# Patient Record
Sex: FEMALE | Race: WHITE | Employment: UNEMPLOYED | URBAN - METROPOLITAN AREA
[De-identification: names, ages, dates, MRNs, and addresses within clinical notes are randomized per-mention and may not be internally consistent; named-entity substitution may affect disease eponyms.]

---

## 2017-01-12 ENCOUNTER — OFFICE VISIT (OUTPATIENT)
Dept: PAIN MANAGEMENT | Age: 62
End: 2017-01-12

## 2017-01-12 VITALS — DIASTOLIC BLOOD PRESSURE: 77 MMHG | RESPIRATION RATE: 14 BRPM | HEART RATE: 56 BPM | SYSTOLIC BLOOD PRESSURE: 132 MMHG

## 2017-01-12 DIAGNOSIS — M54.5 CHRONIC MIDLINE LOW BACK PAIN, WITH SCIATICA PRESENCE UNSPECIFIED: ICD-10-CM

## 2017-01-12 DIAGNOSIS — M16.12 ARTHRITIS OF LEFT HIP: Primary | ICD-10-CM

## 2017-01-12 DIAGNOSIS — M54.17 LUMBOSACRAL RADICULOPATHY: ICD-10-CM

## 2017-01-12 DIAGNOSIS — G89.29 CHRONIC MIDLINE LOW BACK PAIN, WITH SCIATICA PRESENCE UNSPECIFIED: ICD-10-CM

## 2017-01-12 DIAGNOSIS — Z94.0 H/O KIDNEY TRANSPLANT: ICD-10-CM

## 2017-01-12 DIAGNOSIS — Z79.899 ENCOUNTER FOR LONG-TERM (CURRENT) USE OF MEDICATIONS: ICD-10-CM

## 2017-01-12 DIAGNOSIS — M51.37 DEGENERATION OF LUMBAR OR LUMBOSACRAL INTERVERTEBRAL DISC: ICD-10-CM

## 2017-01-12 DIAGNOSIS — Z79.899 ENCOUNTER FOR LONG-TERM (CURRENT) DRUG USE: ICD-10-CM

## 2017-01-12 LAB
ALCOHOL UR POC: NORMAL
AMPHETAMINES UR POC: NEGATIVE
BARBITURATES UR POC: NEGATIVE
BENZODIAZEPINES UR POC: NEGATIVE
BUPRENORPHINE UR POC: NORMAL
CANNABINOIDS UR POC: NEGATIVE
CARISOPRODOL UR POC: NORMAL
COCAINE UR POC: NEGATIVE
FENTANYL UR POC: NORMAL
MDMA/ECSTASY UR POC: NEGATIVE
METHADONE UR POC: NORMAL
METHAMPHETAMINE UR POC: NEGATIVE
METHYLPHENIDATE UR POC: NEGATIVE
OPIATES UR POC: NORMAL
OXYCODONE UR POC: NORMAL
PHENCYCLIDINE UR POC: NEGATIVE
PROPOXYPHENE UR POC: NORMAL
TRAMADOL UR POC: NORMAL
TRICYCLICS UR POC: NEGATIVE

## 2017-01-12 RX ORDER — OXYCODONE AND ACETAMINOPHEN 10; 325 MG/1; MG/1
1 TABLET ORAL
Qty: 60 TAB | Refills: 0 | Status: SHIPPED | OUTPATIENT
Start: 2017-03-11 | End: 2017-04-06 | Stop reason: SDUPTHER

## 2017-01-12 RX ORDER — MORPHINE SULFATE 60 MG/1
60 TABLET, FILM COATED, EXTENDED RELEASE ORAL EVERY 6 HOURS
Qty: 120 TAB | Refills: 0 | Status: SHIPPED | OUTPATIENT
Start: 2017-03-26 | End: 2017-04-06 | Stop reason: SDUPTHER

## 2017-01-12 RX ORDER — MORPHINE SULFATE 60 MG/1
60 TABLET, FILM COATED, EXTENDED RELEASE ORAL EVERY 6 HOURS
Qty: 120 TAB | Refills: 0 | Status: SHIPPED | OUTPATIENT
Start: 2017-01-27 | End: 2017-01-12 | Stop reason: SDUPTHER

## 2017-01-12 RX ORDER — ZOLPIDEM TARTRATE 10 MG/1
TABLET ORAL
Qty: 30 TAB | Refills: 2 | Status: SHIPPED | OUTPATIENT
Start: 2017-01-12 | End: 2017-04-06 | Stop reason: SDUPTHER

## 2017-01-12 RX ORDER — OXYCODONE AND ACETAMINOPHEN 10; 325 MG/1; MG/1
1 TABLET ORAL
Qty: 60 TAB | Refills: 0 | Status: SHIPPED | OUTPATIENT
Start: 2017-02-10 | End: 2017-01-12 | Stop reason: SDUPTHER

## 2017-01-12 RX ORDER — MORPHINE SULFATE 60 MG/1
60 TABLET, FILM COATED, EXTENDED RELEASE ORAL EVERY 6 HOURS
Qty: 120 TAB | Refills: 0 | Status: SHIPPED | OUTPATIENT
Start: 2017-02-25 | End: 2017-01-12 | Stop reason: SDUPTHER

## 2017-01-12 NOTE — PROGRESS NOTES
Nursing Notes    Patient presents to the office today in follow-up. Reviewed medications with counts as follows:    Rx Date filled Qty Dispensed Pill Count Last Dose Short   Morphine er 60 mg 12/29/16 120 63 today no   Percocet 10/325 1/9/17 60 60 n/a no   Ms. Jada Dias has a reminder for a \"due or due soon\" health maintenance. I have asked that she contact her primary care provider for follow-up on this health maintenance. POC UDS was performed in office today    Any new labs or imaging since last appointment? NO    Have you been to an emergency room (ER) or urgent care clinic since your last visit? NO            Have you been hospitalized since your last visit? NO     If yes, where, when, and reason for visit? Have you seen or consulted any other health care providers outside of the [address] [place] [address]  since your last visit? NO     If yes, where, when, and reason for visit?

## 2017-01-12 NOTE — MR AVS SNAPSHOT
Visit Information Date & Time Provider Department Dept. Phone Encounter #  
 1/12/2017  4:00 PM [facility] [id number] Follow-up Instructions Return in about 3 months (around 4/12/2017). Upcoming Health Maintenance Date Due Hepatitis C Screening 1955 FOOT EXAM Q1 9/18/1964 MICROALBUMIN Q1 9/18/1964 Pneumococcal 19-64 Medium Risk (1 of 1 - PPSV23) 9/18/1973 DTaP/Tdap/Td series (1 - Tdap) 9/18/1975 PAP AKA CERVICAL CYTOLOGY 9/18/1975 EYE EXAM RETINAL OR DILATED Q1 2/3/2012 BREAST CANCER SCRN MAMMOGRAM 9/16/2012 ZOSTER VACCINE AGE 60> 9/18/2014 LIPID PANEL Q1 4/21/2015 HEMOGLOBIN A1C Q6M 5/9/2016 INFLUENZA AGE 9 TO ADULT 8/1/2016 COLONOSCOPY 6/27/2021 Allergies as of 1/12/2017  Review Complete On: 1/12/2017 By: Garland Ugalde LPN Severity Noted Reaction Type Reactions Asa-acetaminophen-caff-potass    Unknown (comments) Bystolic [Nebivolol]  81/81/0455    Other (comments) Lipitor [Atorvastatin]  01/25/2012    Unknown (comments) Nsaids (Non-steroidal Anti-inflammatory Drug)    Unknown (comments) Toprol Xl [Metoprolol Succinate]    Other (comments) Asthma Current Immunizations  Never Reviewed Name Date Influenza Vaccine 11/11/2013, 12/18/2012 Influenza Vaccine (Quad) PF 11/10/2015 Not reviewed this visit You Were Diagnosed With   
  
 Codes Comments Arthritis of left hip    -  Primary ICD-10-CM: M19.90 ICD-9-CM: 716.95 Encounter for long-term (current) use of medications     ICD-10-CM: Z79.899 ICD-9-CM: V58.69 Lumbosacral radiculopathy     ICD-10-CM: M54.17 ICD-9-CM: 724.4 Chronic midline low back pain, with sciatica presence unspecified     ICD-10-CM: M54.5, G89.29 ICD-9-CM: 724.2, 338.29  Degeneration of lumbar or lumbosacral intervertebral disc     ICD-10-CM: M51.37 
ICD-9-CM: 722.52   
 Encounter for long-term (current) drug use     ICD-10-CM: Z79.899 ICD-9-CM: V58.69   
 H/O kidney transplant     ICD-10-CM: Z94.0 ICD-9-CM: V42.0 Vitals BP Pulse Resp OB Status Smoking Status 132/77 (!) 56 14 Hysterectomy Never Smoker Vitals History Preferred Pharmacy Pharmacy Name Phone [address], [address] [facility] [phone] Your Updated Medication List  
  
   
This list is accurate as of: 1/12/17  4:56 PM.  Always use your most recent med list.  
  
  
  
  
 Mertha Becka Take  by mouth. doxycycline 50 mg tablet Commonly known as:  ADOXA  
  
 ergocalciferol 50,000 unit capsule Commonly known as:  ERGOCALCIFEROL  
TAKE 1 CAPSULE BY MOUTH EVERY WEEK  
  
 folic acid 742 mcg tablet Take 400 mcg by mouth daily. FOSAMAX 5 mg tablet Generic drug:  alendronate Take 40 mg by mouth Daily (before breakfast). linaclotide 290 mcg Cap capsule Commonly known as:  [place] Take 1 Cap by mouth Daily (before breakfast). For chronic opioid-induced constipation  
  
 lisinopril 20 mg tablet Commonly known as:  Emily November Take  by mouth daily. magic mouthwash Susp Commonly known as:  [place] Take 5 mL by mouth every four (4) hours as needed for Pain (Benadryl- 30 cc; Maalox or equivalent-60 cc; 4 grams of liquid Carafate- 5 cc po-swish, gargle, and swallow q4 hrs. prn sore throat or prn as needed. ). morphine CR 60 mg CR tablet Commonly known as:  MS CONTIN Take 1 Tab by mouth every six (6) hours for 30 days. Indications: CHRONIC PAIN, NEUROPATHIC PAIN, SEVERE PAIN Start taking on:  3/26/2017 * oxyCODONE-acetaminophen  mg per tablet Commonly known as:  PERCOCET 10 Take 1 Tab by mouth. * oxyCODONE-acetaminophen  mg per tablet Commonly known as:  PERCOCET 10 Take 1 Tab by mouth two (2) times daily as needed for Pain (breakthrough) for up to 30 days. Indications: PAIN Start taking on:  3/11/2017 PriLOSEC 40 mg capsule Generic drug:  omeprazole Take 40 mg by mouth daily. simvastatin 80 mg tablet Commonly known as:  ZOCOR Take 80 mg by mouth nightly. ZOFRAN (AS HYDROCHLORIDE) 4 mg tablet Generic drug:  ondansetron hcl Take 4 mg by mouth every eight (8) hours as needed for Nausea. zolpidem 10 mg tablet Commonly known as:  AMBIEN  
TAKE 1 TABLET BY MOUTH NIGHTLY AS NEEDED FOR SLEEP FOR UP TO 30 DAYS * Notice: This list has 2 medication(s) that are the same as other medications prescribed for you. Read the directions carefully, and ask your doctor or other care provider to review them with you. Prescriptions Printed Refills  
 zolpidem (AMBIEN) 10 mg tablet 2 Sig: TAKE 1 TABLET BY MOUTH NIGHTLY AS NEEDED FOR SLEEP FOR UP TO 30 DAYS Class: Print  
 morphine CR (MS CONTIN) 60 mg CR tablet 0 Starting on: 3/26/2017 Sig: Take 1 Tab by mouth every six (6) hours for 30 days. Indications: CHRONIC PAIN, NEUROPATHIC PAIN, SEVERE PAIN Class: Print Route: Oral  
 oxyCODONE-acetaminophen (PERCOCET 10)  mg per tablet 0 Starting on: 3/11/2017 Sig: Take 1 Tab by mouth two (2) times daily as needed for Pain (breakthrough) for up to 30 days. Indications: PAIN Class: Print Route: Oral  
  
Prescriptions Sent to Pharmacy Refills  
 linaclotide (LINZESS) 290 mcg cap capsule 5 Sig: Take 1 Cap by mouth Daily (before breakfast). For chronic opioid-induced constipation Class: Normal  
 Pharmacy: [address] [place] [address] #: [phone] Route: Oral  
  
We Performed the Following AMB POC DRUG SCREEN () [ [facility]] DRUG SCREEN [[id number] Custom] Follow-up Instructions Return in about 3 months (around 4/12/2017). Patient Instructions Plan: Continue same medications as prescribed for chronic pain Follow up in 3 months or sooner if needed Regular exercise and attention to emotional health and diet remain the most effective ways to treat chronic pain of all kinds You may contact me with questions or concerns through [address] Introducing [facility] & HEALTH SERVICES! Dorian Vazquez introduces [employer] patient portal. Now you can access parts of your medical record, email your doctor's office, and request medication refills online. 1. In your internet browser, go to [email or web address] 2. Click on the First Time User? Click Here link in the Sign In box. You will see the New Member Sign Up page. 3. Enter your [employer] Access Code exactly as it appears below. You will not need to use this code after youve completed the sign-up process. If you do not sign up before the expiration date, you must request a new code. · [employer] Access Code: [email or web address] Expires: 4/12/2017  4:52 PM 
 
4. Enter the last four digits of your Social Security Number (xxxx) and Date of Birth (mm/dd/yyyy) as indicated and click Submit. You will be taken to the next sign-up page. 5. Create a [employer] ID. This will be your [employer] login ID and cannot be changed, so think of one that is secure and easy to remember. 6. Create a [employer] password. You can change your password at any time. 7. Enter your Password Reset Question and Answer. This can be used at a later time if you forget your password. 8. Enter your e-mail address. You will receive e-mail notification when new information is available in [address] 9. Click Sign Up. You can now view and download portions of your medical record. 10. Click the Download Summary menu link to download a portable copy of your medical information. If you have questions, please visit the Frequently Asked Questions section of the [employer] website.  Remember, [employer] is NOT to be used for urgent needs. For medical emergencies, dial 911. Now available from your iPhone and Android! Please provide this summary of care documentation to your next provider. Your primary care clinician is listed as Natanael Trevizo. If you have any questions after today's visit, please call [phone].

## 2017-01-12 NOTE — PROGRESS NOTES
HISTORY OF PRESENT ILLNESS  Itz Ramirez is a 57 y.o. female. Patient presents for follow up of chronic lower back and polyarticular pain due to osteoarthritis and lumbar degenerative disc disease. She is s/p renal transplant in 1984. She reports that left hip, which is in need of replacement, has become progressively more painful over the past several months. She is not yet ready to proceed with surgery, but Dr. Arelis Rivero has informed her that \"he's ready when [she] is ready\" to proceed. She continues to complain of persistent back pain, which she describes as aching, shooting, stabbing, and tender. Symptoms worsen with prolonged weight-bearing, sitting, and bending. Pt reports average of 3-4/10 pain scale most days. She denies any new symptoms. Medications continue to work well for pain control overall. Itz Ramirez is tolerating medications well, with no untoward side effects noted. She is able to stay more active with less discomfort with these current doses. The patient reports an average of 60% relief with this regimen. Most recent UDS and  were consistent with prescribed medications. Pill counts are appropriate. She is informed of side effects, risks, and benefits of this regimen, and emphasizes that she derives a significant improvement in functionality and quality of life, and notes that non-opioid medications and therapies in the past have not offered significant benefit. She denies new or worsening insomnia or constipation issues. She denies any falls, injuries, or hospitalizations since the last visit. HPI--see above    ROS  Review of Systems   Constitutional: Negative. Negative for fever and chills. HENT: Negative for ear pain. Eyes: Negative. Negative for pain and discharge. Respiratory: Positive for cough and shortness of breath. Cardiovascular: Negative. Gastrointestinal: Negative for nausea, vomiting and constipation.    Genitourinary: Positive for dysuria (frequent UT ). Negative for urgency, frequency, hematuria and flank pain. Hx of kidney transplant    Musculoskeletal: Positive for back pain (lower back ) and joint pain (hips b/l). Negative for myalgias, falls and neck pain. Currently on Fosamax and Vit D. She reports increased pain. Physical Exam  Musculoskeletal:        Right hip: She exhibits tenderness. She exhibits normal range of motion, normal strength, no bony tenderness, no swelling and no crepitus. Legs:  Well-healed incision at right lateral hip     Constitutional: She appears well-developed and well-nourished. She is cooperative. She does not have a sickly appearance. HENT:   Head: Normocephalic and atraumatic. Right Ear: External ear normal. No drainage. Left Ear: External ear normal. No drainage. Nose: Nose normal.   Eyes: Lids are normal. Right eye exhibits no discharge. Left eye exhibits no discharge. Right conjunctiva has no hemorrhage. Left conjunctiva has no hemorrhage. Neck: Neck supple. No tracheal deviation present. No thyroid mass and no thyromegaly present. Pulmonary/Chest: Effort normal. No respiratory distress. Neurological: She is alert. No cranial nerve deficit. Skin: Skin is intact. No rash noted. Psychiatric: Her speech is normal. Her affect is not angry. She does not express inappropriate judgment. ASSESSMENT and PLAN    ICD-10-CM ICD-9-CM    1. Arthritis of left hip M19.90 716.95    2. Encounter for long-term (current) use of medications Z79.899 V58.69 DRUG SCREEN      AMB POC DRUG SCREEN ()   3. Lumbosacral radiculopathy M54.17 724.4    4. Chronic midline low back pain, with sciatica presence unspecified M54.5 724.2     G89.29 338.29    5. Degeneration of lumbar or lumbosacral intervertebral disc M51.37 722.52    6. Encounter for long-term (current) drug use Z79.899 V58.69    7.  H/O kidney transplant Z94.0 V42.0       Plan:  Continue same medications as prescribed for chronic pain  Follow up in 3 months or sooner if needed  Regular exercise and attention to emotional health and diet remain the most effective ways to treat chronic pain of all kinds  You may contact me with questions or concerns through [address]

## 2017-04-06 ENCOUNTER — OFFICE VISIT (OUTPATIENT)
Dept: PAIN MANAGEMENT | Age: 62
End: 2017-04-06

## 2017-04-06 VITALS
SYSTOLIC BLOOD PRESSURE: 165 MMHG | HEART RATE: 59 BPM | DIASTOLIC BLOOD PRESSURE: 79 MMHG | BODY MASS INDEX: 32.42 KG/M2 | WEIGHT: 183 LBS

## 2017-04-06 DIAGNOSIS — M51.37 DEGENERATION OF LUMBAR OR LUMBOSACRAL INTERVERTEBRAL DISC: ICD-10-CM

## 2017-04-06 DIAGNOSIS — Z96.641 STATUS POST TOTAL REPLACEMENT OF RIGHT HIP: ICD-10-CM

## 2017-04-06 DIAGNOSIS — M54.2 CERVICALGIA: Primary | ICD-10-CM

## 2017-04-06 DIAGNOSIS — K59.09 CHRONIC CONSTIPATION: ICD-10-CM

## 2017-04-06 DIAGNOSIS — Z96.641 HISTORY OF TOTAL HIP REPLACEMENT, RIGHT: ICD-10-CM

## 2017-04-06 DIAGNOSIS — K59.03 CONSTIPATION DUE TO PAIN MEDICATION: ICD-10-CM

## 2017-04-06 DIAGNOSIS — M16.12 ARTHRITIS OF LEFT HIP: ICD-10-CM

## 2017-04-06 DIAGNOSIS — M54.17 LUMBOSACRAL RADICULOPATHY: ICD-10-CM

## 2017-04-06 DIAGNOSIS — Z79.899 ENCOUNTER FOR LONG-TERM (CURRENT) DRUG USE: ICD-10-CM

## 2017-04-06 RX ORDER — MORPHINE SULFATE 60 MG/1
60 TABLET, FILM COATED, EXTENDED RELEASE ORAL EVERY 6 HOURS
Qty: 120 TAB | Refills: 0 | Status: SHIPPED | OUTPATIENT
Start: 2017-06-25 | End: 2017-07-25

## 2017-04-06 RX ORDER — NALOXONE HYDROCHLORIDE 4 MG/.1ML
4 SPRAY NASAL
Qty: 1 PACKAGE | Refills: 0 | Status: SHIPPED | OUTPATIENT
Start: 2017-04-06 | End: 2018-11-06

## 2017-04-06 RX ORDER — MORPHINE SULFATE 60 MG/1
60 TABLET, FILM COATED, EXTENDED RELEASE ORAL EVERY 6 HOURS
Qty: 120 TAB | Refills: 0 | Status: SHIPPED | OUTPATIENT
Start: 2017-04-28 | End: 2017-04-06 | Stop reason: SDUPTHER

## 2017-04-06 RX ORDER — ZOLPIDEM TARTRATE 10 MG/1
TABLET ORAL
Qty: 30 TAB | Refills: 2 | Status: SHIPPED | OUTPATIENT
Start: 2017-04-06 | End: 2017-04-09 | Stop reason: SDUPTHER

## 2017-04-06 RX ORDER — TIZANIDINE 4 MG/1
2-8 TABLET ORAL
Qty: 60 TAB | Refills: 2 | Status: SHIPPED | OUTPATIENT
Start: 2017-04-06 | End: 2017-05-31 | Stop reason: SDUPTHER

## 2017-04-06 RX ORDER — OXYCODONE AND ACETAMINOPHEN 10; 325 MG/1; MG/1
1 TABLET ORAL
Qty: 60 TAB | Refills: 0 | Status: SHIPPED | OUTPATIENT
Start: 2017-05-16 | End: 2017-04-06 | Stop reason: SDUPTHER

## 2017-04-06 RX ORDER — OXYCODONE AND ACETAMINOPHEN 10; 325 MG/1; MG/1
1 TABLET ORAL
Qty: 60 TAB | Refills: 0 | Status: SHIPPED | OUTPATIENT
Start: 2017-06-14 | End: 2017-07-14

## 2017-04-06 RX ORDER — OXYCODONE AND ACETAMINOPHEN 10; 325 MG/1; MG/1
1 TABLET ORAL
Qty: 60 TAB | Refills: 0 | Status: SHIPPED | OUTPATIENT
Start: 2017-04-17 | End: 2017-04-06 | Stop reason: SDUPTHER

## 2017-04-06 RX ORDER — MORPHINE SULFATE 60 MG/1
60 TABLET, FILM COATED, EXTENDED RELEASE ORAL EVERY 6 HOURS
Qty: 120 TAB | Refills: 0 | Status: SHIPPED | OUTPATIENT
Start: 2017-05-27 | End: 2017-04-06 | Stop reason: SDUPTHER

## 2017-04-06 NOTE — PATIENT INSTRUCTIONS
Plan:  Continue same medications as prescribed for chronic pain  Cervical spine x-rays soon to assess for arthritis/DDD  Follow up in 3 months or sooner if needed  Regular exercise and attention to emotional health and diet remain the most effective ways to treat chronic pain of all kinds  You may contact me with questions or concerns through [address]

## 2017-04-06 NOTE — MR AVS SNAPSHOT
Visit Information Date & Time Provider Department Dept. Phone Encounter #  
 4/6/2017  3:20 PM Carlee Schroedertrey [address] [place] [address] Pain Management [phone] [id number] Follow-up Instructions Return in about 3 months (around 7/6/2017). Follow-up and Disposition History Upcoming Health Maintenance Date Due Hepatitis C Screening 1955 FOOT EXAM Q1 9/18/1964 MICROALBUMIN Q1 9/18/1964 Pneumococcal 19-64 Medium Risk (1 of 1 - PPSV23) 9/18/1973 DTaP/Tdap/Td series (1 - Tdap) 9/18/1975 PAP AKA CERVICAL CYTOLOGY 9/18/1975 EYE EXAM RETINAL OR DILATED Q1 2/3/2012 BREAST CANCER SCRN MAMMOGRAM 9/16/2012 ZOSTER VACCINE AGE 60> 9/18/2014 LIPID PANEL Q1 4/21/2015 HEMOGLOBIN A1C Q6M 5/9/2016 INFLUENZA AGE 9 TO ADULT 8/1/2016 COLONOSCOPY 6/27/2021 Allergies as of 4/6/2017  Review Complete On: 4/6/2017 By: Janay Dong RN Severity Noted Reaction Type Reactions Asa-acetaminophen-caff-potass    Unknown (comments) Bystolic [Nebivolol]  62/70/1638    Other (comments) Lipitor [Atorvastatin]  01/25/2012    Unknown (comments) Nsaids (Non-steroidal Anti-inflammatory Drug)    Unknown (comments) Toprol Xl [Metoprolol Succinate]    Other (comments) Asthma Current Immunizations  Never Reviewed Name Date Influenza Vaccine 11/11/2013, 12/18/2012 Influenza Vaccine (Quad) PF 11/10/2015 Not reviewed this visit You Were Diagnosed With   
  
 Codes Comments Cervicalgia    -  Primary ICD-10-CM: M54.2 ICD-9-CM: 723.1 Lumbosacral radiculopathy     ICD-10-CM: M54.17 ICD-9-CM: 724.4 Arthritis of left hip     ICD-10-CM: M16.12 
ICD-9-CM: 716.95 Status post total replacement of right hip     ICD-10-CM: D79.122 ICD-9-CM: V43.64 Constipation due to pain medication     ICD-10-CM: K59.03 
ICD-9-CM: 564.09, E947.9  Chronic constipation     ICD-10-CM: K59.09 
ICD-9-CM: 564.00   
 History of total hip replacement, right     ICD-10-CM: K01.795 ICD-9-CM: V43.64 Degeneration of lumbar or lumbosacral intervertebral disc     ICD-10-CM: M51.37 
ICD-9-CM: 722.52 Encounter for long-term (current) drug use     ICD-10-CM: Z79.899 ICD-9-CM: V58.69 Vitals BP Pulse Weight(growth percentile) BMI OB Status Smoking Status 165/79 (!) 59 183 lb (83 kg) 32.42 kg/m2 Hysterectomy Never Smoker Vitals History BMI and BSA Data Body Mass Index Body Surface Area  
 32.42 kg/m 2 1.92 m 2 Preferred Pharmacy Pharmacy Name Phone [address], [address] [facility] [phone] Your Updated Medication List  
  
   
This list is accurate as of: 4/6/17  4:41 PM.  Always use your most recent med list.  
  
  
  
  
 Liz Matter Take  by mouth. doxycycline 50 mg tablet Commonly known as:  ADOXA  
  
 ergocalciferol 50,000 unit capsule Commonly known as:  ERGOCALCIFEROL  
TAKE 1 CAPSULE BY MOUTH EVERY WEEK  
  
 folic acid 389 mcg tablet Take 400 mcg by mouth daily. FOSAMAX 5 mg tablet Generic drug:  alendronate Take 40 mg by mouth Daily (before breakfast). linaclotide 290 mcg Cap capsule Commonly known as:  Worthy Bonus Take 1 Cap by mouth Daily (before breakfast). For chronic opioid-induced constipation  
  
 lisinopril 20 mg tablet Commonly known as:  Tanda [place] Take  by mouth daily. magic mouthwash Susp Commonly known as:  [place] Take 5 mL by mouth every four (4) hours as needed for Pain (Benadryl- 30 cc; Maalox or equivalent-60 cc; 4 grams of liquid Carafate- 5 cc po-swish, gargle, and swallow q4 hrs. prn sore throat or prn as needed. ). morphine CR 60 mg CR tablet Commonly known as:  MS CONTIN Take 1 Tab by mouth every six (6) hours for 30 days. Indications: Chronic Pain, NEUROPATHIC PAIN, Severe Pain Start taking on:  6/25/2017 naloxone 4 mg/actuation Spry 4 mg by Nasal route once as needed (for opioid overdose) for up to 1 dose. Indications: OPIOID TOXICITY  
  
 * oxyCODONE-acetaminophen  mg per tablet Commonly known as:  PERCOCET 10 Take 1 Tab by mouth. * oxyCODONE-acetaminophen  mg per tablet Commonly known as:  PERCOCET 10 Take 1 Tab by mouth two (2) times daily as needed for Pain (breakthrough) for up to 30 days. Indications: Pain Start taking on:  2017 PriLOSEC 40 mg capsule Generic drug:  omeprazole Take 40 mg by mouth daily. simvastatin 80 mg tablet Commonly known as:  ZOCOR Take 80 mg by mouth nightly. ZOFRAN (AS HYDROCHLORIDE) 4 mg tablet Generic drug:  ondansetron hcl Take 4 mg by mouth every eight (8) hours as needed for Nausea. zolpidem 10 mg tablet Commonly known as:  AMBIEN  
TAKE 1 TABLET BY MOUTH NIGHTLY AS NEEDED FOR SLEEP FOR UP TO 30 DAYS * Notice: This list has 2 medication(s) that are the same as other medications prescribed for you. Read the directions carefully, and ask your doctor or other care provider to review them with you. Prescriptions Printed Refills  
 morphine CR (MS CONTIN) 60 mg CR tablet 0 Starting on: 2017 Sig: Take 1 Tab by mouth every six (6) hours for 30 days. Indications: Chronic Pain, NEUROPATHIC PAIN, Severe Pain Class: Print Route: Oral  
 oxyCODONE-acetaminophen (PERCOCET 10)  mg per tablet 0 Starting on: 2017 Sig: Take 1 Tab by mouth two (2) times daily as needed for Pain (breakthrough) for up to 30 days. Indications: Pain Class: Print Route: Oral  
 zolpidem (AMBIEN) 10 mg tablet 2 Sig: TAKE 1 TABLET BY MOUTH NIGHTLY AS NEEDED FOR SLEEP FOR UP TO 30 DAYS Class: Print Prescriptions Sent to Pharmacy Refills  
 naloxone 4 mg/actuation spry 0  Si mg by Nasal route once as needed (for opioid overdose) for up to 1 dose. Indications: OPIOID TOXICITY Class: Normal  
 Pharmacy: [address] [place] [address] 22 73 Dian Perez [facility] #: [phone] Route: Nasal  
  
Follow-up Instructions Return in about 3 months (around 7/6/2017). To-Do List   
 04/06/2017 Imaging:  XR SPINE CERV 4 OR 5 V Patient Instructions Plan: 
Continue same medications as prescribed for chronic pain Cervical spine x-rays soon to assess for arthritis/DDD Follow up in 3 months or sooner if needed Regular exercise and attention to emotional health and diet remain the most effective ways to treat chronic pain of all kinds You may contact me with questions or concerns through [address] Introducing [facility] HE[facility]! Ramy Ludwig introduces [employer] patient portal. Now you can access parts of your medical record, email your doctor's office, and request medication refills online. 1. In your internet browser, go to [email or web address] 2. Click on the First Time User? Click Here link in the Sign In box. You will see the New Member Sign Up page. 3. Enter your [employer] Access Code exactly as it appears below. You will not need to use this code after youve completed the sign-up process. If you do not sign up before the expiration date, you must request a new code. · [employer] Access Code: [email or web address] Expires: 4/12/2017  5:52 PM 
 
4. Enter the last four digits of your Social Security Number (xxxx) and Date of Birth (mm/dd/yyyy) as indicated and click Submit. You will be taken to the next sign-up page. 5. Create a [employer] ID. This will be your [employer] login ID and cannot be changed, so think of one that is secure and easy to remember. 6. Create a [employer] password. You can change your password at any time. 7. Enter your Password Reset Question and Answer. This can be used at a later time if you forget your password. 8. Enter your e-mail address. You will receive e-mail notification when new information is available in [address] 9. Click Sign Up. You can now view and download portions of your medical record. 10. Click the Download Summary menu link to download a portable copy of your medical information. If you have questions, please visit the Frequently Asked Questions section of the [employer] website. Remember, [employer] is NOT to be used for urgent needs. For medical emergencies, dial 911. Now available from your iPhone and Android! Please provide this summary of care documentation to your next provider. Your primary care clinician is listed as Gia Sharp. If you have any questions after today's visit, please call [phone].

## 2017-04-06 NOTE — PROGRESS NOTES
HISTORY OF PRESENT ILLNESS  Jorgito Paz is a 57 y.o. female. Patient presents for follow up of chronic lower back and polyarticular pain due to osteoarthritis and lumbar degenerative disc disease. She is s/p renal transplant in 1984. She complains of worsening neck pain over the past two months. She describes this pain as stabbing, aching, and tender. The pain tends to worsen in the evenings and she is at rest. Pain also worsens with certain positions of her head, particularly with extension. She denies any upper extremity numbness, tingling, or  strength weakness. We will order cervical spine x-rays to assess for DDD/arthritis of the spine. She is unable to take NSAIDs due to history of renal transplant. She had a bone density in 11/2015, and this revealed increased BMD compared to 2013 (worst T-score was -1.1 at the lumbar spine). She discontinued Fosamax six months ago after five years of use. Medications continue to work well for pain control overall. Jorgito Paz is tolerating medications well, with no untoward side effects noted. She is able to stay more active with less discomfort with these current doses. The patient reports an average of 40-50% relief with this regimen. Most recent UDS and  were consistent with prescribed medications. Pill counts are appropriate. She is informed of side effects, risks, and benefits of this regimen, and emphasizes that she derives a significant improvement in functionality and quality of life, and notes that non-opioid medications and therapies in the past have not offered significant benefit. She denies new or worsening insomnia or constipation issues. She denies any falls, injuries, or hospitalizations since the last visit. HPI--see above    ROS  Review of Systems   Constitutional: Negative. Negative for fever and chills. HENT: Negative for ear pain. Eyes: Negative. Negative for pain and discharge.    Respiratory: Positive for cough and shortness of breath. Cardiovascular: Negative. Gastrointestinal: Negative for nausea, vomiting and constipation. Genitourinary: Positive for dysuria (frequent UT ). Negative for urgency, frequency, hematuria and flank pain. Hx of kidney transplant    Musculoskeletal: Positive for back pain (lower back ) and joint pain (hips b/l). Negative for myalgias, falls and neck pain. Physical Exam  Musculoskeletal:        Right hip: She exhibits tenderness. She exhibits normal range of motion, normal strength, no bony tenderness, no swelling and no crepitus. Legs:  Well-healed incision at right lateral hip  Mild spasms and tenderness of lumbar paraspinous musculature noted      Constitutional: She appears well-developed and well-nourished. She is cooperative. She does not have a sickly appearance. HENT:   Head: Normocephalic and atraumatic. Right Ear: External ear normal. No drainage. Left Ear: External ear normal. No drainage. Nose: Nose normal.   Eyes: Lids are normal. Right eye exhibits no discharge. Left eye exhibits no discharge. Right conjunctiva has no hemorrhage. Left conjunctiva has no hemorrhage. Neck: Neck supple. No tracheal deviation present. No thyroid mass and no thyromegaly present. Pulmonary/Chest: Effort normal. No respiratory distress. Neurological: She is alert. No cranial nerve deficit. Skin: Skin is intact. No rash noted. Psychiatric: Her speech is normal. Her affect is not angry. She does not express inappropriate judgment. ASSESSMENT and PLAN    ICD-10-CM ICD-9-CM    1. Cervicalgia M54.2 723.1 XR SPINE CERV 4 OR 5 V   2. Lumbosacral radiculopathy M54.17 724.4    3. Arthritis of left hip M16.12 716.95    4. 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 Constipation due to pain medication K59.03 564.09      E947.9    6. Chronic constipation K59.09 564.00    7.  History of total hip replacement, right Z96.641 V43.64       Plan:  Continue same medications as prescribed for chronic pain  Cervical spine x-rays soon to assess for arthritis/DDD  Follow up in 3 months or sooner if needed  Regular exercise and attention to emotional health and diet remain the most effective ways to treat chronic pain of all kinds  You may contact me with questions or concerns through [address]

## 2017-04-06 NOTE — PROGRESS NOTES
Nursing Notes    Patient presents to the office today in follow-up. Patient rates her pain at 6/10 on the numerical pain scale. Reviewed medications with counts as follows:    Rx Date filled Qty Dispensed Pill Count Last Dose Short   MS Contin 60 3/30/17 120 92 4/6/17 no   Percocet 10 3/19/17 60 27 4/6/17 no       Comments:     POC UDS was not performed in office today    Any new labs or imaging since last appointment? NO    Have you been to an emergency room (ER) or urgent care clinic since your last visit? NO            Have you been hospitalized since your last visit? NO     If yes, where, when, and reason for visit? Have you seen or consulted any other health care providers outside of the [address] [place] [address]  since your last visit? NO     If yes, where, when, and reason for visit? Ms. Ronna Avila has a reminder for a \"due or due soon\" health maintenance. I have asked that she contact her primary care provider for follow-up on this health maintenance.

## 2017-04-10 RX ORDER — ZOLPIDEM TARTRATE 10 MG/1
TABLET ORAL
Qty: 30 TAB | Refills: 0 | Status: SHIPPED | OUTPATIENT
Start: 2017-04-10 | End: 2017-08-24 | Stop reason: SDUPTHER

## 2017-04-14 ENCOUNTER — HOSPITAL ENCOUNTER (OUTPATIENT)
Dept: GENERAL RADIOLOGY | Age: 62
Discharge: HOME | End: 2017-04-14
Payer: COMMERCIAL

## 2017-04-14 DIAGNOSIS — M54.2 CERVICALGIA: ICD-10-CM

## 2017-04-14 PROCEDURE — 72050 X-RAY EXAM NECK SPINE 4/5VWS: CPT

## 2017-04-18 NOTE — PROGRESS NOTES
Let pt know that xrays of the cervical spine show progression of chronic arthritis, particularly at C5-C7 (lower neck area). If she wishes to schedule any injections, we will need to order an MRI or CT. Otherwise, we will follow up at her next visit.

## 2017-04-20 ENCOUNTER — TELEPHONE (OUTPATIENT)
Dept: PAIN MANAGEMENT | Age: 62
End: 2017-04-20

## 2017-04-25 NOTE — TELEPHONE ENCOUNTER
Let pt know that xrays of the cervical spine show progression of chronic arthritis, particularly at C5-C7 (lower neck area). If she wishes to schedule any injections, we will need to order an MRI or CT. Otherwise, we will follow up at her next visit.  (Routing comment) /kg

## 2017-06-05 RX ORDER — TIZANIDINE 4 MG/1
TABLET ORAL
Qty: 60 TAB | Refills: 0 | Status: SHIPPED | OUTPATIENT
Start: 2017-06-05 | End: 2017-07-02 | Stop reason: SDUPTHER

## 2017-07-05 RX ORDER — TIZANIDINE 4 MG/1
TABLET ORAL
Qty: 60 TAB | Refills: 0 | Status: SHIPPED | OUTPATIENT
Start: 2017-07-05 | End: 2017-08-10 | Stop reason: SDUPTHER

## 2017-08-09 RX ORDER — MORPHINE SULFATE 60 MG/1
30 TABLET, FILM COATED, EXTENDED RELEASE ORAL EVERY 6 HOURS
Qty: 120 TAB | Refills: 0 | Status: SHIPPED | OUTPATIENT
Start: 2017-08-09 | End: 2017-08-24 | Stop reason: SDUPTHER

## 2017-08-09 RX ORDER — OXYCODONE AND ACETAMINOPHEN 10; 325 MG/1; MG/1
1 TABLET ORAL
Qty: 60 TAB | Refills: 0 | Status: SHIPPED | OUTPATIENT
Start: 2017-08-09 | End: 2017-08-24 | Stop reason: SDUPTHER

## 2017-08-09 NOTE — TELEPHONE ENCOUNTER
Received call from patient requesting bridge prescriptions; patient last seen on 04/06/17 and was scheduled to be seen on 06/29/17 , however appointment was rescheduled.

## 2017-08-15 RX ORDER — TIZANIDINE 4 MG/1
TABLET ORAL
Qty: 60 TAB | Refills: 0 | Status: SHIPPED | OUTPATIENT
Start: 2017-08-15 | End: 2017-08-24 | Stop reason: SDUPTHER

## 2017-08-24 ENCOUNTER — OFFICE VISIT (OUTPATIENT)
Dept: PAIN MANAGEMENT | Age: 62
End: 2017-08-24

## 2017-08-24 VITALS
HEART RATE: 55 BPM | RESPIRATION RATE: 14 BRPM | OXYGEN SATURATION: 95 % | WEIGHT: 183 LBS | TEMPERATURE: 98.6 F | DIASTOLIC BLOOD PRESSURE: 77 MMHG | BODY MASS INDEX: 32.42 KG/M2 | SYSTOLIC BLOOD PRESSURE: 143 MMHG

## 2017-08-24 DIAGNOSIS — Z96.641 STATUS POST TOTAL REPLACEMENT OF RIGHT HIP: ICD-10-CM

## 2017-08-24 DIAGNOSIS — Z94.0 H/O KIDNEY TRANSPLANT: ICD-10-CM

## 2017-08-24 DIAGNOSIS — Z96.641 HISTORY OF TOTAL HIP REPLACEMENT, RIGHT: ICD-10-CM

## 2017-08-24 DIAGNOSIS — M54.5 CHRONIC MIDLINE LOW BACK PAIN, WITH SCIATICA PRESENCE UNSPECIFIED: ICD-10-CM

## 2017-08-24 DIAGNOSIS — M51.37 DEGENERATION OF LUMBAR OR LUMBOSACRAL INTERVERTEBRAL DISC: ICD-10-CM

## 2017-08-24 DIAGNOSIS — M16.12 ARTHRITIS OF LEFT HIP: Primary | ICD-10-CM

## 2017-08-24 DIAGNOSIS — K59.03 CONSTIPATION DUE TO PAIN MEDICATION: ICD-10-CM

## 2017-08-24 DIAGNOSIS — G89.29 CHRONIC MIDLINE LOW BACK PAIN, WITH SCIATICA PRESENCE UNSPECIFIED: ICD-10-CM

## 2017-08-24 DIAGNOSIS — Z79.899 ENCOUNTER FOR LONG-TERM (CURRENT) DRUG USE: ICD-10-CM

## 2017-08-24 DIAGNOSIS — M54.17 LUMBOSACRAL RADICULOPATHY: ICD-10-CM

## 2017-08-24 RX ORDER — OXYCODONE AND ACETAMINOPHEN 10; 325 MG/1; MG/1
1 TABLET ORAL
Qty: 60 TAB | Refills: 0 | Status: SHIPPED | OUTPATIENT
Start: 2017-11-05 | End: 2017-08-24 | Stop reason: SDUPTHER

## 2017-08-24 RX ORDER — OXYCODONE AND ACETAMINOPHEN 10; 325 MG/1; MG/1
1 TABLET ORAL
Qty: 60 TAB | Refills: 0 | Status: SHIPPED | OUTPATIENT
Start: 2017-12-04 | End: 2017-12-12

## 2017-08-24 RX ORDER — MORPHINE SULFATE 60 MG/1
30 TABLET, FILM COATED, EXTENDED RELEASE ORAL EVERY 6 HOURS
Qty: 120 TAB | Refills: 0 | Status: SHIPPED | OUTPATIENT
Start: 2017-09-08 | End: 2017-08-24 | Stop reason: SDUPTHER

## 2017-08-24 RX ORDER — OXYCODONE AND ACETAMINOPHEN 10; 325 MG/1; MG/1
1 TABLET ORAL
Qty: 60 TAB | Refills: 0 | Status: SHIPPED | OUTPATIENT
Start: 2017-09-08 | End: 2017-08-24 | Stop reason: SDUPTHER

## 2017-08-24 RX ORDER — ZOLPIDEM TARTRATE 10 MG/1
TABLET ORAL
Qty: 30 TAB | Refills: 3 | Status: SHIPPED | OUTPATIENT
Start: 2017-08-26 | End: 2017-12-12

## 2017-08-24 RX ORDER — TIZANIDINE 4 MG/1
TABLET ORAL
Qty: 60 TAB | Refills: 5 | Status: SHIPPED | OUTPATIENT
Start: 2017-08-24 | End: 2017-12-12

## 2017-08-24 RX ORDER — MORPHINE SULFATE 60 MG/1
30 TABLET, FILM COATED, EXTENDED RELEASE ORAL EVERY 6 HOURS
Qty: 120 TAB | Refills: 0 | Status: SHIPPED | OUTPATIENT
Start: 2017-10-07 | End: 2017-08-24 | Stop reason: SDUPTHER

## 2017-08-24 RX ORDER — MORPHINE SULFATE 60 MG/1
30 TABLET, FILM COATED, EXTENDED RELEASE ORAL EVERY 6 HOURS
Qty: 120 TAB | Refills: 0 | Status: SHIPPED | OUTPATIENT
Start: 2017-12-04 | End: 2017-12-12

## 2017-08-24 RX ORDER — OXYCODONE AND ACETAMINOPHEN 10; 325 MG/1; MG/1
1 TABLET ORAL
Qty: 60 TAB | Refills: 0 | Status: SHIPPED | OUTPATIENT
Start: 2017-10-07 | End: 2017-08-24 | Stop reason: SDUPTHER

## 2017-08-24 RX ORDER — MORPHINE SULFATE 60 MG/1
30 TABLET, FILM COATED, EXTENDED RELEASE ORAL EVERY 6 HOURS
Qty: 120 TAB | Refills: 0 | Status: SHIPPED | OUTPATIENT
Start: 2017-11-05 | End: 2017-08-24 | Stop reason: SDUPTHER

## 2017-08-24 NOTE — PROGRESS NOTES
Nursing Notes    Patient presents to the office today in follow-up. Patient rates her pain at 7/10 on the numerical pain scale. Reviewed medications with counts as follows:    Rx Date filled Qty Dispensed Pill Count Last Dose Short   ambien 10 mg 07/28/17 30 1 Last pm  Yes  1 day   Ms contin 60 mg ER 08/10/17 120 58 Today no   Percocet 10 mg 08/10/17 60 43 today no         Comments: patient is here today for  A follow up appt today she states her pain level today is a 7   She states she had a bone density test and ultrasound checking her kidneys post transplant  She states she has medications in her pill case. POC UDS was performed in office today    Any new labs or imaging since last appointment? YES Bone Density Ultrasound to check her kidney, labs done at [address] you been to an emergency room (ER) or urgent care clinic since your last visit? NO            Have you been hospitalized since your last visit? NO     If yes, where, when, and reason for visit? Have you seen or consulted any other health care providers outside of the [address] [place] [address]  since your last visit? Marshall Devine MD for transplant  If yes, where, when, and reason for visit? Ms. Renetta Johnson has a reminder for a \"due or due soon\" health maintenance. I have asked that she contact her primary care provider for follow-up on this health maintenance.

## 2017-08-24 NOTE — PATIENT INSTRUCTIONS
Plan:  Continue same medications as prescribed for chronic pain  Follow up in 4 months or sooner if needed  Regular exercise and attention to emotional health and diet remain the most effective ways to treat chronic pain of all kinds  You may contact me with questions or concerns through [address]

## 2017-08-24 NOTE — PROGRESS NOTES
HISTORY OF PRESENT ILLNESS  Sindy Linn is a 57 y.o. female. Patient presents for follow up of chronic lower back and polyarticular pain due to osteoarthritis and lumbar degenerative disc disease. She is s/p renal transplant in 1984. She reports that her pain remains constant but stable since her last visit. She is frustrated that she had to reschedule until today after she states that the staff told her to come in yesterday rather than today. But otherwise, she feels that her pain is adequately managed with her current regimen. She continues to complain of persistent back pain, which she describes as aching, shooting, stabbing, and tender. Symptoms worsen with prolonged weight-bearing, sitting, and bending. Pt reports average of 5/10 pain scale most days. She denies any new symptoms. Medications continue to work well for pain control overall. Sindy Linn is tolerating medications well, with no untoward side effects noted. She is able to stay more active with less discomfort with these current doses. The patient reports an average of 60% relief with this regimen. Most recent UDS and  were consistent with prescribed medications. Pill counts are appropriate. She is informed of side effects, risks, and benefits of this regimen, and emphasizes that she derives a significant improvement in functionality and quality of life, and notes that non-opioid medications and therapies in the past have not offered significant benefit. She denies new or worsening insomnia or constipation issues. She denies any falls, injuries, or hospitalizations since the last visit. HPI--see above    ROS  Constitutional: Negative. Negative for fever and chills. HENT: Negative for ear pain. Eyes: Negative. Negative for pain and discharge. Respiratory: Positive for cough and shortness of breath. Cardiovascular: Negative. Gastrointestinal: Negative for nausea, vomiting and constipation.    Genitourinary: Positive for dysuria (frequent UT ). Negative for urgency, frequency, hematuria and flank pain. Hx of kidney transplant    Musculoskeletal: Positive for back pain (lower back ) and joint pain (hips b/l). Negative for myalgias, falls and neck pain. Physical Exam  Musculoskeletal:        Right hip: She exhibits tenderness. She exhibits normal range of motion, normal strength, no bony tenderness, no swelling and no crepitus. Legs:  Well-healed incision at right lateral hip  Mild spasms and tenderness of lumbar paraspinous musculature noted      Constitutional: She appears well-developed and well-nourished. She is cooperative. She does not have a sickly appearance. HENT:   Head: Normocephalic and atraumatic. Right Ear: External ear normal. No drainage. Left Ear: External ear normal. No drainage. Nose: Nose normal.   Eyes: Lids are normal. Right eye exhibits no discharge. Left eye exhibits no discharge. Right conjunctiva has no hemorrhage. Left conjunctiva has no hemorrhage. Neck: Neck supple. No tracheal deviation present. No thyroid mass and no thyromegaly present. Pulmonary/Chest: Effort normal. No respiratory distress. Neurological: She is alert. No cranial nerve deficit. Skin: Skin is intact. No rash noted. Psychiatric: Her speech is normal. Her affect is not angry. She does not express inappropriate judgment. ASSESSMENT and PLAN    ICD-10-CM ICD-9-CM    1. Arthritis of left hip M16.12 716.95    2. Lumbosacral radiculopathy M54.17 724.4    3. Chronic midline low back pain, with sciatica presence unspecified M54.5 724.2     G89.29 338.29    4. Degeneration of lumbar or lumbosacral intervertebral disc M51.37 722.52    5. Encounter for long-term (current) drug use Z79.899 V58.69    6. H/O kidney transplant Z94.0 V42.0    7. History of total hip replacement, right Z96.641 V43.64    8. 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  Constipation due to pain medication K59.03 564.09 E947.9       Plan:  Continue same medications as prescribed for chronic pain  Follow up in 4 months or sooner if needed  Regular exercise and attention to emotional health and diet remain the most effective ways to treat chronic pain of all kinds  You may contact me with questions or concerns through [address]

## 2017-08-24 NOTE — MR AVS SNAPSHOT
Visit Information Date & Time Provider Department Dept. Phone Encounter #  
 8/24/2017  3:40 PM Carlee [facility] [phone] [id number] Follow-up Instructions Return in about 16 weeks (around 12/14/2017). Follow-up and Disposition History Upcoming Health Maintenance Date Due Hepatitis C Screening 1955 FOOT EXAM Q1 9/18/1964 MICROALBUMIN Q1 9/18/1964 Pneumococcal 19-64 Medium Risk (1 of 1 - PPSV23) 9/18/1973 DTaP/Tdap/Td series (1 - Tdap) 9/18/1975 PAP AKA CERVICAL CYTOLOGY 9/18/1975 EYE EXAM RETINAL OR DILATED Q1 2/3/2012 BREAST CANCER SCRN MAMMOGRAM 9/16/2012 ZOSTER VACCINE AGE 60> 7/18/2014 LIPID PANEL Q1 4/21/2015 HEMOGLOBIN A1C Q6M 5/9/2016 INFLUENZA AGE 9 TO ADULT 8/1/2017 COLONOSCOPY 6/27/2021 Allergies as of 8/24/2017  Review Complete On: 8/24/2017 By: Regla Sharp LPN Severity Noted Reaction Type Reactions Asa-acetaminophen-caff-potass    Unknown (comments) Bystolic [Nebivolol]  66/03/7904    Other (comments) Lipitor [Atorvastatin]  01/25/2012    Unknown (comments) Nsaids (Non-steroidal Anti-inflammatory Drug)    Unknown (comments) Toprol Xl [Metoprolol Succinate]    Other (comments) Asthma Current Immunizations  Never Reviewed Name Date Influenza Vaccine 11/11/2013, 12/18/2012 Influenza Vaccine (Quad) PF 11/10/2015 Not reviewed this visit You Were Diagnosed With   
  
 Codes Comments Arthritis of left hip    -  Primary ICD-10-CM: M16.12 
ICD-9-CM: 716.95 Lumbosacral radiculopathy     ICD-10-CM: M54.17 ICD-9-CM: 724.4 Chronic midline low back pain, with sciatica presence unspecified     ICD-10-CM: M54.5, G89.29 ICD-9-CM: 724.2, 338.29 Degeneration of lumbar or lumbosacral intervertebral disc     ICD-10-CM: M51.37 
ICD-9-CM: 722.52 Encounter for long-term (current) drug use     ICD-10-CM: Z79.899 ICD-9-CM: V58.69   
 H/O kidney transplant     ICD-10-CM: Z94.0 ICD-9-CM: V42.0 History of total hip replacement, right     ICD-10-CM: G18.733 ICD-9-CM: V43.64 Status post total replacement of right hip     ICD-10-CM: Z03.897 ICD-9-CM: V43.64 Constipation due to pain medication     ICD-10-CM: K59.03 
ICD-9-CM: 564.09, E947.9 Vitals BP Pulse Temp Resp Weight(growth percentile) SpO2  
 143/77 (BP 1 Location: Right arm, BP Patient Position: Sitting) (!) 55 98.6 °F (37 °C) 14 183 lb (83 kg) 95% BMI OB Status Smoking Status 32.42 kg/m2 Hysterectomy Never Smoker BMI and BSA Data Body Mass Index Body Surface Area  
 32.42 kg/m 2 1.92 m 2 Preferred Pharmacy Pharmacy Name Phone [address] [facility] 17 [facility] 22 [address] [phone] Your Updated Medication List  
  
   
This list is accurate as of: 8/24/17  5:03 PM.  Always use your most recent med list.  
  
  
  
  
 Jud Peña Take  by mouth. doxycycline 50 mg tablet Commonly known as:  ADOXA  
  
 ergocalciferol 50,000 unit capsule Commonly known as:  ERGOCALCIFEROL  
TAKE 1 CAPSULE BY MOUTH EVERY WEEK  
  
 folic acid 258 mcg tablet Take 400 mcg by mouth daily. FOSAMAX 5 mg tablet Generic drug:  alendronate Take 40 mg by mouth Daily (before breakfast). linaclotide 290 mcg Cap capsule Commonly known as:  Mack Stagger Take 1 Cap by mouth Daily (before breakfast). For chronic opioid-induced constipation  
  
 lisinopril 20 mg tablet Commonly known as:  Pecola Cypher Take  by mouth daily. magic mouthwash Susp Commonly known as:  [place] Take 5 mL by mouth every four (4) hours as needed for Pain (Benadryl- 30 cc; Maalox or equivalent-60 cc; 4 grams of liquid Carafate- 5 cc po-swish, gargle, and swallow q4 hrs. prn sore throat or prn as needed. ). morphine CR 60 mg CR tablet Commonly known as:  MS CONTIN  
 Take 1 Tab by mouth every six (6) hours. Max Daily Amount: 240 mg. Start taking on:  12/4/2017  
  
 naloxone 4 mg/actuation Spry 4 mg by Nasal route once as needed (for opioid overdose) for up to 1 dose. Indications: OPIOID TOXICITY  
  
 * oxyCODONE-acetaminophen  mg per tablet Commonly known as:  PERCOCET 10 Take 1 Tab by mouth. * oxyCODONE-acetaminophen  mg per tablet Commonly known as:  PERCOCET 10 Take 1 Tab by mouth two (2) times daily as needed for Pain. Max Daily Amount: 2 Tabs. Start taking on:  12/4/2017 PriLOSEC 40 mg capsule Generic drug:  omeprazole Take 40 mg by mouth daily. simvastatin 80 mg tablet Commonly known as:  ZOCOR Take 80 mg by mouth nightly. tiZANidine 4 mg tablet Commonly known as:  McFarlan Bouche TAKE 1 TO 2 TABLETS BY MOUTH EVERY 6 HOURS AS NEEDED FOR MUSCLE SPASMS ZOFRAN (AS HYDROCHLORIDE) 4 mg tablet Generic drug:  ondansetron hcl Take 4 mg by mouth every eight (8) hours as needed for Nausea. zolpidem 10 mg tablet Commonly known as:  AMBIEN  
TAKE 1 TABLET BY MOUTH NIGHTLY AS NEEDED FOR SLEEP Start taking on:  8/26/2017 * Notice: This list has 2 medication(s) that are the same as other medications prescribed for you. Read the directions carefully, and ask your doctor or other care provider to review them with you. Prescriptions Printed Refills  
 zolpidem (AMBIEN) 10 mg tablet 3 Starting on: 8/26/2017 Sig: TAKE 1 TABLET BY MOUTH NIGHTLY AS NEEDED FOR SLEEP Class: Print  
 morphine CR (MS CONTIN) 60 mg CR tablet 0 Starting on: 12/4/2017 Sig: Take 1 Tab by mouth every six (6) hours. Max Daily Amount: 240 mg.  
 Class: Print Route: Oral  
 oxyCODONE-acetaminophen (PERCOCET 10)  mg per tablet 0 Starting on: 12/4/2017 Sig: Take 1 Tab by mouth two (2) times daily as needed for Pain. Max Daily Amount: 2 Tabs. Class: Print  Route: Oral  
  
 Prescriptions Sent to Pharmacy Refills  
 linaclotide (LINZESS) 290 mcg cap capsule 5 Sig: Take 1 Cap by mouth Daily (before breakfast). For chronic opioid-induced constipation Class: Normal  
 Pharmacy: [address] [place] [address] #: [phone] Route: Oral  
 tiZANidine (ZANAFLEX) 4 mg tablet 5 Sig: TAKE 1 TO 2 TABLETS BY MOUTH EVERY 6 HOURS AS NEEDED FOR MUSCLE SPASMS Class: Normal  
 Pharmacy: [address] [place] [address] Ph #: [phone] Follow-up Instructions Return in about 16 weeks (around 12/14/2017). Patient Instructions Plan: 
Continue same medications as prescribed for chronic pain Follow up in 4 months or sooner if needed Regular exercise and attention to emotional health and diet remain the most effective ways to treat chronic pain of all kinds You may contact me with questions or concerns through [address] Introducing [facility]! Edita Verdugo introduces [employer] patient portal. Now you can access parts of your medical record, email your doctor's office, and request medication refills online. 1. In your internet browser, go to [email or web address] 2. Click on the First Time User? Click Here link in the Sign In box. You will see the New Member Sign Up page. 3. Enter your [employer] Access Code exactly as it appears below. You will not need to use this code after youve completed the sign-up process. If you do not sign up before the expiration date, you must request a new code. · [employer] Access Code: [email or web address] Expires: 11/22/2017  5:03 PM 
 
4. Enter the last four digits of your Social Security Number (xxxx) and Date of Birth (mm/dd/yyyy) as indicated and click Submit. You will be taken to the next sign-up page. 5. Create a [employer] ID.  This will be your [employer] login ID and cannot be changed, so think of one that is secure and easy to remember. 6. Create a [facility] password. You can change your password at any time. 7. Enter your Password Reset Question and Answer. This can be used at a later time if you forget your password. 8. Enter your e-mail address. You will receive e-mail notification when new information is available in [address] 9. Click Sign Up. You can now view and download portions of your medical record. 10. Click the Download Summary menu link to download a portable copy of your medical information. If you have questions, please visit the Frequently Asked Questions section of the [facility] website. Remember, [facility] is NOT to be used for urgent needs. For medical emergencies, dial 911. Now available from your iPhone and Android! Please provide this summary of care documentation to your next provider. Your primary care clinician is listed as Shivani Levy. If you have any questions after today's visit, please call [phone].

## 2017-12-12 ENCOUNTER — OFFICE VISIT (OUTPATIENT)
Dept: PAIN MANAGEMENT | Age: 62
End: 2017-12-12

## 2017-12-12 VITALS
WEIGHT: 189 LBS | DIASTOLIC BLOOD PRESSURE: 79 MMHG | TEMPERATURE: 98.3 F | RESPIRATION RATE: 20 BRPM | BODY MASS INDEX: 33.48 KG/M2 | SYSTOLIC BLOOD PRESSURE: 134 MMHG | HEART RATE: 75 BPM

## 2017-12-12 DIAGNOSIS — M54.17 LUMBOSACRAL RADICULOPATHY: ICD-10-CM

## 2017-12-12 DIAGNOSIS — M54.5 CHRONIC MIDLINE LOW BACK PAIN, WITH SCIATICA PRESENCE UNSPECIFIED: Primary | ICD-10-CM

## 2017-12-12 DIAGNOSIS — G89.4 CHRONIC PAIN SYNDROME: ICD-10-CM

## 2017-12-12 DIAGNOSIS — Z86.59 HISTORY OF DEPRESSION: ICD-10-CM

## 2017-12-12 DIAGNOSIS — M51.37 DEGENERATION OF LUMBAR OR LUMBOSACRAL INTERVERTEBRAL DISC: ICD-10-CM

## 2017-12-12 DIAGNOSIS — G89.29 CHRONIC MIDLINE LOW BACK PAIN, WITH SCIATICA PRESENCE UNSPECIFIED: Primary | ICD-10-CM

## 2017-12-12 DIAGNOSIS — Z96.641 STATUS POST TOTAL REPLACEMENT OF RIGHT HIP: ICD-10-CM

## 2017-12-12 DIAGNOSIS — M16.12 ARTHRITIS OF LEFT HIP: ICD-10-CM

## 2017-12-12 DIAGNOSIS — Z94.0 H/O KIDNEY TRANSPLANT: ICD-10-CM

## 2017-12-12 DIAGNOSIS — F41.1 GAD (GENERALIZED ANXIETY DISORDER): ICD-10-CM

## 2017-12-12 RX ORDER — MORPHINE SULFATE 60 MG/1
30 TABLET, FILM COATED, EXTENDED RELEASE ORAL EVERY 6 HOURS
Qty: 120 TAB | Refills: 0 | Status: SHIPPED | OUTPATIENT
Start: 2017-12-12 | End: 2018-05-14 | Stop reason: SDUPTHER

## 2017-12-12 RX ORDER — MORPHINE SULFATE 60 MG/1
30 TABLET, FILM COATED, EXTENDED RELEASE ORAL EVERY 6 HOURS
Qty: 120 TAB | Refills: 0 | Status: SHIPPED | OUTPATIENT
Start: 2018-02-10 | End: 2018-03-14 | Stop reason: SDUPTHER

## 2017-12-12 RX ORDER — ZOLPIDEM TARTRATE 10 MG/1
TABLET ORAL
Qty: 30 TAB | Refills: 3 | Status: SHIPPED | OUTPATIENT
Start: 2017-12-12 | End: 2018-08-09

## 2017-12-12 RX ORDER — OXYCODONE AND ACETAMINOPHEN 10; 325 MG/1; MG/1
1 TABLET ORAL
Qty: 60 TAB | Refills: 0 | Status: SHIPPED | OUTPATIENT
Start: 2018-02-10 | End: 2018-03-14 | Stop reason: SDUPTHER

## 2017-12-12 RX ORDER — OXYCODONE AND ACETAMINOPHEN 10; 325 MG/1; MG/1
1 TABLET ORAL
Qty: 60 TAB | Refills: 0 | Status: SHIPPED | OUTPATIENT
Start: 2017-12-12 | End: 2018-05-14 | Stop reason: SDUPTHER

## 2017-12-12 RX ORDER — OXYCODONE AND ACETAMINOPHEN 10; 325 MG/1; MG/1
1 TABLET ORAL
Qty: 60 TAB | Refills: 0 | Status: SHIPPED | OUTPATIENT
Start: 2018-01-11 | End: 2018-03-14 | Stop reason: SDUPTHER

## 2017-12-12 RX ORDER — MORPHINE SULFATE 60 MG/1
30 TABLET, FILM COATED, EXTENDED RELEASE ORAL EVERY 6 HOURS
Qty: 120 TAB | Refills: 0 | Status: SHIPPED | OUTPATIENT
Start: 2018-01-11 | End: 2018-03-14 | Stop reason: SDUPTHER

## 2017-12-12 NOTE — MR AVS SNAPSHOT
Visit Information Date & Time Provider Department Dept. Phone Encounter #  
 12/12/2017  1:40 PM Thamas Dakin, MD [address] [place] [address] Pain Management [id number] Follow-up Instructions Return in about 3 months (around 3/12/2018). Follow-up and Disposition History Upcoming Health Maintenance Date Due Hepatitis C Screening 1955 FOOT EXAM Q1 9/18/1964 MICROALBUMIN Q1 9/18/1964 Pneumococcal 19-64 Medium Risk (1 of 1 - PPSV23) 9/18/1973 DTaP/Tdap/Td series (1 - Tdap) 9/18/1975 PAP AKA CERVICAL CYTOLOGY 9/18/1975 EYE EXAM RETINAL OR DILATED Q1 2/3/2012 ZOSTER VACCINE AGE 60> 7/18/2014 LIPID PANEL Q1 4/21/2015 HEMOGLOBIN A1C Q6M 5/9/2016 COLONOSCOPY 6/27/2021 Allergies as of 12/12/2017  Review Complete On: 12/12/2017 By: Thamas Dakin, MD  
  
 Severity Noted Reaction Type Reactions Asa-acetaminophen-caff-potass    Unknown (comments) Bystolic [Nebivolol]  97/32/5985    Other (comments) Lipitor [Atorvastatin]  01/25/2012    Unknown (comments) Nsaids (Non-steroidal Anti-inflammatory Drug)    Unknown (comments) Toprol Xl [Metoprolol Succinate]    Other (comments) Asthma Current Immunizations  Never Reviewed Name Date Influenza Vaccine 11/11/2013, 12/18/2012 Influenza Vaccine (Quad) PF 11/10/2015 Not reviewed this visit You Were Diagnosed With   
  
 Codes Comments Chronic midline low back pain, with sciatica presence unspecified    -  Primary ICD-10-CM: M54.5, G89.29 ICD-9-CM: 724.2, 338.29 Arthritis of left hip     ICD-10-CM: M16.12 
ICD-9-CM: 716.95 Status post total replacement of right hip     ICD-10-CM: E70.697 ICD-9-CM: V43.64 KIM (generalized anxiety disorder)     ICD-10-CM: F41.1 ICD-9-CM: 300.02 Chronic pain syndrome     ICD-10-CM: G89.4 ICD-9-CM: 338.4 H/O kidney transplant     ICD-10-CM: Z94.0 ICD-9-CM: V42.0 Lumbosacral radiculopathy     ICD-10-CM: M54.17 ICD-9-CM: 724.4 Degeneration of lumbar or lumbosacral intervertebral disc     ICD-10-CM: M51.37 
ICD-9-CM: 722.52 History of depression     ICD-10-CM: Z86.59 
ICD-9-CM: V11.8 Vitals BP Pulse Temp Resp Weight(growth percentile) BMI  
 134/79 75 98.3 °F (36.8 °C) 20 189 lb (85.7 kg) 33.48 kg/m2 OB Status Smoking Status Hysterectomy Never Smoker Vitals History BMI and BSA Data Body Mass Index Body Surface Area  
 33.48 kg/m 2 1.95 m 2 Preferred Pharmacy Pharmacy Name Phone [address], Dipak Navarrete 17 [facility] 22 1700 [facility] [phone] Your Updated Medication List  
  
   
This list is accurate as of: 12/12/17  2:05 PM.  Always use your most recent med list.  
  
  
  
  
 Pamela Ashraf Take  by mouth. doxycycline 50 mg tablet Commonly known as:  ADOXA  
  
 ergocalciferol 50,000 unit capsule Commonly known as:  ERGOCALCIFEROL  
TAKE 1 CAPSULE BY MOUTH EVERY WEEK  
  
 folic acid 723 mcg tablet Take 400 mcg by mouth daily. FOSAMAX 5 mg tablet Generic drug:  alendronate Take 40 mg by mouth Daily (before breakfast). linaclotide 290 mcg Cap capsule Commonly known as:  [place] Dikes Take 1 Cap by mouth Daily (before breakfast). For chronic opioid-induced constipation  
  
 lisinopril 20 mg tablet Commonly known as:  Aundra Rian Take  by mouth daily. magic mouthwash Susp Commonly known as:  [place] Take 5 mL by mouth every four (4) hours as needed for Pain (Benadryl- 30 cc; Maalox or equivalent-60 cc; 4 grams of liquid Carafate- 5 cc po-swish, gargle, and swallow q4 hrs. prn sore throat or prn as needed. ). * morphine CR 60 mg CR tablet Commonly known as:  MS CONTIN Take 1 Tab by mouth every six (6) hours. Max Daily Amount: 240 mg.  
  
 * morphine CR 60 mg CR tablet Commonly known as:  MS CONTIN  
 Take 1 Tab by mouth every six (6) hours. Max Daily Amount: 240 mg. Start taking on:  1/11/2018 * morphine CR 60 mg CR tablet Commonly known as:  MS CONTIN Take 1 Tab by mouth every six (6) hours. Max Daily Amount: 240 mg. Start taking on:  2/10/2018  
  
 naloxone 4 mg/actuation nasal spray Commonly known as:  NARCAN  
4 mg by Nasal route once as needed (for opioid overdose) for up to 1 dose. Indications: OPIOID TOXICITY  
  
 * oxyCODONE-acetaminophen  mg per tablet Commonly known as:  PERCOCET 10 Take 1 Tab by mouth. * oxyCODONE-acetaminophen  mg per tablet Commonly known as:  PERCOCET 10 Take 1 Tab by mouth two (2) times daily as needed for Pain. Max Daily Amount: 2 Tabs. * oxyCODONE-acetaminophen  mg per tablet Commonly known as:  PERCOCET 10 Take 1 Tab by mouth two (2) times daily as needed for Pain. Max Daily Amount: 2 Tabs. Start taking on:  1/11/2018 * oxyCODONE-acetaminophen  mg per tablet Commonly known as:  PERCOCET 10 Take 1 Tab by mouth two (2) times daily as needed for Pain. Max Daily Amount: 2 Tabs. Start taking on:  2/10/2018 PriLOSEC 40 mg capsule Generic drug:  omeprazole Take 40 mg by mouth daily. simvastatin 80 mg tablet Commonly known as:  ZOCOR Take 80 mg by mouth nightly. ZOFRAN (AS HYDROCHLORIDE) 4 mg tablet Generic drug:  ondansetron hcl Take 4 mg by mouth every eight (8) hours as needed for Nausea. zolpidem 10 mg tablet Commonly known as:  AMBIEN  
TAKE 1 TABLET BY MOUTH NIGHTLY AS NEEDED FOR SLEEP * Notice: This list has 7 medication(s) that are the same as other medications prescribed for you. Read the directions carefully, and ask your doctor or other care provider to review them with you. Prescriptions Printed Refills  
 zolpidem (AMBIEN) 10 mg tablet 3 Sig: TAKE 1 TABLET BY MOUTH NIGHTLY AS NEEDED FOR SLEEP Class: Print morphine CR (MS CONTIN) 60 mg CR tablet 0 Sig: Take 1 Tab by mouth every six (6) hours. Max Daily Amount: 240 mg.  
 Class: Print Route: Oral  
 morphine CR (MS CONTIN) 60 mg CR tablet 0 Starting on: 2/10/2018 Sig: Take 1 Tab by mouth every six (6) hours. Max Daily Amount: 240 mg.  
 Class: Print Route: Oral  
 morphine CR (MS CONTIN) 60 mg CR tablet 0 Starting on: 1/11/2018 Sig: Take 1 Tab by mouth every six (6) hours. Max Daily Amount: 240 mg.  
 Class: Print Route: Oral  
 oxyCODONE-acetaminophen (PERCOCET 10)  mg per tablet 0 Sig: Take 1 Tab by mouth two (2) times daily as needed for Pain. Max Daily Amount: 2 Tabs. Class: Print Route: Oral  
 oxyCODONE-acetaminophen (PERCOCET 10)  mg per tablet 0 Starting on: 2/10/2018 Sig: Take 1 Tab by mouth two (2) times daily as needed for Pain. Max Daily Amount: 2 Tabs. Class: Print Route: Oral  
 oxyCODONE-acetaminophen (PERCOCET 10)  mg per tablet 0 Starting on: 1/11/2018 Sig: Take 1 Tab by mouth two (2) times daily as needed for Pain. Max Daily Amount: 2 Tabs. Class: Print Route: Oral  
  
Prescriptions Sent to Pharmacy Refills  
 linaclotide (LINZESS) 290 mcg cap capsule 2 Sig: Take 1 Cap by mouth Daily (before breakfast). For chronic opioid-induced constipation Class: Normal  
 Pharmacy: [address] [place] [address] #: [phone] Route: Oral  
  
Follow-up Instructions Return in about 3 months (around 3/12/2018). Introducing [facility]! [employer] introduces [employer] patient portal. Now you can access parts of your medical record, email your doctor's office, and request medication refills online. 1. In your internet browser, go to [email or web address] 2. Click on the First Time User? Click Here link in the Sign In box. You will see the New Member Sign Up page. 3. Enter your [employer] Access Code exactly as it appears below. You will not need to use this code after youve completed the sign-up process. If you do not sign up before the expiration date, you must request a new code. · [employer] Access Code: [email or web address] Expires: 3/12/2018  2:05 PM 
 
4. Enter the last four digits of your Social Security Number (xxxx) and Date of Birth (mm/dd/yyyy) as indicated and click Submit. You will be taken to the next sign-up page. 5. Create a [employer] ID. This will be your [employer] login ID and cannot be changed, so think of one that is secure and easy to remember. 6. Create a [employer] password. You can change your password at any time. 7. Enter your Password Reset Question and Answer. This can be used at a later time if you forget your password. 8. Enter your e-mail address. You will receive e-mail notification when new information is available in [address]. 9. Click Sign Up. You can now view and download portions of your medical record. 10. Click the Download Summary menu link to download a portable copy of your medical information. If you have questions, please visit the Frequently Asked Questions section of the [employer] website. Remember, [employer] is NOT to be used for urgent needs. For medical emergencies, dial 911. Now available from your iPhone and Android! Please provide this summary of care documentation to your next provider. If you have any questions after today's visit, please call [phone].

## 2017-12-12 NOTE — PROGRESS NOTES
HISTORY OF PRESENT ILLNESS  Janna Romero is a 60 y.o. female. HPI Comments: The patient's care with this clinic has been under the direction of Dr. Leopold Brooke. I have reviewed medical information today including progress notes. Review of past treatments, past medications, current medications. Review of diagnoses. I have obtained history from the patient today as well. There have been changes concerning pain management across our country and in the state of [place]. Also, changes within our own clinic. Nursing discussed these changes with the patient. I also spoke with the patient on these matters today. The patient's questions were answered. The patient is aware that Dr. Leopold Brooke is no longer with this clinic. The patient is also aware that they are free to choose a different pain clinic if they wish and our clinic will help transition them if necessary. Chronic pain and opioids. Also, when appropriate, the patient will receive a copy of a research study, titled- Benzodiazepines and risk of all cause mortality in adults. Visit survey reviewed  Chief complaint- chronic pain syndrome- low back pain, hip pain  Medication helps general activity, mood, walking, sleep  She will continue with Percocet 10 mg twice a day as needed  Ambien 10 mg as needed to help with sleep  Extended release morphine 60 mg 4 times a day  She is on prednisone, on a daily basis for her history of kidney transplant. This is prescribed by a different clinic. No new significant side effects reported  Medication continues to help improve quality of life. Medications reviewed including risk and benefits. Recent average level of pain-6        Review of Systems   Constitutional: Negative for chills and fever. HENT: Negative for ear discharge and ear pain. Eyes: Negative for discharge and redness. Respiratory: Negative for hemoptysis and wheezing. Gastrointestinal: Negative for blood in stool and melena. Musculoskeletal: Positive for back pain and myalgias. Skin: Negative for itching and rash. Neurological: Negative for seizures and loss of consciousness. Psychiatric/Behavioral: Negative for hallucinations and suicidal ideas. Physical Exam   Constitutional: She appears well-developed and well-nourished. She is cooperative. She does not have a sickly appearance. HENT:   Head: Normocephalic and atraumatic. Right Ear: External ear normal. No drainage. Left Ear: External ear normal. No drainage. Nose: Nose normal.   Eyes: Lids are normal. Right eye exhibits no discharge. Left eye exhibits no discharge. Right conjunctiva has no hemorrhage. Left conjunctiva has no hemorrhage. Neck: Neck supple. No tracheal deviation present. No thyroid mass present. Pulmonary/Chest: Effort normal. No respiratory distress. Neurological: She is alert. No cranial nerve deficit. Skin: Skin is intact. No rash noted. Psychiatric: Her speech is normal. Her affect is not angry. She does not express inappropriate judgment. Nursing note and vitals reviewed. ASSESSMENT and PLAN  Encounter Diagnoses   Name Primary?  Arthritis of left hip     Status post total replacement of right hip     KIM (generalized anxiety disorder)     Chronic pain syndrome     H/O kidney transplant     Chronic midline low back pain, with sciatica presence unspecified     Lumbosacral radiculopathy     Degeneration of lumbar or lumbosacral intervertebral disc    No indicators for recent medication misuse.  reviewed. Pain Meds and Quality Of Life have been reviewed. Nonpharmacologic therapy and non-opioid pharmacologic therapy were considered. If opioid therapy is prescribed, this is only if the expected benefits are anticipated to outweigh risks. Possible changes to treatment plan considered. Support/education given as needed. Today-medications are as listed. Follow up -- 3 months.     The patient reports that her most recent prescriptions for Prozac, she states for depression, or from this pain clinic. I explained that I am not comfortable with that medicine coming from the pain clinic. She still has plenty remaining including refills. I have asked her to taper down and discuss this medicine with her primary care physician or a mental health clinic. Pain is a complex sensory and emotional experience intimately related to the concept of suffering and the life experiences and psychological makeup of the individual. There is a host of psychosocial issues,including depression,anxiety,fear,altered social roles,and loss of activities which have a strong scientific basis for contributing to the chronic pain state. The effective treatment of chronic pain involves understanding the individual with pain. Issues of fear avoidance,catastrophizing,belief systems about exercise and injury,sleep disturbance,family dynamics,and other factors may play a role in the patient's experience of pain.

## 2017-12-12 NOTE — PROGRESS NOTES
Nursing Notes    Patient presents to the office today in follow-up. Patient rates her pain at 6/10 on the numerical pain scale. Reviewed medications with counts as follows:    Rx Date filled Qty Dispensed Pill Count Last Dose Short     Morphine sulfate 60mg ER 11/14/17 120 2 This am  No ( with one blaine day )    Percocet 10/325mg 11/19/17 60 12 This am No                                     Comments:     POC UDS was not performed in office today    Any new labs or imaging since last appointment? YES; labwork ; cxr, CT of heart     Have you been to an emergency room (ER) or urgent care clinic since your last visit? YES ; [facility] emergency dept            Have you been hospitalized since your last visit? YES     If yes, where, when, and reason for visit? [facility] in 11/2017 due to CHF    Have you seen or consulted any other health care providers outside of the [address] [place] [address]  since your last visit? YES     If yes, where, when, and reason for visit? emergency dept physicians, hospitalists, cardiology, pcp     Patient did not bring in medications , ambien 10mg (#30) filled on 11/19/17 to this office; advised to bring in all medications to all office visit. HM deferred to pcp.

## 2018-02-22 ENCOUNTER — TELEPHONE (OUTPATIENT)
Dept: PAIN MANAGEMENT | Age: 63
End: 2018-02-22

## 2018-02-22 NOTE — TELEPHONE ENCOUNTER
FEP [employer] approved mser, but denied the quantity of #120/30. They run on a 3 month basis and will only approve #270/30=90/30. Called pt to let her know FEP will only cover 3/day vs 4/day. Pt asked what to do. Explained the insurance isn't saying she can't have 4/day, but they will only cover 3/day. Told her she can pay out of pocket for the extra 30 pills if she can afford it. Pt said she would see how much it costs or will take 3 per day and use her breakthrough meds which she rarely uses.

## 2018-03-14 ENCOUNTER — OFFICE VISIT (OUTPATIENT)
Dept: PAIN MANAGEMENT | Age: 63
End: 2018-03-14

## 2018-03-14 VITALS
RESPIRATION RATE: 18 BRPM | SYSTOLIC BLOOD PRESSURE: 137 MMHG | HEART RATE: 76 BPM | DIASTOLIC BLOOD PRESSURE: 77 MMHG | HEIGHT: 63 IN | BODY MASS INDEX: 33.49 KG/M2 | TEMPERATURE: 98.2 F | WEIGHT: 189 LBS

## 2018-03-14 DIAGNOSIS — M16.12 ARTHRITIS OF LEFT HIP: ICD-10-CM

## 2018-03-14 DIAGNOSIS — Z79.899 ENCOUNTER FOR LONG-TERM (CURRENT) USE OF HIGH-RISK MEDICATION: ICD-10-CM

## 2018-03-14 DIAGNOSIS — M51.37 DEGENERATION OF LUMBAR OR LUMBOSACRAL INTERVERTEBRAL DISC: Primary | ICD-10-CM

## 2018-03-14 DIAGNOSIS — Z96.641 STATUS POST TOTAL REPLACEMENT OF RIGHT HIP: ICD-10-CM

## 2018-03-14 DIAGNOSIS — G89.4 CHRONIC PAIN SYNDROME: ICD-10-CM

## 2018-03-14 DIAGNOSIS — M25.551 PAIN IN JOINT INVOLVING RIGHT PELVIC REGION AND THIGH: ICD-10-CM

## 2018-03-14 LAB
ALCOHOL UR POC: NORMAL
AMPHETAMINES UR POC: NEGATIVE
BARBITURATES UR POC: NORMAL
BENZODIAZEPINES UR POC: NEGATIVE
BUPRENORPHINE UR POC: NEGATIVE
CANNABINOIDS UR POC: NEGATIVE
CARISOPRODOL UR POC: NORMAL
COCAINE UR POC: NEGATIVE
FENTANYL UR POC: NORMAL
MDMA/ECSTASY UR POC: NORMAL
METHADONE UR POC: NEGATIVE
METHAMPHETAMINE UR POC: NORMAL
METHYLPHENIDATE UR POC: NORMAL
OPIATES UR POC: NORMAL
OXYCODONE UR POC: NORMAL
PHENCYCLIDINE UR POC: NORMAL
PROPOXYPHENE UR POC: NORMAL
TRAMADOL UR POC: NORMAL
TRICYCLICS UR POC: NORMAL

## 2018-03-14 RX ORDER — OXYCODONE AND ACETAMINOPHEN 10; 325 MG/1; MG/1
1 TABLET ORAL
Qty: 60 TAB | Refills: 0 | Status: SHIPPED | OUTPATIENT
Start: 2018-05-17 | End: 2018-05-14 | Stop reason: SDUPTHER

## 2018-03-14 RX ORDER — MORPHINE SULFATE 60 MG/1
30 TABLET, FILM COATED, EXTENDED RELEASE ORAL EVERY 8 HOURS
Qty: 90 TAB | Refills: 0 | Status: SHIPPED | OUTPATIENT
Start: 2018-03-21 | End: 2018-08-09 | Stop reason: SDUPTHER

## 2018-03-14 RX ORDER — MORPHINE SULFATE 60 MG/1
30 TABLET, FILM COATED, EXTENDED RELEASE ORAL EVERY 8 HOURS
Qty: 90 TAB | Refills: 0 | Status: SHIPPED | OUTPATIENT
Start: 2018-04-20 | End: 2018-05-14 | Stop reason: SDUPTHER

## 2018-03-14 RX ORDER — OXYCODONE AND ACETAMINOPHEN 10; 325 MG/1; MG/1
1 TABLET ORAL
Qty: 60 TAB | Refills: 0 | Status: SHIPPED | OUTPATIENT
Start: 2018-04-18 | End: 2018-05-14 | Stop reason: SDUPTHER

## 2018-03-14 RX ORDER — OXYCODONE AND ACETAMINOPHEN 10; 325 MG/1; MG/1
1 TABLET ORAL
Qty: 60 TAB | Refills: 0 | Status: SHIPPED | OUTPATIENT
Start: 2018-03-19 | End: 2018-08-09 | Stop reason: SDUPTHER

## 2018-03-14 RX ORDER — MORPHINE SULFATE 60 MG/1
30 TABLET, FILM COATED, EXTENDED RELEASE ORAL EVERY 8 HOURS
Qty: 90 TAB | Refills: 0 | Status: SHIPPED | OUTPATIENT
Start: 2018-05-17 | End: 2018-05-14 | Stop reason: SDUPTHER

## 2018-03-14 NOTE — MR AVS SNAPSHOT
[address] [facility] [address] [phone] Patient: Lynnette Gan MRN: [id number] Visit Information Date & Time Provider Department Dept. Phone Encounter #  
 3/14/2018  2:20 PM Yousif Patel [facility] [phone] [id number] Your Appointments 4/3/2018  2:45 PM  
CONSULT with Kelsey Jorge MD  
[facility] Appt Note: [address] [facility] [address]  
[phone] [id number] 5/31/2018 11:00 AM  
Follow Up with Mervat Gregorio PA-C [facility] ([facility] Appt Note: Thursday, May 31st 2018 @ 11:00AM  
 [address] [facility] [address]  
[phone] [id number] Upcoming Health Maintenance Date Due Hepatitis C Screening 1955 FOOT EXAM Q1 9/18/1964 MICROALBUMIN Q1 9/18/1964 Pneumococcal 19-64 Medium Risk (1 of 1 - PPSV23) 9/18/1973 DTaP/Tdap/Td series (1 - Tdap) 9/18/1975 PAP AKA CERVICAL CYTOLOGY 9/18/1975 EYE EXAM RETINAL OR DILATED Q1 2/3/2012 BREAST CANCER SCRN MAMMOGRAM 9/16/2012 ZOSTER VACCINE AGE 60> 7/18/2014 LIPID PANEL Q1 4/21/2015 HEMOGLOBIN A1C Q6M 5/9/2016 COLONOSCOPY 6/27/2021 Allergies as of 3/14/2018  Review Complete On: 3/14/2018 By: Mervat Gregorio PA-C Severity Noted Reaction Type Reactions Asa-acetaminophen-caff-potass    Unknown (comments) Bystolic [Nebivolol]  61/17/3458    Other (comments) Lipitor [Atorvastatin]  01/25/2012    Unknown (comments) Nsaids (Non-steroidal Anti-inflammatory Drug)    Unknown (comments) Toprol Xl [Metoprolol Succinate]    Other (comments) Asthma Current Immunizations  Never Reviewed Name Date Influenza Vaccine 11/11/2013, 12/18/2012 Influenza Vaccine (Quad) PF 11/10/2015 Not reviewed this visit You Were Diagnosed With   
  
 Codes Comments Degeneration of lumbar or lumbosacral intervertebral disc    -  Primary ICD-10-CM: M51.37 
ICD-9-CM: 722.52 Chronic pain syndrome     ICD-10-CM: G89.4 ICD-9-CM: 338.4 Arthritis of left hip     ICD-10-CM: M16.12 
ICD-9-CM: 716.95 Status post total replacement of right hip     ICD-10-CM: Q68.369 ICD-9-CM: V43.64 Pain in joint involving right pelvic region and thigh     ICD-10-CM: M25.551 ICD-9-CM: 719.45 Encounter for long-term (current) use of high-risk medication     ICD-10-CM: Z79.899 ICD-9-CM: V58.69 Vitals BP Pulse Temp Resp Height(growth percentile) Weight(growth percentile)  
 137/77 (BP 1 Location: Right arm, BP Patient Position: Sitting) 76 98.2 °F (36.8 °C) (Oral) 18 5' 3\" (1.6 m) 189 lb (85.7 kg) BMI OB Status Smoking Status 33.48 kg/m2 Hysterectomy Never Smoker BMI and BSA Data Body Mass Index Body Surface Area  
 33.48 kg/m 2 1.95 m 2 Preferred Pharmacy Pharmacy Name Phone [address] [place] [address] [address] [facility] [phone] Your Updated Medication List  
  
   
This list is accurate as of 3/14/18  3:12 PM.  Always use your most recent med list.  
  
  
  
  
 Abel Cummins Take  by mouth. doxycycline 50 mg tablet Commonly known as:  ADOXA  
  
 ergocalciferol 50,000 unit capsule Commonly known as:  ERGOCALCIFEROL  
TAKE 1 CAPSULE BY MOUTH EVERY WEEK  
  
 folic acid 252 mcg tablet Take 400 mcg by mouth daily. FOSAMAX 5 mg tablet Generic drug:  alendronate Take 40 mg by mouth Daily (before breakfast). linaclotide 290 mcg Cap capsule Commonly known as:  Jaquita Coaster Take 1 Cap by mouth Daily (before breakfast). For chronic opioid-induced constipation  
  
 lisinopril 20 mg tablet Commonly known as:  Lupe Shell Take  by mouth daily. magic mouthwash Susp Commonly known as:  Billy Martinez Take 5 mL by mouth every four (4) hours as needed for Pain (Benadryl- 30 cc; Maalox or equivalent-60 cc; 4 grams of liquid Carafate- 5 cc po-swish, gargle, and swallow q4 hrs. prn sore throat or prn as needed. ). * morphine CR 60 mg CR tablet Commonly known as:  MS CONTIN Take 1 Tab by mouth every six (6) hours. Max Daily Amount: 240 mg.  
  
 * morphine CR 60 mg CR tablet Commonly known as:  MS CONTIN Take 1 Tab by mouth every eight (8) hours. Max Daily Amount: 180 mg. Start taking on:  3/21/2018 * morphine CR 60 mg CR tablet Commonly known as:  MS CONTIN Take 1 Tab by mouth every eight (8) hours. Max Daily Amount: 180 mg. Start taking on:  4/20/2018 * morphine CR 60 mg CR tablet Commonly known as:  MS CONTIN Take 1 Tab by mouth every eight (8) hours. Max Daily Amount: 180 mg. Start taking on:  5/17/2018  
  
 naloxone 4 mg/actuation nasal spray Commonly known as:  NARCAN  
4 mg by Nasal route once as needed (for opioid overdose) for up to 1 dose. Indications: OPIOID TOXICITY  
  
 * oxyCODONE-acetaminophen  mg per tablet Commonly known as:  PERCOCET 10 Take 1 Tab by mouth. * oxyCODONE-acetaminophen  mg per tablet Commonly known as:  PERCOCET 10 Take 1 Tab by mouth two (2) times daily as needed for Pain. Max Daily Amount: 2 Tabs. * oxyCODONE-acetaminophen  mg per tablet Commonly known as:  PERCOCET 10 Take 1 Tab by mouth two (2) times daily as needed for Pain. Max Daily Amount: 2 Tabs. Start taking on:  3/19/2018 * oxyCODONE-acetaminophen  mg per tablet Commonly known as:  PERCOCET 10 Take 1 Tab by mouth two (2) times daily as needed for Pain. Max Daily Amount: 2 Tabs. Start taking on:  4/18/2018 * oxyCODONE-acetaminophen  mg per tablet Commonly known as:  PERCOCET 10  
 Take 1 Tab by mouth two (2) times daily as needed for Pain. Max Daily Amount: 2 Tabs. Start taking on:  5/17/2018 PriLOSEC 40 mg capsule Generic drug:  omeprazole Take 40 mg by mouth daily. simvastatin 80 mg tablet Commonly known as:  ZOCOR Take 80 mg by mouth nightly. ZOFRAN (AS HYDROCHLORIDE) 4 mg tablet Generic drug:  ondansetron hcl Take 4 mg by mouth every eight (8) hours as needed for Nausea. zolpidem 10 mg tablet Commonly known as:  AMBIEN  
TAKE 1 TABLET BY MOUTH NIGHTLY AS NEEDED FOR SLEEP * Notice: This list has 9 medication(s) that are the same as other medications prescribed for you. Read the directions carefully, and ask your doctor or other care provider to review them with you. Prescriptions Printed Refills  
 morphine CR (MS CONTIN) 60 mg CR tablet 0 Starting on: 5/17/2018 Sig: Take 1 Tab by mouth every eight (8) hours. Max Daily Amount: 180 mg.  
 Class: Print Route: Oral  
 morphine CR (MS CONTIN) 60 mg CR tablet 0 Starting on: 4/20/2018 Sig: Take 1 Tab by mouth every eight (8) hours. Max Daily Amount: 180 mg.  
 Class: Print Route: Oral  
 morphine CR (MS CONTIN) 60 mg CR tablet 0 Starting on: 3/21/2018 Sig: Take 1 Tab by mouth every eight (8) hours. Max Daily Amount: 180 mg.  
 Class: Print Route: Oral  
 oxyCODONE-acetaminophen (PERCOCET 10)  mg per tablet 0 Starting on: 5/17/2018 Sig: Take 1 Tab by mouth two (2) times daily as needed for Pain. Max Daily Amount: 2 Tabs. Class: Print Route: Oral  
 oxyCODONE-acetaminophen (PERCOCET 10)  mg per tablet 0 Starting on: 4/18/2018 Sig: Take 1 Tab by mouth two (2) times daily as needed for Pain. Max Daily Amount: 2 Tabs. Class: Print Route: Oral  
 oxyCODONE-acetaminophen (PERCOCET 10)  mg per tablet 0 Starting on: 3/19/2018 Sig: Take 1 Tab by mouth two (2) times daily as needed for Pain. Max Daily Amount: 2 Tabs. Class: Print Route: Oral  
  
We Performed the Following AMB POC DRUG SCREEN () [ \[facility]\""] DRUG SCREEN [[id number] Custom] Introducing [facility]! Anselmo Bui introduces Invarium patient portal. Now you can access parts of your medical record, email your doctor's office, and request medication refills online. 1. In your internet browser, go to [email or web address] 2. Click on the First Time User? Click Here link in the Sign In box. You will see the New Member Sign Up page. 3. Enter your Invarium Access Code exactly as it appears below. You will not need to use this code after youve completed the sign-up process. If you do not sign up before the expiration date, you must request a new code. · Invarium Access Code: [email or web address] Expires: 6/12/2018  3:12 PM 
 
4. Enter the last four digits of your Social Security Number (xxxx) and Date of Birth (mm/dd/yyyy) as indicated and click Submit. You will be taken to the next sign-up page. 5. Create a Invarium ID. This will be your Invarium login ID and cannot be changed, so think of one that is secure and easy to remember. 6. Create a Invarium password. You can change your password at any time. 7. Enter your Password Reset Question and Answer. This can be used at a later time if you forget your password. 8. Enter your e-mail address. You will receive e-mail notification when new information is available in [address] 9. Click Sign Up. You can now view and download portions of your medical record. 10. Click the Download Summary menu link to download a portable copy of your medical information. If you have questions, please visit the Frequently Asked Questions section of the Invarium website. Remember, In[employer]ium is NOT to be used for urgent needs. For medical emergencies, dial 911. Now available from your iPhone and Android! Please provide this summary of care documentation to your next provider. If you have any questions after today's visit, please call [phone].

## 2018-03-14 NOTE — PROGRESS NOTES
HISTORY OF PRESENT ILLNESS  Tim Johnson is a 60 y.o. female    Ms. Esme Jett presents for follow up of chronic lower back and polyarticular pain due to osteoarthritis and lumbar degenerative disc disease. She is s/p renal transplant in 1984. This is my first visit with this patient today. The patient denies any significant changes since last f/u. We discussed her current condition and medications in detail today. She tolerates medications without side effects. Tim Johnson reports no change in sleep or constipation. She uses Colace and over-the-counter laxative for occasional constipation. .      Current medication management consists of:MS Contin 60 mg tablet, take 1 tablet every 8 hours, oxycodone/acetaminophen 10/325 mg tablet, take 1 tablet 2 times daily as needed  Medications are helping with pain control and quality of life. Her pain is 2-3/10 with medication and 7 8/10 without. Pt describes pain as aching, stabbing, and burning. Aggravating factors include standing, sitting, and walking. Relieved with rest, medication, and avoiding painful activities. The patient reports 70% relief with current medications. Current treatment is helping to improve general activity, mood, walking, sleep, enjoyment of life    Measuring clinical outcomes of chronic pain patients: score 12/28; the lower the number the better the outcome. Pain Meds and Quality Of Life have been reviewed. Nonpharmacologic therapy and non-opioid pharmacologic therapy were considered. If opioid therapy is prescribed, this is only if the expected benefits are anticipated to outweigh risks. She  is otherwise doing well with no other complaints today. She denies any adverse events including nausea, vomiting, dizziness, increased constipation, hallucinations, or seizures. The patient reports functional improvement and QOL with pain medication.        Vitals:    03/14/18 1437   BP: 137/77   Pulse: 76   Resp: 18   Temp: 98.2 °F (36.8 °C)   TempSrc: Oral   Weight: 85.7 kg (189 lb)   Height: 5' 3\" (1.6 m)   PainSc:   7   PainLoc: Back         Allergies   Allergen Reactions    Asa-Acetaminophen-Caff-Potass Unknown (comments)    Bystolic [Nebivolol] Other (comments)    Lipitor [Atorvastatin] Unknown (comments)    Nsaids (Non-Steroidal Anti-Inflammatory Drug) Unknown (comments)    Toprol Xl [Metoprolol Succinate] Other (comments)     Asthma       Past Surgical History:   Procedure Laterality Date    ABDOMEN SURGERY PROC UNLISTED      HX GYN      Ovary    HX HEENT      nose surgery    HX HYSTERECTOMY      HX ORTHOPAEDIC  [id number]    hip replacement    HX UROLOGICAL      Kidney Transplant    NEPHRECTOMY      bilateral       ROS   Constitutional: Negative. Negative for fever and chills. HENT: Negative for ear pain. Eyes: Negative. Negative for pain and discharge. Respiratory: Positive for cough and shortness of breath. Cardiovascular: Negative. Gastrointestinal: Negative for nausea, vomiting and constipation. Genitourinary: Positive for dysuria (frequent UT ). Negative for urgency, frequency, hematuria and flank pain. Hx of kidney transplant    Musculoskeletal: Positive for back pain (lower back ) and joint pain (hips b/l). Negative for myalgias, falls and neck pain. Physical Exam   Musculoskeletal:        Right hip: She exhibits tenderness. She exhibits normal range of motion, normal strength, no bony tenderness, no swelling and no crepitus. Legs:  Well-healed incision at right lateral hip  Mild spasms and tenderness of lumbar paraspinous musculature noted       Constitutional: She appears well-developed and well-nourished. She is cooperative. She does not have a sickly appearance. HENT:   Head: Normocephalic and atraumatic. Right Ear: External ear normal. No drainage. Left Ear: External ear normal. No drainage. Nose: Nose normal.   Eyes: Lids are normal. Right eye exhibits no discharge.  Left eye exhibits no discharge. Right conjunctiva has no hemorrhage. Left conjunctiva has no hemorrhage. Neck: Neck supple. No tracheal deviation present. No thyroid mass and no thyromegaly present. Pulmonary/Chest: Effort normal. No respiratory distress. Neurological: She is alert. No cranial nerve deficit. Skin: Skin is intact. No rash noted. Psychiatric: Her speech is normal. Her affect is not angry. She does not express inappropriate judgment. ASSESSMENT:    1. Degeneration of lumbar or lumbosacral intervertebral disc    2. Chronic pain syndrome    3. Arthritis of left hip    4. Status post total replacement of right hip    5. Pain in joint involving right pelvic region and thigh    6. Encounter for long-term (current) use of high-risk medication          [place] Prescription Monitoring Program was reviewed which does not demonstrate aberrancies and/or inconsistencies with regard to the historical prescribing of controlled medications to this patient by other providers. Medications were brought to visit today. Pill count was appropriate. When possible, non-drug therapy for chronic pain should be used as a first-line treatment. Physical therapy exercise regimens, chiropractic manipulation, meditation relaxation techniques, cognitive behavior therapy, acupuncture, yoga, Kelvin Chi,  transcutaneous electrical nerve stimulation (TENS), and application of moist heat can help alleviate pain . Explained that realistic expectations and goals with chronic pain management are to maximize function and minimize pain with the understanding that limitations will exist both in the extent of relief that she may achieve, as well as thresholds of mg strengths that we will not exceed. Our role is to help the patient better cope with chronic pain utilizng a multimodal approach. The patients condition and plan were discussed. All questions were answered. The patient agrees with the plan.      PLAN / Pt Instructions:  1. Continue current plan with no evidence of addiction or diversion. 2. Stable on current medication without adverse events. 3. Refill MS Contin 60 mg tablet, take 1 tablet every 8 hours  4. Refill oxycodone/acetaminophen 10/325 mg tablet, take 1 tablet 2 times daily as needed  5. Discussed risks of addiction, dependency, and opioid induced hyperalgesia. 6. Reviewed with patient benefits of home exercise, and lifestyle changes to assist the patient in self-management of symptoms. 7. Return to clinic in 3 months         Medications Ordered Today   Medications    morphine CR (MS CONTIN) 60 mg CR tablet     Sig: Take 1 Tab by mouth every eight (8) hours. Max Daily Amount: 180 mg. Dispense:  90 Tab     Refill:  0    morphine CR (MS CONTIN) 60 mg CR tablet     Sig: Take 1 Tab by mouth every eight (8) hours. Max Daily Amount: 180 mg. Dispense:  90 Tab     Refill:  0    morphine CR (MS CONTIN) 60 mg CR tablet     Sig: Take 1 Tab by mouth every eight (8) hours. Max Daily Amount: 180 mg. Dispense:  90 Tab     Refill:  0    oxyCODONE-acetaminophen (PERCOCET 10)  mg per tablet     Sig: Take 1 Tab by mouth two (2) times daily as needed for Pain. Max Daily Amount: 2 Tabs. Dispense:  60 Tab     Refill:  0    oxyCODONE-acetaminophen (PERCOCET 10)  mg per tablet     Sig: Take 1 Tab by mouth two (2) times daily as needed for Pain. Max Daily Amount: 2 Tabs. Dispense:  60 Tab     Refill:  0    oxyCODONE-acetaminophen (PERCOCET 10)  mg per tablet     Sig: Take 1 Tab by mouth two (2) times daily as needed for Pain. Max Daily Amount: 2 Tabs. Dispense:  60 Tab     Refill:  0         Prescription monitoring program reviewed. The patient  should keep track of total Tylenol intake and make sure liver function tests have been checked with primary care physician. POC UDS today.      Pain medications prescribed with the objective of pain relief and improved physical and psychosocial function in this patient. DISPOSITION  · Counseled patient on proper use of prescribed medications. · Counseled patient about chronic medical conditions and their relationship to anxiety and depression and recommended mental health support as needed. · Reviewed with patient self-help tools, home exercise, and lifestyle changes to assist the patient in self-management of symptoms. · Reviewed with patient the treatment plan, goals of treatment plan, and limitations of treatment plan, to include the potential for side effects from medications and procedures. If side effects occur, it is the responsibility of the patient to inform the clinic so that a change in the treatment plan can be made in a safe manner. The patient is advised that stopping prescribed medication may cause an increase in symptoms and possible medication withdrawal symptoms. The patient is informed an emergency room evaluation may be necessary if this occurs. Spent 25 minutes with patient today reviewing the treatment plan, goals of treatment plan, and limitations of the treatment plan, to include the potential for side effects from medications and procedures. More than 50% of the visit time was spent counseling the patient. Wilian Gu PA-C 3/14/2018        Note: Please excuse any typographical errors. Voice recognition software was used for this note and may cause mistakes.

## 2018-05-31 ENCOUNTER — OFFICE VISIT (OUTPATIENT)
Dept: PAIN MANAGEMENT | Age: 63
End: 2018-05-31

## 2018-05-31 VITALS
HEART RATE: 57 BPM | SYSTOLIC BLOOD PRESSURE: 138 MMHG | RESPIRATION RATE: 18 BRPM | BODY MASS INDEX: 31.01 KG/M2 | DIASTOLIC BLOOD PRESSURE: 65 MMHG | TEMPERATURE: 98.6 F | WEIGHT: 175 LBS | HEIGHT: 63 IN

## 2018-05-31 DIAGNOSIS — M16.12 ARTHRITIS OF LEFT HIP: ICD-10-CM

## 2018-05-31 DIAGNOSIS — M54.5 CHRONIC MIDLINE LOW BACK PAIN, WITH SCIATICA PRESENCE UNSPECIFIED: ICD-10-CM

## 2018-05-31 DIAGNOSIS — G89.4 CHRONIC PAIN SYNDROME: ICD-10-CM

## 2018-05-31 DIAGNOSIS — M25.551 PAIN IN JOINT INVOLVING RIGHT PELVIC REGION AND THIGH: ICD-10-CM

## 2018-05-31 DIAGNOSIS — M51.37 DEGENERATION OF LUMBAR OR LUMBOSACRAL INTERVERTEBRAL DISC: ICD-10-CM

## 2018-05-31 DIAGNOSIS — M54.17 LUMBOSACRAL RADICULOPATHY: Primary | ICD-10-CM

## 2018-05-31 DIAGNOSIS — Z96.641 HISTORY OF TOTAL RIGHT HIP REPLACEMENT: ICD-10-CM

## 2018-05-31 DIAGNOSIS — G89.29 CHRONIC MIDLINE LOW BACK PAIN, WITH SCIATICA PRESENCE UNSPECIFIED: ICD-10-CM

## 2018-05-31 RX ORDER — OXYCODONE AND ACETAMINOPHEN 10; 325 MG/1; MG/1
1 TABLET ORAL
Qty: 60 TAB | Refills: 0 | Status: SHIPPED | OUTPATIENT
Start: 2018-06-26 | End: 2018-09-09 | Stop reason: ALTCHOICE

## 2018-05-31 RX ORDER — MORPHINE SULFATE 60 MG/1
30 TABLET, FILM COATED, EXTENDED RELEASE ORAL EVERY 8 HOURS
Qty: 90 TAB | Refills: 0 | Status: SHIPPED | OUTPATIENT
Start: 2018-06-26 | End: 2018-11-06 | Stop reason: ALTCHOICE

## 2018-05-31 NOTE — PROGRESS NOTES
HISTORY OF PRESENT ILLNESS  Sabas Gould is a 60 y.o. female    Ms. Melissa Moe presents for follow up of chronic lower back and polyarticular pain due to osteoarthritis and lumbar degenerative disc disease. She is s/p renal transplant in 1984.      The patient denies any significant changes since last f/u. We discussed her current condition and medications in detail today. She tolerates medications without side effects. Lillian Moe reports no change in sleep or constipation. She uses Colace and over-the-counter laxative for occasional constipation. Patient understands current practice transition and taper plan in future. Patient is planning on transferring her continued pain management care to another practice. She will sign a medical release form today. I will provide 1 month transition prescription. We will assist patient with any medical records transfer as needed. Current MME dose as of today:210    Current medication management consists of:MS Contin 60 mg tablet, take 1 tablet every 8 hours, oxycodone/acetaminophen 10/325 mg tablet, take 1 tablet 2 times daily as needed  Medications are helping with pain control and quality of life. Her pain is 2-3/10 with medication and 7 8/10 without.  Pt describes pain as aching, stabbing, and burning. Aggravating factors include standing, sitting, and walking. Relieved with rest, medication, and avoiding painful activities. The patient reports 70% relief with current medications.   Current treatment is helping to improve general activity, mood, walking, sleep, enjoyment of life     Pain Meds and Quality Of Life have been reviewed. Nonpharmacologic therapy and non-opioid pharmacologic therapy were considered. If opioid therapy is prescribed, this is only if the expected benefits are anticipated to outweigh risks. She  is otherwise doing well with no other complaints today.  She denies any adverse events including nausea, vomiting, dizziness, increased constipation, hallucinations, or seizures. The patient reports functional improvement and QOL with pain medication. Vitals:    05/31/18 1125   BP: 138/65   Pulse: (!) 57   Resp: 18   Temp: 98.6 °F (37 °C)   TempSrc: Oral   Weight: 79.4 kg (175 lb)   Height: 5' 3\" (1.6 m)   PainSc:   7   PainLoc: Back       Allergies   Allergen Reactions    Asa-Acetaminophen-Caff-Potass Unknown (comments)    Aspirin Other (comments)    Beta-Blockers (Beta-Adrenergic Blocking Agts) Other (comments)     [facility] into [facility].  Bystolic [Nebivolol] Other (comments)    Ibuprofen Other (comments)     Renal transplant patient    Lipitor [Atorvastatin] Unknown (comments)    Nsaids (Non-Steroidal Anti-Inflammatory Drug) Unknown (comments)    Sirolimus Other (comments)     pneumonitis    Toprol Xl [Metoprolol Succinate] Other (comments)     Asthma    Venlafaxine Other (comments)     Patient does not recall taking this medication       Past Surgical History:   Procedure Laterality Date    ABDOMEN SURGERY PROC UNLISTED      HX GYN      Ovary    HX HEENT      nose surgery    HX HYSTERECTOMY      HX ORTHOPAEDIC  [id number]    hip replacement    HX UROLOGICAL      Kidney Transplant    NEPHRECTOMY      bilateral       ROS   Constitutional: Negative.  Negative for fever and chills. HENT: Negative for ear pain.    Eyes: Negative.  Negative for pain and discharge. Respiratory: Positive for cough and shortness of breath.    Cardiovascular: Negative.    Gastrointestinal: Negative for nausea, vomiting and constipation. Genitourinary: Positive for dysuria (frequent UT ). Negative for urgency, frequency, hematuria and flank pain.        Hx of kidney transplant    Musculoskeletal: Positive for back pain (lower back ) and joint pain (hips b/l). Negative for myalgias, falls and neck pain.        Physical Exam   Musculoskeletal:        Right hip: She exhibits tenderness.  She exhibits normal range of motion, normal strength, no bony tenderness, no swelling and no crepitus.      Legs:  Well-healed incision at right lateral hip  Mild spasms and tenderness of lumbar paraspinous musculature noted        Constitutional: She appears well-developed and well-nourished. She is cooperative. She does not have a sickly appearance. HENT:   Head: Normocephalic and atraumatic. Right Ear: External ear normal. No drainage. Left Ear: External ear normal. No drainage. Nose: Nose normal.   Eyes: Lids are normal. Right eye exhibits no discharge. Left eye exhibits no discharge. Right conjunctiva has no hemorrhage. Left conjunctiva has no hemorrhage. Neck: Neck supple. No tracheal deviation present. No thyroid mass and no thyromegaly present. Pulmonary/Chest: Effort normal. No respiratory distress. Neurological: She is alert. No cranial nerve deficit. Skin: Skin is intact. No rash noted. Psychiatric: Her speech is normal. Her affect is not angry. She does not express inappropriate judgment.        ASSESSMENT:    1. Lumbosacral radiculopathy    2. Degeneration of lumbar or lumbosacral intervertebral disc    3. Chronic midline low back pain, with sciatica presence unspecified    4. History of total right hip replacement    5. Chronic pain syndrome    6. Arthritis of left hip    7. Pain in joint involving right pelvic region and thigh          COMM:   [address] [place] [address] Program was reviewed which does not demonstrate aberrancies and/or inconsistencies with regard to the historical prescribing of controlled medications to this patient by other providers. Medications were brought to visit today. Pill count was appropriate. When possible, non-drug therapy for chronic pain should be used as a first-line treatment.  Physical therapy exercise regimens, chiropractic manipulation, meditation relaxation techniques, cognitive behavior therapy, acupuncture, yoga, Kelvin Chi,  transcutaneous electrical nerve stimulation (TENS), and application of moist heat can help alleviate pain . Explained that realistic expectations and goals with chronic pain management are to maximize function and minimize pain with the understanding that limitations will exist both in the extent of relief that she may achieve, as well as thresholds of mg strengths that we will not exceed. Our role is to help the patient better cope with chronic pain utilizng a multimodal approach. The patients condition and plan were discussed. All questions were answered. The patient agrees with the plan. PLAN / Pt Instructions:  1. Continue current plan with no evidence of addiction or diversion. 2. Stable on current medication without adverse events. 3. Refill MS Contin 60 mg tablet, take 1 tablet every 8 hours  4. Refill oxycodone/acetaminophen 10/325 mg tablet, take 1 tablet 2 times daily as needed  5. Discussed risks of addiction, dependency, and opioid induced hyperalgesia. 6. Reviewed with patient benefits of home exercise, and lifestyle changes to assist the patient in self-management of symptoms  7. Patient plans on moving her pain management care to another provider. 8. She will tentatively schedule for 3 month follow-up. We will assist with medical records transfer as needed. Prescription monitoring program reviewed. The patient  should keep track of total Tylenol intake and make sure liver function tests have been checked with primary care physician. Pain medications prescribed with the objective of pain relief and improved physical and psychosocial function in this patient. DISPOSITION  · Counseled patient on proper use of prescribed medications. · Counseled patient about chronic medical conditions and their relationship to anxiety and depression and recommended mental health support as needed.   · Reviewed with patient self-help tools, home exercise, and lifestyle changes to assist the patient in self-management of symptoms. · Reviewed with patient the treatment plan, goals of treatment plan, and limitations of treatment plan, to include the potential for side effects from medications and procedures. If side effects occur, it is the responsibility of the patient to inform the clinic so that a change in the treatment plan can be made in a safe manner. The patient is advised that stopping prescribed medication may cause an increase in symptoms and possible medication withdrawal symptoms. The patient is informed an emergency room evaluation may be necessary if this occurs. Spent 25 minutes with patient today reviewing the treatment plan, goals of treatment plan, and limitations of the treatment plan, to include the potential for side effects from medications and procedures. More than 50% of the visit time was spent counseling the patient. Magali Tavares PA-C 5/31/2018        Note: Please excuse any typographical errors. Voice recognition software was used for this note and may cause mistakes.

## 2018-05-31 NOTE — PROGRESS NOTES
Nursing Notes    Patient presents to the office today in follow-up. Patient rates her pain at 7/10 on the numerical pain scale. Reviewed medications with counts as follows:    Rx Date filled Qty Dispensed Pill Count Last Dose Short   Percocet  mg 05/27/18 60 53 This a.m no   Morphine 60 mg 05/27/18 90 80 This a.m no                                POC UDS was not performed in office today    Any new labs or imaging since last appointment? NO    Have you been to an emergency room (ER) or urgent care clinic since your last visit? NO            Have you been hospitalized since your last visit? NO     If yes, where, when, and reason for visit? Have you seen or consulted any other health care providers outside of the [address] [place] [address]  since your last visit? NO     If yes, where, when, and reason for visit? Ms. Neetu Cantu has a reminder for a \"due or due soon\" health maintenance. I have asked that she contact her primary care provider for follow-up on this health maintenance.

## 2018-08-02 ENCOUNTER — LAB ONLY (OUTPATIENT)
Dept: INTERNAL MEDICINE CLINIC | Age: 63
End: 2018-08-02

## 2018-08-02 ENCOUNTER — HOSPITAL ENCOUNTER (OUTPATIENT)
Dept: LAB | Age: 63
Discharge: HOME | End: 2018-08-02
Payer: COMMERCIAL

## 2018-08-02 DIAGNOSIS — E11.9 TYPE 2 DIABETES MELLITUS WITHOUT COMPLICATION, UNSPECIFIED WHETHER LONG TERM INSULIN USE (HCC): ICD-10-CM

## 2018-08-02 DIAGNOSIS — Z94.0 H/O KIDNEY TRANSPLANT: ICD-10-CM

## 2018-08-02 DIAGNOSIS — Z00.00 ROUTINE GENERAL MEDICAL EXAMINATION AT A HEALTH CARE FACILITY: Primary | ICD-10-CM

## 2018-08-02 DIAGNOSIS — Z00.00 ROUTINE GENERAL MEDICAL EXAMINATION AT A HEALTH CARE FACILITY: ICD-10-CM

## 2018-08-02 LAB
ANION GAP SERPL CALC-SCNC: 8 MMOL/L (ref 3–18)
APPEARANCE UR: CLEAR
BACTERIA URNS QL MICRO: ABNORMAL /HPF
BASOPHILS # BLD: 0 K/UL (ref 0–0.1)
BASOPHILS NFR BLD: 0 % (ref 0–2)
BILIRUB UR QL: NEGATIVE
BUN SERPL-MCNC: 14 MG/DL (ref 7–18)
BUN/CREAT SERPL: 14 (ref 12–20)
CALCIUM SERPL-MCNC: 9.5 MG/DL (ref 8.5–10.1)
CHLORIDE SERPL-SCNC: 105 MMOL/L (ref 100–108)
CHOLEST SERPL-MCNC: 265 MG/DL
CO2 SERPL-SCNC: 28 MMOL/L (ref 21–32)
COLOR UR: YELLOW
CREAT SERPL-MCNC: 0.97 MG/DL (ref 0.6–1.3)
DIFFERENTIAL METHOD BLD: ABNORMAL
EOSINOPHIL # BLD: 0.2 K/UL (ref 0–0.4)
EOSINOPHIL NFR BLD: 3 % (ref 0–5)
EPITH CASTS URNS QL MICRO: ABNORMAL /LPF (ref 0–5)
ERYTHROCYTE [DISTWIDTH] IN BLOOD BY AUTOMATED COUNT: 14.1 % (ref 11.6–14.5)
EST. AVERAGE GLUCOSE BLD GHB EST-MCNC: 169 MG/DL
GLUCOSE SERPL-MCNC: 127 MG/DL (ref 74–99)
GLUCOSE UR STRIP.AUTO-MCNC: NEGATIVE MG/DL
HBA1C MFR BLD: 7.5 % (ref 4.2–5.6)
HCT VFR BLD AUTO: 39.2 % (ref 35–45)
HDLC SERPL-MCNC: 44 MG/DL (ref 40–60)
HDLC SERPL: 6 {RATIO} (ref 0–5)
HGB BLD-MCNC: 12.4 G/DL (ref 12–16)
HGB UR QL STRIP: NEGATIVE
KETONES UR QL STRIP.AUTO: NEGATIVE MG/DL
LDLC SERPL CALC-MCNC: ABNORMAL MG/DL (ref 0–100)
LEUKOCYTE ESTERASE UR QL STRIP.AUTO: ABNORMAL
LIPID PROFILE,FLP: ABNORMAL
LYMPHOCYTES # BLD: 2.5 K/UL (ref 0.9–3.6)
LYMPHOCYTES NFR BLD: 33 % (ref 21–52)
MCH RBC QN AUTO: 30.3 PG (ref 24–34)
MCHC RBC AUTO-ENTMCNC: 31.6 G/DL (ref 31–37)
MCV RBC AUTO: 95.8 FL (ref 74–97)
MONOCYTES # BLD: 0.8 K/UL (ref 0.05–1.2)
MONOCYTES NFR BLD: 10 % (ref 3–10)
NEUTS SEG # BLD: 4.2 K/UL (ref 1.8–8)
NEUTS SEG NFR BLD: 54 % (ref 40–73)
NITRITE UR QL STRIP.AUTO: NEGATIVE
PH UR STRIP: 6 [PH] (ref 5–8)
PLATELET # BLD AUTO: 318 K/UL (ref 135–420)
PMV BLD AUTO: 9.5 FL (ref 9.2–11.8)
POTASSIUM SERPL-SCNC: 4.6 MMOL/L (ref 3.5–5.5)
PROT UR STRIP-MCNC: ABNORMAL MG/DL
RBC # BLD AUTO: 4.09 M/UL (ref 4.2–5.3)
RBC #/AREA URNS HPF: ABNORMAL /HPF (ref 0–5)
SODIUM SERPL-SCNC: 141 MMOL/L (ref 136–145)
SP GR UR REFRACTOMETRY: 1.01 (ref 1–1.03)
TRIGL SERPL-MCNC: 479 MG/DL (ref ?–150)
TSH SERPL DL<=0.05 MIU/L-ACNC: 1.1 UIU/ML (ref 0.36–3.74)
UROBILINOGEN UR QL STRIP.AUTO: 0.2 EU/DL (ref 0.2–1)
VLDLC SERPL CALC-MCNC: ABNORMAL MG/DL
WBC # BLD AUTO: 7.8 K/UL (ref 4.6–13.2)
WBC URNS QL MICRO: ABNORMAL /HPF (ref 0–4)

## 2018-08-02 PROCEDURE — 84443 ASSAY THYROID STIM HORMONE: CPT | Performed by: INTERNAL MEDICINE

## 2018-08-02 PROCEDURE — 85025 COMPLETE CBC W/AUTO DIFF WBC: CPT | Performed by: INTERNAL MEDICINE

## 2018-08-02 PROCEDURE — 81001 URINALYSIS AUTO W/SCOPE: CPT | Performed by: INTERNAL MEDICINE

## 2018-08-02 PROCEDURE — 82043 UR ALBUMIN QUANTITATIVE: CPT | Performed by: INTERNAL MEDICINE

## 2018-08-02 PROCEDURE — 83036 HEMOGLOBIN GLYCOSYLATED A1C: CPT | Performed by: INTERNAL MEDICINE

## 2018-08-02 PROCEDURE — 86803 HEPATITIS C AB TEST: CPT | Performed by: INTERNAL MEDICINE

## 2018-08-02 PROCEDURE — 80061 LIPID PANEL: CPT | Performed by: INTERNAL MEDICINE

## 2018-08-02 PROCEDURE — 80048 BASIC METABOLIC PNL TOTAL CA: CPT | Performed by: INTERNAL MEDICINE

## 2018-08-03 LAB
CREAT UR-MCNC: 81.76 MG/DL (ref 30–125)
HCV AB SER IA-ACNC: 0.1 INDEX
HCV AB SERPL QL IA: NEGATIVE
HCV COMMENT,HCGAC: NORMAL
MICROALBUMIN UR-MCNC: 6.8 MG/DL (ref 0–3)
MICROALBUMIN/CREAT UR-RTO: 83 MG/G (ref 0–30)

## 2018-08-09 ENCOUNTER — OFFICE VISIT (OUTPATIENT)
Dept: INTERNAL MEDICINE CLINIC | Age: 63
End: 2018-08-09

## 2018-08-09 VITALS
RESPIRATION RATE: 20 BRPM | SYSTOLIC BLOOD PRESSURE: 148 MMHG | BODY MASS INDEX: 32.27 KG/M2 | DIASTOLIC BLOOD PRESSURE: 82 MMHG | TEMPERATURE: 99.2 F | OXYGEN SATURATION: 96 % | HEART RATE: 80 BPM | WEIGHT: 189 LBS | HEIGHT: 64 IN

## 2018-08-09 DIAGNOSIS — E11.29 TYPE 2 DIABETES MELLITUS WITH MICROALBUMINURIA, WITHOUT LONG-TERM CURRENT USE OF INSULIN (HCC): ICD-10-CM

## 2018-08-09 DIAGNOSIS — G89.4 CHRONIC PAIN SYNDROME: ICD-10-CM

## 2018-08-09 DIAGNOSIS — I10 ESSENTIAL HYPERTENSION: ICD-10-CM

## 2018-08-09 DIAGNOSIS — Z12.31 SCREENING MAMMOGRAM, ENCOUNTER FOR: ICD-10-CM

## 2018-08-09 DIAGNOSIS — K59.03 CONSTIPATION DUE TO PAIN MEDICATION: ICD-10-CM

## 2018-08-09 DIAGNOSIS — E78.5 HYPERLIPIDEMIA, UNSPECIFIED HYPERLIPIDEMIA TYPE: ICD-10-CM

## 2018-08-09 DIAGNOSIS — K21.9 GASTROESOPHAGEAL REFLUX DISEASE WITHOUT ESOPHAGITIS: ICD-10-CM

## 2018-08-09 DIAGNOSIS — Z94.0 KIDNEY TRANSPLANT STATUS, LIVING RELATED DONOR: ICD-10-CM

## 2018-08-09 DIAGNOSIS — I50.32 CHRONIC DIASTOLIC HEART FAILURE (HCC): ICD-10-CM

## 2018-08-09 DIAGNOSIS — M85.89 OSTEOPENIA OF MULTIPLE SITES: ICD-10-CM

## 2018-08-09 DIAGNOSIS — M15.9 PRIMARY OSTEOARTHRITIS INVOLVING MULTIPLE JOINTS: ICD-10-CM

## 2018-08-09 DIAGNOSIS — Z23 ENCOUNTER FOR IMMUNIZATION: ICD-10-CM

## 2018-08-09 DIAGNOSIS — Z87.448 H/O RENAL FAILURE: ICD-10-CM

## 2018-08-09 DIAGNOSIS — R80.9 TYPE 2 DIABETES MELLITUS WITH MICROALBUMINURIA, WITHOUT LONG-TERM CURRENT USE OF INSULIN (HCC): ICD-10-CM

## 2018-08-09 DIAGNOSIS — Z86.59 HISTORY OF DEPRESSION: ICD-10-CM

## 2018-08-09 DIAGNOSIS — Z00.01 ENCOUNTER FOR ROUTINE ADULT PHYSICAL EXAM WITH ABNORMAL FINDINGS: Primary | ICD-10-CM

## 2018-08-09 RX ORDER — LISINOPRIL 40 MG/1
40 TABLET ORAL DAILY
Qty: 90 TAB | Refills: 2 | Status: SHIPPED | OUTPATIENT
Start: 2018-08-09 | End: 2018-11-06

## 2018-08-09 RX ORDER — SIMVASTATIN 40 MG/1
TABLET, FILM COATED ORAL
Refills: 2 | COMMUNITY
Start: 2018-06-29 | End: 2018-08-13 | Stop reason: ALTCHOICE

## 2018-08-09 RX ORDER — METFORMIN HYDROCHLORIDE 500 MG/1
500 TABLET, EXTENDED RELEASE ORAL
Qty: 90 TAB | Refills: 2 | Status: SHIPPED | OUTPATIENT
Start: 2018-08-09 | End: 2018-09-09 | Stop reason: SDDI

## 2018-08-09 NOTE — PROGRESS NOTES
Chief Complaint   Patient presents with   [facility] patient present to establish care with Dr. Nika Salcedo. Dorenetent states that she was treated for MRSA 4 months ago. Patient also states that she has had a kidney transplant 40 years ao. Health Maintenance Due   Topic Date Due    FOOT EXAM Q1  09/18/1964    Pneumococcal 19-64 Medium Risk (1 of 1 - PPSV23) 09/18/1973    DTaP/Tdap/Td series (1 - Tdap) 09/18/1975    PAP AKA CERVICAL CYTOLOGY  09/18/1975    EYE EXAM RETINAL OR DILATED Q1  02/03/2012    BREAST CANCER SCRN MAMMOGRAM  09/16/2012    ZOSTER VACCINE AGE 60>  07/18/2014    Influenza Age 9 to Adult  08/01/2018     Patient given Tetanus vaccine, TDAP, in left deltoid, per verbal order from Dr. Deloris Najjar. Instructed patient to sit and wait 10-20 minutes before leaving the premises so that we can watch for any complications or adverse reactions. Patient given vaccine information statement handout before vaccine was given. Patient tolerated well without adverse reactions or complications. 1. Have you been to the ER, urgent care clinic or hospitalized within the last 6 months? YES.     2. Have you seen or consulted any other health care providers outside of the [address] [place] [address] since your last visit (Include any pap smears or colon screening)?  YES

## 2018-08-09 NOTE — MR AVS SNAPSHOT
[address] [facility] 
 
 
 [address], [address] [place] [address] [facility] 
[phone] Patient: Giovanna Terra MRN: [id number] HG Visit Information Date & Time Provider Department Dept. Phone Encounter #  
 2018 11:30 AM Claude Sark, MD [facility] [phone] Follow-up Instructions Return in about 4 weeks (around 2018), or if symptoms worsen or fail to improve. Your Appointments 2018  9:25 AM  
LAB with [facility] NURSE VISIT [facility] ([address]) Appt Note: lab  
 [address], [address] [place] [address] [place]  
  
   
 [address], [address] [facility] [place] [address]  
  
    
 2018  1:30 PM  
Office Visit with Claude Sark, MD  
[facility] [address]) Appt Note: 4 week follow up  
 [address], [address] [place] [address] [place]  
  
   
 [address], [address] Upcoming Health Maintenance Date Due  
 FOOT EXAM Q1 1964 Pneumococcal 19-64 Medium Risk (1 of 1 - PPSV23) 1973 DTaP/Tdap/Td series (1 - Tdap) 1975 PAP AKA CERVICAL CYTOLOGY 1975 EYE EXAM RETINAL OR DILATED Q1 2/3/2012 BREAST CANCER SCRN MAMMOGRAM 2012 ZOSTER VACCINE AGE 60> 2014 Influenza Age 5 to Adult 2018 HEMOGLOBIN A1C Q6M 2019 MICROALBUMIN Q1 2019 LIPID PANEL Q1 2019 COLONOSCOPY 2021 Allergies as of 2018  Review Complete On: 2018 By: Saige Espino Severity Noted Reaction Type Reactions Asa-acetaminophen-caff-potass    Unknown (comments) Aspirin  2018    Other (comments) Beta-blockers (Beta-adrenergic Blocking Agts)    Other (comments) Sent into CHF. Bystolic [Nebivolol]      Other (comments) Ibuprofen    Other (comments) Renal transplant patient Lipitor [Atorvastatin]  01/25/2012    Unknown (comments) Nsaids (Non-steroidal Anti-inflammatory Drug)    Unknown (comments) Sirolimus  02/05/2015    Other (comments)  
 pneumonitis Toprol Xl [Metoprolol Succinate]    Other (comments) Asthma Venlafaxine    Other (comments) Patient does not recall taking this medication Current Immunizations  Never Reviewed Name Date Influenza Vaccine 11/11/2013, 12/18/2012 Influenza Vaccine (Quad) PF 11/10/2015 Tdap  Incomplete Not reviewed this visit You Were Diagnosed With   
  
 Codes Comments Encounter for immunization    -  Primary ICD-10-CM: K99 ICD-9-CM: V03.89 Vitals BP Pulse Temp Resp Height(growth percentile) Weight(growth percentile) 148/82 (BP 1 Location: Left arm, BP Patient Position: Sitting) 80 99.2 °F (37.3 °C) (Oral) 20 5' 3.5\" (1.613 m) 189 lb (85.7 kg) SpO2 BMI OB Status Smoking Status 96% 32.95 kg/m2 Hysterectomy Former Smoker Vitals History BMI and BSA Data Body Mass Index Body Surface Area 32.95 kg/m 2 1.96 m 2 Preferred Pharmacy Pharmacy Name Phone [address], Dipak Navarrete [facility] 22 1700 [facility] [phone] Your Updated Medication List  
  
   
This list is accurate as of 8/9/18 12:26 PM.  Always use your most recent med list.  
  
  
  
  
 Coralyn Emms Take  by mouth.  
  
 ergocalciferol 50,000 unit capsule Commonly known as:  ERGOCALCIFEROL  
TAKE 1 CAPSULE BY MOUTH EVERY WEEK FLUoxetine 40 mg capsule Commonly known as:  PROzac TK 1 C PO D  
  
 folic acid 282 mcg tablet Take 400 mcg by mouth daily. FOSAMAX 5 mg tablet Generic drug:  alendronate Take 40 mg by mouth Daily (before breakfast). furosemide 20 mg tablet Commonly known as:  LASIX TK 1 T PO BID  
  
 isosorbide mononitrate ER 30 mg tablet Commonly known as:  IMDUR TK 1 T PO  QD  
  
 linaclotide 290 mcg Cap capsule Commonly known as:  Armando Desha Take 1 Cap by mouth Daily (before breakfast). For chronic opioid-induced constipation  
  
 lisinopril 40 mg tablet Commonly known as:  Merilynn Glendale Take 1 Tab by mouth daily. magic mouthwash Susp Commonly known as:  SuperGen [address] Take 5 mL by mouth every four (4) hours as needed for Pain (Benadryl- 30 cc; Maalox or equivalent-60 cc; 4 grams of liquid Carafate- 5 cc po-swish, gargle, and swallow q4 hrs. prn sore throat or prn as needed. ). metFORMIN  mg tablet Commonly known as:  GLUCOPHAGE XR Take 1 Tab by mouth daily (with dinner). morphine CR 60 mg CR tablet Commonly known as:  MS CONTIN Take 1 Tab by mouth every eight (8) hours. Max Daily Amount: 180 mg.  
  
 naloxone 4 mg/actuation nasal spray Commonly known as:  NARCAN  
4 mg by Nasal route once as needed (for opioid overdose) for up to 1 dose. Indications: OPIOID TOXICITY  
  
 oxyCODONE-acetaminophen  mg per tablet Commonly known as:  PERCOCET 10 Take 1 Tab by mouth two (2) times daily as needed for Pain. Max Daily Amount: 2 Tabs. predniSONE 5 mg tablet Commonly known as:  DELTASONE  
TK 1 T PO  EVERY OTHER DAY PriLOSEC 40 mg capsule Generic drug:  omeprazole Take 40 mg by mouth daily. simvastatin 40 mg tablet Commonly known as:  ZOCOR TK 1 T PO HS  
  
 tacrolimus 1 mg capsule Commonly known as:  PROGRAF  
TAKE 2 CAPSULES BY MOUTH TWICE DAILY ZOFRAN 4 mg tablet Generic drug:  ondansetron hcl Take 4 mg by mouth every eight (8) hours as needed for Nausea. Prescriptions Sent to Pharmacy Refills  
 lisinopril (PRINIVIL, ZESTRIL) 40 mg tablet 2 Sig: Take 1 Tab by mouth daily. Class: Normal  
 Pharmacy: [address] [place] [address] #: [phone]  Route: Oral  
 metFORMIN ER (GLUCOPHAGE XR) 500 mg tablet 2  
 Sig: Take 1 Tab by mouth daily (with dinner). Class: Normal  
 Pharmacy: [address] [place] [address] #: [phone] Route: Oral  
  
We Performed the Following TETANUS, DIPHTHERIA TOXOIDS AND ACELLULAR PERTUSSIS VACCINE (TDAP), IN INDIVIDS. >=7, IM [id number] CPT(R)] Follow-up Instructions Return in about 4 weeks (around 9/6/2018), or if symptoms worsen or fail to improve. Patient Instructions Begin metformin  mg with dinner. Increase lisinopril to 40 mg daily. Please monitor and record your blood pressure and blood sugar every morning and bring  record of readings to our office for review. Call office if blood pressure greater than 150/90. Call office if blood sugar greater than 140 or less than 90. Learning About Diabetes Food Guidelines Your Care Instructions Meal planning is important to manage diabetes. It helps keep your blood sugar at a target level (which you set with your doctor). You don't have to eat special foods. You can eat what your family eats, including sweets once in a while. But you do have to pay attention to how often you eat and how much you eat of certain foods. You may want to work with a dietitian or a certified diabetes educator (CDE) to help you plan meals and snacks. A dietitian or CDE can also help you lose weight if that is one of your goals. What should you know about eating carbs? Managing the amount of carbohydrate (carbs) you eat is an important part of healthy meals when you have diabetes. Carbohydrate is found in many foods. · Learn which foods have carbs. And learn the amounts of carbs in different foods. ¨ Bread, cereal, pasta, and rice have about 15 grams of carbs in a serving. A serving is 1 slice of bread (1 ounce), ½ cup of cooked cereal, or 1/3 cup of cooked pasta or rice. ¨ Fruits have 15 grams of carbs in a serving.  A serving is 1 small fresh fruit, such as an apple or orange; ½ of a banana; ½ cup of cooked or canned fruit; ½ cup of fruit juice; 1 cup of melon or raspberries; or 2 tablespoons of dried fruit. ¨ Milk and no-sugar-added yogurt have 15 grams of carbs in a serving. A serving is 1 cup of milk or 2/3 cup of no-sugar-added yogurt. ¨ Starchy vegetables have 15 grams of carbs in a serving. A serving is ½ cup of mashed potatoes or sweet potato; 1 cup winter squash; ½ of a small baked potato; ½ cup of cooked beans; or ½ cup cooked corn or green peas. · Learn how much carbs to eat each day and at each meal. A dietitian or CDE can teach you how to keep track of the amount of carbs you eat. This is called carbohydrate counting. · If you are not sure how to count carbohydrate grams, use the Plate Method to plan meals. It is a good, quick way to make sure that you have a balanced meal. It also helps you spread carbs throughout the day. ¨ Divide your plate by types of foods. Put non-starchy vegetables on half the plate, meat or other protein food on one-quarter of the plate, and a grain or starchy vegetable in the final quarter of the plate. To this you can add a small piece of fruit and 1 cup of milk or yogurt, depending on how many carbs you are supposed to eat at a meal. 
· Try to eat about the same amount of carbs at each meal. Do not \"save up\" your daily allowance of carbs to eat at one meal. 
· Proteins have very little or no carbs per serving. Examples of proteins are beef, chicken, turkey, fish, eggs, tofu, cheese, cottage cheese, and peanut butter. A serving size of meat is 3 ounces, which is about the size of a deck of cards. Examples of meat substitute serving sizes (equal to 1 ounce of meat) are 1/4 cup of cottage cheese, 1 egg, 1 tablespoon of peanut butter, and ½ cup of tofu. How can you eat out and still eat healthy? · Learn to estimate the serving sizes of foods that have carbohydrate.  If you measure food at home, it will be easier to estimate the amount in a serving of restaurant food. · If the meal you order has too much carbohydrate (such as potatoes, corn, or baked beans), ask to have a low-carbohydrate food instead. Ask for a salad or green vegetables. · If you use insulin, check your blood sugar before and after eating out to help you plan how much to eat in the future. · If you eat more carbohydrate at a meal than you had planned, take a walk or do other exercise. This will help lower your blood sugar. What else should you know? · Limit saturated fat, such as the fat from meat and dairy products. This is a healthy choice because people who have diabetes are at higher risk of heart disease. So choose lean cuts of meat and nonfat or low-fat dairy products. Use olive or canola oil instead of butter or shortening when cooking. · Don't skip meals. Your blood sugar may drop too low if you skip meals and take insulin or certain medicines for diabetes. · Check with your doctor before you drink alcohol. Alcohol can cause your blood sugar to drop too low. Alcohol can also cause a bad reaction if you take certain diabetes medicines. Follow-up care is a key part of your treatment and safety. Be sure to make and go to all appointments, and call your doctor if you are having problems. It's also a good idea to know your test results and keep a list of the medicines you take. Where can you learn more? Go to http://kolby-yuli.info/. Enter K261 in the search box to learn more about \"Learning About Diabetes Food Guidelines. \" Current as of: December 7, 2017 Content Version: 11.7 © 1729-2972 Healthwise, Incorporated. Care instructions adapted under license by [employer] (which disclaims liability or warranty for this information).  If you have questions about a medical condition or this instruction, always ask your healthcare professional. [employer] disclaims any warranty or liability for your use of this information. Learning About Meal Planning for Diabetes Why plan your meals? Meal planning can be a key part of managing diabetes. Planning meals and snacks with the right balance of carbohydrate, protein, and fat can help you keep your blood sugar at the target level you set with your doctor. You don't have to eat special foods. You can eat what your family eats, including sweets once in a while. But you do have to pay attention to how often you eat and how much you eat of certain foods. You may want to work with a dietitian or a certified diabetes educator. He or she can give you tips and meal ideas and can answer your questions about meal planning. This health professional can also help you reach a healthy weight if that is one of your goals. What plan is right for you? Your dietitian or diabetes educator may suggest that you start with the plate format or carbohydrate counting. The plate format The plate format is a simple way to help you manage how you eat. You plan meals by learning how much space each food should take on a plate. Using the plate format helps you spread carbohydrate throughout the day. It can make it easier to keep your blood sugar level within your target range. It also helps you see if you're eating healthy portion sizes. To use the plate format, you put non-starchy vegetables on half your plate. Add meat or meat substitutes on one-quarter of the plate. Put a grain or starchy vegetable (such as brown rice or a potato) on the final quarter of the plate. You can add a small piece of fruit and some low-fat or fat-free milk or yogurt, depending on your carbohydrate goal for each meal. 
Here are some tips for using the plate format: · Make sure that you are not using an oversized plate. A 9-inch plate is best. Many restaurants use larger plates. · Get used to using the plate format at home. Then you can use it when you eat out. · Write down your questions about using the plate format. Talk to your doctor, a dietitian, or a diabetes educator about your concerns. Carbohydrate counting With carbohydrate counting, you plan meals based on the amount of carbohydrate in each food. Carbohydrate raises blood sugar higher and more quickly than any other nutrient. It is found in desserts, breads and cereals, and fruit. It's also found in starchy vegetables such as potatoes and corn, grains such as rice and pasta, and milk and yogurt. Spreading carbohydrate throughout the day helps keep your blood sugar levels within your target range. Your daily amount depends on several things, including your weight, how active you are, which diabetes medicines you take, and what your goals are for your blood sugar levels. A registered dietitian or diabetes educator can help you plan how much carbohydrate to include in each meal and snack. A guideline for your daily amount of carbohydrate is: · 45 to 60 grams at each meal. That's about the same as 3 to 4 carbohydrate servings. · 15 to 20 grams at each snack. That's about the same as 1 carbohydrate serving. The Nutrition Facts label on packaged foods tells you how much carbohydrate is in a serving of the food. First, look at the serving size on the food label. Is that the amount you eat in a serving? All of the nutrition information on a food label is based on that serving size. So if you eat more or less than that, you'll need to adjust the other numbers. Total carbohydrate is the next thing you need to look for on the label. If you count carbohydrate servings, one serving of carbohydrate is 15 grams. For foods that don't come with labels, such as fresh fruits and vegetables, you'll need a guide that lists carbohydrate in these foods.  Ask your doctor, dietitian, or diabetes educator about books or other nutrition guides you can use. If you take insulin, you need to know how many grams of carbohydrate are in a meal. This lets you know how much rapid-acting insulin to take before you eat. If you use an insulin pump, you get a constant rate of insulin during the day. So the pump must be programmed at meals to give you extra insulin to cover the rise in blood sugar after meals. When you know how much carbohydrate you will eat, you can take the right amount of insulin. Or, if you always use the same amount of insulin, you need to make sure that you eat the same amount of carbohydrate at meals. If you need more help to understand carbohydrate counting and food labels, ask your doctor, dietitian, or diabetes educator. How do you get started with meal planning? Here are some tips to get started: 
· Plan your meals a week at a time. Don't forget to include snacks too. · Use cookbooks or online recipes to plan several main meals. Plan some quick meals for busy nights. You also can double some recipes that freeze well. Then you can save half for other busy nights when you don't have time to cook. · Make sure you have the ingredients you need for your recipes. If you're running low on basic items, put these items on your shopping list too. · List foods that you use to make breakfasts, lunches, and snacks. List plenty of fruits and vegetables. · Post this list on the refrigerator. Add to it as you think of more things you need. · Take the list to the store to do your weekly shopping. Follow-up care is a key part of your treatment and safety. Be sure to make and go to all appointments, and call your doctor if you are having problems. It's also a good idea to know your test results and keep a list of the medicines you take. Where can you learn more? Go to http://kolby-yuli.info/. Estela Foley in the search box to learn more about \"Learning About Meal Planning for Diabetes. \" 
 Current as of: December 7, 2017 Content Version: 11.7 © 5346-6998 [employer]. Care instructions adapted under license by [employer] (which disclaims liability or warranty for this information). If you have questions about a medical condition or this instruction, always ask your healthcare professional. [address] any warranty or liability for your use of this information. DASH Diet: Care Instructions Your Care Instructions The DASH diet is an eating plan that can help lower your blood pressure. DASH stands for Dietary Approaches to Stop Hypertension. Hypertension is high blood pressure. The DASH diet focuses on eating foods that are high in calcium, potassium, and magnesium. These nutrients can lower blood pressure. The foods that are highest in these nutrients are fruits, vegetables, low-fat dairy products, nuts, seeds, and legumes. But taking calcium, potassium, and magnesium supplements instead of eating foods that are high in those nutrients does not have the same effect. The DASH diet also includes whole grains, fish, and poultry. The DASH diet is one of several lifestyle changes your doctor may recommend to lower your high blood pressure. Your doctor may also want you to decrease the amount of sodium in your diet. Lowering sodium while following the DASH diet can lower blood pressure even further than just the DASH diet alone. Follow-up care is a key part of your treatment and safety. Be sure to make and go to all appointments, and call your doctor if you are having problems. It's also a good idea to know your test results and keep a list of the medicines you take. How can you care for yourself at home? Following the DASH diet · Eat 4 to 5 servings of fruit each day. A serving is 1 medium-sized piece of fruit, ½ cup chopped or canned fruit, 1/4 cup dried fruit, or 4 ounces (½ cup) of fruit juice. Choose fruit more often than fruit juice. · Eat 4 to 5 servings of vegetables each day. A serving is 1 cup of lettuce or raw leafy vegetables, ½ cup of chopped or cooked vegetables, or 4 ounces (½ cup) of vegetable juice. Choose vegetables more often than vegetable juice. · Get 2 to 3 servings of low-fat and fat-free dairy each day. A serving is 8 ounces of milk, 1 cup of yogurt, or 1 ½ ounces of cheese. · Eat 6 to 8 servings of grains each day. A serving is 1 slice of bread, 1 ounce of dry cereal, or ½ cup of cooked rice, pasta, or cooked cereal. Try to choose whole-grain products as much as possible. · Limit lean meat, poultry, and fish to 2 servings each day. A serving is 3 ounces, about the size of a deck of cards. · Eat 4 to 5 servings of nuts, seeds, and legumes (cooked dried beans, lentils, and split peas) each week. A serving is 1/3 cup of nuts, 2 tablespoons of seeds, or ½ cup of cooked beans or peas. · Limit fats and oils to 2 to 3 servings each day. A serving is 1 teaspoon of vegetable oil or 2 tablespoons of salad dressing. · Limit sweets and added sugars to 5 servings or less a week. A serving is 1 tablespoon jelly or jam, ½ cup sorbet, or 1 cup of lemonade. · Eat less than 2,300 milligrams (mg) of sodium a day. If you limit your sodium to 1,500 mg a day, you can lower your blood pressure even more. Tips for success · Start small. Do not try to make dramatic changes to your diet all at once. You might feel that you are missing out on your favorite foods and then be more likely to not follow the plan. Make small changes, and stick with them. Once those changes become habit, add a few more changes. · Try some of the following: ¨ Make it a goal to eat a fruit or vegetable at every meal and at snacks. This will make it easy to get the recommended amount of fruits and vegetables each day. ¨ Try yogurt topped with fruit and nuts for a snack or healthy dessert. ¨ Add lettuce, tomato, cucumber, and onion to sandwiches. ¨ Combine a ready-made pizza crust with low-fat mozzarella cheese and lots of vegetable toppings. Try using tomatoes, squash, spinach, broccoli, carrots, cauliflower, and onions. ¨ Have a variety of cut-up vegetables with a low-fat dip as an appetizer instead of chips and dip. ¨ Sprinkle sunflower seeds or chopped almonds over salads. Or try adding chopped walnuts or almonds to cooked vegetables. ¨ Try some vegetarian meals using beans and peas. Add garbanzo or kidney beans to salads. Make burritos and tacos with mashed xie beans or black beans. Where can you learn more? Go to http://kolby-yuli.info/. Enter E718 in the search box to learn more about \"DASH Diet: Care Instructions. \" Current as of: December 6, 2017 Content Version: 11.7 © 8417-8278 [employer]. Care instructions adapted under license by [employer] (which disclaims liability or warranty for this information). If you have questions about a medical condition or this instruction, always ask your healthcare professional. [place] [address] any warranty or liability for your use of this information. Introducing [facility]! [facility] introduces SmartOn Learning patient portal. Now you can access parts of your medical record, email your doctor's office, and request medication refills online. 1. In your internet browser, go to [email or web address] 2. Click on the First Time User? Click Here link in the Sign In box. You will see the New Member Sign Up page. 3. Enter your SmartOn Learning Access Code exactly as it appears below. You will not need to use this code after youve completed the sign-up process. If you do not sign up before the expiration date, you must request a new code. · SmartOn Learning Access Code: [email or web address] Expires: 10/31/2018 10:56 AM 
 
4.  Enter the last four digits of your Social Security Number (xxxx) and Date of Birth (mm/dd/yyyy) as indicated and click Submit. You will be taken to the next sign-up page. 5. Create a ICAgen ID. This will be your ICAgen login ID and cannot be changed, so think of one that is secure and easy to remember. 6. Create a ICAgen password. You can change your password at any time. 7. Enter your Password Reset Question and Answer. This can be used at a later time if you forget your password. 8. Enter your e-mail address. You will receive e-mail notification when new information is available in [address] 9. Click Sign Up. You can now view and download portions of your medical record. 10. Click the Download Summary menu link to download a portable copy of your medical information. If you have questions, please visit the Frequently Asked Questions section of the ICAgen website. Remember, ICAgen is NOT to be used for urgent needs. For medical emergencies, dial 911. Now available from your iPhone and Android! Please provide this summary of care documentation to your next provider. Your primary care clinician is listed as Jacy Ta. If you have any questions after today's visit, please call [phone].

## 2018-08-09 NOTE — PATIENT INSTRUCTIONS
Begin metformin  mg with dinner. Increase lisinopril to 40 mg daily. Please monitor and record your blood pressure and blood sugar every morning and bring  record of readings to our office for review. Call office if blood pressure greater than 150/90. Call office if blood sugar greater than 140 or less than 90. Learning About Diabetes Food Guidelines  Your Care Instructions    Meal planning is important to manage diabetes. It helps keep your blood sugar at a target level (which you set with your doctor). You don't have to eat special foods. You can eat what your family eats, including sweets once in a while. But you do have to pay attention to how often you eat and how much you eat of certain foods. You may want to work with a dietitian or a certified diabetes educator (CDE) to help you plan meals and snacks. A dietitian or CDE can also help you lose weight if that is one of your goals. What should you know about eating carbs? Managing the amount of carbohydrate (carbs) you eat is an important part of healthy meals when you have diabetes. Carbohydrate is found in many foods. · Learn which foods have carbs. And learn the amounts of carbs in different foods. ¨ Bread, cereal, pasta, and rice have about 15 grams of carbs in a serving. A serving is 1 slice of bread (1 ounce), ½ cup of cooked cereal, or 1/3 cup of cooked pasta or rice. ¨ Fruits have 15 grams of carbs in a serving. A serving is 1 small fresh fruit, such as an apple or orange; ½ of a banana; ½ cup of cooked or canned fruit; ½ cup of fruit juice; 1 cup of melon or raspberries; or 2 tablespoons of dried fruit. ¨ Milk and no-sugar-added yogurt have 15 grams of carbs in a serving. A serving is 1 cup of milk or 2/3 cup of no-sugar-added yogurt. ¨ Starchy vegetables have 15 grams of carbs in a serving.  A serving is ½ cup of mashed potatoes or sweet potato; 1 cup winter squash; ½ of a small baked potato; ½ cup of cooked beans; or ½ cup cooked corn or green peas. · Learn how much carbs to eat each day and at each meal. A dietitian or CDE can teach you how to keep track of the amount of carbs you eat. This is called carbohydrate counting. · If you are not sure how to count carbohydrate grams, use the Plate Method to plan meals. It is a good, quick way to make sure that you have a balanced meal. It also helps you spread carbs throughout the day. ¨ Divide your plate by types of foods. Put non-starchy vegetables on half the plate, meat or other protein food on one-quarter of the plate, and a grain or starchy vegetable in the final quarter of the plate. To this you can add a small piece of fruit and 1 cup of milk or yogurt, depending on how many carbs you are supposed to eat at a meal.  · Try to eat about the same amount of carbs at each meal. Do not \"save up\" your daily allowance of carbs to eat at one meal.  · Proteins have very little or no carbs per serving. Examples of proteins are beef, chicken, turkey, fish, eggs, tofu, cheese, cottage cheese, and peanut butter. A serving size of meat is 3 ounces, which is about the size of a deck of cards. Examples of meat substitute serving sizes (equal to 1 ounce of meat) are 1/4 cup of cottage cheese, 1 egg, 1 tablespoon of peanut butter, and ½ cup of tofu. How can you eat out and still eat healthy? · Learn to estimate the serving sizes of foods that have carbohydrate. If you measure food at home, it will be easier to estimate the amount in a serving of restaurant food. · If the meal you order has too much carbohydrate (such as potatoes, corn, or baked beans), ask to have a low-carbohydrate food instead. Ask for a salad or green vegetables. · If you use insulin, check your blood sugar before and after eating out to help you plan how much to eat in the future. · If you eat more carbohydrate at a meal than you had planned, take a walk or do other exercise.  This will help lower your blood sugar.  What else should you know? · Limit saturated fat, such as the fat from meat and dairy products. This is a healthy choice because people who have diabetes are at higher risk of heart disease. So choose lean cuts of meat and nonfat or low-fat dairy products. Use olive or canola oil instead of butter or shortening when cooking. · Don't skip meals. Your blood sugar may drop too low if you skip meals and take insulin or certain medicines for diabetes. · Check with your doctor before you drink alcohol. Alcohol can cause your blood sugar to drop too low. Alcohol can also cause a bad reaction if you take certain diabetes medicines. Follow-up care is a key part of your treatment and safety. Be sure to make and go to all appointments, and call your doctor if you are having problems. It's also a good idea to know your test results and keep a list of the medicines you take. Where can you learn more? Go to http://[employer] Allianceyuli.info/. Enter A060 in the search box to learn more about \"Learning About Diabetes Food Guidelines. \"  Current as of: December 7, 2017  Content Version: 11.7  © 5910-6144 [employer]. Care instructions adapted under license by [employer] (which disclaims liability or warranty for this information). If you have questions about a medical condition or this instruction, always ask your healthcare professional. [address] any warranty or liability for your use of this information. Learning About Meal Planning for Diabetes  Why plan your meals? Meal planning can be a key part of managing diabetes. Planning meals and snacks with the right balance of carbohydrate, protein, and fat can help you keep your blood sugar at the target level you set with your doctor. You don't have to eat special foods. You can eat what your family eats, including sweets once in a while.  But you do have to pay attention to how often you eat and how much you eat of certain foods. You may want to work with a dietitian or a certified diabetes educator. He or she can give you tips and meal ideas and can answer your questions about meal planning. This health professional can also help you reach a healthy weight if that is one of your goals. What plan is right for you? Your dietitian or diabetes educator may suggest that you start with the plate format or carbohydrate counting. The plate format  The plate format is a simple way to help you manage how you eat. You plan meals by learning how much space each food should take on a plate. Using the plate format helps you spread carbohydrate throughout the day. It can make it easier to keep your blood sugar level within your target range. It also helps you see if you're eating healthy portion sizes. To use the plate format, you put non-starchy vegetables on half your plate. Add meat or meat substitutes on one-quarter of the plate. Put a grain or starchy vegetable (such as brown rice or a potato) on the final quarter of the plate. You can add a small piece of fruit and some low-fat or fat-free milk or yogurt, depending on your carbohydrate goal for each meal.  Here are some tips for using the plate format:  · Make sure that you are not using an oversized plate. A 9-inch plate is best. Many restaurants use larger plates. · Get used to using the plate format at home. Then you can use it when you eat out. · Write down your questions about using the plate format. Talk to your doctor, a dietitian, or a diabetes educator about your concerns. Carbohydrate counting  With carbohydrate counting, you plan meals based on the amount of carbohydrate in each food. Carbohydrate raises blood sugar higher and more quickly than any other nutrient. It is found in desserts, breads and cereals, and fruit. It's also found in starchy vegetables such as potatoes and corn, grains such as rice and pasta, and milk and yogurt.  Spreading carbohydrate throughout the day helps keep your blood sugar levels within your target range. Your daily amount depends on several things, including your weight, how active you are, which diabetes medicines you take, and what your goals are for your blood sugar levels. A registered dietitian or diabetes educator can help you plan how much carbohydrate to include in each meal and snack. A guideline for your daily amount of carbohydrate is:  · 45 to 60 grams at each meal. That's about the same as 3 to 4 carbohydrate servings. · 15 to 20 grams at each snack. That's about the same as 1 carbohydrate serving. The Nutrition Facts label on packaged foods tells you how much carbohydrate is in a serving of the food. First, look at the serving size on the food label. Is that the amount you eat in a serving? All of the nutrition information on a food label is based on that serving size. So if you eat more or less than that, you'll need to adjust the other numbers. Total carbohydrate is the next thing you need to look for on the label. If you count carbohydrate servings, one serving of carbohydrate is 15 grams. For foods that don't come with labels, such as fresh fruits and vegetables, you'll need a guide that lists carbohydrate in these foods. Ask your doctor, dietitian, or diabetes educator about books or other nutrition guides you can use. If you take insulin, you need to know how many grams of carbohydrate are in a meal. This lets you know how much rapid-acting insulin to take before you eat. If you use an insulin pump, you get a constant rate of insulin during the day. So the pump must be programmed at meals to give you extra insulin to cover the rise in blood sugar after meals. When you know how much carbohydrate you will eat, you can take the right amount of insulin. Or, if you always use the same amount of insulin, you need to make sure that you eat the same amount of carbohydrate at meals.   If you need more help to understand carbohydrate counting and food labels, ask your doctor, dietitian, or diabetes educator. How do you get started with meal planning? Here are some tips to get started:  · Plan your meals a week at a time. Don't forget to include snacks too. · Use cookbooks or online recipes to plan several main meals. Plan some quick meals for busy nights. You also can double some recipes that freeze well. Then you can save half for other busy nights when you don't have time to cook. · Make sure you have the ingredients you need for your recipes. If you're running low on basic items, put these items on your shopping list too. · List foods that you use to make breakfasts, lunches, and snacks. List plenty of fruits and vegetables. · Post this list on the refrigerator. Add to it as you think of more things you need. · Take the list to the store to do your weekly shopping. Follow-up care is a key part of your treatment and safety. Be sure to make and go to all appointments, and call your doctor if you are having problems. It's also a good idea to know your test results and keep a list of the medicines you take. Where can you learn more? Go to http://kolby-yuli.info/. Jennifer Aguirre in the search box to learn more about \"Learning About Meal Planning for Diabetes. \"  Current as of: December 7, 2017  Content Version: 11.7  © 3940-2001 [employer]. Care instructions adapted under license by [employer] (which disclaims liability or warranty for this information). If you have questions about a medical condition or this instruction, always ask your healthcare professional. [place] [address] any warranty or liability for your use of this information. DASH Diet: Care Instructions  Your Care Instructions    The DASH diet is an eating plan that can help lower your blood pressure. DASH stands for Dietary Approaches to Stop Hypertension.  Hypertension is high blood pressure. The DASH diet focuses on eating foods that are high in calcium, potassium, and magnesium. These nutrients can lower blood pressure. The foods that are highest in these nutrients are fruits, vegetables, low-fat dairy products, nuts, seeds, and legumes. But taking calcium, potassium, and magnesium supplements instead of eating foods that are high in those nutrients does not have the same effect. The DASH diet also includes whole grains, fish, and poultry. The DASH diet is one of several lifestyle changes your doctor may recommend to lower your high blood pressure. Your doctor may also want you to decrease the amount of sodium in your diet. Lowering sodium while following the DASH diet can lower blood pressure even further than just the DASH diet alone. Follow-up care is a key part of your treatment and safety. Be sure to make and go to all appointments, and call your doctor if you are having problems. It's also a good idea to know your test results and keep a list of the medicines you take. How can you care for yourself at home? Following the DASH diet  · Eat 4 to 5 servings of fruit each day. A serving is 1 medium-sized piece of fruit, ½ cup chopped or canned fruit, 1/4 cup dried fruit, or 4 ounces (½ cup) of fruit juice. Choose fruit more often than fruit juice. · Eat 4 to 5 servings of vegetables each day. A serving is 1 cup of lettuce or raw leafy vegetables, ½ cup of chopped or cooked vegetables, or 4 ounces (½ cup) of vegetable juice. Choose vegetables more often than vegetable juice. · Get 2 to 3 servings of low-fat and fat-free dairy each day. A serving is 8 ounces of milk, 1 cup of yogurt, or 1 ½ ounces of cheese. · Eat 6 to 8 servings of grains each day. A serving is 1 slice of bread, 1 ounce of dry cereal, or ½ cup of cooked rice, pasta, or cooked cereal. Try to choose whole-grain products as much as possible. · Limit lean meat, poultry, and fish to 2 servings each day.  A serving is 3 ounces, about the size of a deck of cards. · Eat 4 to 5 servings of nuts, seeds, and legumes (cooked dried beans, lentils, and split peas) each week. A serving is 1/3 cup of nuts, 2 tablespoons of seeds, or ½ cup of cooked beans or peas. · Limit fats and oils to 2 to 3 servings each day. A serving is 1 teaspoon of vegetable oil or 2 tablespoons of salad dressing. · Limit sweets and added sugars to 5 servings or less a week. A serving is 1 tablespoon jelly or jam, ½ cup sorbet, or 1 cup of lemonade. · Eat less than 2,300 milligrams (mg) of sodium a day. If you limit your sodium to 1,500 mg a day, you can lower your blood pressure even more. Tips for success  · Start small. Do not try to make dramatic changes to your diet all at once. You might feel that you are missing out on your favorite foods and then be more likely to not follow the plan. Make small changes, and stick with them. Once those changes become habit, add a few more changes. · Try some of the following:  ¨ Make it a goal to eat a fruit or vegetable at every meal and at snacks. This will make it easy to get the recommended amount of fruits and vegetables each day. ¨ Try yogurt topped with fruit and nuts for a snack or healthy dessert. ¨ Add lettuce, tomato, cucumber, and onion to sandwiches. ¨ Combine a ready-made pizza crust with low-fat mozzarella cheese and lots of vegetable toppings. Try using tomatoes, squash, spinach, broccoli, carrots, cauliflower, and onions. ¨ Have a variety of cut-up vegetables with a low-fat dip as an appetizer instead of chips and dip. ¨ Sprinkle sunflower seeds or chopped almonds over salads. Or try adding chopped walnuts or almonds to cooked vegetables. ¨ Try some vegetarian meals using beans and peas. Add garbanzo or kidney beans to salads. Make burritos and tacos with mashed ixe beans or black beans. Where can you learn more? Go to http://jarrett-yuli.info/.   Enter P294 in the search box to learn more about \"DASH Diet: Care Instructions. \"  Current as of: December 6, 2017  Content Version: 11.7  © 4119-7891 [employer]. Care instructions adapted under license by [employer] (which disclaims liability or warranty for this information). If you have questions about a medical condition or this instruction, always ask your healthcare professional. [address] any warranty or liability for your use of this information.

## 2018-08-13 ENCOUNTER — TELEPHONE (OUTPATIENT)
Dept: INTERNAL MEDICINE CLINIC | Age: 63
End: 2018-08-13

## 2018-08-13 PROBLEM — Z87.448 H/O RENAL FAILURE: Status: ACTIVE | Noted: 2018-08-13

## 2018-08-13 PROBLEM — M15.9 PRIMARY OSTEOARTHRITIS INVOLVING MULTIPLE JOINTS: Status: ACTIVE | Noted: 2018-08-13

## 2018-08-13 PROBLEM — I50.32 CHRONIC DIASTOLIC HEART FAILURE (HCC): Status: ACTIVE | Noted: 2018-08-13

## 2018-08-13 PROBLEM — E78.5 HYPERLIPIDEMIA: Status: ACTIVE | Noted: 2018-08-13

## 2018-08-13 PROBLEM — I10 ESSENTIAL HYPERTENSION: Status: ACTIVE | Noted: 2018-08-13

## 2018-08-13 PROBLEM — K21.9 GERD (GASTROESOPHAGEAL REFLUX DISEASE): Status: ACTIVE | Noted: 2018-08-13

## 2018-08-13 RX ORDER — ROSUVASTATIN CALCIUM 20 MG/1
20 TABLET, COATED ORAL
Qty: 30 TAB | Refills: 5 | Status: SHIPPED | OUTPATIENT
Start: 2018-08-13 | End: 2018-09-05 | Stop reason: ALTCHOICE

## 2018-08-13 RX ORDER — AMLODIPINE BESYLATE 5 MG/1
5 TABLET ORAL DAILY
Qty: 30 TAB | Refills: 5 | Status: SHIPPED | OUTPATIENT
Start: 2018-08-13 | End: 2019-01-03 | Stop reason: ALTCHOICE

## 2018-08-13 NOTE — PROGRESS NOTES
HPI:   Guillaume Gifford is a 60y.o. year old female who presents today to \[facility]\"" care. She is the daughter of Saritha Welch. She has a history of hypertension, hyperlipidemia, chronic diastolic heart failure, diabetes mellitus, ESRD s/p living donor cadaveric renal transplant (4/1980), osteopenia, osteoarthritis, lumbar degenerative disease, chronic pain syndrome, squamous cell skin cancers, depression, anxiety, GERD, and chronic constipation. She reports that she is doing relatively well. She states that she has been followed in the [facility] pain management center, but states that six months ago, she decided to wean herself off of narcotic pain medications. She states that she has decreased her MS Contin to 30 mg daily and is rarely requiring a Percocet. She states that she has not noticed a significant increase in her pain. She is otherwise without complaints and feeling generally well. She has a history of hypertension, treated with lisinopril, Imdur, and lasix. She also has a history of hyperlipidemia, treated with high intensity dose simvastatin. In 10/2017, she was admitted to [facility] with dyspnea on exertion, and chest xray revealed mild cardiac enlargement with pulmonary vascular congestion and diffusely increased interstitial markings. Echocardiogram showed normal LV size and function (EF 65%), mild concentric LVH, grade 2 diastolic dysfunction, and pharmacologic nuclear stress test was a normal low risk study without evidence of ischemia or prior infarction, and calculated EF 70%. She was diagnosed with acute on chronic diastolic heart failure, and treated with lasix and diuresed approximately five liters with improvement. She was intolerant to beta blockers. She has been monitoring her blood pressure at home and reports elevated readings recently, ranging 150-160/ 80-90.  She currently denies any chest pain, shortness of breath at rest or with exertion, palpitations, lightheadedness, PND, orthopnea, or edema. She is now being followed by Dr. Oemr Carcamo, but did not keep a follow-up appointment. She has a history of diabetes mellitus, not currently being treated with medication. She denies any polyuria, polydipsia, nocturia, or blurry vision, and has no history of retinopathy, neuropathy, or nephropathy. She has not been having regular eye exams. She has a history of renal failure and is s/p a living donor cadaveric renal transplant from her brother in 4/1980. She is maintained on tacrolimus and prednisone, and is followed in the [facility] by Dr. Shar Vázquez and Dr. Aundrea Acevedo. She has secondary osteopenia due to chronic steroid use, and is being treated with alendronate. Her last bone density study was in 5/2017 showing T-scores:  femoral neck left -0.1, lumbar -0.4, and distal radius -1.2. She continues to take calcium and Vitamin D supplements. She has no history of pathologic fractures. She has a history of osteoarthritis and is s/p right hip replacement by Dr. Eveline Santos in 2015. She also has chronic neck and low back pain secondary to degenerative disease. She has been followed in the [facility] pain management center, but as discussed above, she is attempting to wean from taking all narcotics. She denies any worsening neck or low back pain today. She denies any fevers, chills, weight change, saddle paresthesia, neurogenic bowel or bladder symptoms, or recent trauma. She has a history of GERD, treated with omeprazole. She had an upper endoscopy in 5/2014 by Dr. Malka Finney which was normal. She also has difficulty with chronic constipation, secondary to narcotic use. She is being treated with Linzess. She had a screening colonoscopy in 6/2011 which showed evidence of diverticulosis. Follow-up recommended for 10 years. She denies any abdominal pain, nausea, vomiting, melena, hematochezia, or change in bowel movements.     She has a history of multiple squamous cell carcinomas of the skin. She is being followed closely by Dr. Angie Brown. She reports that she was treated with Efudex last week and her skin is beginning to exfoliate. She does report that she was treated with doxycycline in 6/2018 for a possible MRSA infection on her wrist. She states that her grandson had a confirmed infection and she was taking care of him. She states that it completely resolved. She has a history of depression and anxiety, treated currently with Prozac. She states that she is no longer using Ambien as a sleep aid. She states that she feels that her symptoms are currently well controlled. Past Medical History:   Diagnosis Date    Calculus of kidney     Chronic diastolic heart failure ([facility] [address] 8/13/2018    Chronic pain syndrome 7/16/2013    Constipation due to pain medication 4/8/2016    Degeneration of lumbar or lumbosacral intervertebral disc     Depression     Diabetes mellitus     Essential hypertension 8/13/2018    KIM (generalized anxiety disorder) 4/29/2015    H/O renal failure 8/13/2018    Secondary to glomerulonephritis. 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    Type 2 diabetes mellitus with microalbuminuria ([facility] [address] 11/10/2015     Past Surgical History:   Procedure Laterality Date    ABDOMEN SURGERY PROC UNLISTED      HX GYN      Ovary    HX HEENT      nose surgery    HX HYSTERECTOMY      HX ORTHOPAEDIC  [id number]    hip replacement    HX UROLOGICAL      Kidney Transplant    NEPHRECTOMY      bilateral     Current Outpatient Prescriptions   Medication Sig    lisinopril (PRINIVIL, ZESTRIL) 40 mg tablet Take 1 Tab by mouth daily.     metFORMIN ER (GLUCOPHAGE XR) 500 mg tablet Take 1 Tab by mouth daily (with dinner).  predniSONE (DELTASONE) 5 mg tablet TK 1 T PO  EVERY OTHER DAY    furosemide (LASIX) 20 mg tablet TK 1 T PO BID    isosorbide mononitrate ER (IMDUR) 30 mg tablet TK 1 T PO  QD    tacrolimus (PROGRAF) 1 mg capsule TAKE 2 CAPSULES BY MOUTH TWICE DAILY    FLUoxetine (PROZAC) 40 mg capsule TK 1 C PO D    linaclotide (LINZESS) 290 mcg cap capsule Take 1 Cap by mouth Daily (before breakfast). For chronic opioid-induced constipation    DOCUSATE SODIUM (COLACE PO) Take  by mouth.  omeprazole (PRILOSEC) 40 mg capsule Take 40 mg by mouth daily.  alendronate (FOSAMAX) 5 mg tablet Take 40 mg by mouth Daily (before breakfast).  ergocalciferol (ERGOCALCIFEROL) 50,000 unit capsule TAKE 1 CAPSULE BY MOUTH EVERY WEEK    folic acid 475 mcg tablet Take 400 mcg by mouth daily.  simvastatin (ZOCOR) 40 mg tablet TK 1 T PO HS    morphine CR (MS CONTIN) 60 mg CR tablet Take 1 Tab by mouth every eight (8) hours. Max Daily Amount: 180 mg.    oxyCODONE-acetaminophen (PERCOCET 10)  mg per tablet Take 1 Tab by mouth two (2) times daily as needed for Pain. Max Daily Amount: 2 Tabs.  naloxone 4 mg/actuation spry 4 mg by Nasal route once as needed (for opioid overdose) for up to 1 dose. Indications: OPIOID TOXICITY    magic mouthwash (TABITHA) susp Take 5 mL by mouth every four (4) hours as needed for Pain (Benadryl- 30 cc; Maalox or equivalent-60 cc; 4 grams of liquid Carafate- 5 cc po-swish, gargle, and swallow q4 hrs. prn sore throat or prn as needed. ).  ondansetron hcl (ZOFRAN) 4 mg tablet Take 4 mg by mouth every eight (8) hours as needed for Nausea. No current facility-administered medications for this visit. Allergies and Intolerances: Allergies   Allergen Reactions    Asa-Acetaminophen-Caff-Potass Unknown (comments)    Aspirin Other (comments)    Beta-Blockers (Beta-Adrenergic Blocking Agts) Other (comments)     Sent into [facility].     Bystolic [Nebivolol] Other (comments)    Ibuprofen Other (comments)     Renal transplant patient    Lipitor [Atorvastatin] Unknown (comments)    Nsaids (Non-Steroidal Anti-Inflammatory Drug) Unknown (comments)    Sirolimus Other (comments)     pneumonitis    Toprol Xl [Metoprolol Succinate] Other (comments)     Asthma    Venlafaxine Other (comments)     Patient does not recall taking this medication     Family History: Her sister was diagnosed with breast cancer at age 61; her father had CAD and  during CABG  Family History   Problem Relation Age of Onset    Hypertension Father     Heart Disease Father     Alcohol abuse Father     Arthritis-osteo Father     Arthritis-osteo Brother     Cancer Maternal Grandmother      breast    Lung Disease Sister     High Cholesterol Mother     Arthritis-osteo Mother     Arthritis-osteo Sister     Liver Disease Sister      Social History:   She  reports that she quit smoking about 16 years ago. She has never used smokeless tobacco. Smoked  2-3 ppd for 10 years, stopping 20 years ago. She is  with one adopted son. She is a retired . History   Alcohol Use No     Immunization History:  Immunization History   Administered Date(s) Administered    Influenza Vaccine 2012, 2013    Influenza Vaccine (Quad) PF 11/10/2015    Pneumococcal Polysaccharide (PPSV-23) 2014    Tdap 2018       Review of Systems:   As above included in HPI. Otherwise 11 point review of systems negative including constitutional, skin, HENT, eyes, respiratory, cardiovascular, gastrointestinal, genitourinary, musculoskeletal, endocrine, hematologic, allergy, and neurologic. Physical:   Vitals:   BP: 148/82  HR: 80  WT: 189 lb (85.7 kg)  BMI:  32.95 kg/m2    Exam:   Patient appears in no apparent distress. Affect is appropriate.   HEENT --Anicteric sclerae, tympanic membranes normal,  ear canals normal.  PERRL, EOMI, conjunctiva and lids normal. Sinuses were nontender, turbinates normal, hearing normal.  Oropharynx without  erythema, normal tongue, oral mucosa and tonsils. No cervical lymphadenopathy. No thyromegaly, JVD, or bruits. Carotid pulses 2+ with normal upstroke. Lungs --Clear to auscultation. No wheezing or rales. Heart --Regular rate and rhythm, no murmurs, rubs, gallops, or clicks. Breasts -- no masses, skin dimpling, asymmetry, nipple discharge, nodules, axillary lymphadenopathy. Chest wall --Nontender to palpation. PMI normal.  Abdomen -- Soft and nontender, no hepatosplenomegaly or masses. Extremities -- Without cyanosis, clubbing, edema. 2+ pulses equally and bilaterally.   Neuro -- CN 2-12 intact, strength 5/5 with intact soft touch in all extremities  Derm - multiple raised lesions with rough appearing skin secondary to Efudex treatment    Diabetic foot exam:     Left Foot:   Visual Exam: normal    Pulse DP: 2+ (normal)   Filament test: normal sensation    Vibratory sensation: normal      Right Foot:   Visual Exam: normal    Pulse DP: 2+ (normal)   Filament test: normal sensation    Vibratory sensation: normal          Review of Data:  Labs:  Hospital Outpatient Visit on 08/02/2018   Component Date Value Ref Range Status    Hemoglobin A1c 08/02/2018 7.5* 4.2 - 5.6 % Final    Est. average glucose 08/02/2018 169  mg/dL Final    WBC 08/02/2018 7.8  4.6 - 13.2 K/uL Final    RBC 08/02/2018 4.09* 4.20 - 5.30 M/uL Final    HGB 08/02/2018 12.4  12.0 - 16.0 g/dL Final    HCT 08/02/2018 39.2  35.0 - 45.0 % Final    MCV 08/02/2018 95.8  74.0 - 97.0 FL Final    MCH 08/02/2018 30.3  24.0 - 34.0 PG Final    MCHC 08/02/2018 31.6  31.0 - 37.0 g/dL Final    RDW 08/02/2018 14.1  11.6 - 14.5 % Final    PLATELET 55/70/9776 203  135 - 420 K/uL Final    MPV 08/02/2018 9.5  9.2 - 11.8 FL Final    NEUTROPHILS 08/02/2018 54  40 - 73 % Final    LYMPHOCYTES 08/02/2018 33  21 - 52 % Final    MONOCYTES 08/02/2018 10  3 - 10 % Final    EOSINOPHILS 08/02/2018 3  0 - 5 % Final    BASOPHILS 08/02/2018 0  0 - 2 % Final    ABS. NEUTROPHILS 08/02/2018 4.2  1.8 - 8.0 K/UL Final    ABS. LYMPHOCYTES 08/02/2018 2.5  0.9 - 3.6 K/UL Final    ABS. MONOCYTES 08/02/2018 0.8  0.05 - 1.2 K/UL Final    ABS. EOSINOPHILS 08/02/2018 0.2  0.0 - 0.4 K/UL Final    ABS. BASOPHILS 08/02/2018 0.0  0.0 - 0.1 K/UL Final    DF 08/02/2018 AUTOMATED    Final    LIPID PROFILE 08/02/2018        Final    Cholesterol, total 08/02/2018 265* <200 MG/DL Final    Triglyceride 08/02/2018 479* <150 MG/DL Final    HDL Cholesterol 08/02/2018 44  40 - 60 MG/DL Final    LDL, calculated 08/02/2018 LDL AND VLDL CHOLESTEROL NOT CALCULATED WHEN TRIGLYCERIDES >400 MG/DL OR HDL CHOLESTEROL <20 MG/DL  0 - 100 MG/DL Final    VLDL, calculated 08/02/2018 Calculation not valid with this patient's other Lipid values. MG/DL Final    CHOL/HDL Ratio 08/02/2018 6.0* 0 - 5.0   Final    Sodium 08/02/2018 141  136 - 145 mmol/L Final    Potassium 08/02/2018 4.6  3.5 - 5.5 mmol/L Final    Chloride 08/02/2018 105  100 - 108 mmol/L Final    CO2 08/02/2018 28  21 - 32 mmol/L Final    Anion gap 08/02/2018 8  3.0 - 18 mmol/L Final    Glucose 08/02/2018 127* 74 - 99 mg/dL Final    BUN 08/02/2018 14  7.0 - 18 MG/DL Final    Creatinine 08/02/2018 0.97  0.6 - 1.3 MG/DL Final    BUN/Creatinine ratio 08/02/2018 14  12 - 20   Final    GFR est AA 08/02/2018 >60  >60 ml/min/1.73m2 Final    GFR est non-AA 08/02/2018 58* >60 ml/min/1.73m2 Final    Calcium 08/02/2018 9.5  8.5 - 10.1 MG/DL Final    TSH 08/02/2018 1.10  0.36 - 3.74 uIU/mL Final    Hepatitis C virus Ab 08/02/2018 0.1  <0.80 Index Final    Hep C  virus Ab Interp.  08/02/2018 NEGATIVE   NEG   Final    Hep C  virus Ab comment 08/02/2018        Final    Microalbumin,urine random 08/02/2018 6.80* 0 - 3.0 MG/DL Final    Creatinine, urine 08/02/2018 81.76  30 - 125 mg/dL Final    Microalbumin/Creat ratio (mg/g cre* 08/02/2018 83* 0 - 30 mg/g Final    Color 2018 YELLOW    Final    Appearance 2018 CLEAR    Final    Specific gravity 2018 1.014  1.005 - 1.030   Final    pH (UA) 2018 6.0  5.0 - 8.0   Final    Protein 2018 TRACE* NEG mg/dL Final    Glucose 2018 NEGATIVE   NEG mg/dL Final    Ketone 2018 NEGATIVE   NEG mg/dL Final    Bilirubin 2018 NEGATIVE   NEG   Final    Blood 2018 NEGATIVE   NEG   Final    Urobilinogen 2018 0.2  0.2 - 1.0 EU/dL Final    Nitrites 2018 NEGATIVE   NEG   Final    Leukocyte Esterase 2018 SMALL* NEG   Final    WBC 2018 4 to 10  0 - 4 /hpf Final    RBC 2018 NONE  0 - 5 /hpf Final    Epithelial cells 2018 1+  0 - 5 /lpf Final    Bacteria 2018 1+* NEG /hpf Final     Health Maintenance:  Screening:    Mammogram: negative (2016) [facility]   PAP smear: s/p CATY/BSO for endometriosis   Colorectal: colonoscopy (2011) divertculosis. Due .    Depression: on Prozac   DM (HbA1c/FPG): HbA1c 7.5 (2018)   Hepatitis C: negative (2018)   Falls: none   DEXA: osteopenia (2017)   Glaucoma: unknown   Smokin-30 pack years, stopped 20 years ago   Vitamin D: 18.3 (2015)   Medicare Wellness: N/A    Impression:  Patient Active Problem List   Diagnosis Code    Chronic low back pain M54.5, G89.29    Lumbosacral radiculopathy M54.17    Degeneration of lumbar or lumbosacral intervertebral disc M51.37    Primary iridocyclitis H20.019    Encounter for long-term (current) drug use Z79.899    Chronic insomnia F51.04    Kidney transplant status, living related donor Z80.0    Chronic pain syndrome G89.4    KIM (generalized anxiety disorder) F41.1    Type 2 diabetes mellitus with microalbuminuria (HCC) E11.29, R80.9    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    Constipation due to pain medication K59.03    Arthritis of left hip M16.12    History of depression Z86.59    Osteopenia of multiple sites M85.89    Essential hypertension I10    Chronic diastolic heart failure (HCC) I50.32    Hyperlipidemia E78.5    H/O renal failure Z87.448       Plan:  1. Hypertension. Blood pressure elevated today and at home on current regimen of lisinopril 20 mg daily, Imdur 30 mg daily and lasix 20 mg bid. Patient reports dose of lisinopril decreased when lasix was added, but blood pressure has not been controlled since doing so. Will increase dose to 40 mg daily, and instructed to monitor and record blood pressure and bring to next visit. Renal function has been stable with creatinine 0.97/ eGFR 58. Will continue to follow and recheck next visit. 2. Chronic diastolic heart failure. Grade 2 on echocardiogram in 10/2017. Hemodynamically stable currently current regimen as above. Does not tolerate beta blockers. Instructed to restrict sodium and follow weights. 3. Hyperlipidemia. On moderate intensity dose simvastatin with LDL not calculated given elevated triglycerides of 479. Discussed importance of weight loss and improved control of blood sugar. Will recheck next visit and consider changing to more potent statin. Patient states she experienced muscle aches with atorvastatin. Will consider rosuvastatin. Continue to follow. 4. S/p renal transplant from living related donor. Continuing to do well. On tacrolimus and prednisone and being followed in the Transplant Clinic at [facility]. Evidence of trace protein on urinalysis and microalbuminuria on recent check. Need to achieve better blood pressure and blood sugar control. Follow closely. 5. Diabetes mellitus. HbA1c 7.5 on no medications. Will begin metformin  mg daily with dinner. No known evidence of retinopathy, but patient overdue for eye exam. Will refer to Dr. Shaye Bowman. Foot exam normal today. On Ace-I and statin. Urine microalbumin/ creatinine ratio with evidence of mild-mod microalbuminuria. Will follow as achieve better blood sugar control. 6. Osteopenia.  Last bone density scan 5/2017 . Using femoral neck T-scores, calculated FRAX score estimates her 10 year risk of a major osteoporetic fracture at 10 % and hip fracture at 0.3 % in context of chronic steroid use and secondary osteoporosis. Currently being treated with alendronate as per Transplant team. Continue calcium and Vitamin D. On 50,000 U dose weekly of ergocalciferol. Will check level at next visit. Encouraged exercise, particularly weight bearing activities. 7. Osteoarthritis. Patient with multiple areas of arthritis contributing to her chronic pain. However, attempting to wean from narcotics, and rarely using Percocet and has decreased MS Contin to 30 mg daily. Will attempt to wean further. Not experiencing increased pain currently, but will follow. Will use non-narcotic based regimen if needed. 8. GERD. Appears well controlled on omeprazole. Upper endoscopy in 5/2014 negative. 9. Chronic constipation. Most likely related to chronic narcotics. Will hopefully improve with weaning. Continue Linzess. 10. Squamous cell carcinoma of skin. Under care of Dr. Bernard Goodman. Being treated with Efudex. Related to chronic immunosuppression. 11. Depression. On Prozac and appears well controlled. 12. Obesity. Emphasized importance of lifestyle modifications, including diet, exercise, and weight loss. Will address at next visit. 13. Health maintenance. Received Pneumovax. Will give Tdap today. Not candidate for Shingrix vaccine until further studied in immunosuppressed. Will order mammogram as overdue. Colonoscopy due 2021. Referred to Dr. Nidhi Flood for eye exams. Will check Vitamin D level with next lab draw. Total time: 40 minutes spent with the patient in face-to-face consultation of which greater than 50% was spent on counseling, answering questions and/or coordination of care. Complex medical review and management performed. Patient understands recommendations and agrees with plan. Follow-up in 4 weeks.

## 2018-08-13 NOTE — TELEPHONE ENCOUNTER
This patient was new to me at her visit last week. Review of record showed that she had a bone density study at [facility] in 5/2017, so insurance will not pay for a repeat until 5/2019. Please cancel her bone density study which is scheduled for 8/17/2018 and inform patient of reason. Thank you.

## 2018-08-13 NOTE — TELEPHONE ENCOUNTER
Called and spoke with patient. She states that her dose of lisinopril was already 40 mg daily when she checked her bottle at home. Told her that 40 mg was the maximum dose and she should not increase to 80 mg. Will prescribe a different anti-hypertensive. Will begin amlodipine 5 mg daily. Script sent to [facility]. Discussed monitoring blood pressure and blood sugar and bringing record to next visit. Patient states that she has already seen an improvement of her blood sugar since beginning metformin. Discussed changing statin to Crestor. Given that will be starting calcium channel blocker, will go ahead and change to rosuvastatin given known interaction with Zocor. Patient agreeable.

## 2018-08-13 NOTE — TELEPHONE ENCOUNTER
called- wife has questions about  Lisinopril- thought she was to take 80 mg a day- she was taking 40mg and she was told to double. She also thought Crestor was to be called in.  Please advise

## 2018-08-16 ENCOUNTER — TELEPHONE (OUTPATIENT)
Dept: INTERNAL MEDICINE CLINIC | Age: 63
End: 2018-08-16

## 2018-08-16 NOTE — TELEPHONE ENCOUNTER
Called and spoke with patient. Complaining of diffuse muscle aching and cramping since starting rosuvastatin. Reports that blood pressure and blood sugar significantly improved since starting amlodipine and metformin. Instructed to discontinue rosuvastatin and see if muscle complaints improve. Will address statin medication at next visit on 9/5/2018.

## 2018-08-16 NOTE — TELEPHONE ENCOUNTER
Patient's  is calling stating since starting the new meds she has been having extreme muscle pain that is only getting worse. Please advise.

## 2018-08-29 ENCOUNTER — HOSPITAL ENCOUNTER (OUTPATIENT)
Dept: LAB | Age: 63
Discharge: HOME | End: 2018-08-29
Payer: COMMERCIAL

## 2018-08-29 DIAGNOSIS — I10 ESSENTIAL HYPERTENSION: ICD-10-CM

## 2018-08-29 DIAGNOSIS — R80.9 TYPE 2 DIABETES MELLITUS WITH MICROALBUMINURIA, WITHOUT LONG-TERM CURRENT USE OF INSULIN (HCC): ICD-10-CM

## 2018-08-29 DIAGNOSIS — Z87.448 H/O RENAL FAILURE: ICD-10-CM

## 2018-08-29 DIAGNOSIS — E11.29 TYPE 2 DIABETES MELLITUS WITH MICROALBUMINURIA, WITHOUT LONG-TERM CURRENT USE OF INSULIN (HCC): ICD-10-CM

## 2018-08-29 DIAGNOSIS — M85.89 OSTEOPENIA OF MULTIPLE SITES: ICD-10-CM

## 2018-08-29 DIAGNOSIS — Z94.0 KIDNEY TRANSPLANT STATUS, LIVING RELATED DONOR: ICD-10-CM

## 2018-08-29 LAB
25(OH)D3 SERPL-MCNC: 30.4 NG/ML (ref 30–100)
ALBUMIN SERPL-MCNC: 3.9 G/DL (ref 3.4–5)
ANION GAP SERPL CALC-SCNC: 7 MMOL/L (ref 3–18)
APPEARANCE UR: CLEAR
BILIRUB UR QL: NEGATIVE
BUN SERPL-MCNC: 15 MG/DL (ref 7–18)
BUN/CREAT SERPL: 15 (ref 12–20)
CALCIUM SERPL-MCNC: 8.3 MG/DL (ref 8.5–10.1)
CHLORIDE SERPL-SCNC: 107 MMOL/L (ref 100–108)
CHOLEST SERPL-MCNC: 266 MG/DL
CO2 SERPL-SCNC: 27 MMOL/L (ref 21–32)
COLOR UR: YELLOW
CREAT SERPL-MCNC: 0.97 MG/DL (ref 0.6–1.3)
CREAT UR-MCNC: 94.3 MG/DL (ref 30–125)
EPITH CASTS URNS QL MICRO: ABNORMAL /LPF (ref 0–5)
GLUCOSE SERPL-MCNC: 113 MG/DL (ref 74–99)
GLUCOSE UR STRIP.AUTO-MCNC: NEGATIVE MG/DL
HDLC SERPL-MCNC: 38 MG/DL (ref 40–60)
HDLC SERPL: 7 {RATIO} (ref 0–5)
HGB UR QL STRIP: NEGATIVE
KETONES UR QL STRIP.AUTO: NEGATIVE MG/DL
LDLC SERPL CALC-MCNC: 172 MG/DL (ref 0–100)
LEUKOCYTE ESTERASE UR QL STRIP.AUTO: ABNORMAL
LIPID PROFILE,FLP: ABNORMAL
MICROALBUMIN UR-MCNC: 9.16 MG/DL (ref 0–3)
MICROALBUMIN/CREAT UR-RTO: 97 MG/G (ref 0–30)
NITRITE UR QL STRIP.AUTO: NEGATIVE
PH UR STRIP: 5.5 [PH] (ref 5–8)
PHOSPHATE SERPL-MCNC: 3.3 MG/DL (ref 2.5–4.9)
POTASSIUM SERPL-SCNC: 4.5 MMOL/L (ref 3.5–5.5)
PROT UR STRIP-MCNC: ABNORMAL MG/DL
RBC #/AREA URNS HPF: ABNORMAL /HPF (ref 0–5)
SODIUM SERPL-SCNC: 141 MMOL/L (ref 136–145)
SP GR UR REFRACTOMETRY: 1.02 (ref 1–1.03)
TRIGL SERPL-MCNC: 280 MG/DL (ref ?–150)
UROBILINOGEN UR QL STRIP.AUTO: 0.2 EU/DL (ref 0.2–1)
VLDLC SERPL CALC-MCNC: 56 MG/DL
WBC URNS QL MICRO: ABNORMAL /HPF (ref 0–5)

## 2018-08-29 PROCEDURE — 82043 UR ALBUMIN QUANTITATIVE: CPT | Performed by: INTERNAL MEDICINE

## 2018-08-29 PROCEDURE — 80069 RENAL FUNCTION PANEL: CPT | Performed by: INTERNAL MEDICINE

## 2018-08-29 PROCEDURE — 82306 VITAMIN D 25 HYDROXY: CPT | Performed by: INTERNAL MEDICINE

## 2018-08-29 PROCEDURE — 36415 COLL VENOUS BLD VENIPUNCTURE: CPT | Performed by: INTERNAL MEDICINE

## 2018-08-29 PROCEDURE — 80061 LIPID PANEL: CPT | Performed by: INTERNAL MEDICINE

## 2018-08-29 PROCEDURE — 81001 URINALYSIS AUTO W/SCOPE: CPT | Performed by: INTERNAL MEDICINE

## 2018-09-05 ENCOUNTER — OFFICE VISIT (OUTPATIENT)
Dept: INTERNAL MEDICINE CLINIC | Age: 63
End: 2018-09-05

## 2018-09-05 VITALS
WEIGHT: 187.8 LBS | BODY MASS INDEX: 32.06 KG/M2 | SYSTOLIC BLOOD PRESSURE: 162 MMHG | RESPIRATION RATE: 16 BRPM | DIASTOLIC BLOOD PRESSURE: 80 MMHG | HEIGHT: 64 IN | TEMPERATURE: 98.8 F | HEART RATE: 60 BPM | OXYGEN SATURATION: 96 %

## 2018-09-05 DIAGNOSIS — Z94.0 KIDNEY TRANSPLANT STATUS, LIVING RELATED DONOR: ICD-10-CM

## 2018-09-05 DIAGNOSIS — K59.03 CONSTIPATION DUE TO PAIN MEDICATION: ICD-10-CM

## 2018-09-05 DIAGNOSIS — E78.5 HYPERLIPIDEMIA, UNSPECIFIED HYPERLIPIDEMIA TYPE: ICD-10-CM

## 2018-09-05 DIAGNOSIS — D84.9 IMMUNOSUPPRESSED STATUS (HCC): ICD-10-CM

## 2018-09-05 DIAGNOSIS — I50.32 CHRONIC DIASTOLIC HEART FAILURE (HCC): ICD-10-CM

## 2018-09-05 DIAGNOSIS — K21.9 GASTROESOPHAGEAL REFLUX DISEASE WITHOUT ESOPHAGITIS: ICD-10-CM

## 2018-09-05 DIAGNOSIS — M85.89 OSTEOPENIA OF MULTIPLE SITES: ICD-10-CM

## 2018-09-05 DIAGNOSIS — R80.9 TYPE 2 DIABETES MELLITUS WITH MICROALBUMINURIA, WITHOUT LONG-TERM CURRENT USE OF INSULIN (HCC): ICD-10-CM

## 2018-09-05 DIAGNOSIS — Z86.59 HISTORY OF DEPRESSION: ICD-10-CM

## 2018-09-05 DIAGNOSIS — G89.4 CHRONIC PAIN SYNDROME: ICD-10-CM

## 2018-09-05 DIAGNOSIS — E11.29 TYPE 2 DIABETES MELLITUS WITH MICROALBUMINURIA, WITHOUT LONG-TERM CURRENT USE OF INSULIN (HCC): ICD-10-CM

## 2018-09-05 DIAGNOSIS — I10 ESSENTIAL HYPERTENSION: Primary | ICD-10-CM

## 2018-09-05 RX ORDER — LANOLIN ALCOHOL/MO/W.PET/CERES
400 CREAM (GRAM) TOPICAL DAILY
COMMUNITY
End: 2019-12-19

## 2018-09-05 RX ORDER — ISOSORBIDE MONONITRATE 60 MG/1
60 TABLET, EXTENDED RELEASE ORAL
Qty: 90 TAB | Refills: 2 | Status: SHIPPED | OUTPATIENT
Start: 2018-09-05 | End: 2019-01-03 | Stop reason: ALTCHOICE

## 2018-09-05 RX ORDER — SIMVASTATIN 40 MG/1
40 TABLET, FILM COATED ORAL
Qty: 90 TAB | Refills: 2 | Status: SHIPPED | OUTPATIENT
Start: 2018-09-05 | End: 2019-10-09 | Stop reason: SDUPTHER

## 2018-09-05 NOTE — PROGRESS NOTES
Chief Complaint   Patient presents with    Hypertension     4 week follow up with lab review. Health Maintenance Due   Topic Date Due    EYE EXAM RETINAL OR DILATED Q1  02/03/2012    BREAST CANCER SCRN MAMMOGRAM  09/16/2012    ZOSTER VACCINE AGE 60>  07/18/2014    Pneumococcal 19-64 Highest Risk (2 of 3 - PCV13) 11/28/2015    Influenza Age 9 to Adult  08/01/2018     1. Have you been to the ER, urgent care clinic or hospitalized since your last visit? NO.     2. Have you seen or consulted any other health care providers outside of the [address] [place] [address] since your last visit (Include any pap smears or colon screening)?  NO

## 2018-09-05 NOTE — MR AVS SNAPSHOT
Samantha  
 
 
 [address], [address] [facility] 200 [facility] 
[phone] Patient: Toñito Milner MRN: [id number] PKU:[id number] Visit Information Date & Time Provider Department Dept. Phone Encounter #  
 9/5/2018  1:30 PM Rajendra Morelos MD [facility] [phone] Follow-up Instructions Return in about 2 weeks (around 9/19/2018), or if symptoms worsen or fail to improve. Your Appointments 9/20/2018  2:00 PM  
Office Visit with Rajendra Morelos MD  
[facility]) Appt Note: ov 2wks per bs  
 [address] [facility], [address] [id number] [address] 455 [facility] [place]  
  
   
 [address] [facility], [address] Upcoming Health Maintenance Date Due  
 EYE EXAM RETINAL OR DILATED Q1 2/3/2012 BREAST CANCER SCRN MAMMOGRAM 9/16/2012 ZOSTER VACCINE AGE 60> 7/18/2014 Pneumococcal 19-64 Highest Risk (2 of 3 - PCV13) 11/28/2015 Influenza Age 5 to Adult 8/1/2018 HEMOGLOBIN A1C Q6M 2/2/2019 FOOT EXAM Q1 8/13/2019 MICROALBUMIN Q1 8/29/2019 LIPID PANEL Q1 8/29/2019 COLONOSCOPY 6/27/2021 DTaP/Tdap/Td series (2 - Td) 8/9/2028 Allergies as of 9/5/2018  Review Complete On: 9/5/2018 By: Dulce Maria Jain Severity Noted Reaction Type Reactions Asa-acetaminophen-caff-potass    Unknown (comments) Aspirin  05/14/2018    Other (comments) Beta-blockers (Beta-adrenergic Blocking Agts)    Other (comments) Sent into CHF. Bystolic [Nebivolol]  44/68/9951    Other (comments) Ibuprofen    Other (comments) Renal transplant patient Lipitor [Atorvastatin]  01/25/2012    Unknown (comments) Nsaids (Non-steroidal Anti-inflammatory Drug)    Unknown (comments) Sirolimus  02/05/2015    Other (comments)  
 pneumonitis Toprol Xl [Metoprolol Succinate]    Other (comments) Asthma Venlafaxine    Other (comments) Patient does not recall taking this medication Current Immunizations  Reviewed on 8/13/2018 Name Date Influenza Vaccine 11/11/2013, 12/18/2012 Influenza Vaccine (Quad) PF 11/10/2015 Pneumococcal Polysaccharide (PPSV-23) 11/28/2014 12:00 AM  
 Tdap 8/9/2018 12:20 PM  
  
 Not reviewed this visit Vitals BP Pulse Temp Resp Height(growth percentile) Weight(growth percentile) 162/80 (BP 1 Location: Right arm, BP Patient Position: Sitting) 60 98.8 °F (37.1 °C) (Oral) 16 5' 3.5\" (1.613 m) 187 lb 12.8 oz (85.2 kg) SpO2 BMI OB Status Smoking Status 96% 32.75 kg/m2 Hysterectomy Former Smoker Vitals History BMI and BSA Data Body Mass Index Body Surface Area 32.75 kg/m 2 1.95 m 2 Preferred Pharmacy Pharmacy Name Phone [address], Dipak Navarrete 17 [facility] 22 7310 [facility] [phone] Your Updated Medication List  
  
   
This list is accurate as of 9/5/18  2:42 PM.  Always use your most recent med list. amLODIPine 5 mg tablet Commonly known as:  Karen Gables Take 1 Tab by mouth daily. COLACE PO Take  by mouth.  
  
 ergocalciferol 50,000 unit capsule Commonly known as:  ERGOCALCIFEROL  
TAKE 1 CAPSULE BY MOUTH EVERY WEEK Flaxseed Oil Oil  
by Does Not Apply route. FLUoxetine 40 mg capsule Commonly known as:  PROzac TK 1 C PO D  
  
 folic acid 833 mcg tablet Take 400 mcg by mouth daily. FOSAMAX 5 mg tablet Generic drug:  alendronate Take 40 mg by mouth Daily (before breakfast). furosemide 20 mg tablet Commonly known as:  LASIX TK 1 T PO BID  
  
 isosorbide mononitrate ER 30 mg tablet Commonly known as:  IMDUR TK 1 T PO  QD  
  
 linaclotide 290 mcg Cap capsule Commonly known as:  Alto Longs Take 1 Cap by mouth Daily (before breakfast). For chronic opioid-induced constipation  
  
 lisinopril 40 mg tablet Commonly known as:  Angel Cervantes  
 Take 1 Tab by mouth daily. MAGOX 400 mg tablet Generic drug:  magnesium oxide Take 400 mg by mouth daily. metFORMIN  mg tablet Commonly known as:  GLUCOPHAGE XR Take 1 Tab by mouth daily (with dinner). morphine CR 60 mg CR tablet Commonly known as:  MS CONTIN Take 1 Tab by mouth every eight (8) hours. Max Daily Amount: 180 mg.  
  
 naloxone 4 mg/actuation nasal spray Commonly known as:  NARCAN  
4 mg by Nasal route once as needed (for opioid overdose) for up to 1 dose. Indications: OPIOID TOXICITY  
  
 oxyCODONE-acetaminophen  mg per tablet Commonly known as:  PERCOCET 10 Take 1 Tab by mouth two (2) times daily as needed for Pain. Max Daily Amount: 2 Tabs. PriLOSEC 40 mg capsule Generic drug:  omeprazole Take 40 mg by mouth daily. tacrolimus 1 mg capsule Commonly known as:  PROGRAF  
TAKE 2 CAPSULES BY MOUTH TWICE DAILY ZOFRAN 4 mg tablet Generic drug:  ondansetron hcl Take 4 mg by mouth every eight (8) hours as needed for Nausea. Follow-up Instructions Return in about 2 weeks (around 9/19/2018), or if symptoms worsen or fail to improve. Patient Instructions Increase Imdur to 60 mg daily. Restart simvastatin at 40 mg daily. Please monitor and record your blood pressure every morning for the next 2 weeks two hours after taking your medications. Please deliver record of readings to our office for review. Discontinue Fosamax. Learning About Diabetes Food Guidelines Your Care Instructions Meal planning is important to manage diabetes. It helps keep your blood sugar at a target level (which you set with your doctor). You don't have to eat special foods. You can eat what your family eats, including sweets once in a while. But you do have to pay attention to how often you eat and how much you eat of certain foods.  
You may want to work with a dietitian or a certified diabetes educator (CDE) to help you plan meals and snacks. A dietitian or CDE can also help you lose weight if that is one of your goals. What should you know about eating carbs? Managing the amount of carbohydrate (carbs) you eat is an important part of healthy meals when you have diabetes. Carbohydrate is found in many foods. · Learn which foods have carbs. And learn the amounts of carbs in different foods. ¨ Bread, cereal, pasta, and rice have about 15 grams of carbs in a serving. A serving is 1 slice of bread (1 ounce), ½ cup of cooked cereal, or 1/3 cup of cooked pasta or rice. ¨ Fruits have 15 grams of carbs in a serving. A serving is 1 small fresh fruit, such as an apple or orange; ½ of a banana; ½ cup of cooked or canned fruit; ½ cup of fruit juice; 1 cup of melon or raspberries; or 2 tablespoons of dried fruit. ¨ Milk and no-sugar-added yogurt have 15 grams of carbs in a serving. A serving is 1 cup of milk or 2/3 cup of no-sugar-added yogurt. ¨ Starchy vegetables have 15 grams of carbs in a serving. A serving is ½ cup of mashed potatoes or sweet potato; 1 cup winter squash; ½ of a small baked potato; ½ cup of cooked beans; or ½ cup cooked corn or green peas. · Learn how much carbs to eat each day and at each meal. A dietitian or CDE can teach you how to keep track of the amount of carbs you eat. This is called carbohydrate counting. · If you are not sure how to count carbohydrate grams, use the Plate Method to plan meals. It is a good, quick way to make sure that you have a balanced meal. It also helps you spread carbs throughout the day. ¨ Divide your plate by types of foods. Put non-starchy vegetables on half the plate, meat or other protein food on one-quarter of the plate, and a grain or starchy vegetable in the final quarter of the plate.  To this you can add a small piece of fruit and 1 cup of milk or yogurt, depending on how many carbs you are supposed to eat at a meal. 
 · Try to eat about the same amount of carbs at each meal. Do not \"save up\" your daily allowance of carbs to eat at one meal. 
· Proteins have very little or no carbs per serving. Examples of proteins are beef, chicken, turkey, fish, eggs, tofu, cheese, cottage cheese, and peanut butter. A serving size of meat is 3 ounces, which is about the size of a deck of cards. Examples of meat substitute serving sizes (equal to 1 ounce of meat) are 1/4 cup of cottage cheese, 1 egg, 1 tablespoon of peanut butter, and ½ cup of tofu. How can you eat out and still eat healthy? · Learn to estimate the serving sizes of foods that have carbohydrate. If you measure food at home, it will be easier to estimate the amount in a serving of restaurant food. · If the meal you order has too much carbohydrate (such as potatoes, corn, or baked beans), ask to have a low-carbohydrate food instead. Ask for a salad or green vegetables. · If you use insulin, check your blood sugar before and after eating out to help you plan how much to eat in the future. · If you eat more carbohydrate at a meal than you had planned, take a walk or do other exercise. This will help lower your blood sugar. What else should you know? · Limit saturated fat, such as the fat from meat and dairy products. This is a healthy choice because people who have diabetes are at higher risk of heart disease. So choose lean cuts of meat and nonfat or low-fat dairy products. Use olive or canola oil instead of butter or shortening when cooking. · Don't skip meals. Your blood sugar may drop too low if you skip meals and take insulin or certain medicines for diabetes. · Check with your doctor before you drink alcohol. Alcohol can cause your blood sugar to drop too low. Alcohol can also cause a bad reaction if you take certain diabetes medicines. Follow-up care is a key part of your treatment and safety.  Be sure to make and go to all appointments, and call your doctor if you are having problems. It's also a good idea to know your test results and keep a list of the medicines you take. Where can you learn more? Go to http://kolby-yuli.info/. Enter P317 in the search box to learn more about \"Learning About Diabetes Food Guidelines. \" Current as of: December 7, 2017 Content Version: 11.7 © 5753-6380 [employer] Care instructions adapted under license by [employer] (which disclaims liability or warranty for this information). If you have questions about a medical condition or this instruction, always ask your healthcare professional. [address] any warranty or liability for your use of this information. Learning About Meal Planning for Diabetes Why plan your meals? Meal planning can be a key part of managing diabetes. Planning meals and snacks with the right balance of carbohydrate, protein, and fat can help you keep your blood sugar at the target level you set with your doctor. You don't have to eat special foods. You can eat what your family eats, including sweets once in a while. But you do have to pay attention to how often you eat and how much you eat of certain foods. You may want to work with a dietitian or a certified diabetes educator. He or she can give you tips and meal ideas and can answer your questions about meal planning. This health professional can also help you reach a healthy weight if that is one of your goals. What plan is right for you? Your dietitian or diabetes educator may suggest that you start with the plate format or carbohydrate counting. The plate format The plate format is a simple way to help you manage how you eat. You plan meals by learning how much space each food should take on a plate. Using the plate format helps you spread carbohydrate throughout the day.  It can make it easier to keep your blood sugar level within your target range. It also helps you see if you're eating healthy portion sizes. To use the plate format, you put non-starchy vegetables on half your plate. Add meat or meat substitutes on one-quarter of the plate. Put a grain or starchy vegetable (such as brown rice or a potato) on the final quarter of the plate. You can add a small piece of fruit and some low-fat or fat-free milk or yogurt, depending on your carbohydrate goal for each meal. 
Here are some tips for using the plate format: · Make sure that you are not using an oversized plate. A 9-inch plate is best. Many restaurants use larger plates. · Get used to using the plate format at home. Then you can use it when you eat out. · Write down your questions about using the plate format. Talk to your doctor, a dietitian, or a diabetes educator about your concerns. Carbohydrate counting With carbohydrate counting, you plan meals based on the amount of carbohydrate in each food. Carbohydrate raises blood sugar higher and more quickly than any other nutrient. It is found in desserts, breads and cereals, and fruit. It's also found in starchy vegetables such as potatoes and corn, grains such as rice and pasta, and milk and yogurt. Spreading carbohydrate throughout the day helps keep your blood sugar levels within your target range. Your daily amount depends on several things, including your weight, how active you are, which diabetes medicines you take, and what your goals are for your blood sugar levels. A registered dietitian or diabetes educator can help you plan how much carbohydrate to include in each meal and snack. A guideline for your daily amount of carbohydrate is: · 45 to 60 grams at each meal. That's about the same as 3 to 4 carbohydrate servings. · 15 to 20 grams at each snack. That's about the same as 1 carbohydrate serving.  
The Nutrition Facts label on packaged foods tells you how much carbohydrate is in a serving of the food. First, look at the serving size on the food label. Is that the amount you eat in a serving? All of the nutrition information on a food label is based on that serving size. So if you eat more or less than that, you'll need to adjust the other numbers. Total carbohydrate is the next thing you need to look for on the label. If you count carbohydrate servings, one serving of carbohydrate is 15 grams. For foods that don't come with labels, such as fresh fruits and vegetables, you'll need a guide that lists carbohydrate in these foods. Ask your doctor, dietitian, or diabetes educator about books or other nutrition guides you can use. If you take insulin, you need to know how many grams of carbohydrate are in a meal. This lets you know how much rapid-acting insulin to take before you eat. If you use an insulin pump, you get a constant rate of insulin during the day. So the pump must be programmed at meals to give you extra insulin to cover the rise in blood sugar after meals. When you know how much carbohydrate you will eat, you can take the right amount of insulin. Or, if you always use the same amount of insulin, you need to make sure that you eat the same amount of carbohydrate at meals. If you need more help to understand carbohydrate counting and food labels, ask your doctor, dietitian, or diabetes educator. How do you get started with meal planning? Here are some tips to get started: 
· Plan your meals a week at a time. Don't forget to include snacks too. · Use cookbooks or online recipes to plan several main meals. Plan some quick meals for busy nights. You also can double some recipes that freeze well. Then you can save half for other busy nights when you don't have time to cook. · Make sure you have the ingredients you need for your recipes. If you're running low on basic items, put these items on your shopping list too. · List foods that you use to make breakfasts, lunches, and snacks. List plenty of fruits and vegetables. · Post this list on the refrigerator. Add to it as you think of more things you need. · Take the list to the store to do your weekly shopping. Follow-up care is a key part of your treatment and safety. Be sure to make and go to all appointments, and call your doctor if you are having problems. It's also a good idea to know your test results and keep a list of the medicines you take. Where can you learn more? Go to http://kolbyTursiop Technologiesyuli.info/. Cesia Perez in the search box to learn more about \"Learning About Meal Planning for Diabetes. \" Current as of: December 7, 2017 Content Version: 11.7 © 0659-3391 [employer]. Care instructions adapted under license by [employer] (which disclaims liability or warranty for this information). If you have questions about a medical condition or this instruction, always ask your healthcare professional. [place] [address] any warranty or liability for your use of this information. Introducing [facility]! [employer] introduces [employer] patient portal. Now you can access parts of your medical record, email your doctor's office, and request medication refills online. 1. In your internet browser, go to [email or web address] 2. Click on the First Time User? Click Here link in the Sign In box. You will see the New Member Sign Up page. 3. Enter your [employer] Access Code exactly as it appears below. You will not need to use this code after youve completed the sign-up process. If you do not sign up before the expiration date, you must request a new code. · [employer] Access Code: [email or web address] Expires: 10/31/2018 10:56 AM 
 
4. Enter the last four digits of your Social Security Number (xxxx) and Date of Birth (mm/dd/yyyy) as indicated and click Submit.  You will be taken to the next sign-up page. 5. Create a Re[employer] ID. This will be your [employer] login ID and cannot be changed, so think of one that is secure and easy to remember. 6. Create a [employer] password. You can change your password at any time. 7. Enter your Password Reset Question and Answer. This can be used at a later time if you forget your password. 8. Enter your e-mail address. You will receive e-mail notification when new information is available in [address] 9. Click Sign Up. You can now view and download portions of your medical record. 10. Click the Download Summary menu link to download a portable copy of your medical information. If you have questions, please visit the Frequently Asked Questions section of the [employer] website. Remember, [employer] is NOT to be used for urgent needs. For medical emergencies, dial 911. Now available from your iPhone and Android! Please provide this summary of care documentation to your next provider. Your primary care clinician is listed as Jacy Ta. If you have any questions after today's visit, please call [phone].

## 2018-09-05 NOTE — PATIENT INSTRUCTIONS
Increase Imdur to 60 mg daily. Restart simvastatin at 40 mg daily. Please monitor and record your blood pressure every morning for the next 2 weeks two hours after taking your medications. Please deliver record of readings to our office for review. Discontinue Fosamax. Learning About Diabetes Food Guidelines  Your Care Instructions    Meal planning is important to manage diabetes. It helps keep your blood sugar at a target level (which you set with your doctor). You don't have to eat special foods. You can eat what your family eats, including sweets once in a while. But you do have to pay attention to how often you eat and how much you eat of certain foods. You may want to work with a dietitian or a certified diabetes educator (CDE) to help you plan meals and snacks. A dietitian or CDE can also help you lose weight if that is one of your goals. What should you know about eating carbs? Managing the amount of carbohydrate (carbs) you eat is an important part of healthy meals when you have diabetes. Carbohydrate is found in many foods. · Learn which foods have carbs. And learn the amounts of carbs in different foods. ¨ Bread, cereal, pasta, and rice have about 15 grams of carbs in a serving. A serving is 1 slice of bread (1 ounce), ½ cup of cooked cereal, or 1/3 cup of cooked pasta or rice. ¨ Fruits have 15 grams of carbs in a serving. A serving is 1 small fresh fruit, such as an apple or orange; ½ of a banana; ½ cup of cooked or canned fruit; ½ cup of fruit juice; 1 cup of melon or raspberries; or 2 tablespoons of dried fruit. ¨ Milk and no-sugar-added yogurt have 15 grams of carbs in a serving. A serving is 1 cup of milk or 2/3 cup of no-sugar-added yogurt. ¨ Starchy vegetables have 15 grams of carbs in a serving. A serving is ½ cup of mashed potatoes or sweet potato; 1 cup winter squash; ½ of a small baked potato; ½ cup of cooked beans; or ½ cup cooked corn or green peas.   · Learn how much carbs to eat each day and at each meal. A dietitian or CDE can teach you how to keep track of the amount of carbs you eat. This is called carbohydrate counting. · If you are not sure how to count carbohydrate grams, use the Plate Method to plan meals. It is a good, quick way to make sure that you have a balanced meal. It also helps you spread carbs throughout the day. ¨ Divide your plate by types of foods. Put non-starchy vegetables on half the plate, meat or other protein food on one-quarter of the plate, and a grain or starchy vegetable in the final quarter of the plate. To this you can add a small piece of fruit and 1 cup of milk or yogurt, depending on how many carbs you are supposed to eat at a meal.  · Try to eat about the same amount of carbs at each meal. Do not \"save up\" your daily allowance of carbs to eat at one meal.  · Proteins have very little or no carbs per serving. Examples of proteins are beef, chicken, turkey, fish, eggs, tofu, cheese, cottage cheese, and peanut butter. A serving size of meat is 3 ounces, which is about the size of a deck of cards. Examples of meat substitute serving sizes (equal to 1 ounce of meat) are 1/4 cup of cottage cheese, 1 egg, 1 tablespoon of peanut butter, and ½ cup of tofu. How can you eat out and still eat healthy? · Learn to estimate the serving sizes of foods that have carbohydrate. If you measure food at home, it will be easier to estimate the amount in a serving of restaurant food. · If the meal you order has too much carbohydrate (such as potatoes, corn, or baked beans), ask to have a low-carbohydrate food instead. Ask for a salad or green vegetables. · If you use insulin, check your blood sugar before and after eating out to help you plan how much to eat in the future. · If you eat more carbohydrate at a meal than you had planned, take a walk or do other exercise. This will help lower your blood sugar. What else should you know?   · Limit saturated fat, such as the fat from meat and dairy products. This is a healthy choice because people who have diabetes are at higher risk of heart disease. So choose lean cuts of meat and nonfat or low-fat dairy products. Use olive or canola oil instead of butter or shortening when cooking. · Don't skip meals. Your blood sugar may drop too low if you skip meals and take insulin or certain medicines for diabetes. · Check with your doctor before you drink alcohol. Alcohol can cause your blood sugar to drop too low. Alcohol can also cause a bad reaction if you take certain diabetes medicines. Follow-up care is a key part of your treatment and safety. Be sure to make and go to all appointments, and call your doctor if you are having problems. It's also a good idea to know your test results and keep a list of the medicines you take. Where can you learn more? Go to http://[employer] CBOTyuli.info/. Enter H906 in the search box to learn more about \"Learning About Diabetes Food Guidelines. \"  Current as of: December 7, 2017  Content Version: 11.7  © 7877-1105 [employer]. Care instructions adapted under license by [employer] (which disclaims liability or warranty for this information). If you have questions about a medical condition or this instruction, always ask your healthcare professional. [address] any warranty or liability for your use of this information. Learning About Meal Planning for Diabetes  Why plan your meals? Meal planning can be a key part of managing diabetes. Planning meals and snacks with the right balance of carbohydrate, protein, and fat can help you keep your blood sugar at the target level you set with your doctor. You don't have to eat special foods. You can eat what your family eats, including sweets once in a while. But you do have to pay attention to how often you eat and how much you eat of certain foods.   You may want to work with a dietitian or a certified diabetes educator. He or she can give you tips and meal ideas and can answer your questions about meal planning. This health professional can also help you reach a healthy weight if that is one of your goals. What plan is right for you? Your dietitian or diabetes educator may suggest that you start with the plate format or carbohydrate counting. The plate format  The plate format is a simple way to help you manage how you eat. You plan meals by learning how much space each food should take on a plate. Using the plate format helps you spread carbohydrate throughout the day. It can make it easier to keep your blood sugar level within your target range. It also helps you see if you're eating healthy portion sizes. To use the plate format, you put non-starchy vegetables on half your plate. Add meat or meat substitutes on one-quarter of the plate. Put a grain or starchy vegetable (such as brown rice or a potato) on the final quarter of the plate. You can add a small piece of fruit and some low-fat or fat-free milk or yogurt, depending on your carbohydrate goal for each meal.  Here are some tips for using the plate format:  · Make sure that you are not using an oversized plate. A 9-inch plate is best. Many restaurants use larger plates. · Get used to using the plate format at home. Then you can use it when you eat out. · Write down your questions about using the plate format. Talk to your doctor, a dietitian, or a diabetes educator about your concerns. Carbohydrate counting  With carbohydrate counting, you plan meals based on the amount of carbohydrate in each food. Carbohydrate raises blood sugar higher and more quickly than any other nutrient. It is found in desserts, breads and cereals, and fruit. It's also found in starchy vegetables such as potatoes and corn, grains such as rice and pasta, and milk and yogurt.  Spreading carbohydrate throughout the day helps keep your blood sugar levels within your target range. Your daily amount depends on several things, including your weight, how active you are, which diabetes medicines you take, and what your goals are for your blood sugar levels. A registered dietitian or diabetes educator can help you plan how much carbohydrate to include in each meal and snack. A guideline for your daily amount of carbohydrate is:  · 45 to 60 grams at each meal. That's about the same as 3 to 4 carbohydrate servings. · 15 to 20 grams at each snack. That's about the same as 1 carbohydrate serving. The Nutrition Facts label on packaged foods tells you how much carbohydrate is in a serving of the food. First, look at the serving size on the food label. Is that the amount you eat in a serving? All of the nutrition information on a food label is based on that serving size. So if you eat more or less than that, you'll need to adjust the other numbers. Total carbohydrate is the next thing you need to look for on the label. If you count carbohydrate servings, one serving of carbohydrate is 15 grams. For foods that don't come with labels, such as fresh fruits and vegetables, you'll need a guide that lists carbohydrate in these foods. Ask your doctor, dietitian, or diabetes educator about books or other nutrition guides you can use. If you take insulin, you need to know how many grams of carbohydrate are in a meal. This lets you know how much rapid-acting insulin to take before you eat. If you use an insulin pump, you get a constant rate of insulin during the day. So the pump must be programmed at meals to give you extra insulin to cover the rise in blood sugar after meals. When you know how much carbohydrate you will eat, you can take the right amount of insulin. Or, if you always use the same amount of insulin, you need to make sure that you eat the same amount of carbohydrate at meals.   If you need more help to understand carbohydrate counting and food labels, ask your doctor, dietitian, or diabetes educator. How do you get started with meal planning? Here are some tips to get started:  · Plan your meals a week at a time. Don't forget to include snacks too. · Use cookbooks or online recipes to plan several main meals. Plan some quick meals for busy nights. You also can double some recipes that freeze well. Then you can save half for other busy nights when you don't have time to cook. · Make sure you have the ingredients you need for your recipes. If you're running low on basic items, put these items on your shopping list too. · List foods that you use to make breakfasts, lunches, and snacks. List plenty of fruits and vegetables. · Post this list on the refrigerator. Add to it as you think of more things you need. · Take the list to the store to do your weekly shopping. Follow-up care is a key part of your treatment and safety. Be sure to make and go to all appointments, and call your doctor if you are having problems. It's also a good idea to know your test results and keep a list of the medicines you take. Where can you learn more? Go to http://kolby-yuli.info/. [place] in the search box to learn more about \"Learning About Meal Planning for Diabetes. \"  Current as of: December 7, 2017  Content Version: 11.7  © 8742-6117 [employer]. Care instructions adapted under license by [employer] (which disclaims liability or warranty for this information). If you have questions about a medical condition or this instruction, always ask your healthcare professional. [address] any warranty or liability for your use of this information.

## 2018-09-09 PROBLEM — D84.9 IMMUNOSUPPRESSED STATUS (HCC): Status: ACTIVE | Noted: 2018-09-09

## 2018-09-09 RX ORDER — ALENDRONATE SODIUM 10 MG/1
40 TABLET ORAL
Qty: 4 TAB | Refills: 0
Start: 2018-09-09 | End: 2018-11-06

## 2018-09-09 NOTE — PROGRESS NOTES
HPI:   Jerad Talamatnes is a 60y.o. year old female who presents today for evaluation of hypertension, hyperlipidemia, chronic diastolic heart failure, diabetes mellitus, ESRD s/p living donor cadaveric renal transplant (4/1980), osteopenia, osteoarthritis, lumbar degenerative disease, chronic pain syndrome, squamous cell skin cancers, depression, anxiety, GERD, and chronic constipation. She reports that she is doing relatively well. She had been followed previously in the [facility] pain management center, but reported last visit that she has been weaning herself off of pain medications. She reports today that she has been successful discontinuing Percocet, and is only using MS Contin 30 mg as needed. She states that she has not noticed a significant increase in her neck or low back pain. She does report that she stopped taking metformin since she changed her diet and noticed that this was effective in controlling her blood sugars. She states that she started rosuvastatin, but noticed severe muscle aches and stopped taking it with improvement. She has been monitoring her blood pressure since starting amlodipine, and reports that it has been fluctuating, appearing controlled at times and elevated at others. She states that she notices that when she takes a dose of MS Contin, her blood pressure seems to rise. She is otherwise without complaints. She has a history of hypertension, currently treated with lisinopril, Imdur, amlodipine, and lasix. She also has a history of hyperlipidemia, treated previously with high intensity dose simvastatin. In 10/2017, she was admitted to [facility] with dyspnea on exertion, and chest xray revealed mild cardiac enlargement with pulmonary vascular congestion and diffusely increased interstitial markings.  Echocardiogram showed normal LV size and function (EF 65%), mild concentric LVH, grade 2 diastolic dysfunction, and pharmacologic nuclear stress test was a normal low risk study without evidence of ischemia or prior infarction, and calculated EF 70%. She was diagnosed with acute on chronic diastolic heart failure, and treated with lasix and diuresed approximately five liters with improvement. She was intolerant to beta blockers. She has been monitoring her blood pressure at home and reports elevated readings recently, ranging 150-160/ 80-90. She currently denies any chest pain, shortness of breath at rest or with exertion, palpitations, lightheadedness, PND, orthopnea, or edema. She is now being followed by Dr. Justa Begum, but did not keep a follow-up appointment. She has a history of diabetes mellitus, not currently being treated with medication. She denies any polyuria, polydipsia, nocturia, or blurry vision, and has no history of retinopathy, neuropathy, or nephropathy. She has not been having regular eye exams. She has a history of renal failure and is s/p a living donor cadaveric renal transplant from her brother in 4/1980. She is maintained on tacrolimus alone, stating that she discontinued prednisone approximately one year ago. She is followed in the [facility] by Dr. Wesley Torres and Dr. Fany Jett. She has secondary osteopenia due to chronic steroid use, and is being treated with alendronate. Her last bone density study was in 5/2017 showing T-scores:  femoral neck left -0.1, lumbar -0.4, and distal radius -1.2. She continues to take calcium and Vitamin D supplements. She has no history of pathologic fractures. She has a history of osteoarthritis and is s/p right hip replacement by Dr. Laya Winn in 2015. She also has chronic neck and low back pain secondary to degenerative disease. She has been followed in the [facility] pain management center, but as discussed above, she is attempting to wean from taking all narcotics. She denies any worsening neck or low back pain today.  She denies any fevers, chills, weight change, saddle paresthesia, neurogenic bowel or bladder symptoms, or recent trauma. She has a history of GERD, treated with omeprazole. She had an upper endoscopy in 5/2014 by Dr. Guy Marquis which was normal. She also has difficulty with chronic constipation, secondary to narcotic use. She is being treated with Linzess. She had a screening colonoscopy in 6/2011 which showed evidence of diverticulosis. Follow-up recommended for 10 years. She denies any abdominal pain, nausea, vomiting, melena, hematochezia, or change in bowel movements. She has a history of multiple squamous cell carcinomas of the skin. [facility] She reports that she was treated with Efudex in 8/2018 and her skin is beginning to exfoliate. She does report that she was treated with doxycycline in 6/2018 for a possible MRSA infection on her wrist. She states that her grandson had a confirmed infection and she was taking care of him. She states that it completely resolved. She has a history of depression and anxiety, treated currently with Prozac. She states that she is no longer using Ambien as a sleep aid. She states that she feels that her symptoms are currently well controlled. Past Medical History:   Diagnosis Date    Calculus of kidney     Chronic diastolic heart failure ([facility] [address] 8/13/2018    Chronic pain syndrome 7/16/2013    Constipation due to pain medication 4/8/2016    Degeneration of lumbar or lumbosacral intervertebral disc     Depression     Diabetes mellitus     Essential hypertension 8/13/2018    KIM (generalized anxiety disorder) 4/29/2015    H/O renal failure 8/13/2018    Secondary to glomerulonephritis.   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    Type 2 diabetes mellitus with microalbuminuria ([facility] [address] 11/10/2015     Past Surgical History:   Procedure Laterality Date    ABDOMEN SURGERY PROC UNLISTED      HX GYN      Ovary    HX HEENT      nose surgery    HX HYSTERECTOMY      HX ORTHOPAEDIC  [id number]    hip replacement    HX UROLOGICAL      Kidney Transplant    NEPHRECTOMY      bilateral     Current Outpatient Prescriptions   Medication Sig    alendronate (FOSAMAX) 10 mg tablet Take 4 Tabs by mouth every seven (7) days.  magnesium oxide (MAGOX) 400 mg tablet Take 400 mg by mouth daily.  Flaxseed Oil oil by Does Not Apply route.  simvastatin (ZOCOR) 40 mg tablet Take 1 Tab by mouth nightly.  isosorbide mononitrate ER (IMDUR) 60 mg CR tablet Take 1 Tab by mouth every morning.  amLODIPine (NORVASC) 5 mg tablet Take 1 Tab by mouth daily.  lisinopril (PRINIVIL, ZESTRIL) 40 mg tablet Take 1 Tab by mouth daily.  morphine CR (MS CONTIN) 60 mg CR tablet Take 1 Tab by mouth every eight (8) hours. Max Daily Amount: 180 mg.    furosemide (LASIX) 20 mg tablet TK 1 T PO BID    tacrolimus (PROGRAF) 1 mg capsule TAKE 2 CAPSULES BY MOUTH TWICE DAILY    FLUoxetine (PROZAC) 40 mg capsule TK 1 C PO D    DOCUSATE SODIUM (COLACE PO) Take  by mouth.  omeprazole (PRILOSEC) 40 mg capsule Take 40 mg by mouth daily.  ergocalciferol (ERGOCALCIFEROL) 50,000 unit capsule TAKE 1 CAPSULE BY MOUTH EVERY WEEK    linaclotide (LINZESS) 290 mcg cap capsule Take 1 Cap by mouth Daily (before breakfast). For chronic opioid-induced constipation    naloxone 4 mg/actuation spry 4 mg by Nasal route once as needed (for opioid overdose) for up to 1 dose. Indications: OPIOID TOXICITY    ondansetron hcl (ZOFRAN) 4 mg tablet Take 4 mg by mouth every eight (8) hours as needed for Nausea.  folic acid 434 mcg tablet Take 400 mcg by mouth daily. No current facility-administered medications for this visit.       Allergies and Intolerances: Allergies   Allergen Reactions    Asa-Acetaminophen-Caff-Potass Unknown (comments)    Aspirin Other (comments)    Beta-Blockers (Beta-Adrenergic Blocking Agts) Other (comments)     Sent into [facility].  Bystolic [Nebivolol] Other (comments)    Ibuprofen Other (comments)     Renal transplant patient    Lipitor [Atorvastatin] Unknown (comments)    Nsaids (Non-Steroidal Anti-Inflammatory Drug) Unknown (comments)    Sirolimus Other (comments)     pneumonitis    Toprol Xl [Metoprolol Succinate] Other (comments)     Asthma    Venlafaxine Other (comments)     Patient does not recall taking this medication     Family History: Her sister was diagnosed with breast cancer at age 61; her father had CAD and  during CABG  Family History   Problem Relation Age of Onset    Hypertension Father     Heart Disease Father     Alcohol abuse Father     Arthritis-osteo Father     Arthritis-osteo Brother     Cancer Maternal Grandmother      breast    Lung Disease Sister     High Cholesterol Mother     Arthritis-osteo Mother     Arthritis-osteo Sister     Liver Disease Sister      Social History:   She  reports that she quit smoking about 16 years ago. She has never used smokeless tobacco. Smoked  2-3 ppd for 10 years, stopping 20 years ago. She is  with one adopted son. She is a retired . History   Alcohol Use No     Immunization History:  Immunization History   Administered Date(s) Administered    Influenza Vaccine 2012, 2013    Influenza Vaccine (Quad) PF 11/10/2015    Pneumococcal Polysaccharide (PPSV-23) 2014    Tdap 2018       Review of Systems:   As above included in HPI. Otherwise 11 point review of systems negative including constitutional, skin, HENT, eyes, respiratory, cardiovascular, gastrointestinal, genitourinary, musculoskeletal, endocrine, hematologic, allergy, and neurologic.     Physical:   Vitals:   BP: 162/80  HR: 60  WT: 187 lb 12.8 oz (85.2 kg)  BMI:  32.75 kg/m2    Exam:   Patient appears in no apparent distress. Affect is appropriate. HEENT: PERRLA, anicteric, oropharynx clear, no JVD, adenopathy or thyromegaly. No carotid bruits or radiated murmur. Lungs: clear to auscultation, no wheezes, rhonchi, or rales. Heart: regular rate and rhythm. No murmur, rubs, gallops  Abdomen: soft, nontender, nondistended, normal bowel sounds, no hepatosplenomegaly or masses. Extremities: without edema. Pulses 1-2+ bilaterally.   Derm - multiple raised lesions with rough appearing skin secondary to Efudex treatment    Review of Data:  Labs:  Hospital Outpatient Visit on 08/29/2018   Component Date Value Ref Range Status    Sodium 08/29/2018 141  136 - 145 mmol/L Final    Potassium 08/29/2018 4.5  3.5 - 5.5 mmol/L Final    Chloride 08/29/2018 107  100 - 108 mmol/L Final    CO2 08/29/2018 27  21 - 32 mmol/L Final    Anion gap 08/29/2018 7  3.0 - 18 mmol/L Final    Glucose 08/29/2018 113* 74 - 99 mg/dL Final    BUN 08/29/2018 15  7.0 - 18 MG/DL Final    Creatinine 08/29/2018 0.97  0.6 - 1.3 MG/DL Final    BUN/Creatinine ratio 08/29/2018 15  12 - 20   Final    GFR est AA 08/29/2018 >60  >60 ml/min/1.73m2 Final    GFR est non-AA 08/29/2018 58* >60 ml/min/1.73m2 Final    Calcium 08/29/2018 8.3* 8.5 - 10.1 MG/DL Final    Phosphorus 08/29/2018 3.3  2.5 - 4.9 MG/DL Final    Albumin 08/29/2018 3.9  3.4 - 5.0 g/dL Final    Microalbumin,urine random 08/29/2018 9.16* 0 - 3.0 MG/DL Final    Creatinine, urine 08/29/2018 94.30  30 - 125 mg/dL Final    Microalbumin/Creat ratio (mg/g cre* 08/29/2018 97* 0 - 30 mg/g Final    Color 08/29/2018 YELLOW    Final    Appearance 08/29/2018 CLEAR    Final    Specific gravity 08/29/2018 1.017  1.005 - 1.030   Final    pH (UA) 08/29/2018 5.5  5.0 - 8.0   Final    Protein 08/29/2018 TRACE* NEG mg/dL Final    Glucose 08/29/2018 NEGATIVE   NEG mg/dL Final    Ketone 08/29/2018 NEGATIVE   NEG mg/dL Final    Bilirubin 2018 NEGATIVE   NEG   Final    Blood 2018 NEGATIVE   NEG   Final    Urobilinogen 2018 0.2  0.2 - 1.0 EU/dL Final    Nitrites 2018 NEGATIVE   NEG   Final    Leukocyte Esterase 2018 MODERATE* NEG   Final    WBC 2018 5 to 10  0 - 5 /hpf Final    RBC 2018 0 to 1  0 - 5 /hpf Final    Epithelial cells 2018 FEW  0 - 5 /lpf Final    LIPID PROFILE 2018        Final    Cholesterol, total 2018 266* <200 MG/DL Final    Triglyceride 2018 280* <150 MG/DL Final    HDL Cholesterol 2018 38* 40 - 60 MG/DL Final    LDL, calculated 2018 172* 0 - 100 MG/DL Final    VLDL, calculated 2018 56  MG/DL Final    CHOL/HDL Ratio 2018 7.0* 0 - 5.0   Final    Vitamin D 25-Hydroxy 2018 30.4  30 - 100 ng/mL Final     Health Maintenance:  Screening:    Mammogram: negative (2016) [facility]   PAP smear: s/p CATY/BSO for endometriosis   Colorectal: colonoscopy (2011) divertculosis. Due .    Depression: on Prozac   DM (HbA1c/FPG): HbA1c 7.5 (2018)   Hepatitis C: negative (2018)   Falls: none   DEXA: osteopenia (2017)   Glaucoma: regular eye exams with Dr. Nico Saravia (overdue)   Smokin-30 pack years, stopped 20 years ago   Vitamin D: 30.4 (2018)   Medicare Wellness: N/A    Impression:  Patient Active Problem List   Diagnosis Code    Chronic low back pain M54.5, G89.29    Lumbosacral radiculopathy M54.17    Degeneration of lumbar or lumbosacral intervertebral disc M51.37    Primary iridocyclitis H20.019    Encounter for long-term (current) drug use Z79.899    Chronic insomnia F51.04    Kidney transplant status, living related donor Z80.0    Chronic pain syndrome G89.4    KIM (generalized anxiety disorder) F41.1    Type 2 diabetes mellitus with microalbuminuria (HCC) E11.29, R80.9    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    Constipation due to pain medication K59.03    Arthritis of left hip M16.12  History of depression Z86.59    Osteopenia of multiple sites M85.89    Essential hypertension I10    Chronic diastolic heart failure (HCC) I50.32    Hyperlipidemia E78.5    H/O renal failure Z87.448    GERD (gastroesophageal reflux disease) K21.9    Primary osteoarthritis involving multiple joints M15.0       Plan:  1. Hypertension. Blood pressure elevated today and at home on current regimen of lisinopril 40 mg daily, Imdur 30 mg daily, amlodipine 5 mg daily, and lasix 20 mg as needed. Will increase Imdur to 60 mg daily. Instructed to monitor and record her blood pressure daily and bring record of readings to our office for review. Renal function has been stable with creatinine 0.97/ eGFR 58. Will continue to follow. 2. Chronic diastolic heart failure. Grade 2 on echocardiogram in 10/2017. Hemodynamically stable on current regimen as above. Does not tolerate beta blockers. Instructed to restrict sodium and follow weights. 3. Hyperlipidemia. Attempted to change to rosuvastatin, but patient developed myalgias. Lipid panel with elevated TG at 280, as well as elevated LDL at 172 and low HDL at 38. Will restart simvastatin at 40 mg daily and recheck at next visit. Discussed importance of weight loss and improved control of blood sugar. Continue to follow. 4. S/p renal transplant from living related donor. Continuing to do well. On tacrolimus and being followed in the Transplant [facility] at [facility]. States that stopped taking prednisone approximately one year ago on her own. Evidence of trace protein on urinalysis and microalbuminuria on recent check. Need to achieve better blood pressure and blood sugar control. Follow closely. 5. Diabetes mellitus. HbA1c 7.5 without medications. Started metformin  mg daily with dinner at last visit, but patient discontinued stating that sugars are now normal with dietary changes.  No known evidence of retinopathy, but patient overdue for eye exam. Referred to  [facility], but not yet scheduled. Foot exam normal (8/2018). On Ace-I and will restart statin. Urine microalbumin/ creatinine ratio with evidence of mild-mod microalbuminuria. Will follow as achieve better blood sugar control. 6. Osteopenia. Last bone density scan 5/2017 . Using femoral neck T-scores, calculated FRAX score estimates her 10 year risk of a major osteoporetic fracture at 10 % and hip fracture at 0.3 % in context of chronic steroid use and secondary osteoporosis. However, patient states that she is no longer taking prednisone, so unclear indication to continue. Currently being treated with alendronate as per Transplant team, and advised to address with them. Continue calcium and Vitamin D. On 50,000 U dose weekly of ergocalciferol. Encouraged exercise, particularly weight bearing activities. 7. Osteoarthritis. Patient with multiple areas of arthritis contributing to her chronic pain. However, attempting to wean from narcotics, and discontinued Percocet and using minimal MS Contin 30 mg as needed. Not experiencing increased pain currently, but will follow. Will use non-narcotic based regimen if needed. 8. GERD. Appears well controlled on omeprazole. Upper endoscopy in 5/2014 negative. 9. Chronic constipation. Most likely related to chronic narcotics. Will hopefully improve with weaning. Continue Linzess. 10. Squamous cell carcinoma of skin. Under care of Dr. Reg Wallace. Being treated with Efudex. Related to chronic immunosuppression. 11. Depression. On Prozac and appears well controlled. 12. Obesity. Emphasized importance of lifestyle modifications, including diet, exercise, and weight loss. Will address at next visit. 13. Health maintenance. Received Pneumovax. Given Tdap. Not candidate for Shingrix vaccine until further studied in immunosuppressed. Ordered mammogram as overdue, but not yet scheduled. Colonoscopy due 2021. Referred to Dr. Lora Milan for eye exams.  Vitamin D level normal on current dose of ergocalciferol. Patient understands recommendations and agrees with plan. Follow-up in 2 weeks to reevaluate blood pressure and lipid therapy.

## 2018-09-20 ENCOUNTER — TELEPHONE (OUTPATIENT)
Dept: INTERNAL MEDICINE CLINIC | Age: 63
End: 2018-09-20

## 2018-09-20 ENCOUNTER — OFFICE VISIT (OUTPATIENT)
Dept: INTERNAL MEDICINE CLINIC | Age: 63
End: 2018-09-20

## 2018-09-20 VITALS
DIASTOLIC BLOOD PRESSURE: 80 MMHG | HEART RATE: 80 BPM | WEIGHT: 183.8 LBS | OXYGEN SATURATION: 95 % | BODY MASS INDEX: 32.57 KG/M2 | SYSTOLIC BLOOD PRESSURE: 120 MMHG | RESPIRATION RATE: 12 BRPM | TEMPERATURE: 99.2 F | HEIGHT: 63 IN

## 2018-09-20 DIAGNOSIS — M54.17 LUMBOSACRAL RADICULOPATHY: ICD-10-CM

## 2018-09-20 DIAGNOSIS — Z86.59 HISTORY OF DEPRESSION: ICD-10-CM

## 2018-09-20 DIAGNOSIS — K21.9 GASTROESOPHAGEAL REFLUX DISEASE WITHOUT ESOPHAGITIS: ICD-10-CM

## 2018-09-20 DIAGNOSIS — I51.9 LEFT VENTRICULAR DIASTOLIC DYSFUNCTION: ICD-10-CM

## 2018-09-20 DIAGNOSIS — E78.5 HYPERLIPIDEMIA, UNSPECIFIED HYPERLIPIDEMIA TYPE: ICD-10-CM

## 2018-09-20 DIAGNOSIS — D84.9 IMMUNOSUPPRESSED STATUS (HCC): ICD-10-CM

## 2018-09-20 DIAGNOSIS — K59.03 CONSTIPATION DUE TO PAIN MEDICATION: ICD-10-CM

## 2018-09-20 DIAGNOSIS — M15.9 PRIMARY OSTEOARTHRITIS INVOLVING MULTIPLE JOINTS: ICD-10-CM

## 2018-09-20 DIAGNOSIS — Z94.0 KIDNEY TRANSPLANT STATUS, LIVING RELATED DONOR: ICD-10-CM

## 2018-09-20 DIAGNOSIS — I10 ESSENTIAL HYPERTENSION: Primary | ICD-10-CM

## 2018-09-20 DIAGNOSIS — I50.32 CHRONIC DIASTOLIC HEART FAILURE (HCC): ICD-10-CM

## 2018-09-20 DIAGNOSIS — F41.1 GAD (GENERALIZED ANXIETY DISORDER): ICD-10-CM

## 2018-09-20 DIAGNOSIS — R80.9 TYPE 2 DIABETES MELLITUS WITH MICROALBUMINURIA, WITHOUT LONG-TERM CURRENT USE OF INSULIN (HCC): ICD-10-CM

## 2018-09-20 DIAGNOSIS — E11.29 TYPE 2 DIABETES MELLITUS WITH MICROALBUMINURIA, WITHOUT LONG-TERM CURRENT USE OF INSULIN (HCC): ICD-10-CM

## 2018-09-20 NOTE — PATIENT INSTRUCTIONS
DASH Diet: Care Instructions  Your Care Instructions    The DASH diet is an eating plan that can help lower your blood pressure. DASH stands for Dietary Approaches to Stop Hypertension. Hypertension is high blood pressure. The DASH diet focuses on eating foods that are high in calcium, potassium, and magnesium. These nutrients can lower blood pressure. The foods that are highest in these nutrients are fruits, vegetables, low-fat dairy products, nuts, seeds, and legumes. But taking calcium, potassium, and magnesium supplements instead of eating foods that are high in those nutrients does not have the same effect. The DASH diet also includes whole grains, fish, and poultry. The DASH diet is one of several lifestyle changes your doctor may recommend to lower your high blood pressure. Your doctor may also want you to decrease the amount of sodium in your diet. Lowering sodium while following the DASH diet can lower blood pressure even further than just the DASH diet alone. Follow-up care is a key part of your treatment and safety. Be sure to make and go to all appointments, and call your doctor if you are having problems. It's also a good idea to know your test results and keep a list of the medicines you take. How can you care for yourself at home? Following the DASH diet  · Eat 4 to 5 servings of fruit each day. A serving is 1 medium-sized piece of fruit, ½ cup chopped or canned fruit, 1/4 cup dried fruit, or 4 ounces (½ cup) of fruit juice. Choose fruit more often than fruit juice. · Eat 4 to 5 servings of vegetables each day. A serving is 1 cup of lettuce or raw leafy vegetables, ½ cup of chopped or cooked vegetables, or 4 ounces (½ cup) of vegetable juice. Choose vegetables more often than vegetable juice. · Get 2 to 3 servings of low-fat and fat-free dairy each day. A serving is 8 ounces of milk, 1 cup of yogurt, or 1 ½ ounces of cheese. · Eat 6 to 8 servings of grains each day.  A serving is 1 slice of bread, 1 ounce of dry cereal, or ½ cup of cooked rice, pasta, or cooked cereal. Try to choose whole-grain products as much as possible. · Limit lean meat, poultry, and fish to 2 servings each day. A serving is 3 ounces, about the size of a deck of cards. · Eat 4 to 5 servings of nuts, seeds, and legumes (cooked dried beans, lentils, and split peas) each week. A serving is 1/3 cup of nuts, 2 tablespoons of seeds, or ½ cup of cooked beans or peas. · Limit fats and oils to 2 to 3 servings each day. A serving is 1 teaspoon of vegetable oil or 2 tablespoons of salad dressing. · Limit sweets and added sugars to 5 servings or less a week. A serving is 1 tablespoon jelly or jam, ½ cup sorbet, or 1 cup of lemonade. · Eat less than 2,300 milligrams (mg) of sodium a day. If you limit your sodium to 1,500 mg a day, you can lower your blood pressure even more. Tips for success  · Start small. Do not try to make dramatic changes to your diet all at once. You might feel that you are missing out on your favorite foods and then be more likely to not follow the plan. Make small changes, and stick with them. Once those changes become habit, add a few more changes. · Try some of the following:  ¨ Make it a goal to eat a fruit or vegetable at every meal and at snacks. This will make it easy to get the recommended amount of fruits and vegetables each day. ¨ Try yogurt topped with fruit and nuts for a snack or healthy dessert. ¨ Add lettuce, tomato, cucumber, and onion to sandwiches. ¨ Combine a ready-made pizza crust with low-fat mozzarella cheese and lots of vegetable toppings. Try using tomatoes, squash, spinach, broccoli, carrots, cauliflower, and onions. ¨ Have a variety of cut-up vegetables with a low-fat dip as an appetizer instead of chips and dip. ¨ Sprinkle sunflower seeds or chopped almonds over salads. Or try adding chopped walnuts or almonds to cooked vegetables.   ¨ Try some vegetarian meals using beans and peas. Add garbanzo or kidney beans to salads. Make burritos and tacos with mashed xie beans or black beans. Where can you learn more? Go to http://kolby-yuli.info/. Enter D287 in the search box to learn more about \"DASH Diet: Care Instructions. \"  Current as of: December 6, 2017  Content Version: 11.7  © 2767-6096 [employer]. Care instructions adapted under license by [employer] (which disclaims liability or warranty for this information). If you have questions about a medical condition or this instruction, always ask your healthcare professional. [address] any warranty or liability for your use of this information. Learning About Diabetes Food Guidelines  Your Care Instructions    Meal planning is important to manage diabetes. It helps keep your blood sugar at a target level (which you set with your doctor). You don't have to eat special foods. You can eat what your family eats, including sweets once in a while. But you do have to pay attention to how often you eat and how much you eat of certain foods. You may want to work with a dietitian or a certified diabetes educator (CDE) to help you plan meals and snacks. A dietitian or CDE can also help you lose weight if that is one of your goals. What should you know about eating carbs? Managing the amount of carbohydrate (carbs) you eat is an important part of healthy meals when you have diabetes. Carbohydrate is found in many foods. · Learn which foods have carbs. And learn the amounts of carbs in different foods. ¨ Bread, cereal, pasta, and rice have about 15 grams of carbs in a serving. A serving is 1 slice of bread (1 ounce), ½ cup of cooked cereal, or 1/3 cup of cooked pasta or rice. ¨ Fruits have 15 grams of carbs in a serving.  A serving is 1 small fresh fruit, such as an apple or orange; ½ of a banana; ½ cup of cooked or canned fruit; ½ cup of fruit juice; 1 cup of melon or raspberries; or 2 tablespoons of dried fruit. ¨ Milk and no-sugar-added yogurt have 15 grams of carbs in a serving. A serving is 1 cup of milk or 2/3 cup of no-sugar-added yogurt. ¨ Starchy vegetables have 15 grams of carbs in a serving. A serving is ½ cup of mashed potatoes or sweet potato; 1 cup winter squash; ½ of a small baked potato; ½ cup of cooked beans; or ½ cup cooked corn or green peas. · Learn how much carbs to eat each day and at each meal. A dietitian or CDE can teach you how to keep track of the amount of carbs you eat. This is called carbohydrate counting. · If you are not sure how to count carbohydrate grams, use the Plate Method to plan meals. It is a good, quick way to make sure that you have a balanced meal. It also helps you spread carbs throughout the day. ¨ Divide your plate by types of foods. Put non-starchy vegetables on half the plate, meat or other protein food on one-quarter of the plate, and a grain or starchy vegetable in the final quarter of the plate. To this you can add a small piece of fruit and 1 cup of milk or yogurt, depending on how many carbs you are supposed to eat at a meal.  · Try to eat about the same amount of carbs at each meal. Do not \"save up\" your daily allowance of carbs to eat at one meal.  · Proteins have very little or no carbs per serving. Examples of proteins are beef, chicken, turkey, fish, eggs, tofu, cheese, cottage cheese, and peanut butter. A serving size of meat is 3 ounces, which is about the size of a deck of cards. Examples of meat substitute serving sizes (equal to 1 ounce of meat) are 1/4 cup of cottage cheese, 1 egg, 1 tablespoon of peanut butter, and ½ cup of tofu. How can you eat out and still eat healthy? · Learn to estimate the serving sizes of foods that have carbohydrate. If you measure food at home, it will be easier to estimate the amount in a serving of restaurant food.   · If the meal you order has too much carbohydrate (such as potatoes, corn, or baked beans), ask to have a low-carbohydrate food instead. Ask for a salad or green vegetables. · If you use insulin, check your blood sugar before and after eating out to help you plan how much to eat in the future. · If you eat more carbohydrate at a meal than you had planned, take a walk or do other exercise. This will help lower your blood sugar. What else should you know? · Limit saturated fat, such as the fat from meat and dairy products. This is a healthy choice because people who have diabetes are at higher risk of heart disease. So choose lean cuts of meat and nonfat or low-fat dairy products. Use olive or canola oil instead of butter or shortening when cooking. · Don't skip meals. Your blood sugar may drop too low if you skip meals and take insulin or certain medicines for diabetes. · Check with your doctor before you drink alcohol. Alcohol can cause your blood sugar to drop too low. Alcohol can also cause a bad reaction if you take certain diabetes medicines. Follow-up care is a key part of your treatment and safety. Be sure to make and go to all appointments, and call your doctor if you are having problems. It's also a good idea to know your test results and keep a list of the medicines you take. Where can you learn more? Go to http://kolby-yuli.info/. Enter X599 in the search box to learn more about \"Learning About Diabetes Food Guidelines. \"  Current as of: December 7, 2017  Content Version: 11.7  © 8559-5280 [employer]. Care instructions adapted under license by [employer] (which disclaims liability or warranty for this information). If you have questions about a medical condition or this instruction, always ask your healthcare professional. [place] [address] any warranty or liability for your use of this information.     Hyperlipidemia: After Your Visit  Your Care Instructions  Hyperlipidemia is too much fat in your blood. The body has several kinds of fat, including cholesterol and triglycerides. Your body needs fat for many things, such as making new cells. But too much fat in your blood increases your chances of having a heart attack or stroke. You may be able to lower your cholesterol and triglycerides with a heart-healthy diet, exercise, and if needed, medicine. Your doctor may want you to try lifestyle changes first to see whether they lower the fat in your blood. You may need to take medicine if lifestyle changes do not lower the fat in your blood enough. Follow-up care is a key part of your treatment and safety. Be sure to make and go to all appointments, and call your doctor if you are having problems. Its also a good idea to know your test results and keep a list of the medicines you take. How can you care for yourself at home? Take your medicines  · Take your medicines exactly as prescribed. Call your doctor if you think you are having a problem with your medicine. · If you take medicine to lower your cholesterol, go to follow-up visits. You will need to have blood tests. · Do not take large doses of niacin, which is a B vitamin, while taking medicine called statins. It may increase the chance of muscle pain and liver problems. · Talk to your doctor about avoiding grapefruit juice if you are taking statins. Grapefruit juice can raise the level of this medicine in your blood. This could increase side effects. Eat more fruits, vegetables, and fiber  · Fruits and vegetables have lots of nutrients that help protect against heart disease, and they have little--if any--fat. Try to eat at least five servings a day. Dark green, deep orange, or yellow fruits and vegetables are healthy choices. · Keep carrots, celery, and other veggies handy for snacks. Buy fruit that is in season and store it where you can see it so that you will be tempted to eat it.  Cook dishes that have a lot of veggies in them, such as stir-fries and soups. · Foods high in fiber may reduce your cholesterol and provide important vitamins and minerals. High-fiber foods include whole-grain cereals and breads, oatmeal, beans, brown rice, citrus fruits, and apples. · Buy whole-grain breads and cereals instead of white bread and pastries. Limit saturated fat  · Read food labels and try to avoid saturated fat and trans fat. They increase your risk of heart disease. · Use olive or canola oil when you cook. Try cholesterol-lowering spreads, such as Benecol or Take Control. · Bake, broil, grill, or steam foods instead of frying them. · Limit the amount of high-fat meats you eat, including hot dogs and sausages. Cut out all visible fat when you prepare meat. · Eat fish, skinless poultry, and soy products such as tofu instead of high-fat meats. Soybeans may be especially good for your heart. Eat at least two servings of fish a week. Certain fish, such as salmon, contain omega-3 fatty acids, which may help reduce your risk of heart attack. · Choose low-fat or fat-free milk and dairy products. Get exercise, limit alcohol, and quit smoking  · Get more exercise. Work with your doctor to set up an exercise program. Even if you can do only a small amount, exercise will help you get stronger, have more energy, and manage your weight and your stress. Walking is an easy way to get exercise. Gradually increase the amount you walk every day. Aim for at least 30 minutes on most days of the week. You also may want to swim, bike, or do other activities. · Limit alcohol to no more than 2 drinks a day for men and 1 drink a day for women. · Do not smoke. If you need help quitting, talk to your doctor about stop-smoking programs and medicines. These can increase your chances of quitting for good. When should you call for help? Call 911 anytime you think you may need emergency care. For example, call if:  · You have symptoms of a heart attack.  These may include:  ¨ Chest pain or pressure, or a strange feeling in the chest.  ¨ Sweating. ¨ Shortness of breath. ¨ Nausea or vomiting. ¨ Pain, pressure, or a strange feeling in the back, neck, jaw, or upper belly or in one or both shoulders or arms. ¨ Lightheadedness or sudden weakness. ¨ A fast or irregular heartbeat. After you call 911, the  may tell you to chew 1 adult-strength or 2 to 4 low-dose aspirin. Wait for an ambulance. Do not try to drive yourself. · You have signs of a stroke. These may include:  ¨ Sudden numbness, paralysis, or weakness in your face, arm, or leg, especially on only one side of your body. ¨ New problems with walking or balance. ¨ Sudden vision changes. ¨ Drooling or slurred speech. ¨ New problems speaking or understanding simple statements, or feeling confused. ¨ A sudden, severe headache that is different from past headaches. · You passed out (lost consciousness). Call your doctor now or seek immediate medical care if:  · You have muscle pain or weakness. Watch closely for changes in your health, and be sure to contact your doctor if:  · You are very tired. · You have an upset stomach, gas, constipation, or belly pain or cramps. Where can you learn more? Go to HITbills.be  Enter C406 in the search box to learn more about \"Hyperlipidemia: After Your Visit. \"   © 9873-4091 [employer]. Care instructions adapted under license by [employer] (which disclaims liability or warranty for this information). This care instruction is for use with your licensed healthcare professional. If you have questions about a medical condition or this instruction, always ask your healthcare professional. [place] [address] any warranty or liability for your use of this information.   Content Version: 2.4.652291; Last Revised: October 13, 2011

## 2018-09-20 NOTE — MR AVS SNAPSHOT
[address] [facility] 
 
 
 [address], [address] [place] [address] [facility] 
[phone] Patient: Felisha Sanchez MRN: [id number] [id number] Visit Information Date & Time Provider Department Dept. Phone Encounter #  
 9/20/2018  2:00 PM Lázaro Whitten MD [facility] [phone] [id number] Follow-up Instructions Return if symptoms worsen or fail to improve. Your Appointments 11/6/2018 12:00 PM  
Office Visit with Lázaro Whitten MD  
[facility] [address] [facility]) Appt Note: allie campuzano per [facility] labs at [facility] Cap [address], [address] [place] [address] [place]  
  
   
 [address], [address] Upcoming Health Maintenance Date Due  
 EYE EXAM RETINAL OR DILATED Q1 2/3/2012 BREAST CANCER SCRN MAMMOGRAM 9/16/2012 ZOSTER VACCINE AGE 60> 7/18/2014 Pneumococcal 19-64 Highest Risk (2 of 3 - PCV13) 11/28/2015 Influenza Age 5 to Adult 8/1/2018 HEMOGLOBIN A1C Q6M 2/2/2019 FOOT EXAM Q1 8/13/2019 MICROALBUMIN Q1 8/29/2019 LIPID PANEL Q1 8/29/2019 COLONOSCOPY 6/27/2021 DTaP/Tdap/Td series (2 - Td) 8/9/2028 Allergies as of 9/20/2018  Review Complete On: 9/20/2018 By: [place] Severity Noted Reaction Type Reactions Asa-acetaminophen-caff-potass    Unknown (comments) Aspirin  05/14/2018    Other (comments) Beta-blockers (Beta-adrenergic Blocking Agts)    Other (comments) Sent into CHF. Bystolic [Nebivolol]  67/73/0412    Other (comments) Ibuprofen    Other (comments) Renal transplant patient Lipitor [Atorvastatin]  01/25/2012    Unknown (comments) Nsaids (Non-steroidal Anti-inflammatory Drug)    Unknown (comments) Sirolimus  02/05/2015    Other (comments)  
 pneumonitis Toprol Xl [Metoprolol Succinate]    Other (comments) Asthma Venlafaxine    Other (comments) Patient does not recall taking this medication Current Immunizations  Reviewed on 8/13/2018 Name Date Influenza Vaccine 11/11/2013, 12/18/2012 Influenza Vaccine (Quad) PF 11/10/2015 Pneumococcal Polysaccharide (PPSV-23) 11/28/2014 12:00 AM  
 Tdap 8/9/2018 12:20 PM  
  
 Not reviewed this visit Vitals BP Pulse Temp Resp Height(growth percentile) Weight(growth percentile) 120/80 (BP 1 Location: Left arm, BP Patient Position: Sitting) 80 99.2 °F (37.3 °C) (Oral) 12 5' 3\" (1.6 m) 183 lb 12.8 oz (83.4 kg) SpO2 BMI OB Status Smoking Status 95% 32.56 kg/m2 Hysterectomy Former Smoker BMI and BSA Data Body Mass Index Body Surface Area 32.56 kg/m 2 1.93 m 2 Preferred Pharmacy Pharmacy Name Phone [address], [address] [facility] [address] [facility] [phone] Your Updated Medication List  
  
   
This list is accurate as of 9/20/18  2:47 PM.  Always use your most recent med list.  
  
  
  
  
 alendronate 10 mg tablet Commonly known as:  FOSAMAX Take 4 Tabs by mouth every seven (7) days. amLODIPine 5 mg tablet Commonly known as:  Thaddeus Evelynhs Take 1 Tab by mouth daily. COLACE PO Take  by mouth.  
  
 ergocalciferol 50,000 unit capsule Commonly known as:  ERGOCALCIFEROL  
TAKE 1 CAPSULE BY MOUTH EVERY WEEK Flaxseed Oil Oil  
by Does Not Apply route. FLUoxetine 40 mg capsule Commonly known as:  PROzac TK 1 C PO D  
  
 folic acid 812 mcg tablet Take 400 mcg by mouth daily. furosemide 20 mg tablet Commonly known as:  LASIX TK 1 T PO BID  
  
 isosorbide mononitrate ER 60 mg CR tablet Commonly known as:  IMDUR Take 1 Tab by mouth every morning. linaclotide 290 mcg Cap capsule Commonly known as:  [place] Take 1 Cap by mouth Daily (before breakfast). For chronic opioid-induced constipation  
  
 lisinopril 40 mg tablet Commonly known as:  [place] Take 1 Tab by mouth daily. MAGOX 400 mg tablet Generic drug:  magnesium oxide Take 400 mg by mouth daily. morphine CR 60 mg CR tablet Commonly known as:  MS CONTIN Take 1 Tab by mouth every eight (8) hours. Max Daily Amount: 180 mg.  
  
 naloxone 4 mg/actuation nasal spray Commonly known as:  NARCAN  
4 mg by Nasal route once as needed (for opioid overdose) for up to 1 dose. Indications: OPIOID TOXICITY PriLOSEC 40 mg capsule Generic drug:  omeprazole Take 40 mg by mouth daily. simvastatin 40 mg tablet Commonly known as:  ZOCOR Take 1 Tab by mouth nightly. tacrolimus 1 mg capsule Commonly known as:  PROGRAF  
TAKE 2 CAPSULES BY MOUTH TWICE DAILY ZOFRAN 4 mg tablet Generic drug:  ondansetron hcl Take 4 mg by mouth every eight (8) hours as needed for Nausea. Follow-up Instructions Return if symptoms worsen or fail to improve. Patient Instructions DASH Diet: Care Instructions Your Care Instructions The DASH diet is an eating plan that can help lower your blood pressure. DASH stands for Dietary Approaches to Stop Hypertension. Hypertension is high blood pressure. The DASH diet focuses on eating foods that are high in calcium, potassium, and magnesium. These nutrients can lower blood pressure. The foods that are highest in these nutrients are fruits, vegetables, low-fat dairy products, nuts, seeds, and legumes. But taking calcium, potassium, and magnesium supplements instead of eating foods that are high in those nutrients does not have the same effect. The DASH diet also includes whole grains, fish, and poultry. The DASH diet is one of several lifestyle changes your doctor may recommend to lower your high blood pressure. Your doctor may also want you to decrease the amount of sodium in your diet.  Lowering sodium while following the DASH diet can lower blood pressure even further than just the DASH diet alone. Follow-up care is a key part of your treatment and safety. Be sure to make and go to all appointments, and call your doctor if you are having problems. It's also a good idea to know your test results and keep a list of the medicines you take. How can you care for yourself at home? Following the DASH diet · Eat 4 to 5 servings of fruit each day. A serving is 1 medium-sized piece of fruit, ½ cup chopped or canned fruit, 1/4 cup dried fruit, or 4 ounces (½ cup) of fruit juice. Choose fruit more often than fruit juice. · Eat 4 to 5 servings of vegetables each day. A serving is 1 cup of lettuce or raw leafy vegetables, ½ cup of chopped or cooked vegetables, or 4 ounces (½ cup) of vegetable juice. Choose vegetables more often than vegetable juice. · Get 2 to 3 servings of low-fat and fat-free dairy each day. A serving is 8 ounces of milk, 1 cup of yogurt, or 1 ½ ounces of cheese. · Eat 6 to 8 servings of grains each day. A serving is 1 slice of bread, 1 ounce of dry cereal, or ½ cup of cooked rice, pasta, or cooked cereal. Try to choose whole-grain products as much as possible. · Limit lean meat, poultry, and fish to 2 servings each day. A serving is 3 ounces, about the size of a deck of cards. · Eat 4 to 5 servings of nuts, seeds, and legumes (cooked dried beans, lentils, and split peas) each week. A serving is 1/3 cup of nuts, 2 tablespoons of seeds, or ½ cup of cooked beans or peas. · Limit fats and oils to 2 to 3 servings each day. A serving is 1 teaspoon of vegetable oil or 2 tablespoons of salad dressing. · Limit sweets and added sugars to 5 servings or less a week. A serving is 1 tablespoon jelly or jam, ½ cup sorbet, or 1 cup of lemonade. · Eat less than 2,300 milligrams (mg) of sodium a day. If you limit your sodium to 1,500 mg a day, you can lower your blood pressure even more. Tips for success · Start small. Do not try to make dramatic changes to your diet all at once. You might feel that you are missing out on your favorite foods and then be more likely to not follow the plan. Make small changes, and stick with them. Once those changes become habit, add a few more changes. · Try some of the following: ¨ Make it a goal to eat a fruit or vegetable at every meal and at snacks. This will make it easy to get the recommended amount of fruits and vegetables each day. ¨ Try yogurt topped with fruit and nuts for a snack or healthy dessert. ¨ Add lettuce, tomato, cucumber, and onion to sandwiches. ¨ Combine a ready-made pizza crust with low-fat mozzarella cheese and lots of vegetable toppings. Try using tomatoes, squash, spinach, broccoli, carrots, cauliflower, and onions. ¨ Have a variety of cut-up vegetables with a low-fat dip as an appetizer instead of chips and dip. ¨ Sprinkle sunflower seeds or chopped almonds over salads. Or try adding chopped walnuts or almonds to cooked vegetables. ¨ Try some vegetarian meals using beans and peas. Add garbanzo or kidney beans to salads. Make burritos and tacos with mashed xie beans or black beans. Where can you learn more? Go to http://kolby-yuli.info/. Enter P647 in the search box to learn more about \"DASH Diet: Care Instructions. \" Current as of: December 6, 2017 Content Version: 11.7 © 9548-7870 [employer]. Care instructions adapted under license by [employer] (which disclaims liability or warranty for this information). If you have questions about a medical condition or this instruction, always ask your healthcare professional. [place] [address] any warranty or liability for your use of this information. Learning About Diabetes Food Guidelines Your Care Instructions Meal planning is important to manage diabetes.  It helps keep your blood sugar at a target level (which you set with your doctor). You don't have to eat special foods. You can eat what your family eats, including sweets once in a while. But you do have to pay attention to how often you eat and how much you eat of certain foods. You may want to work with a dietitian or a certified diabetes educator (CDE) to help you plan meals and snacks. A dietitian or CDE can also help you lose weight if that is one of your goals. What should you know about eating carbs? Managing the amount of carbohydrate (carbs) you eat is an important part of healthy meals when you have diabetes. Carbohydrate is found in many foods. · Learn which foods have carbs. And learn the amounts of carbs in different foods. ¨ Bread, cereal, pasta, and rice have about 15 grams of carbs in a serving. A serving is 1 slice of bread (1 ounce), ½ cup of cooked cereal, or 1/3 cup of cooked pasta or rice. ¨ Fruits have 15 grams of carbs in a serving. A serving is 1 small fresh fruit, such as an apple or orange; ½ of a banana; ½ cup of cooked or canned fruit; ½ cup of fruit juice; 1 cup of melon or raspberries; or 2 tablespoons of dried fruit. ¨ Milk and no-sugar-added yogurt have 15 grams of carbs in a serving. A serving is 1 cup of milk or 2/3 cup of no-sugar-added yogurt. ¨ Starchy vegetables have 15 grams of carbs in a serving. A serving is ½ cup of mashed potatoes or sweet potato; 1 cup winter squash; ½ of a small baked potato; ½ cup of cooked beans; or ½ cup cooked corn or green peas. · Learn how much carbs to eat each day and at each meal. A dietitian or CDE can teach you how to keep track of the amount of carbs you eat. This is called carbohydrate counting. · If you are not sure how to count carbohydrate grams, use the Plate Method to plan meals. It is a good, quick way to make sure that you have a balanced meal. It also helps you spread carbs throughout the day. ¨ Divide your plate by types of foods. Put non-starchy vegetables on half the plate, meat or other protein food on one-quarter of the plate, and a grain or starchy vegetable in the final quarter of the plate. To this you can add a small piece of fruit and 1 cup of milk or yogurt, depending on how many carbs you are supposed to eat at a meal. 
· Try to eat about the same amount of carbs at each meal. Do not \"save up\" your daily allowance of carbs to eat at one meal. 
· Proteins have very little or no carbs per serving. Examples of proteins are beef, chicken, turkey, fish, eggs, tofu, cheese, cottage cheese, and peanut butter. A serving size of meat is 3 ounces, which is about the size of a deck of cards. Examples of meat substitute serving sizes (equal to 1 ounce of meat) are 1/4 cup of cottage cheese, 1 egg, 1 tablespoon of peanut butter, and ½ cup of tofu. How can you eat out and still eat healthy? · Learn to estimate the serving sizes of foods that have carbohydrate. If you measure food at home, it will be easier to estimate the amount in a serving of restaurant food. · If the meal you order has too much carbohydrate (such as potatoes, corn, or baked beans), ask to have a low-carbohydrate food instead. Ask for a salad or green vegetables. · If you use insulin, check your blood sugar before and after eating out to help you plan how much to eat in the future. · If you eat more carbohydrate at a meal than you had planned, take a walk or do other exercise. This will help lower your blood sugar. What else should you know? · Limit saturated fat, such as the fat from meat and dairy products. This is a healthy choice because people who have diabetes are at higher risk of heart disease. So choose lean cuts of meat and nonfat or low-fat dairy products. Use olive or canola oil instead of butter or shortening when cooking. · Don't skip meals.  Your blood sugar may drop too low if you skip meals and take insulin or certain medicines for diabetes. · Check with your doctor before you drink alcohol. Alcohol can cause your blood sugar to drop too low. Alcohol can also cause a bad reaction if you take certain diabetes medicines. Follow-up care is a key part of your treatment and safety. Be sure to make and go to all appointments, and call your doctor if you are having problems. It's also a good idea to know your test results and keep a list of the medicines you take. Where can you learn more? Go to http://kolby-yuli.info/. Enter Z235 in the search box to learn more about \"Learning About Diabetes Food Guidelines. \" Current as of: December 7, 2017 Content Version: 11.7 © 9892-9375 [employer]. Care instructions adapted under license by [employer] (which disclaims liability or warranty for this information). If you have questions about a medical condition or this instruction, always ask your healthcare professional. [address] any warranty or liability for your use of this information. Hyperlipidemia: After Your Visit Your Care Instructions Hyperlipidemia is too much fat in your blood. The body has several kinds of fat, including cholesterol and triglycerides. Your body needs fat for many things, such as making new cells. But too much fat in your blood increases your chances of having a heart attack or stroke. You may be able to lower your cholesterol and triglycerides with a heart-healthy diet, exercise, and if needed, medicine. Your doctor may want you to try lifestyle changes first to see whether they lower the fat in your blood. You may need to take medicine if lifestyle changes do not lower the fat in your blood enough. Follow-up care is a key part of your treatment and safety.  Be sure to make and go to all appointments, and call your doctor if you are having problems. Its also a good idea to know your test results and keep a list of the medicines you take. How can you care for yourself at home? Take your medicines · Take your medicines exactly as prescribed. Call your doctor if you think you are having a problem with your medicine. · If you take medicine to lower your cholesterol, go to follow-up visits. You will need to have blood tests. · Do not take large doses of niacin, which is a B vitamin, while taking medicine called statins. It may increase the chance of muscle pain and liver problems. · Talk to your doctor about avoiding grapefruit juice if you are taking statins. Grapefruit juice can raise the level of this medicine in your blood. This could increase side effects. Eat more fruits, vegetables, and fiber · Fruits and vegetables have lots of nutrients that help protect against heart disease, and they have littleif anyfat. Try to eat at least five servings a day. Dark green, deep orange, or yellow fruits and vegetables are healthy choices. · Keep carrots, celery, and other veggies handy for snacks. Buy fruit that is in season and store it where you can see it so that you will be tempted to eat it. Cook dishes that have a lot of veggies in them, such as stir-fries and soups. · Foods high in fiber may reduce your cholesterol and provide important vitamins and minerals. High-fiber foods include whole-grain cereals and breads, oatmeal, beans, brown rice, citrus fruits, and apples. · Buy whole-grain breads and cereals instead of white bread and pastries. Limit saturated fat · Read food labels and try to avoid saturated fat and trans fat. They increase your risk of heart disease. · Use olive or canola oil when you cook. Try cholesterol-lowering spreads, such as Benecol or Take Control. · Bake, broil, grill, or steam foods instead of frying them.  
· Limit the amount of high-fat meats you eat, including hot dogs and sausages. Cut out all visible fat when you prepare meat. · Eat fish, skinless poultry, and soy products such as tofu instead of high-fat meats. Soybeans may be especially good for your heart. Eat at least two servings of fish a week. Certain fish, such as salmon, contain omega-3 fatty acids, which may help reduce your risk of heart attack. · Choose low-fat or fat-free milk and dairy products. Get exercise, limit alcohol, and quit smoking · Get more exercise. Work with your doctor to set up an exercise program. Even if you can do only a small amount, exercise will help you get stronger, have more energy, and manage your weight and your stress. Walking is an easy way to get exercise. Gradually increase the amount you walk every day. Aim for at least 30 minutes on most days of the week. You also may want to swim, bike, or do other activities. · Limit alcohol to no more than 2 drinks a day for men and 1 drink a day for women. · Do not smoke. If you need help quitting, talk to your doctor about stop-smoking programs and medicines. These can increase your chances of quitting for good. When should you call for help? Call 911 anytime you think you may need emergency care. For example, call if: 
· You have symptoms of a heart attack. These may include: ¨ Chest pain or pressure, or a strange feeling in the chest. 
¨ Sweating. ¨ Shortness of breath. ¨ Nausea or vomiting. ¨ Pain, pressure, or a strange feeling in the back, neck, jaw, or upper belly or in one or both shoulders or arms. ¨ Lightheadedness or sudden weakness. ¨ A fast or irregular heartbeat. After you call 911, the  may tell you to chew 1 adult-strength or 2 to 4 low-dose aspirin. Wait for an ambulance. Do not try to drive yourself. · You have signs of a stroke. These may include: 
¨ Sudden numbness, paralysis, or weakness in your face, arm, or leg, especially on only one side of your body. ¨ New problems with walking or balance. ¨ Sudden vision changes. ¨ Drooling or slurred speech. ¨ New problems speaking or understanding simple statements, or feeling confused. ¨ A sudden, severe headache that is different from past headaches. · You passed out (lost consciousness). Call your doctor now or seek immediate medical care if: 
· You have muscle pain or weakness. Watch closely for changes in your health, and be sure to contact your doctor if: 
· You are very tired. · You have an upset stomach, gas, constipation, or belly pain or cramps. Where can you learn more? Go to [employer].be Enter C406 in the search box to learn more about \"Hyperlipidemia: After Your Visit. \"  
© 0010-0596 Healthwise, Incorporated. Care instructions adapted under license by Vaishali Brown (which disclaims liability or warranty for this information). This care instruction is for use with your licensed healthcare professional. If you have questions about a medical condition or this instruction, always ask your healthcare professional. [place] [address] any warranty or liability for your use of this information. Content Version: 5.8.079998; Last Revised: October 13, 2011 Introducing [facility] [place] [facility] SERVICES! Vaishali Brown introduces Shweeb patient portal. Now you can access parts of your medical record, email your doctor's office, and request medication refills online. 1. In your internet browser, go to [email or web address]. [place]/[employer] 2. Click on the First Time User? Click Here link in the Sign In box. You will see the New Member Sign Up page. 3. Enter your Shweeb Access Code exactly as it appears below. You will not need to use this code after youve completed the sign-up process. If you do not sign up before the expiration date, you must request a new code. · Shweeb Access Code: [email or web address] Expires: 10/31/2018 10:56 AM 
 
 4. Enter the last four digits of your Social Security Number (xxxx) and Date of Birth (mm/dd/yyyy) as indicated and click Submit. You will be taken to the next sign-up page. 5. Create a HashParade ID. This will be your HashParade login ID and cannot be changed, so think of one that is secure and easy to remember. 6. Create a HashParade password. You can change your password at any time. 7. Enter your Password Reset Question and Answer. This can be used at a later time if you forget your password. 8. Enter your e-mail address. You will receive e-mail notification when new information is available in [address] 9. Click Sign Up. You can now view and download portions of your medical record. 10. Click the Download Summary menu link to download a portable copy of your medical information. If you have questions, please visit the Frequently Asked Questions section of the HashParade website. Remember, HashParade is NOT to be used for urgent needs. For medical emergencies, dial 911. Now available from your iPhone and Android! Please provide this summary of care documentation to your next provider. Your primary care clinician is listed as Aristides Mckee. If you have any questions after today's visit, please call [phone].

## 2018-09-20 NOTE — PROGRESS NOTES
1. Have you been to the ER, urgent care clinic or hospitalized since your last visit? NO           2. Have you seen or consulted any other health care providers outside of the [address] [place] [address] since your last visit (Include any pap smears or colon screening)? YES    Do you have an Advanced Directive? NO    Would you like information on Advanced Directives?  NO

## 2018-09-23 PROBLEM — I51.9 LEFT VENTRICULAR DIASTOLIC DYSFUNCTION: Status: ACTIVE | Noted: 2018-09-23

## 2018-09-23 NOTE — PROGRESS NOTES
HPI:   Charan Mccauley is a 58y.o. year old female who presents today for reevaluation of blood pressure. She has a history of hypertension, hyperlipidemia, chronic diastolic heart failure, diabetes mellitus, ESRD s/p living donor cadaveric renal transplant (4/1980), osteopenia, osteoarthritis, lumbar degenerative disease, chronic pain syndrome, squamous cell skin cancers, depression, anxiety, GERD, and chronic constipation. She reports that she is doing relatively well. She had been followed previously in the [facility] pain management center, but is continuing to wean herself off of pain medications. She reports today that she is now only using MS Contin 30 mg as needed. She states that she has not noticed a significant increase in her neck or low back pain. She was noted to have elevated blood pressure at her last visit on amlodipine 5 mg daily and lisinopril 40 mg daily, and her dose of Imdur was increased to 60 mg daily. She states that she has been monitoring her blood pressure at home and reports that it is generally improved, ranging 120-130/ 80-85. She states that she has been eating better and has decreased her intake of carbohydrates significantly. She reports that she has noted some nasal congestion and mild sore throat over the last few days and believes she is getting a URI. She denies any fever or chills. She is otherwise without complaints. She has a history of hypertension, currently treated with lisinopril, Imdur, amlodipine, and lasix. She also has a history of hyperlipidemia, treated previously with high intensity dose simvastatin at 80 mg daily. She was changed to rosuvastatin in 8/2018, but noticed severe muscle aches and stopped taking it with improvement. She was subsequently restarted on simvastatin at 40 mg dose.  In 10/2017, she was admitted to [facility] with dyspnea on exertion, and chest xray revealed mild cardiac enlargement with pulmonary vascular congestion and diffusely increased interstitial markings. Echocardiogram showed normal LV size and function (EF 65%), mild concentric LVH, grade 2 diastolic dysfunction, and a pharmacologic nuclear stress test was a normal low risk study without evidence of ischemia or prior infarction, and calculated EF 70%. She was diagnosed with acute on chronic diastolic heart failure, and treated with lasix and diuresed approximately five liters with improvement. She was intolerant to beta blockers. She currently denies any chest pain, shortness of breath at rest or with exertion, palpitations, lightheadedness, PND, orthopnea, or edema. She is now being followed by Dr. Maria Ines Patricia. She has a history of diabetes mellitus, not currently being treated with medication. She denies any polyuria, polydipsia, nocturia, or blurry vision, and has no history of retinopathy, neuropathy, or nephropathy. She has not been having regular eye exams. She has a history of renal failure and is s/p a living donor cadaveric renal transplant from her brother in 4/1980. She is maintained on tacrolimus alone, stating that she discontinued prednisone approximately one year ago. She is followed in the [facility] by Dr. Priscila Black and Dr. Gerson Greene. She has secondary osteopenia due to chronic steroid use, and is being treated with alendronate. Her last bone density study was in 5/2017 showing T-scores:  femoral neck left -0.1, lumbar -0.4, and distal radius -1.2. She continues to take calcium and Vitamin D supplements. She has no history of pathologic fractures. She has a history of osteoarthritis and is s/p right hip replacement by Dr. Kassy Ambriz in 2015. She also has chronic neck and low back pain secondary to degenerative disease. She has been followed in the [facility] pain management center, but as discussed above, she is attempting to wean from taking all narcotics. She denies any worsening neck or low back pain today.  She denies any fevers, chills, weight change, saddle paresthesia, neurogenic bowel or bladder symptoms, or recent trauma. She has a history of GERD, treated with omeprazole. She had an upper endoscopy in 5/2014 by Dr. Ming Saldivar which was normal. She also has difficulty with chronic constipation, secondary to narcotic use. She is being treated with Linzess. She had a screening colonoscopy in 6/2011 which showed evidence of diverticulosis. Follow-up recommended for 10 years. She denies any abdominal pain, nausea, vomiting, melena, hematochezia, or change in bowel movements. She has a history of multiple squamous cell carcinomas of the skin. She is being followed closely by Dr. Noel Garcia. She reports that she was treated with Efudex in 8/2018 with subsequent exfoliation. She does report that she was treated with doxycycline in 6/2018 for a possible MRSA infection on her wrist. She states that her grandson had a confirmed infection and she was taking care of him. She states that it completely resolved. She has a history of depression and anxiety, treated currently with Prozac. She states that she is no longer using Ambien as a sleep aid. She states that she feels that her symptoms are currently well controlled. Past Medical History:   Diagnosis Date    Calculus of kidney     Chronic diastolic heart failure ([facility] [place] [address] 8/13/2018    Chronic pain syndrome 7/16/2013    Constipation due to pain medication 4/8/2016    Degeneration of lumbar or lumbosacral intervertebral disc     Depression     Diabetes mellitus     Essential hypertension 8/13/2018    KIM (generalized anxiety disorder) 4/29/2015    H/O renal failure 8/13/2018    Secondary to glomerulonephritis.   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    Type 2 diabetes mellitus with microalbuminuria ([facility] [address] 11/10/2015     Past Surgical History:   Procedure Laterality Date    ABDOMEN SURGERY PROC UNLISTED      HX GYN      Ovary    HX HEENT      nose surgery    HX HYSTERECTOMY      HX ORTHOPAEDIC  [id number]    hip replacement    HX UROLOGICAL      Kidney Transplant    NEPHRECTOMY      bilateral     Current Outpatient Prescriptions   Medication Sig    alendronate (FOSAMAX) 10 mg tablet Take 4 Tabs by mouth every seven (7) days.  magnesium oxide (MAGOX) 400 mg tablet Take 400 mg by mouth daily.  Flaxseed Oil oil by Does Not Apply route.  simvastatin (ZOCOR) 40 mg tablet Take 1 Tab by mouth nightly.  isosorbide mononitrate ER (IMDUR) 60 mg CR tablet Take 1 Tab by mouth every morning.  amLODIPine (NORVASC) 5 mg tablet Take 1 Tab by mouth daily.  lisinopril (PRINIVIL, ZESTRIL) 40 mg tablet Take 1 Tab by mouth daily.  morphine CR (MS CONTIN) 60 mg CR tablet Take 1 Tab by mouth every eight (8) hours. Max Daily Amount: 180 mg.    furosemide (LASIX) 20 mg tablet TK 1 T PO BID    tacrolimus (PROGRAF) 1 mg capsule TAKE 2 CAPSULES BY MOUTH TWICE DAILY    FLUoxetine (PROZAC) 40 mg capsule TK 1 C PO D    linaclotide (LINZESS) 290 mcg cap capsule Take 1 Cap by mouth Daily (before breakfast). For chronic opioid-induced constipation    naloxone 4 mg/actuation spry 4 mg by Nasal route once as needed (for opioid overdose) for up to 1 dose. Indications: OPIOID TOXICITY    DOCUSATE SODIUM (COLACE PO) Take  by mouth.  omeprazole (PRILOSEC) 40 mg capsule Take 40 mg by mouth daily.  ondansetron hcl (ZOFRAN) 4 mg tablet Take 4 mg by mouth every eight (8) hours as needed for Nausea.  ergocalciferol (ERGOCALCIFEROL) 50,000 unit capsule TAKE 1 CAPSULE BY MOUTH EVERY WEEK    folic acid 352 mcg tablet Take 400 mcg by mouth daily.      No current facility-administered medications for this visit. Allergies and Intolerances: Allergies   Allergen Reactions    Asa-Acetaminophen-Caff-Potass Unknown (comments)    Aspirin Other (comments)    Beta-Blockers (Beta-Adrenergic Blocking Agts) Other (comments)     Sent into [facility].  Bystolic [Nebivolol] Other (comments)    Ibuprofen Other (comments)     Renal transplant patient    Lipitor [Atorvastatin] Unknown (comments)    Nsaids (Non-Steroidal Anti-Inflammatory Drug) Unknown (comments)    Sirolimus Other (comments)     pneumonitis    Toprol Xl [Metoprolol Succinate] Other (comments)     Asthma    Venlafaxine Other (comments)     Patient does not recall taking this medication     Family History: Her sister was diagnosed with breast cancer at age 61; her father had CAD and  during CABG  Family History   Problem Relation Age of Onset    Hypertension Father     Heart Disease Father     Alcohol abuse Father     Arthritis-osteo Father     Arthritis-osteo Brother     Cancer Maternal Grandmother      breast    Lung Disease Sister     High Cholesterol Mother     Arthritis-osteo Mother     Arthritis-osteo Sister     Liver Disease Sister      Social History:   She  reports that she quit smoking about 16 years ago. She has never used smokeless tobacco. Smoked  2-3 ppd for 10 years, stopping 20 years ago. She is  with one adopted son. She is a retired . History   Alcohol Use No     Immunization History:  Immunization History   Administered Date(s) Administered    Influenza Vaccine 2012, 2013    Influenza Vaccine (Quad) PF 11/10/2015    Pneumococcal Polysaccharide (PPSV-23) 2014    Tdap 2018       Review of Systems:   As above included in HPI. Otherwise 11 point review of systems negative including constitutional, skin, HENT, eyes, respiratory, cardiovascular, gastrointestinal, genitourinary, musculoskeletal, endocrine, hematologic, allergy, and neurologic.     Physical: Vitals:   BP: 120/80  HR: 80  WT: 183 lb 12.8 oz (83.4 kg)  BMI:  32.56 kg/m2    Exam:   Patient appears in no apparent distress. Affect is appropriate. HEENT: PERRLA, anicteric, oropharynx clear, no JVD, adenopathy or thyromegaly. No carotid bruits or radiated murmur. Lungs: clear to auscultation, no wheezes, rhonchi, or rales. Heart: regular rate and rhythm. No murmur, rubs, gallops  Abdomen: soft, nontender, nondistended, normal bowel sounds, no hepatosplenomegaly or masses. Extremities: without edema. Pulses 1-2+ bilaterally.   Derm - multiple raised lesions with rough appearing skin secondary to Efudex treatment    Review of Data:  Labs:  Hospital Outpatient Visit on 08/29/2018   Component Date Value Ref Range Status    Sodium 08/29/2018 141  136 - 145 mmol/L Final    Potassium 08/29/2018 4.5  3.5 - 5.5 mmol/L Final    Chloride 08/29/2018 107  100 - 108 mmol/L Final    CO2 08/29/2018 27  21 - 32 mmol/L Final    Anion gap 08/29/2018 7  3.0 - 18 mmol/L Final    Glucose 08/29/2018 113* 74 - 99 mg/dL Final    BUN 08/29/2018 15  7.0 - 18 MG/DL Final    Creatinine 08/29/2018 0.97  0.6 - 1.3 MG/DL Final    BUN/Creatinine ratio 08/29/2018 15  12 - 20   Final    GFR est AA 08/29/2018 >60  >60 ml/min/1.73m2 Final    GFR est non-AA 08/29/2018 58* >60 ml/min/1.73m2 Final    Calcium 08/29/2018 8.3* 8.5 - 10.1 MG/DL Final    Phosphorus 08/29/2018 3.3  2.5 - 4.9 MG/DL Final    Albumin 08/29/2018 3.9  3.4 - 5.0 g/dL Final    Microalbumin,urine random 08/29/2018 9.16* 0 - 3.0 MG/DL Final    Creatinine, urine 08/29/2018 94.30  30 - 125 mg/dL Final    Microalbumin/Creat ratio (mg/g cre* 08/29/2018 97* 0 - 30 mg/g Final    Color 08/29/2018 YELLOW    Final    Appearance 08/29/2018 CLEAR    Final    Specific gravity 08/29/2018 1.017  1.005 - 1.030   Final    pH (UA) 08/29/2018 5.5  5.0 - 8.0   Final    Protein 08/29/2018 TRACE* NEG mg/dL Final    Glucose 08/29/2018 NEGATIVE   NEG mg/dL Final    Ketone 2018 NEGATIVE   NEG mg/dL Final    Bilirubin 2018 NEGATIVE   NEG   Final    Blood 2018 NEGATIVE   NEG   Final    Urobilinogen 2018 0.2  0.2 - 1.0 EU/dL Final    Nitrites 2018 NEGATIVE   NEG   Final    Leukocyte Esterase 2018 MODERATE* NEG   Final    WBC 2018 5 to 10  0 - 5 /hpf Final    RBC 2018 0 to 1  0 - 5 /hpf Final    Epithelial cells 2018 FEW  0 - 5 /lpf Final    LIPID PROFILE 2018        Final    Cholesterol, total 2018 266* <200 MG/DL Final    Triglyceride 2018 280* <150 MG/DL Final    HDL Cholesterol 2018 38* 40 - 60 MG/DL Final    LDL, calculated 2018 172* 0 - 100 MG/DL Final    VLDL, calculated 2018 56  MG/DL Final    CHOL/HDL Ratio 2018 7.0* 0 - 5.0   Final    Vitamin D 25-Hydroxy 2018 30.4  30 - 100 ng/mL Final     Health Maintenance:  Screening:    Mammogram: negative (2016) [facility]   PAP smear: s/p CATY/BSO for endometriosis   Colorectal: colonoscopy (2011) divertculosis. Due .    Depression: on Prozac   DM (HbA1c/FPG): HbA1c 7.5 (2018)   Hepatitis C: negative (2018)   Falls: none   DEXA: osteopenia (2017)   Glaucoma: regular eye exams with Dr. Claudette Promise (overdue)   Smokin-30 pack years, stopped 20 years ago   Vitamin D: 30.4 (2018)   Medicare Wellness: N/A    Impression:  Patient Active Problem List   Diagnosis Code    Chronic low back pain M54.5, G89.29    Lumbosacral radiculopathy M54.17    Degeneration of lumbar or lumbosacral intervertebral disc M51.37    Primary iridocyclitis H20.019    Chronic insomnia F51.04    Kidney transplant status, living related donor Z94.0    Chronic pain syndrome G89.4    KIM (generalized anxiety disorder) F41.1    Type 2 diabetes mellitus with microalbuminuria (HCC) E11.29, R80.9    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    Constipation due to pain medication K59.03    Arthritis of left hip M16.12    History of depression Z86.59    Osteopenia of multiple sites M85.89    Essential hypertension I10    Chronic diastolic heart failure (HCC) I50.32    Hyperlipidemia E78.5    H/O renal failure Z87.448    GERD (gastroesophageal reflux disease) K21.9    Primary osteoarthritis involving multiple joints M15.0    Immunosuppressed status (HCC) D89.9    Left ventricular diastolic dysfunction H83.8       Plan:  1. Hypertension. Increased dose of Imdur at last visit to 60 mg. Blood pressure significantly improved today and at home. Current regimen includes lisinopril 40 mg daily, Imdur 60 mg daily, amlodipine 5 mg daily, and lasix 20 mg as needed. Instructed to continue to monitor and record her blood pressure daily and call if elevated. Renal function has been stable with creatinine 0.97/ eGFR 58. Will continue to follow. 2. Chronic diastolic heart failure. Grade 2 with normal systolic function on echocardiogram in 10/2017. Hemodynamically stable on current regimen as above. Does not tolerate beta blockers. Instructed to restrict sodium and follow weights. 3. Hyperlipidemia. Attempted to change to rosuvastatin, but patient developed myalgias. Lipid panel with elevated TG at 280, as well as elevated LDL at 172 and low HDL at 38. Will restart simvastatin at 40 mg daily and recheck at next visit. Discussed importance of weight loss and improved control of blood sugar. Continue to follow. 4. S/p renal transplant from living related donor. Continuing to do well. On tacrolimus and being followed in the Transplant Clinic at [facility]. States that stopped taking prednisone approximately one year ago on her own. Evidence of trace protein on urinalysis and microalbuminuria on recent check. Need to achieve better blood pressure and blood sugar control. Will recheck next visit. Follow closely. 5. Diabetes mellitus. HbA1c 7.5 without medications.  Started metformin  mg daily with dinner in 8/2018, but patient discontinued stating that sugars are now normal with dietary changes. No known evidence of retinopathy. Patient referred to Dr. James Gill. Foot exam normal (8/2018). On Ace-I and will restart statin. Urine microalbumin/ creatinine ratio with evidence of mild-mod microalbuminuria. Will follow as achieve better blood sugar and blood pressure control. 6. Osteopenia. Last bone density scan 5/2017 . Using femoral neck T-scores, calculated FRAX score estimates her 10 year risk of a major osteoporetic fracture at 10 % and hip fracture at 0.3 % in context of chronic steroid use and secondary osteoporosis. However, patient states that she is no longer taking prednisone, so unclear indication to continue. Currently being treated with alendronate as per Transplant team, and advised to address with them. Continue calcium and Vitamin D. On 50,000 U dose weekly of ergocalciferol. Encouraged exercise, particularly weight bearing activities. 7. Osteoarthritis. Patient with multiple areas of arthritis contributing to her chronic pain. However, attempting to wean from narcotics, and discontinued Percocet and using minimal MS Contin 30 mg as needed. Not experiencing increased pain currently, but will follow. Will use non-narcotic based regimen if needed. 8. GERD. Appears well controlled on omeprazole. Upper endoscopy in 5/2014 negative. 9. Chronic constipation. Most likely related to chronic narcotics. Will hopefully improve with weaning. Continue Linzess. 10. Squamous cell carcinoma of skin. Under care of Dr. Ruth Reynolds. Being treated with Efudex. Related to chronic immunosuppression. 11. Depression. On Prozac and appears well controlled. 12. Obesity. Emphasized importance of lifestyle modifications, including diet, exercise, and weight loss. Decreased six pounds over last month. Will continue to address. 13. Health maintenance. Received Pneumovax. Given Tdap. Not candidate for Shingrix vaccine until further studied in immunosuppressed. Wishing to hold off on influenza vaccine until next month. Ordered mammogram as overdue, but not yet scheduled. Encouraged to do so. Colonoscopy due 2021. Referred to Dr. Jody Knapp for eye exams. Vitamin D level normal on current dose of ergocalciferol. Patient understands recommendations and agrees with plan. Follow-up in 8 weeks.

## 2018-10-04 ENCOUNTER — TELEPHONE (OUTPATIENT)
Dept: INTERNAL MEDICINE CLINIC | Age: 63
End: 2018-10-04

## 2018-10-04 RX ORDER — AMOXICILLIN AND CLAVULANATE POTASSIUM 875; 125 MG/1; MG/1
1 TABLET, FILM COATED ORAL 2 TIMES DAILY
Qty: 20 TAB | Refills: 0 | Status: SHIPPED | OUTPATIENT
Start: 2018-10-04 | End: 2018-10-14

## 2018-10-04 NOTE — TELEPHONE ENCOUNTER
Patient's  is calling stating patient saw you 2 weeks ago for a cold. Stating she still has nasal congestion and discolored mucous. Wants to know if something can be called in to [place].

## 2018-11-04 ENCOUNTER — TELEPHONE (OUTPATIENT)
Dept: INTERNAL MEDICINE CLINIC | Age: 63
End: 2018-11-04

## 2018-11-04 NOTE — TELEPHONE ENCOUNTER
Received call from . Reports that wife had a bad sore throat on 10/30-10/31/2018, which has since resolved, but subsequently developed nasal congestion, post nasal drainage, and cough productive of yellowish mucus. No significant headache or facial pain, and denies any fever, chills, or shortness of breath. Patient is s/p renal transplant. Discussed that symptoms most likely viral in etiology. Has previously scheduled appointment this week on 11/6. Will not treat with antibiotics currently, but assess at visit if needed. Instructed to call back or go to ED for any worsening.

## 2018-11-05 ENCOUNTER — HOSPITAL ENCOUNTER (OUTPATIENT)
Dept: LAB | Age: 63
Discharge: HOME | End: 2018-11-05
Payer: COMMERCIAL

## 2018-11-05 DIAGNOSIS — Z86.59 HISTORY OF DEPRESSION: ICD-10-CM

## 2018-11-05 DIAGNOSIS — I50.32 CHRONIC DIASTOLIC HEART FAILURE (HCC): ICD-10-CM

## 2018-11-05 DIAGNOSIS — E11.29 TYPE 2 DIABETES MELLITUS WITH MICROALBUMINURIA, WITHOUT LONG-TERM CURRENT USE OF INSULIN (HCC): ICD-10-CM

## 2018-11-05 DIAGNOSIS — R80.9 TYPE 2 DIABETES MELLITUS WITH MICROALBUMINURIA, WITHOUT LONG-TERM CURRENT USE OF INSULIN (HCC): ICD-10-CM

## 2018-11-05 DIAGNOSIS — I10 ESSENTIAL HYPERTENSION: ICD-10-CM

## 2018-11-05 DIAGNOSIS — K59.03 CONSTIPATION DUE TO PAIN MEDICATION: ICD-10-CM

## 2018-11-05 DIAGNOSIS — M54.17 LUMBOSACRAL RADICULOPATHY: ICD-10-CM

## 2018-11-05 DIAGNOSIS — M15.9 PRIMARY OSTEOARTHRITIS INVOLVING MULTIPLE JOINTS: ICD-10-CM

## 2018-11-05 DIAGNOSIS — D84.9 IMMUNOSUPPRESSED STATUS (HCC): ICD-10-CM

## 2018-11-05 DIAGNOSIS — E78.5 HYPERLIPIDEMIA, UNSPECIFIED HYPERLIPIDEMIA TYPE: ICD-10-CM

## 2018-11-05 DIAGNOSIS — K21.9 GASTROESOPHAGEAL REFLUX DISEASE WITHOUT ESOPHAGITIS: ICD-10-CM

## 2018-11-05 DIAGNOSIS — Z94.0 KIDNEY TRANSPLANT STATUS, LIVING RELATED DONOR: ICD-10-CM

## 2018-11-05 DIAGNOSIS — I51.9 LEFT VENTRICULAR DIASTOLIC DYSFUNCTION: ICD-10-CM

## 2018-11-05 DIAGNOSIS — F41.1 GAD (GENERALIZED ANXIETY DISORDER): ICD-10-CM

## 2018-11-05 LAB
25(OH)D3 SERPL-MCNC: 22.1 NG/ML (ref 30–100)
ALBUMIN SERPL-MCNC: 3.7 G/DL (ref 3.4–5)
ALBUMIN/GLOB SERPL: 0.9 {RATIO} (ref 0.8–1.7)
ALP SERPL-CCNC: 84 U/L (ref 45–117)
ALT SERPL-CCNC: 35 U/L (ref 13–56)
ANION GAP SERPL CALC-SCNC: 5 MMOL/L (ref 3–18)
AST SERPL-CCNC: 17 U/L (ref 15–37)
BASOPHILS # BLD: 0 K/UL (ref 0–0.1)
BASOPHILS NFR BLD: 0 % (ref 0–2)
BILIRUB SERPL-MCNC: 0.3 MG/DL (ref 0.2–1)
BUN SERPL-MCNC: 8 MG/DL (ref 7–18)
BUN/CREAT SERPL: 9 (ref 12–20)
CALCIUM SERPL-MCNC: 8.6 MG/DL (ref 8.5–10.1)
CHLORIDE SERPL-SCNC: 109 MMOL/L (ref 100–108)
CHOLEST SERPL-MCNC: 243 MG/DL
CO2 SERPL-SCNC: 27 MMOL/L (ref 21–32)
CREAT SERPL-MCNC: 0.91 MG/DL (ref 0.6–1.3)
CREAT UR-MCNC: 67 MG/DL (ref 30–125)
DIFFERENTIAL METHOD BLD: ABNORMAL
EOSINOPHIL # BLD: 0.3 K/UL (ref 0–0.4)
EOSINOPHIL NFR BLD: 3 % (ref 0–5)
ERYTHROCYTE [DISTWIDTH] IN BLOOD BY AUTOMATED COUNT: 12.8 % (ref 11.6–14.5)
EST. AVERAGE GLUCOSE BLD GHB EST-MCNC: 143 MG/DL
GLOBULIN SER CALC-MCNC: 3.9 G/DL (ref 2–4)
GLUCOSE SERPL-MCNC: 127 MG/DL (ref 74–99)
HBA1C MFR BLD: 6.6 % (ref 4.2–5.6)
HCT VFR BLD AUTO: 39.7 % (ref 35–45)
HDLC SERPL-MCNC: 38 MG/DL (ref 40–60)
HDLC SERPL: 6.4 {RATIO} (ref 0–5)
HGB BLD-MCNC: 13 G/DL (ref 12–16)
LDLC SERPL CALC-MCNC: ABNORMAL MG/DL (ref 0–100)
LIPID PROFILE,FLP: ABNORMAL
LYMPHOCYTES # BLD: 2.9 K/UL (ref 0.9–3.6)
LYMPHOCYTES NFR BLD: 32 % (ref 21–52)
MCH RBC QN AUTO: 30 PG (ref 24–34)
MCHC RBC AUTO-ENTMCNC: 32.7 G/DL (ref 31–37)
MCV RBC AUTO: 91.7 FL (ref 74–97)
MICROALBUMIN UR-MCNC: 30.9 MG/DL (ref 0–3)
MICROALBUMIN/CREAT UR-RTO: 461 MG/G (ref 0–30)
MONOCYTES # BLD: 0.6 K/UL (ref 0.05–1.2)
MONOCYTES NFR BLD: 7 % (ref 3–10)
NEUTS SEG # BLD: 5.3 K/UL (ref 1.8–8)
NEUTS SEG NFR BLD: 58 % (ref 40–73)
PLATELET # BLD AUTO: 266 K/UL (ref 135–420)
PMV BLD AUTO: 9.1 FL (ref 9.2–11.8)
POTASSIUM SERPL-SCNC: 4.4 MMOL/L (ref 3.5–5.5)
PROT SERPL-MCNC: 7.6 G/DL (ref 6.4–8.2)
RBC # BLD AUTO: 4.33 M/UL (ref 4.2–5.3)
SODIUM SERPL-SCNC: 141 MMOL/L (ref 136–145)
TRIGL SERPL-MCNC: 464 MG/DL (ref ?–150)
VLDLC SERPL CALC-MCNC: ABNORMAL MG/DL
WBC # BLD AUTO: 9.2 K/UL (ref 4.6–13.2)

## 2018-11-05 PROCEDURE — 82306 VITAMIN D 25 HYDROXY: CPT | Performed by: INTERNAL MEDICINE

## 2018-11-05 PROCEDURE — 83036 HEMOGLOBIN GLYCOSYLATED A1C: CPT | Performed by: INTERNAL MEDICINE

## 2018-11-05 PROCEDURE — 85025 COMPLETE CBC W/AUTO DIFF WBC: CPT | Performed by: INTERNAL MEDICINE

## 2018-11-05 PROCEDURE — 80061 LIPID PANEL: CPT | Performed by: INTERNAL MEDICINE

## 2018-11-05 PROCEDURE — 80053 COMPREHEN METABOLIC PANEL: CPT | Performed by: INTERNAL MEDICINE

## 2018-11-05 PROCEDURE — 82043 UR ALBUMIN QUANTITATIVE: CPT | Performed by: INTERNAL MEDICINE

## 2018-11-05 PROCEDURE — 36415 COLL VENOUS BLD VENIPUNCTURE: CPT | Performed by: INTERNAL MEDICINE

## 2018-11-06 ENCOUNTER — OFFICE VISIT (OUTPATIENT)
Dept: INTERNAL MEDICINE CLINIC | Age: 63
End: 2018-11-06

## 2018-11-06 ENCOUNTER — HOSPITAL ENCOUNTER (OUTPATIENT)
Dept: GENERAL RADIOLOGY | Age: 63
Discharge: HOME | End: 2018-11-06
Payer: COMMERCIAL

## 2018-11-06 VITALS
HEIGHT: 63 IN | BODY MASS INDEX: 32.43 KG/M2 | OXYGEN SATURATION: 95 % | RESPIRATION RATE: 14 BRPM | TEMPERATURE: 98.4 F | HEART RATE: 75 BPM | DIASTOLIC BLOOD PRESSURE: 80 MMHG | WEIGHT: 183 LBS | SYSTOLIC BLOOD PRESSURE: 144 MMHG

## 2018-11-06 DIAGNOSIS — Z91.14 NONCOMPLIANCE WITH MEDICATIONS: ICD-10-CM

## 2018-11-06 DIAGNOSIS — I10 ESSENTIAL HYPERTENSION: ICD-10-CM

## 2018-11-06 DIAGNOSIS — Z87.448 H/O RENAL FAILURE: ICD-10-CM

## 2018-11-06 DIAGNOSIS — J20.9 BRONCHITIS, ACUTE, WITH BRONCHOSPASM: Primary | ICD-10-CM

## 2018-11-06 DIAGNOSIS — J06.9 BACTERIAL UPPER RESPIRATORY INFECTION: ICD-10-CM

## 2018-11-06 DIAGNOSIS — M15.9 PRIMARY OSTEOARTHRITIS INVOLVING MULTIPLE JOINTS: ICD-10-CM

## 2018-11-06 DIAGNOSIS — E11.29 TYPE 2 DIABETES MELLITUS WITH MICROALBUMINURIA, WITHOUT LONG-TERM CURRENT USE OF INSULIN (HCC): ICD-10-CM

## 2018-11-06 DIAGNOSIS — D84.9 IMMUNOSUPPRESSED STATUS (HCC): ICD-10-CM

## 2018-11-06 DIAGNOSIS — R80.9 TYPE 2 DIABETES MELLITUS WITH MICROALBUMINURIA, WITHOUT LONG-TERM CURRENT USE OF INSULIN (HCC): ICD-10-CM

## 2018-11-06 DIAGNOSIS — F41.1 GAD (GENERALIZED ANXIETY DISORDER): ICD-10-CM

## 2018-11-06 DIAGNOSIS — G89.29 CHRONIC MIDLINE LOW BACK PAIN, WITH SCIATICA PRESENCE UNSPECIFIED: ICD-10-CM

## 2018-11-06 DIAGNOSIS — E78.5 HYPERLIPIDEMIA, UNSPECIFIED HYPERLIPIDEMIA TYPE: ICD-10-CM

## 2018-11-06 DIAGNOSIS — B96.89 BACTERIAL UPPER RESPIRATORY INFECTION: ICD-10-CM

## 2018-11-06 DIAGNOSIS — J44.1 COPD WITH ACUTE EXACERBATION (HCC): ICD-10-CM

## 2018-11-06 DIAGNOSIS — M85.89 OSTEOPENIA OF MULTIPLE SITES: ICD-10-CM

## 2018-11-06 DIAGNOSIS — R05.9 COUGH: ICD-10-CM

## 2018-11-06 DIAGNOSIS — I50.32 CHRONIC DIASTOLIC HEART FAILURE (HCC): ICD-10-CM

## 2018-11-06 DIAGNOSIS — Z94.0 KIDNEY TRANSPLANT STATUS, LIVING RELATED DONOR: ICD-10-CM

## 2018-11-06 DIAGNOSIS — Z86.59 HISTORY OF DEPRESSION: ICD-10-CM

## 2018-11-06 DIAGNOSIS — J20.9 BRONCHITIS, ACUTE, WITH BRONCHOSPASM: ICD-10-CM

## 2018-11-06 DIAGNOSIS — M54.17 LUMBOSACRAL RADICULOPATHY: ICD-10-CM

## 2018-11-06 DIAGNOSIS — M54.5 CHRONIC MIDLINE LOW BACK PAIN, WITH SCIATICA PRESENCE UNSPECIFIED: ICD-10-CM

## 2018-11-06 PROCEDURE — 71046 X-RAY EXAM CHEST 2 VIEWS: CPT

## 2018-11-06 RX ORDER — PREDNISONE 20 MG/1
40 TABLET ORAL
Qty: 10 TAB | Refills: 0 | Status: SHIPPED | OUTPATIENT
Start: 2018-11-06 | End: 2018-11-11

## 2018-11-06 RX ORDER — LISINOPRIL 20 MG/1
20 TABLET ORAL DAILY
Qty: 90 TAB | Refills: 2 | Status: SHIPPED | OUTPATIENT
Start: 2018-11-06 | End: 2019-01-03

## 2018-11-06 RX ORDER — ALBUTEROL SULFATE 90 UG/1
1 AEROSOL, METERED RESPIRATORY (INHALATION)
Qty: 1 INHALER | Refills: 1 | Status: SHIPPED | OUTPATIENT
Start: 2018-11-06 | End: 2019-05-10

## 2018-11-06 RX ORDER — ERGOCALCIFEROL 1.25 MG/1
50000 CAPSULE ORAL
Qty: 12 CAP | Refills: 3 | Status: SHIPPED | OUTPATIENT
Start: 2018-11-06 | End: 2019-05-10

## 2018-11-06 RX ORDER — BENZONATATE 100 MG/1
100 CAPSULE ORAL
Qty: 30 CAP | Refills: 0 | Status: SHIPPED | OUTPATIENT
Start: 2018-11-06 | End: 2019-01-25 | Stop reason: SDUPTHER

## 2018-11-06 RX ORDER — DOXYCYCLINE 100 MG/1
100 CAPSULE ORAL 2 TIMES DAILY
Qty: 20 CAP | Refills: 0 | Status: SHIPPED | OUTPATIENT
Start: 2018-11-06 | End: 2018-11-16

## 2018-11-06 NOTE — PATIENT INSTRUCTIONS
Chronic Obstructive Pulmonary Disease (COPD) Flare-Ups: Care Instructions  Your Care Instructions    Chronic obstructive pulmonary disease (COPD) is a lung disease that makes it hard to breathe. It is caused by damage to the lungs over many years, usually from smoking. COPD is often a mix of two diseases:  · Chronic bronchitis: The airways that carry air to the lungs (bronchial tubes) get inflamed and make a lot of mucus. This can narrow or block the airways. · Emphysema: In a healthy person, the tiny air sacs in the lungs are like balloons. As you breathe in and out, they get bigger and smaller to move air through your lungs. But with emphysema, these air sacs are damaged and lose their stretch. Less air gets in and out of the lungs. Many people with COPD have attacks called flare-ups or exacerbations. This is when your usual symptoms quickly get worse and stay worse. The doctor has checked you carefully. But problems can develop later. If you notice any problems or new symptoms, get medical treatment right away. Follow-up care is a key part of your treatment and safety. Be sure to make and go to all appointments, and call your doctor if you are having problems. It's also a good idea to know your test results and keep a list of the medicines you take. How can you care for yourself at home? · Be safe with medicines. Take your medicines exactly as prescribed. Call your doctor if you think you are having a problem with your medicine. You may be taking medicines such as:  ? Bronchodilators. These help open your airways and make breathing easier. ? Corticosteroids. These reduce airway inflammation. They may be given as pills, in a vein, or in an inhaled form. You may go home with pills in addition to an inhaler that you already use. · A spacer may help you get more inhaled medicine to your lungs. Ask your doctor or pharmacist if a spacer is right for you. If it is, ask how to use it properly.   · If your doctor prescribed antibiotics, take them as directed. Do not stop taking them just because you feel better. You need to take the full course of antibiotics. · If your doctor prescribed oxygen, use the flow rate your doctor has recommended. Do not change it without talking to your doctor first.  · Do not smoke. Smoking makes COPD worse. If you need help quitting, talk to your doctor about stop-smoking programs and medicines. These can increase your chances of quitting for good. When should you call for help? Call 911 anytime you think you may need emergency care. For example, call if:    · You have severe trouble breathing.    Call your doctor now or seek immediate medical care if:    · You have new or worse trouble breathing.     · Your coughing or wheezing gets worse.     · You cough up dark brown or bloody mucus (sputum).     · You have a new or higher fever.    Watch closely for changes in your health, and be sure to contact your doctor if:    · You notice more mucus or a change in the color of your mucus.     · You need to use your antibiotic or steroid pills.     · You do not get better as expected. Where can you learn more? Go to http://kolby-yuli.info/. Enter F189 in the search box to learn more about \"Chronic Obstructive Pulmonary Disease (COPD) Flare-Ups: Care Instructions. \"  Current as of: December 6, 2017  Content Version: 11.8  © 7414-4344 [employer]. Care instructions adapted under license by [employer] (which disclaims liability or warranty for this information). If you have questions about a medical condition or this instruction, always ask your healthcare professional. [place] [address] any warranty or liability for your use of this information. Bronchitis: Care Instructions  Your Care Instructions    Bronchitis is inflammation of the bronchial tubes, which carry air to the lungs.  The tubes swell and produce mucus, or phlegm. The mucus and inflamed bronchial tubes make you cough. You may have trouble breathing. Most cases of bronchitis are caused by viruses like those that cause colds. Antibiotics usually do not help and they may be harmful. Bronchitis usually develops rapidly and lasts about 2 to 3 weeks in otherwise healthy people. Follow-up care is a key part of your treatment and safety. Be sure to make and go to all appointments, and call your doctor if you are having problems. It's also a good idea to know your test results and keep a list of the medicines you take. How can you care for yourself at home? · Take all medicines exactly as prescribed. Call your doctor if you think you are having a problem with your medicine. · Get some extra rest.  · Take an over-the-counter pain medicine, such as acetaminophen (Tylenol), ibuprofen (Advil, Motrin), or naproxen (Aleve) to reduce fever and relieve body aches. Read and follow all instructions on the label. · Do not take two or more pain medicines at the same time unless the doctor told you to. Many pain medicines have acetaminophen, which is Tylenol. Too much acetaminophen (Tylenol) can be harmful. · Take an over-the-counter cough medicine that contains dextromethorphan to help quiet a dry, hacking cough so that you can sleep. Avoid cough medicines that have more than one active ingredient. Read and follow all instructions on the label. · Breathe moist air from a humidifier, hot shower, or sink filled with hot water. The heat and moisture will thin mucus so you can cough it out. · Do not smoke. Smoking can make bronchitis worse. If you need help quitting, talk to your doctor about stop-smoking programs and medicines. These can increase your chances of quitting for good. When should you call for help? Call 911 anytime you think you may need emergency care.  For example, call if:    · You have severe trouble breathing.    Call your doctor now or seek immediate medical care if:    · You have new or worse trouble breathing.     · You cough up dark brown or bloody mucus (sputum).     · You have a new or higher fever.     · You have a new rash.    Watch closely for changes in your health, and be sure to contact your doctor if:    · You cough more deeply or more often, especially if you notice more mucus or a change in the color of your mucus.     · You are not getting better as expected. Where can you learn more? Go to http://kolby-yuli.info/. Enter H333 in the search box to learn more about \"Bronchitis: Care Instructions. \"  Current as of: December 6, 2017  Content Version: 11.8  © 6504-1327 [employer]. Care instructions adapted under license by [employer] (which disclaims liability or warranty for this information). If you have questions about a medical condition or this instruction, always ask your healthcare professional. [address] any warranty or liability for your use of this information.

## 2018-11-10 PROBLEM — Z91.14 NONCOMPLIANCE WITH MEDICATIONS: Status: ACTIVE | Noted: 2018-11-10

## 2018-11-10 NOTE — PROGRESS NOTES
HPI:   Rico Kearney is a 58y.o. year old female who presents today for evaluation of hypertension, hyperlipidemia, chronic diastolic heart failure, diabetes mellitus, ESRD s/p living donor cadaveric renal transplant (4/1980), osteopenia, osteoarthritis, lumbar degenerative disease, chronic pain syndrome, squamous cell skin cancers, depression, anxiety, GERD, and chronic constipation. She reports that she has been having difficulty with cough productive of yellow-brown sputum for four days. She states that she does have nasal congestion and drainage, but reports that her most bothersome problem is the cough. She denies any fever, chills, shortness of breath, sore throat, or ear pain. She also admits today that she is no longer taking her immunosuppressant medication for her kidney transplant. She states that she stopped taking prednisone about a year ago, and then stopped tacrolimus several months ago although is not specific as to when. However, review of record shows that her tacrolimus level was therapeutic at 5.2 on 4/27/2018 at her annual follow-up visit in the renal transplant clinic. She states that she was told by one of her kidney doctors that \"her brother must have been a twin for her transplanted kidney to have lasted this long\". She states that she believes this even though he is 11years older, and she decided on her own that she no longer needed to take her immunosuppressants. She states that she does not like the fact that she \"gets so many colds\" while taking the medications. She is otherwise without complaints. She has a history of hypertension, currently treated with lisinopril, Imdur, amlodipine, and lasix as needed. She has been monitoring her blood pressure at home intermittently, and reports that it is generally controlled, ranging 120-130/ 80-85. She also has a history of hyperlipidemia, treated previously with high intensity dose simvastatin at 80 mg daily.  She was changed to rosuvastatin in 8/2018, but noticed severe muscle aches and stopped taking it with improvement. She was subsequently restarted on simvastatin at 40 mg dose. In 10/2017, she was admitted to [facility] with dyspnea on exertion, and chest xray revealed mild cardiac enlargement with pulmonary vascular congestion and diffusely increased interstitial markings. Echocardiogram showed normal LV size and function (EF 65%), mild concentric LVH, grade 2 diastolic dysfunction, and a pharmacologic nuclear stress test was a normal low risk study without evidence of ischemia or prior infarction, and calculated EF 70%. She was diagnosed with acute on chronic diastolic heart failure, and treated with lasix and diuresed approximately five liters with improvement. She was intolerant to beta blockers. She currently denies any chest pain, shortness of breath at rest or with exertion, palpitations, lightheadedness, PND, orthopnea, or edema. She is now being followed by Dr. Philip Ulloa. She has a history of diabetes mellitus, not currently being treated with medication. She denies any polyuria, polydipsia, nocturia, or blurry vision, and has no history of retinopathy, neuropathy, or nephropathy. She has not been having regular eye exams. She has a history of renal failure and is s/p a living donor cadaveric renal transplant from her brother in 4/1980. She is maintained on tacrolimus alone, stating that she discontinued prednisone approximately one year ago. She is followed in the [facility] by Dr. Olive Escalante and Dr. Mathieu Mercer. She has secondary osteopenia due to chronic steroid use, and was being treated with alendronate. However, she states that she discontinued taking it. Her last bone density study was in 5/2017 showing T-scores:  femoral neck left -0.1, lumbar -0.4, and distal radius -1.2. She continues to take calcium and Vitamin D supplements. She has no history of pathologic fractures.      She has a history of osteoarthritis and is s/p right hip replacement by Dr. Flor Morgan in 2015. She also has chronic neck and low back pain secondary to degenerative disease. She had been followed previously in the [facility] pain management center, but is continuing to wean herself off of pain medications. She has stopped taking Percocet and is now only using MS Contin 30 mg as needed. She states that she has not noticed a significant increase in her neck or low back pain. She denies any fevers, chills, weight change, saddle paresthesia, neurogenic bowel or bladder symptoms, or recent trauma. She has a history of GERD, treated with omeprazole. She had an upper endoscopy in 5/2014 by Dr. Gigi Zavala which was normal. She also has difficulty with chronic constipation, secondary to narcotic use. She is being treated with Linzess. She had a screening colonoscopy in 6/2011 which showed evidence of diverticulosis. Follow-up recommended for 10 years. She denies any abdominal pain, nausea, vomiting, melena, hematochezia, or change in bowel movements. She has a history of multiple squamous cell carcinomas of the skin. She is being followed closely by Dr. Jorge Dotson. She reports that she was treated with Efudex in 8/2018 with subsequent exfoliation. She does report that she was treated with doxycycline in 6/2018 for a possible MRSA infection on her wrist. She states that her grandson had a confirmed infection and she was taking care of him. She states that it completely resolved. She has a history of depression and anxiety, treated with Prozac. However, she states that she is no longer taking it. She states that she is no longer using Ambien as a sleep aid. She states that she feels that her symptoms are currently well controlled.       Past Medical History:   Diagnosis Date    Calculus of kidney     Chronic diastolic heart failure ([facility] [address] 8/13/2018    Chronic pain syndrome 7/16/2013    Constipation due to pain medication 4/8/2016    Degeneration of lumbar or lumbosacral intervertebral disc     Depression     Diabetes mellitus     Essential hypertension 8/13/2018    KIM (generalized anxiety disorder) 4/29/2015    H/O renal failure 8/13/2018    Secondary to glomerulonephritis. 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    Type 2 diabetes mellitus with microalbuminuria ([facility] [address] 11/10/2015     Past Surgical History:   Procedure Laterality Date    ABDOMEN SURGERY PROC UNLISTED      HX GYN      Ovary    HX HEENT      nose surgery    HX HYSTERECTOMY      HX ORTHOPAEDIC  [id number]    hip replacement    HX UROLOGICAL      Kidney Transplant    NEPHRECTOMY      bilateral     Current Outpatient Medications   Medication Sig    doxycycline (VIBRAMYCIN) 100 mg capsule Take 1 Cap by mouth two (2) times a day for 10 days.  predniSONE (DELTASONE) 20 mg tablet Take 2 Tabs by mouth daily (with breakfast) for 5 days.  albuterol (PROVENTIL HFA, VENTOLIN HFA, PROAIR HFA) 90 mcg/actuation inhaler Take 1 Puff by inhalation every four (4) hours as needed for Wheezing.  benzonatate (TESSALON) 100 mg capsule Take 1 Cap by mouth three (3) times daily as needed for Cough for up to 7 days.  ergocalciferol (ERGOCALCIFEROL) 50,000 unit capsule Take 1 Cap by mouth every seven (7) days.  lisinopril (PRINIVIL, ZESTRIL) 20 mg tablet Take 1 Tab by mouth daily.  magnesium oxide (MAGOX) 400 mg tablet Take 400 mg by mouth daily.  simvastatin (ZOCOR) 40 mg tablet Take 1 Tab by mouth nightly.  isosorbide mononitrate ER (IMDUR) 60 mg CR tablet Take 1 Tab by mouth every morning.  amLODIPine (NORVASC) 5 mg tablet Take 1 Tab by mouth daily.     furosemide (LASIX) 20 mg tablet TK 1 T PO BID    tacrolimus (PROGRAF) 1 mg capsule TAKE 2 CAPSULES BY MOUTH TWICE DAILY    linaclotide (LINZESS) 290 mcg cap capsule Take 1 Cap by mouth Daily (before breakfast). For chronic opioid-induced constipation    DOCUSATE SODIUM (COLACE PO) Take  by mouth.  omeprazole (PRILOSEC) 40 mg capsule Take 40 mg by mouth daily.  ondansetron hcl (ZOFRAN) 4 mg tablet Take 4 mg by mouth every eight (8) hours as needed for Nausea.  folic acid 959 mcg tablet Take 400 mcg by mouth daily. No current facility-administered medications for this visit. Allergies and Intolerances: Allergies   Allergen Reactions    Asa-Acetaminophen-Caff-Potass Unknown (comments)    Aspirin Other (comments)    Beta-Blockers (Beta-Adrenergic Blocking Agts) Other (comments)     Sent into [employer].  Bystolic [Nebivolol] Other (comments)    Ibuprofen Other (comments)     Renal transplant patient    Lipitor [Atorvastatin] Unknown (comments)    Nsaids (Non-Steroidal Anti-Inflammatory Drug) Unknown (comments)    Sirolimus Other (comments)     pneumonitis    Toprol Xl [Metoprolol Succinate] Other (comments)     Asthma    Venlafaxine Other (comments)     Patient does not recall taking this medication     Family History: Her sister was diagnosed with breast cancer at age 61; her father had CAD and  during CABG  Family History   Problem Relation Age of Onset    Hypertension Father     Heart Disease Father     Alcohol abuse Father     Arthritis-osteo Father     Arthritis-osteo Brother     Cancer Maternal Grandmother         breast    Lung Disease Sister     High Cholesterol Mother     Arthritis-osteo Mother     Arthritis-osteo Sister     Liver Disease Sister      Social History:   She  reports that she quit smoking about 16 years ago. she has never used smokeless tobacco. Smoked  2-3 ppd for 10 years, stopping 20 years ago. She is  with one adopted son.  She is a retired . Social History     Substance and Sexual Activity   Alcohol Use No     Immunization History:  Immunization History   Administered Date(s) Administered    Influenza Vaccine 12/18/2012, 11/11/2013    Influenza Vaccine (Quad) PF 11/10/2015    Pneumococcal Polysaccharide (PPSV-23) 11/28/2014    Tdap 08/09/2018       Review of Systems:   As above included in HPI. Otherwise 11 point review of systems negative including constitutional, skin, HENT, eyes, respiratory, cardiovascular, gastrointestinal, genitourinary, musculoskeletal, endocrine, hematologic, allergy, and neurologic. Physical:   Vitals:   BP: 144/80  HR: 75  WT: 183 lb (83 kg)  BMI:  32.42 kg/m2    Exam:   Patient appears in no apparent distress. Affect is appropriate. HEENT: PERRLA, anicteric, oropharynx clear, no JVD, adenopathy or thyromegaly. No carotid bruits or radiated murmur. Lungs: coarse breath sounds with diffuse expiratory wheezing; no rhonchi or rales. Heart: regular rate and rhythm. No murmur, rubs, gallops  Abdomen: soft, nontender, nondistended, normal bowel sounds, no hepatosplenomegaly or masses. Extremities: without edema. Pulses 1-2+ bilaterally.     Review of Data:  Labs:  Hospital Outpatient Visit on 11/05/2018   Component Date Value Ref Range Status    WBC 11/05/2018 9.2  4.6 - 13.2 K/uL Final    RBC 11/05/2018 4.33  4.20 - 5.30 M/uL Final    HGB 11/05/2018 13.0  12.0 - 16.0 g/dL Final    HCT 11/05/2018 39.7  35.0 - 45.0 % Final    MCV 11/05/2018 91.7  74.0 - 97.0 FL Final    MCH 11/05/2018 30.0  24.0 - 34.0 PG Final    MCHC 11/05/2018 32.7  31.0 - 37.0 g/dL Final    RDW 11/05/2018 12.8  11.6 - 14.5 % Final    PLATELET 03/03/7504 566  135 - 420 K/uL Final    MPV 11/05/2018 9.1* 9.2 - 11.8 FL Final    NEUTROPHILS 11/05/2018 58  40 - 73 % Final    LYMPHOCYTES 11/05/2018 32  21 - 52 % Final    MONOCYTES 11/05/2018 7  3 - 10 % Final    EOSINOPHILS 11/05/2018 3  0 - 5 % Final    BASOPHILS 11/05/2018 0  0 - 2 % Final    ABS. NEUTROPHILS 11/05/2018 5.3  1.8 - 8.0 K/UL Final    ABS. LYMPHOCYTES 11/05/2018 2.9  0.9 - 3.6 K/UL Final    ABS. MONOCYTES 11/05/2018 0.6  0.05 - 1.2 K/UL Final    ABS. EOSINOPHILS 11/05/2018 0.3  0.0 - 0.4 K/UL Final    ABS. BASOPHILS 11/05/2018 0.0  0.0 - 0.1 K/UL Final    DF 11/05/2018 AUTOMATED    Final    Hemoglobin A1c 11/05/2018 6.6* 4.2 - 5.6 % Final    Est. average glucose 11/05/2018 143  mg/dL Final    LIPID PROFILE 11/05/2018        Final    Cholesterol, total 11/05/2018 243* <200 MG/DL Final    Triglyceride 11/05/2018 464* <150 MG/DL Final    HDL Cholesterol 11/05/2018 38* 40 - 60 MG/DL Final    LDL, calculated 11/05/2018 LDL AND VLDL CHOLESTEROL NOT CALCULATED WHEN TRIGLYCERIDES >400 MG/DL OR HDL CHOLESTEROL <20 MG/DL  0 - 100 MG/DL Final    VLDL, calculated 11/05/2018 Calculation not valid with this patient's other Lipid values. MG/DL Final    CHOL/HDL Ratio 11/05/2018 6.4* 0 - 5.0   Final    Sodium 11/05/2018 141  136 - 145 mmol/L Final    Potassium 11/05/2018 4.4  3.5 - 5.5 mmol/L Final    Chloride 11/05/2018 109* 100 - 108 mmol/L Final    CO2 11/05/2018 27  21 - 32 mmol/L Final    Anion gap 11/05/2018 5  3.0 - 18 mmol/L Final    Glucose 11/05/2018 127* 74 - 99 mg/dL Final    BUN 11/05/2018 8  7.0 - 18 MG/DL Final    Creatinine 11/05/2018 0.91  0.6 - 1.3 MG/DL Final    BUN/Creatinine ratio 11/05/2018 9* 12 - 20   Final    GFR est AA 11/05/2018 >60  >60 ml/min/1.73m2 Final    GFR est non-AA 11/05/2018 >60  >60 ml/min/1.73m2 Final    Calcium 11/05/2018 8.6  8.5 - 10.1 MG/DL Final    Bilirubin, total 11/05/2018 0.3  0.2 - 1.0 MG/DL Final    ALT (SGPT) 11/05/2018 35  13 - 56 U/L Final    AST (SGOT) 11/05/2018 17  15 - 37 U/L Final    Alk.  phosphatase 11/05/2018 84  45 - 117 U/L Final    Protein, total 11/05/2018 7.6  6.4 - 8.2 g/dL Final    Albumin 11/05/2018 3.7  3.4 - 5.0 g/dL Final    Globulin 11/05/2018 3.9  2.0 - 4.0 g/dL Final  A-G Ratio 2018 0.9  0.8 - 1.7   Final    Microalbumin,urine random 2018 30.90* 0 - 3.0 MG/DL Final    Creatinine, urine 2018 67.00  30 - 125 mg/dL Final    Microalbumin/Creat ratio (mg/g cre* 2018 461* 0 - 30 mg/g Final    Vitamin D 25-Hydroxy 2018 22.1* 30 - 100 ng/mL Final     Health Maintenance:  Screening:    Mammogram: negative (2016) [facility]. Overdue. PAP smear: s/p CATY/BSO for endometriosis   Colorectal: colonoscopy (2011) divertculosis. Due . Depression: no longer taking Prozac   DM (HbA1c/FPG): HbA1c 6.6 (2018)   Hepatitis C: negative (2018)   Falls: none   DEXA: osteopenia (2017) [facility]. Glaucoma: regular eye exams with Dr. Franki Lanza. Overdue. Smokin-30 pack years, stopped 20 years ago   Vitamin D: 22.1 (2018)   Medicare Wellness: N/A    Impression:  Patient Active Problem List   Diagnosis Code    Chronic low back pain M54.5, G89.29    Lumbosacral radiculopathy M54.17    Degeneration of lumbar or lumbosacral intervertebral disc M51.37    Primary iridocyclitis H20.019    Chronic insomnia F51.04    Kidney transplant status, living related donor Z80.0    Chronic pain syndrome G89.4    KIM (generalized anxiety disorder) F41.1    Type 2 diabetes mellitus with microalbuminuria (HCC) E11.29, R80.9    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    Constipation due to pain medication K59.03    Arthritis of left hip M16.12    History of depression Z86.59    Osteopenia of multiple sites M85.89    Essential hypertension I10    Chronic diastolic heart failure (HCC) I50.32    Hyperlipidemia E78.5    H/O renal failure Z87.448    GERD (gastroesophageal reflux disease) K21.9    Primary osteoarthritis involving multiple joints M15.0    Immunosuppressed status ([facility]) D89.9    Left ventricular diastolic dysfunction C57.6       Plan:  1. Upper respiratory infection with bronchospasm.  Patient with cough productive of purulent sputum and coarse breath sounds with diffuse expiratory wheezing on exam. Will obtain chest x-ray to evaluate for pneumonia. Will begin doxycycline 100 mg bid for 10 days, and will also give albuterol inhaler and prednisone 40 mg daily for 5 days given diffuse wheezing. Tessalon for cough. Follow-up in 2 weeks to reassess. 2. S/p renal transplant from living related donor. Patient admitted today that she is not taking her immunosuppressant drugs, stopping prednisone about a year ago and stopping tacrolimus several months ago on her own. Noted evidence of trace protein on urinalysis and microalbuminuria at last two checks in 8/2018 (83-97 mg/g). However, significant worsening of proteinuria today with urine microalbumin/creatinine ratio 461 mg/g. Creatinine has remained stable. She admitted to noncompliance when this was brought to her attention and became very concerned. Stated that she will restart tacrolimus and prednisone today. Discussed that she should contact the renal transplant clinic, particularly as they would be able to advise her on the best way to resume, but she stated that she \"will never be able to get another transplant if they find out she stopped the medications\". Will reassess patient in 2 weeks and discuss further. Follow closely. 3. Hypertension. Blood pressure elevated today, but she reports that she did not take her medications this morning. Current regimen includes lisinopril 40 mg daily, Imdur 60 mg daily, amlodipine 5 mg daily, and lasix 20 mg as needed. Stressed importance of compliance with medications as important to protect kidney. Instructed to continue to monitor her blood pressure daily. Renal function has been stable with creatinine 0.91/ eGFR >60. Will continue to follow. 4. Chronic diastolic heart failure. Grade 2 with normal systolic function on echocardiogram in 10/2017. Hemodynamically stable on current regimen as above. Does not tolerate beta blockers.  Instructed to restrict sodium and follow weights. 5. Hyperlipidemia. Attempted to change to rosuvastatin, but patient developed myalgias. Lipid panel with elevated TG at 464 low HDL at 38 with LDL not calculated. Unclear if she is taking simvastatin, but she states that she is taking 40 mg daily. Will recheck at next visit before adjusting dose. Discussed importance of weight loss and improved control of blood sugar. Continue to follow. 6. Diabetes mellitus. HbA1c improved to 6.6 today without medications. Started metformin  mg daily with dinner in 8/2018, but patient discontinued stating that sugars are now normal with dietary changes. No known evidence of retinopathy. Patient referred to Dr. Kendal Lozada but has not yet scheduled appointment. Foot exam normal (8/2018). On Ace-I and will restart statin. Urine microalbumin/ creatinine ratio with evidence of macroalbuminuria as discussed. Will follow as achieve better blood sugar and blood pressure control. 7. Osteopenia. Last bone density scan 5/2017 . Using femoral neck T-scores, calculated FRAX score estimates her 10 year risk of a major osteoporetic fracture at 10 % and hip fracture at 0.3 % in context of chronic steroid use and secondary osteoporosis. Also, noted report of thoracic compression deformities on chest CT scan in 11/2017 at [facility]. She admitted to no longer taking prednisone, will now resume therapy with 5 mg qod. She stopped taking alendronate although unclear when she discontinued. Continue calcium and Vitamin D. On 50,000 U dose weekly of ergocalciferol, although doubt compliance given low level. She states that she will resume today. Encouraged exercise, particularly weight bearing activities. 8. Osteoarthritis. Patient with multiple areas of arthritis contributing to her chronic pain. However, attempting to wean from narcotics, and discontinued Percocet and using minimal MS Contin 30 mg as needed.  Not experiencing increased pain currently, but will follow. Will use non-narcotic based regimen if needed. 9. GERD. Appears well controlled on omeprazole. Upper endoscopy in 5/2014 negative. 10. Chronic constipation. Most likely related to chronic narcotics. Improved with weaning. Continue Linzess. 11. Squamous cell carcinoma of skin. Under care of Dr. Kamlesh Owen. Being treated with Efudex. Related to chronic immunosuppression. 12. Depression. No longer taking Prozac and appears well controlled. 13. Obesity. Emphasized importance of lifestyle modifications, including diet, exercise, and weight loss. Maintained six pound weight loss. Will continue to address. 13. Health maintenance. Received Pneumovax. Given Tdap. Not candidate for Shingrix vaccine until further studied in immunosuppressed. Will hold off on influenza vaccine given acute illness today. Ordered mammogram as overdue, but not yet scheduled. Encouraged to do so. Colonoscopy due 2021. Referred to Dr. Lisa Toribio for eye exams. Encouraged to schedule. Vitamin D level low, and states will resume ergocalciferol. Total time: 40 minutes spent with the patient in face-to-face consultation of which greater than 50% was spent on counseling, answering questions and/or coordination of care. Complex medical review and management performed. Patient understands recommendations and agrees with plan. Follow-up in 2 weeks. Addendum    XR Results (most recent):  Results from Hospital Encounter encounter on 11/06/18   XR CHEST PA LAT    Narrative EXAM: Chest Radiographs    INDICATION: Shortness of breath    TECHNIQUE: PA and lateral views of the chest    COMPARISON: 11/9/2015, 12/18/2013, 8/28/2012 and 12/15/2011    FINDINGS: No pneumothorax identified. The lungs are clear. No infiltrates  identified. No effusions appreciated. The cardiomediastinal silhouette is  unremarkable. The pulmonary vascularity is unremarkable. The osseous structures  are unremarkable. Impression IMPRESSION:  1.   No acute cardiopulmonary process. Reviewed chest x-ray results. No evidence of pneumonia. Continue with current plan as above.

## 2018-11-15 ENCOUNTER — TELEPHONE (OUTPATIENT)
Dept: INTERNAL MEDICINE CLINIC | Age: 63
End: 2018-11-15

## 2018-11-15 NOTE — TELEPHONE ENCOUNTER
Called and spoke with patient. Having insomnia, anxiety and restlessness. Discussed that may be side effects from restarting tacrolimus and prednisone. Told to continue and will follow-up when seen at visit next week. Patient agreeable.

## 2018-11-15 NOTE — TELEPHONE ENCOUNTER
Pt's spouse, Edward Amaral, calling. Said Dr Hilary Pereira changed dosing of Lisinopril & Simvastatin and pt is now having nightmares, is restless, can't sleep. Edwardashely Amaral would like to speak with Dr Hilary Pereira, his # is [phone] (said his wife has lost her phone).

## 2018-11-21 ENCOUNTER — OFFICE VISIT (OUTPATIENT)
Dept: INTERNAL MEDICINE CLINIC | Age: 63
End: 2018-11-21

## 2018-11-21 ENCOUNTER — HOSPITAL ENCOUNTER (OUTPATIENT)
Dept: LAB | Age: 63
Discharge: HOME | End: 2018-11-21
Payer: COMMERCIAL

## 2018-11-21 VITALS
HEART RATE: 84 BPM | SYSTOLIC BLOOD PRESSURE: 150 MMHG | OXYGEN SATURATION: 94 % | BODY MASS INDEX: 32.43 KG/M2 | TEMPERATURE: 99.2 F | WEIGHT: 183 LBS | DIASTOLIC BLOOD PRESSURE: 78 MMHG | HEIGHT: 63 IN | RESPIRATION RATE: 16 BRPM

## 2018-11-21 DIAGNOSIS — M15.9 PRIMARY OSTEOARTHRITIS INVOLVING MULTIPLE JOINTS: ICD-10-CM

## 2018-11-21 DIAGNOSIS — Z23 ENCOUNTER FOR IMMUNIZATION: ICD-10-CM

## 2018-11-21 DIAGNOSIS — E78.5 HYPERLIPIDEMIA, UNSPECIFIED HYPERLIPIDEMIA TYPE: ICD-10-CM

## 2018-11-21 DIAGNOSIS — Z86.59 HISTORY OF DEPRESSION: ICD-10-CM

## 2018-11-21 DIAGNOSIS — I51.9 LEFT VENTRICULAR DIASTOLIC DYSFUNCTION: ICD-10-CM

## 2018-11-21 DIAGNOSIS — R80.1 PERSISTENT PROTEINURIA: ICD-10-CM

## 2018-11-21 DIAGNOSIS — Z94.0 KIDNEY TRANSPLANT STATUS, LIVING RELATED DONOR: Primary | ICD-10-CM

## 2018-11-21 DIAGNOSIS — M85.89 OSTEOPENIA OF MULTIPLE SITES: ICD-10-CM

## 2018-11-21 DIAGNOSIS — I10 ESSENTIAL HYPERTENSION: ICD-10-CM

## 2018-11-21 DIAGNOSIS — D84.9 IMMUNOSUPPRESSED STATUS (HCC): ICD-10-CM

## 2018-11-21 DIAGNOSIS — R80.9 TYPE 2 DIABETES MELLITUS WITH MICROALBUMINURIA, WITHOUT LONG-TERM CURRENT USE OF INSULIN (HCC): ICD-10-CM

## 2018-11-21 DIAGNOSIS — K21.9 GASTROESOPHAGEAL REFLUX DISEASE WITHOUT ESOPHAGITIS: ICD-10-CM

## 2018-11-21 DIAGNOSIS — I50.32 CHRONIC DIASTOLIC HEART FAILURE (HCC): ICD-10-CM

## 2018-11-21 DIAGNOSIS — Z91.14 NONCOMPLIANCE WITH MEDICATIONS: ICD-10-CM

## 2018-11-21 DIAGNOSIS — E11.29 TYPE 2 DIABETES MELLITUS WITH MICROALBUMINURIA, WITHOUT LONG-TERM CURRENT USE OF INSULIN (HCC): ICD-10-CM

## 2018-11-21 DIAGNOSIS — F51.04 CHRONIC INSOMNIA: ICD-10-CM

## 2018-11-21 DIAGNOSIS — G47.00 INSOMNIA, UNSPECIFIED TYPE: ICD-10-CM

## 2018-11-21 DIAGNOSIS — Z94.0 KIDNEY TRANSPLANT STATUS, LIVING RELATED DONOR: ICD-10-CM

## 2018-11-21 LAB
ALBUMIN SERPL-MCNC: 4.5 G/DL (ref 3.4–5)
ALBUMIN/GLOB SERPL: 1.4 {RATIO} (ref 0.8–1.7)
ALP SERPL-CCNC: 67 U/L (ref 45–117)
ALT SERPL-CCNC: 30 U/L (ref 13–56)
ANION GAP SERPL CALC-SCNC: 7 MMOL/L (ref 3–18)
APPEARANCE UR: CLEAR
AST SERPL-CCNC: 16 U/L (ref 15–37)
BACTERIA URNS QL MICRO: ABNORMAL /HPF
BASOPHILS # BLD: 0 K/UL (ref 0–0.1)
BASOPHILS NFR BLD: 0 % (ref 0–2)
BILIRUB SERPL-MCNC: 0.3 MG/DL (ref 0.2–1)
BILIRUB UR QL: NEGATIVE
BUN SERPL-MCNC: 15 MG/DL (ref 7–18)
BUN/CREAT SERPL: 16 (ref 12–20)
CALCIUM SERPL-MCNC: 9.6 MG/DL (ref 8.5–10.1)
CHLORIDE SERPL-SCNC: 105 MMOL/L (ref 100–108)
CHOLEST SERPL-MCNC: 255 MG/DL
CO2 SERPL-SCNC: 26 MMOL/L (ref 21–32)
COLOR UR: YELLOW
CREAT SERPL-MCNC: 0.95 MG/DL (ref 0.6–1.3)
DIFFERENTIAL METHOD BLD: ABNORMAL
EOSINOPHIL # BLD: 0.1 K/UL (ref 0–0.4)
EOSINOPHIL NFR BLD: 1 % (ref 0–5)
EPITH CASTS URNS QL MICRO: ABNORMAL /LPF (ref 0–5)
ERYTHROCYTE [DISTWIDTH] IN BLOOD BY AUTOMATED COUNT: 13.3 % (ref 11.6–14.5)
GLOBULIN SER CALC-MCNC: 3.3 G/DL (ref 2–4)
GLUCOSE SERPL-MCNC: 138 MG/DL (ref 74–99)
GLUCOSE UR STRIP.AUTO-MCNC: NEGATIVE MG/DL
HCT VFR BLD AUTO: 39.3 % (ref 35–45)
HDLC SERPL-MCNC: 58 MG/DL (ref 40–60)
HDLC SERPL: 4.4 {RATIO} (ref 0–5)
HGB BLD-MCNC: 12.7 G/DL (ref 12–16)
HGB UR QL STRIP: NEGATIVE
KETONES UR QL STRIP.AUTO: NEGATIVE MG/DL
LDLC SERPL CALC-MCNC: 125.4 MG/DL (ref 0–100)
LEUKOCYTE ESTERASE UR QL STRIP.AUTO: ABNORMAL
LIPID PROFILE,FLP: ABNORMAL
LYMPHOCYTES # BLD: 2.3 K/UL (ref 0.9–3.6)
LYMPHOCYTES NFR BLD: 20 % (ref 21–52)
MCH RBC QN AUTO: 30.5 PG (ref 24–34)
MCHC RBC AUTO-ENTMCNC: 32.3 G/DL (ref 31–37)
MCV RBC AUTO: 94.2 FL (ref 74–97)
MONOCYTES # BLD: 0.6 K/UL (ref 0.05–1.2)
MONOCYTES NFR BLD: 5 % (ref 3–10)
NEUTS SEG # BLD: 8.5 K/UL (ref 1.8–8)
NEUTS SEG NFR BLD: 74 % (ref 40–73)
NITRITE UR QL STRIP.AUTO: NEGATIVE
PH UR STRIP: 5 [PH] (ref 5–8)
PLATELET # BLD AUTO: 314 K/UL (ref 135–420)
PMV BLD AUTO: 9.4 FL (ref 9.2–11.8)
POTASSIUM SERPL-SCNC: 4.6 MMOL/L (ref 3.5–5.5)
PROT SERPL-MCNC: 7.8 G/DL (ref 6.4–8.2)
PROT UR STRIP-MCNC: 30 MG/DL
RBC # BLD AUTO: 4.17 M/UL (ref 4.2–5.3)
RBC #/AREA URNS HPF: 0 /HPF (ref 0–5)
SODIUM SERPL-SCNC: 138 MMOL/L (ref 136–145)
SP GR UR REFRACTOMETRY: 1.02 (ref 1–1.03)
TRIGL SERPL-MCNC: 358 MG/DL (ref ?–150)
UROBILINOGEN UR QL STRIP.AUTO: 0.2 EU/DL (ref 0.2–1)
VLDLC SERPL CALC-MCNC: 71.6 MG/DL
WBC # BLD AUTO: 11.5 K/UL (ref 4.6–13.2)
WBC URNS QL MICRO: ABNORMAL /HPF (ref 0–4)

## 2018-11-21 PROCEDURE — 85025 COMPLETE CBC W/AUTO DIFF WBC: CPT

## 2018-11-21 PROCEDURE — 80061 LIPID PANEL: CPT

## 2018-11-21 PROCEDURE — 80053 COMPREHEN METABOLIC PANEL: CPT

## 2018-11-21 PROCEDURE — 82043 UR ALBUMIN QUANTITATIVE: CPT

## 2018-11-21 PROCEDURE — 80197 ASSAY OF TACROLIMUS: CPT

## 2018-11-21 PROCEDURE — 81001 URINALYSIS AUTO W/SCOPE: CPT

## 2018-11-21 RX ORDER — OMEPRAZOLE 40 MG/1
40 CAPSULE, DELAYED RELEASE ORAL DAILY
Qty: 90 CAP | Refills: 2 | Status: SHIPPED | OUTPATIENT
Start: 2018-11-21 | End: 2019-05-15 | Stop reason: SDUPTHER

## 2018-11-21 RX ORDER — ZOLPIDEM TARTRATE 5 MG/1
5 TABLET ORAL
Qty: 30 TAB | Refills: 0 | OUTPATIENT
Start: 2018-11-21 | End: 2018-12-21 | Stop reason: SDUPTHER

## 2018-11-21 NOTE — PROGRESS NOTES
Chief Complaint   Patient presents with    Breathing Problem     2 week follow up with lab work. Health Maintenance Due   Topic Date Due    Shingrix Vaccine Age 49> (1 of 2) 09/18/2004    BREAST CANCER SCRN MAMMOGRAM  09/16/2012    Pneumococcal 19-64 Highest Risk (2 of 3 - PCV13) 11/28/2015    EYE EXAM RETINAL OR DILATED Q1  08/04/2016    Influenza Age 5 to Adult  08/01/2018     Patient given influenza vaccine, FLUARIX Quadrivalent, in right deltoid and pneumococcal vaccine, Prevnar 13, in left deltoid, per verbal order from Dr. Nile Washington. Instructed patient to sit and wait 10-20 minutes before leaving the premises so that we can watch for any complications or adverse reactions. Patient given vaccine information statement handout before vaccine was given. Patient tolerated well without adverse reactions or complications. 1. Have you been to the ER, urgent care clinic or hospitalized since your last visit? NO.     2. Have you seen or consulted any other health care providers outside of the [address] [place] [address] since your last visit (Include any pap smears or colon screening)? NO      Called in Ambien 5 mg to [employer].

## 2018-11-21 NOTE — PATIENT INSTRUCTIONS

## 2018-11-22 LAB
CREAT UR-MCNC: 117 MG/DL (ref 30–125)
MICROALBUMIN UR-MCNC: 17.5 MG/DL (ref 0–3)
MICROALBUMIN/CREAT UR-RTO: 150 MG/G (ref 0–30)

## 2018-11-23 LAB — TACROLIMUS BLD-MCNC: 1.7 NG/ML (ref 2–20)

## 2018-11-25 PROBLEM — M16.12 ARTHRITIS OF LEFT HIP: Status: RESOLVED | Noted: 2017-01-12 | Resolved: 2018-11-25

## 2018-11-25 PROBLEM — R80.1 PERSISTENT PROTEINURIA: Status: ACTIVE | Noted: 2018-11-25

## 2018-11-25 NOTE — PROGRESS NOTES
HPI:   Edil Winter is a 58y.o. year old female who presents today for evaluation of hypertension, hyperlipidemia, chronic diastolic heart failure, diabetes mellitus, ESRD s/p living donor cadaveric renal transplant (4/1980), osteopenia, osteoarthritis, lumbar degenerative disease, chronic pain syndrome, squamous cell skin cancers, depression, anxiety, GERD, and chronic constipation. She was last seen on 11/6/2018 and reported cough productive of yellow-brown sputum, nasal congestion, and post nasal drainage. She was found to have coarse breath sounds and diffuse wheezing on exam, and a chest x-ray was obtained which was negative. She was treated with prednisone 40 mg x 5 days, doxycycline 100 mg bid, and albuterol inhaler. She also admitted at her visit that she was no longer taking her immunosuppressant medications for her kidney transplant. She stated that she had stopped taking prednisone about a year ago, and then stopped tacrolimus several months ago although is not specific as to when. However, review of record showed that her tacrolimus level was therapeutic at 5.2 on 4/27/2018 at her annual follow-up visit in the renal transplant clinic. She stated that she was told by one of her kidney doctors that \"her brother must have been a twin for her transplanted kidney to have lasted this long\". She stated that she believed him even though her brother is 11years older, and she decided on her own that she no longer needed to take her immunosuppressants. She states that she also did not like the fact that she \"gets so many colds\" while taking the medications. She became concerned when she was informed of the development of increasing proteinuria on he labs. She returns today and reports that her cough is much improved. She also reports that she restarted her kidney transplant immunosuppressant medication, and is taking tacrolimus 2 mg bid and prednisone 5 mg every other day.  She is anxious about having her urine retested today to see if her proteinuria is improved. She is otherwise without complaints. She has a history of hypertension, currently treated with lisinopril, Imdur, amlodipine, and lasix as needed. She has been monitoring her blood pressure at home intermittently, and reports that it is generally controlled, ranging 120-130/ 80-85. She also has a history of hyperlipidemia, treated previously with high intensity dose simvastatin at 80 mg daily. She was changed to rosuvastatin in 8/2018, but noticed severe muscle aches and stopped taking it with improvement. She was subsequently restarted on simvastatin at 40 mg dose. In 10/2017, she was admitted to [facility] with dyspnea on exertion, and chest xray revealed mild cardiac enlargement with pulmonary vascular congestion and diffusely increased interstitial markings. Echocardiogram showed normal LV size and function (EF 65%), mild concentric LVH, grade 2 diastolic dysfunction, and a pharmacologic nuclear stress test was a normal low risk study without evidence of ischemia or prior infarction, and calculated EF 70%. She was diagnosed with acute on chronic diastolic heart failure, and treated with lasix and diuresed approximately five liters with improvement. She was intolerant to beta blockers. She currently denies any chest pain, shortness of breath at rest or with exertion, palpitations, lightheadedness, PND, orthopnea, or edema. She is now being followed by Dr. Connie Andersen. She has a history of diabetes mellitus, not currently being treated with medication. She denies any polyuria, polydipsia, nocturia, or blurry vision, and has no history of retinopathy, neuropathy, or nephropathy. She has not been having regular eye exams. She has a history of renal failure and is s/p a living donor cadaveric renal transplant from her brother in 4/1980. She is maintained on tacrolimus alone, stating that she discontinued prednisone approximately one year ago.  She is followed in the [facility] by Dr. Reanna Puri and Dr. Jackelyn Cisneros. She has secondary osteopenia due to chronic steroid use, and was being treated with alendronate. However, she states that she discontinued taking it. Her last bone density study was in 5/2017 showing T-scores:  femoral neck left -0.1, lumbar -0.4, and distal radius -1.2. She continues to take calcium and Vitamin D supplements. She has no history of pathologic fractures. She has a history of osteoarthritis and is s/p right hip replacement by Dr. Jacques León in 2015. She also has chronic neck and low back pain secondary to degenerative disease. She had been followed previously in the [facility] pain management center, but is continuing to wean herself off of pain medications. She has stopped taking Percocet and is now only using MS Contin 30 mg as needed. She states that she has not noticed a significant increase in her neck or low back pain. She denies any fevers, chills, weight change, saddle paresthesia, neurogenic bowel or bladder symptoms, or recent trauma. She has a history of GERD, treated with omeprazole. She had an upper endoscopy in 5/2014 by Dr. Migdalia Crowell which was normal. She also has difficulty with chronic constipation, secondary to narcotic use. She is being treated with Linzess. She had a screening colonoscopy in 6/2011 which showed evidence of diverticulosis. Follow-up recommended for 10 years. She denies any abdominal pain, nausea, vomiting, melena, hematochezia, or change in bowel movements. She has a history of multiple squamous cell carcinomas of the skin. She is being followed closely by Dr. Casey Rouse. She reports that she was treated with Efudex in 8/2018 with subsequent exfoliation. She does report that she was treated with doxycycline in 6/2018 for a possible MRSA infection on her wrist. She states that her grandson had a confirmed infection and she was taking care of him. She states that it completely resolved. She has a history of depression and anxiety, treated with Prozac. However, she states that she is no longer taking it. She states that she is no longer using Ambien as a sleep aid. She states that she feels that her symptoms are currently well controlled. Past Medical History:   Diagnosis Date    Calculus of kidney     Chronic diastolic heart failure ([facility] [address] 8/13/2018    Chronic pain syndrome 7/16/2013    Constipation due to pain medication 4/8/2016    Degeneration of lumbar or lumbosacral intervertebral disc     Depression     Diabetes mellitus     Essential hypertension 8/13/2018    KIM (generalized anxiety disorder) 4/29/2015    H/O renal failure 8/13/2018    Secondary to glomerulonephritis. 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    Type 2 diabetes mellitus with microalbuminuria ([facility] [address] 11/10/2015     Past Surgical History:   Procedure Laterality Date    ABDOMEN SURGERY PROC UNLISTED      HX GYN      Ovary    HX HEENT      nose surgery    HX HYSTERECTOMY      HX ORTHOPAEDIC  [id number]    hip replacement    HX UROLOGICAL      Kidney Transplant    NEPHRECTOMY      bilateral     Current Outpatient Medications   Medication Sig    zolpidem (AMBIEN) 5 mg tablet Take 1 Tab by mouth nightly as needed for Sleep. Max Daily Amount: 5 mg.  omeprazole (PRILOSEC) 40 mg capsule Take 1 Cap by mouth daily.  albuterol (PROVENTIL HFA, VENTOLIN HFA, PROAIR HFA) 90 mcg/actuation inhaler Take 1 Puff by inhalation every four (4) hours as needed for Wheezing.  ergocalciferol (ERGOCALCIFEROL) 50,000 unit capsule Take 1 Cap by mouth every seven (7) days.     lisinopril (PRINIVIL, ZESTRIL) 20 mg tablet Take 1 Tab by mouth daily.  magnesium oxide (MAGOX) 400 mg tablet Take 400 mg by mouth daily.  simvastatin (ZOCOR) 40 mg tablet Take 1 Tab by mouth nightly.  isosorbide mononitrate ER (IMDUR) 60 mg CR tablet Take 1 Tab by mouth every morning.  amLODIPine (NORVASC) 5 mg tablet Take 1 Tab by mouth daily.  furosemide (LASIX) 20 mg tablet TK 1 T PO BID    tacrolimus (PROGRAF) 1 mg capsule TAKE 2 CAPSULES BY MOUTH TWICE DAILY    linaclotide (LINZESS) 290 mcg cap capsule Take 1 Cap by mouth Daily (before breakfast). For chronic opioid-induced constipation    DOCUSATE SODIUM (COLACE PO) Take  by mouth.  folic acid 861 mcg tablet Take 400 mcg by mouth daily.  ondansetron hcl (ZOFRAN) 4 mg tablet Take 4 mg by mouth every eight (8) hours as needed for Nausea. No current facility-administered medications for this visit. Allergies and Intolerances: Allergies   Allergen Reactions    Asa-Acetaminophen-Caff-Potass Unknown (comments)    Aspirin Other (comments)    Beta-Blockers (Beta-Adrenergic Blocking Agts) Other (comments)     Sent into [facility].     Bystolic [Nebivolol] Other (comments)    Ibuprofen Other (comments)     Renal transplant patient    Lipitor [Atorvastatin] Unknown (comments)    Nsaids (Non-Steroidal Anti-Inflammatory Drug) Unknown (comments)    Sirolimus Other (comments)     pneumonitis    Toprol Xl [Metoprolol Succinate] Other (comments)     Asthma    Venlafaxine Other (comments)     Patient does not recall taking this medication     Family History: Her sister was diagnosed with breast cancer at age 61; her father had CAD and  during CABG  Family History   Problem Relation Age of Onset    Hypertension Father     Heart Disease Father     Alcohol abuse Father     Arthritis-osteo Father     Arthritis-osteo Brother     Cancer Maternal Grandmother         breast    Lung Disease Sister     High Cholesterol Mother     Arthritis-osteo Mother     Robert Alu Sister     Liver Disease Sister      Social History:   She  reports that she quit smoking about 16 years ago. she has never used smokeless tobacco. Smoked  2-3 ppd for 10 years, stopping 20 years ago. She is  with one adopted son. She is a retired . Social History     Substance and Sexual Activity   Alcohol Use No     Immunization History:  Immunization History   Administered Date(s) Administered    Influenza Vaccine 12/18/2012, 11/11/2013    Influenza Vaccine (Quad) PF 11/10/2015, 11/21/2018    Pneumococcal Conjugate (PCV-13) 11/21/2018    Pneumococcal Polysaccharide (PPSV-23) 11/28/2014    Tdap 08/09/2018       Review of Systems:   As above included in HPI. Otherwise 11 point review of systems negative including constitutional, skin, HENT, eyes, respiratory, cardiovascular, gastrointestinal, genitourinary, musculoskeletal, endocrine, hematologic, allergy, and neurologic. Physical:   Vitals:   BP: 150/78  HR: 84  WT: 183 lb (83 kg)  BMI:  32.42 kg/m2    Exam:   Patient appears in no apparent distress. Affect is appropriate. HEENT: PERRLA, anicteric, oropharynx clear, no JVD, adenopathy or thyromegaly. No carotid bruits or radiated murmur. Lungs: generally clear with scattered faint wheezing. Heart: regular rate and rhythm. No murmur, rubs, gallops  Abdomen: soft, nontender, nondistended, normal bowel sounds, no hepatosplenomegaly or masses. Extremities: without edema. Pulses 1-2+ bilaterally.     Review of Data:  Labs:  Hospital Outpatient Visit on 11/05/2018   Component Date Value Ref Range Status    WBC 11/05/2018 9.2  4.6 - 13.2 K/uL Final    RBC 11/05/2018 4.33  4.20 - 5.30 M/uL Final    HGB 11/05/2018 13.0  12.0 - 16.0 g/dL Final    HCT 11/05/2018 39.7  35.0 - 45.0 % Final    MCV 11/05/2018 91.7  74.0 - 97.0 FL Final    MCH 11/05/2018 30.0  24.0 - 34.0 PG Final    MCHC 11/05/2018 32.7  31.0 - 37.0 g/dL Final    RDW 11/05/2018 12.8  11.6 - 14.5 % Final    PLATELET 76/75/4789 371  135 - 420 K/uL Final    MPV 11/05/2018 9.1* 9.2 - 11.8 FL Final    NEUTROPHILS 11/05/2018 58  40 - 73 % Final    LYMPHOCYTES 11/05/2018 32  21 - 52 % Final    MONOCYTES 11/05/2018 7  3 - 10 % Final    EOSINOPHILS 11/05/2018 3  0 - 5 % Final    BASOPHILS 11/05/2018 0  0 - 2 % Final    ABS. NEUTROPHILS 11/05/2018 5.3  1.8 - 8.0 K/UL Final    ABS. LYMPHOCYTES 11/05/2018 2.9  0.9 - 3.6 K/UL Final    ABS. MONOCYTES 11/05/2018 0.6  0.05 - 1.2 K/UL Final    ABS. EOSINOPHILS 11/05/2018 0.3  0.0 - 0.4 K/UL Final    ABS. BASOPHILS 11/05/2018 0.0  0.0 - 0.1 K/UL Final    DF 11/05/2018 AUTOMATED    Final    Hemoglobin A1c 11/05/2018 6.6* 4.2 - 5.6 % Final    Est. average glucose 11/05/2018 143  mg/dL Final    LIPID PROFILE 11/05/2018        Final    Cholesterol, total 11/05/2018 243* <200 MG/DL Final    Triglyceride 11/05/2018 464* <150 MG/DL Final    HDL Cholesterol 11/05/2018 38* 40 - 60 MG/DL Final    LDL, calculated 11/05/2018 LDL AND VLDL CHOLESTEROL NOT CALCULATED WHEN TRIGLYCERIDES >400 MG/DL OR HDL CHOLESTEROL <20 MG/DL  0 - 100 MG/DL Final    VLDL, calculated 11/05/2018 Calculation not valid with this patient's other Lipid values.   MG/DL Final    CHOL/HDL Ratio 11/05/2018 6.4* 0 - 5.0   Final    Sodium 11/05/2018 141  136 - 145 mmol/L Final    Potassium 11/05/2018 4.4  3.5 - 5.5 mmol/L Final    Chloride 11/05/2018 109* 100 - 108 mmol/L Final    CO2 11/05/2018 27  21 - 32 mmol/L Final    Anion gap 11/05/2018 5  3.0 - 18 mmol/L Final    Glucose 11/05/2018 127* 74 - 99 mg/dL Final    BUN 11/05/2018 8  7.0 - 18 MG/DL Final    Creatinine 11/05/2018 0.91  0.6 - 1.3 MG/DL Final    BUN/Creatinine ratio 11/05/2018 9* 12 - 20   Final    GFR est AA 11/05/2018 >60  >60 ml/min/1.73m2 Final    GFR est non-AA 11/05/2018 >60  >60 ml/min/1.73m2 Final    Calcium 11/05/2018 8.6  8.5 - 10.1 MG/DL Final    Bilirubin, total 11/05/2018 0.3  0.2 - 1.0 MG/DL Final    ALT (SGPT) 2018 35  13 - 56 U/L Final    AST (SGOT) 2018 17  15 - 37 U/L Final    Alk. phosphatase 2018 84  45 - 117 U/L Final    Protein, total 2018 7.6  6.4 - 8.2 g/dL Final    Albumin 2018 3.7  3.4 - 5.0 g/dL Final    Globulin 2018 3.9  2.0 - 4.0 g/dL Final    A-G Ratio 2018 0.9  0.8 - 1.7   Final    Microalbumin,urine random 2018 30.90* 0 - 3.0 MG/DL Final    Creatinine, urine 2018 67.00  30 - 125 mg/dL Final    Microalbumin/Creat ratio (mg/g cre* 2018 461* 0 - 30 mg/g Final    Vitamin D 25-Hydroxy 2018 22.1* 30 - 100 ng/mL Final     Health Maintenance:  Screening:    Mammogram: negative (2016) [facility]. Overdue. PAP smear: s/p CATY/BSO for endometriosis   Colorectal: colonoscopy (2011) divertculosis. Due . Depression: no longer taking Prozac   DM (HbA1c/FPG): HbA1c 6.6 (2018)   Hepatitis C: negative (2018)   Falls: none   DEXA: osteopenia (2017) [facility]. Glaucoma: regular eye exams with Dr. Suzanne Beavers. Overdue.    Smokin-30 pack years, stopped 20 years ago   Vitamin D: 22.1 (2018)   Medicare Wellness: N/A    Impression:  Patient Active Problem List   Diagnosis Code    Chronic low back pain M54.5, G89.29    Lumbosacral radiculopathy M54.17    Degeneration of lumbar or lumbosacral intervertebral disc M51.37    Primary iridocyclitis H20.019    Chronic insomnia F51.04    Kidney transplant status, living related donor Z80.0    Chronic pain syndrome G89.4    KIM (generalized anxiety disorder) F41.1    Type 2 diabetes mellitus with microalbuminuria (HCC) E11.29, R80.9    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    Constipation due to pain medication K59.03    Arthritis of left hip M16.12    History of depression Z86.59    Osteopenia of multiple sites M85.89    Essential hypertension I10    Chronic diastolic heart failure (HCC) I50.32    Hyperlipidemia E78.5    H/O renal failure Z87.448    GERD (gastroesophageal reflux disease) K21.9    Primary osteoarthritis involving multiple joints M15.0    Immunosuppressed status (HCC) D89.9    Left ventricular diastolic dysfunction Q76.8    Noncompliance with medications Z91.14       Plan:  1. Upper respiratory infection with bronchospasm. Patient presented last visit with cough productive of purulent sputum and coarse breath sounds with diffuse expiratory wheezing on exam. Chest x-ray was negative for pneumonia. She was treated with doxycycline 100 mg bid for 10 days, and will also give albuterol inhaler and prednisone 40 mg daily for 5 days given diffuse wheezing. Tessalon for cough. Reports today significant improvement. Suspect underlying COPD given significant smoking history in past. Reports breathing now returned to baseline. Follow. 2. S/p renal transplant from living related donor. Patient admitted last visit to noncompliance with her immunosuppressant drugs, stopping prednisone about a year ago and stopping tacrolimus several months ago on her own. Noted evidence of trace protein on urinalysis and microalbuminuria at last two checks in 8/2018 (83-97 mg/g). However, significant worsening of proteinuria noted at last visit with urine microalbumin/creatinine ratio 461 mg/g (11/6/2018). Creatinine has remained stable. She admitted to noncompliance when this was brought to her attention and she became very concerned. She restarted tacrolimus at 2 mg bid and prednisone 5 mg qod. Discussed that she should contact the renal transplant clinic, particularly as they would be able to advise her on the best way to resume, but she stated that she \"will never be able to get another transplant if they find out she had stopped the medications\". Advised again today that she should contact the renal transplant clinic for tacrolimus monitoring and adjustment, but she refused. Will obtain tacrolimus level today and recheck renal function and urine for protein.  Further recommendations pending results. 3. Hypertension. Blood pressure elevated today, but she states that she is anxious. Current regimen includes lisinopril 40 mg daily, Imdur 60 mg daily, amlodipine 5 mg daily, and lasix 20 mg as needed. Stressed importance of compliance with medications as important to protect kidney. Instructed to continue to monitor her blood pressure daily. Renal function has been stable with creatinine 0.91/ eGFR >60. Will recheck today. 4. Chronic diastolic heart failure. Grade 2 with normal systolic function on echocardiogram in 10/2017. Hemodynamically stable on current regimen as above. Does not tolerate beta blockers. Instructed to restrict sodium and follow weights. 5. Hyperlipidemia. Attempted to change to rosuvastatin, but patient developed myalgias. Lipid panel with elevated TG at 464 low HDL at 38 with LDL not calculated. Unclear if she is taking simvastatin, but she states that she is taking 40 mg daily. Will recheck at next visit before adjusting dose. Discussed importance of weight loss and improved control of blood sugar. Continue to follow. 6. Diabetes mellitus. HbA1c improved to 6.6 today without medications. Started metformin  mg daily with dinner in 8/2018, but patient discontinued stating that sugars are now normal with dietary changes. No known evidence of retinopathy. Patient referred to Dr. Darling Love but has not yet scheduled appointment. Foot exam normal (8/2018). On Ace-I and will restart statin. Urine microalbumin/ creatinine ratio with evidence of macroalbuminuria as discussed. Will follow as achieve better blood sugar and blood pressure control. 7. Osteopenia. Last bone density scan 5/2017 . Using femoral neck T-scores, calculated FRAX score estimates her 10 year risk of a major osteoporetic fracture at 10 % and hip fracture at 0.3 % in context of chronic steroid use and secondary osteoporosis.  Also, noted report of thoracic compression deformities on chest CT scan in 11/2017 at [facility]. She admitted to no longer taking prednisone, will now resume therapy with 5 mg qod. She stopped taking alendronate although unclear when she discontinued. Continue calcium and Vitamin D. On 50,000 U dose weekly of ergocalciferol, although doubt compliance given low level. She states that she will resume today. Encouraged exercise, particularly weight bearing activities. 8. Osteoarthritis. Patient with multiple areas of arthritis contributing to her chronic pain. However, attempting to wean from narcotics, and discontinued Percocet and using minimal MS Contin 30 mg as needed. Not experiencing increased pain currently, but will follow. Will use non-narcotic based regimen if needed. 9. GERD. Appears well controlled on omeprazole. Upper endoscopy in 5/2014 negative. 10. Chronic constipation. Most likely related to chronic narcotics. Improved with weaning. Continue Linzess. 11. Squamous cell carcinoma of skin. Under care of Dr. Carson Scheuermann. Being treated with Efudex. Related to chronic immunosuppression. 12. Depression. No longer taking Prozac and appears well controlled. Reports insomnia since restarting tacrolimus. Wishing to restart Ambien.  reviewed and noted last refill 1/15/2018 for 10 mg dose. Will begin 5 mg dose #30 prescribed. 13. Obesity. Emphasized importance of lifestyle modifications, including diet, exercise, and weight loss. Maintained six pound weight loss. Will continue to address. 13. Health maintenance. Received Pneumovax. Given Tdap. Not candidate for Shingrix vaccine until further studied in immunosuppressed. Will give influenza vaccine and Prevnar 13 today. Ordered mammogram as overdue, but not yet scheduled. Encouraged to do so. Colonoscopy due 2021. Referred to Dr. Arlin Cabezas for eye exams. Encouraged to schedule. Vitamin D level low, and states will resume ergocalciferol. Patient understands recommendations and agrees with plan. Follow-up in 4 weeks. [facility] Outpatient Visit on 11/21/2018   Component Date Value Ref Range Status    WBC 11/21/2018 11.5  4.6 - 13.2 K/uL Final    RBC 11/21/2018 4.17* 4.20 - 5.30 M/uL Final    HGB 11/21/2018 12.7  12.0 - 16.0 g/dL Final    HCT 11/21/2018 39.3  35.0 - 45.0 % Final    MCV 11/21/2018 94.2  74.0 - 97.0 FL Final    MCH 11/21/2018 30.5  24.0 - 34.0 PG Final    MCHC 11/21/2018 32.3  31.0 - 37.0 g/dL Final    RDW 11/21/2018 13.3  11.6 - 14.5 % Final    PLATELET 86/45/1922 419  135 - 420 K/uL Final    MPV 11/21/2018 9.4  9.2 - 11.8 FL Final    NEUTROPHILS 11/21/2018 74* 40 - 73 % Final    LYMPHOCYTES 11/21/2018 20* 21 - 52 % Final    MONOCYTES 11/21/2018 5  3 - 10 % Final    EOSINOPHILS 11/21/2018 1  0 - 5 % Final    BASOPHILS 11/21/2018 0  0 - 2 % Final    ABS. NEUTROPHILS 11/21/2018 8.5* 1.8 - 8.0 K/UL Final    ABS. LYMPHOCYTES 11/21/2018 2.3  0.9 - 3.6 K/UL Final    ABS. MONOCYTES 11/21/2018 0.6  0.05 - 1.2 K/UL Final    ABS. EOSINOPHILS 11/21/2018 0.1  0.0 - 0.4 K/UL Final    ABS.  BASOPHILS 11/21/2018 0.0  0.0 - 0.1 K/UL Final    DF 11/21/2018 AUTOMATED    Final    LIPID PROFILE 11/21/2018        Final    Cholesterol, total 11/21/2018 255* <200 MG/DL Final    Triglyceride 11/21/2018 358* <150 MG/DL Final    HDL Cholesterol 11/21/2018 58  40 - 60 MG/DL Final    LDL, calculated 11/21/2018 125.4* 0 - 100 MG/DL Final    VLDL, calculated 11/21/2018 71.6  MG/DL Final    CHOL/HDL Ratio 11/21/2018 4.4  0 - 5.0   Final    Sodium 11/21/2018 138  136 - 145 mmol/L Final    Potassium 11/21/2018 4.6  3.5 - 5.5 mmol/L Final    Chloride 11/21/2018 105  100 - 108 mmol/L Final    CO2 11/21/2018 26  21 - 32 mmol/L Final    Anion gap 11/21/2018 7  3.0 - 18 mmol/L Final    Glucose 11/21/2018 138* 74 - 99 mg/dL Final    BUN 11/21/2018 15  7.0 - 18 MG/DL Final    Creatinine 11/21/2018 0.95  0.6 - 1.3 MG/DL Final    BUN/Creatinine ratio 11/21/2018 16  12 - 20   Final    GFR est AA 11/21/2018 >60  >60 ml/min/1.73m2 Final    GFR est non-AA 11/21/2018 59* >60 ml/min/1.73m2 Final    Calcium 11/21/2018 9.6  8.5 - 10.1 MG/DL Final    Bilirubin, total 11/21/2018 0.3  0.2 - 1.0 MG/DL Final    ALT (SGPT) 11/21/2018 30  13 - 56 U/L Final    AST (SGOT) 11/21/2018 16  15 - 37 U/L Final    Alk. phosphatase 11/21/2018 67  45 - 117 U/L Final    Protein, total 11/21/2018 7.8  6.4 - 8.2 g/dL Final    Albumin 11/21/2018 4.5  3.4 - 5.0 g/dL Final    Globulin 11/21/2018 3.3  2.0 - 4.0 g/dL Final    A-G Ratio 11/21/2018 1.4  0.8 - 1.7   Final    Tacrolimus 11/21/2018 1.7* 2.0 - 20.0 ng/mL Final    Color 11/21/2018 YELLOW    Final    Appearance 11/21/2018 CLEAR    Final    Specific gravity 11/21/2018 1.018  1.005 - 1.030   Final    pH (UA) 11/21/2018 5.0  5.0 - 8.0   Final    Protein 11/21/2018 30* NEG mg/dL Final    Glucose 11/21/2018 NEGATIVE   NEG mg/dL Final    Ketone 11/21/2018 NEGATIVE   NEG mg/dL Final    Bilirubin 11/21/2018 NEGATIVE   NEG   Final    Blood 11/21/2018 NEGATIVE   NEG   Final    Urobilinogen 11/21/2018 0.2  0.2 - 1.0 EU/dL Final    Nitrites 11/21/2018 NEGATIVE   NEG   Final    Leukocyte Esterase 11/21/2018 SMALL* NEG   Final    WBC 11/21/2018 6 to 8  0 - 4 /hpf Final    RBC 11/21/2018 0  0 - 5 /hpf Final    Epithelial cells 11/21/2018 FEW  0 - 5 /lpf Final    Bacteria 11/21/2018 FEW* NEG /hpf Final    Microalbumin,urine random 11/21/2018 17.50* 0 - 3.0 MG/DL Final    Creatinine, urine 11/21/2018 117.00  30 - 125 mg/dL Final    Microalbumin/Creat ratio (mg/g cre* 11/21/2018 150* 0 - 30 mg/g Final     Reviewed labs from visit. CBC stable without anemia. Liver function normal.  Renal function with creatinine 0.95/ eGFR 59. Urine microalbumin/ creatinine ratio improved to 150 mg/g, but urinalysis measuring 30 mg/dl protein. Tacrolimus level low at 1.7 after resuming medication 2 weeks ago at dose of 2 mg bid . Will advise patient to contact transplant clinic for advice on dose adjustment. Lipid panel significantly improved with total cholesterol 255, HDL 58 and  on simvastatin 40 mg daily. Continue.

## 2018-11-26 ENCOUNTER — TELEPHONE (OUTPATIENT)
Dept: INTERNAL MEDICINE CLINIC | Age: 63
End: 2018-11-26

## 2018-11-26 NOTE — TELEPHONE ENCOUNTER
Pt's spouse calling,  Favian Lee pt is very anxious to hear about her recent lab results. Please advise her at [phone].

## 2018-11-26 NOTE — TELEPHONE ENCOUNTER
Called and discussed lab results with patient. Discussed the following:    CBC stable without anemia. Liver function normal.    Renal function with creatinine 0.95/ eGFR 59. Urine microalbumin/ creatinine ratio improved to 150 mg/g, but urinalysis measuring 30 mg/dl protein. Tacrolimus level low at 1.7 after resuming medication 2 weeks ago at dose of 2 mg bid. Advised to contact transplant clinic for advice on dose adjustment. Patient stated that she was going to contact transplant clinic this week. Lipid panel significantly improved with total cholesterol 255, HDL 58 and  on simvastatin 40 mg daily. Continue.

## 2018-11-30 ENCOUNTER — HOSPITAL ENCOUNTER (OUTPATIENT)
Dept: LAB | Age: 63
Discharge: HOME | End: 2018-11-30
Payer: COMMERCIAL

## 2018-11-30 LAB
ALBUMIN SERPL-MCNC: 4.5 G/DL (ref 3.4–5)
ANION GAP SERPL CALC-SCNC: 8 MMOL/L (ref 3–18)
APPEARANCE UR: CLEAR
BASOPHILS # BLD: 0 K/UL (ref 0–0.1)
BASOPHILS NFR BLD: 0 % (ref 0–2)
BILIRUB UR QL: NEGATIVE
BUN SERPL-MCNC: 21 MG/DL (ref 7–18)
BUN/CREAT SERPL: 17 (ref 12–20)
CALCIUM SERPL-MCNC: 9.3 MG/DL (ref 8.5–10.1)
CHLORIDE SERPL-SCNC: 107 MMOL/L (ref 100–108)
CO2 SERPL-SCNC: 25 MMOL/L (ref 21–32)
COLOR UR: YELLOW
CREAT SERPL-MCNC: 1.22 MG/DL (ref 0.6–1.3)
CREAT UR-MCNC: 81.2 MG/DL (ref 30–125)
DIFFERENTIAL METHOD BLD: NORMAL
EOSINOPHIL # BLD: 0.2 K/UL (ref 0–0.4)
EOSINOPHIL NFR BLD: 2 % (ref 0–5)
EPITH CASTS URNS QL MICRO: NORMAL /LPF (ref 0–5)
ERYTHROCYTE [DISTWIDTH] IN BLOOD BY AUTOMATED COUNT: 13.5 % (ref 11.6–14.5)
GLUCOSE SERPL-MCNC: 180 MG/DL (ref 74–99)
GLUCOSE UR STRIP.AUTO-MCNC: NEGATIVE MG/DL
HCT VFR BLD AUTO: 40.6 % (ref 35–45)
HGB BLD-MCNC: 13.4 G/DL (ref 12–16)
HGB UR QL STRIP: NEGATIVE
HYALINE CASTS URNS QL MICRO: NORMAL /LPF (ref 0–2)
KETONES UR QL STRIP.AUTO: NEGATIVE MG/DL
LEUKOCYTE ESTERASE UR QL STRIP.AUTO: NEGATIVE
LYMPHOCYTES # BLD: 3.1 K/UL (ref 0.9–3.6)
LYMPHOCYTES NFR BLD: 30 % (ref 21–52)
MAGNESIUM SERPL-MCNC: 2.3 MG/DL (ref 1.6–2.6)
MCH RBC QN AUTO: 30.2 PG (ref 24–34)
MCHC RBC AUTO-ENTMCNC: 33 G/DL (ref 31–37)
MCV RBC AUTO: 91.6 FL (ref 74–97)
MONOCYTES # BLD: 0.6 K/UL (ref 0.05–1.2)
MONOCYTES NFR BLD: 6 % (ref 3–10)
NEUTS SEG # BLD: 6.3 K/UL (ref 1.8–8)
NEUTS SEG NFR BLD: 62 % (ref 40–73)
NITRITE UR QL STRIP.AUTO: NEGATIVE
PH UR STRIP: 5 [PH] (ref 5–8)
PHOSPHATE SERPL-MCNC: 4.1 MG/DL (ref 2.5–4.9)
PLATELET # BLD AUTO: 348 K/UL (ref 135–420)
PMV BLD AUTO: 9.2 FL (ref 9.2–11.8)
POTASSIUM SERPL-SCNC: 4.1 MMOL/L (ref 3.5–5.5)
PROT UR STRIP-MCNC: 100 MG/DL
PROT UR-MCNC: 61 MG/DL
RBC # BLD AUTO: 4.43 M/UL (ref 4.2–5.3)
RBC #/AREA URNS HPF: NORMAL /HPF (ref 0–5)
SODIUM SERPL-SCNC: 140 MMOL/L (ref 136–145)
SP GR UR REFRACTOMETRY: 1.01 (ref 1–1.03)
UROBILINOGEN UR QL STRIP.AUTO: 0.2 EU/DL (ref 0.2–1)
WBC # BLD AUTO: 10.2 K/UL (ref 4.6–13.2)
WBC URNS QL MICRO: NORMAL /HPF (ref 0–4)

## 2018-11-30 PROCEDURE — 80069 RENAL FUNCTION PANEL: CPT

## 2018-11-30 PROCEDURE — 81001 URINALYSIS AUTO W/SCOPE: CPT

## 2018-11-30 PROCEDURE — 84156 ASSAY OF PROTEIN URINE: CPT

## 2018-11-30 PROCEDURE — 80197 ASSAY OF TACROLIMUS: CPT

## 2018-11-30 PROCEDURE — 82570 ASSAY OF URINE CREATININE: CPT

## 2018-11-30 PROCEDURE — 36415 COLL VENOUS BLD VENIPUNCTURE: CPT

## 2018-11-30 PROCEDURE — 83735 ASSAY OF MAGNESIUM: CPT

## 2018-11-30 PROCEDURE — 85025 COMPLETE CBC W/AUTO DIFF WBC: CPT

## 2018-12-03 LAB — TACROLIMUS BLD-MCNC: 2.3 NG/ML (ref 2–20)

## 2018-12-08 DIAGNOSIS — G47.00 INSOMNIA, UNSPECIFIED TYPE: ICD-10-CM

## 2018-12-10 NOTE — TELEPHONE ENCOUNTER
PHONE IN [facility] reports the last fill date for Ambien as 11/21/2018. There appears to be no inconsistencies in regards to the prescribing of this medication. Last Visit: 11/21/2018 with MD Mague Alexander  Next Appointment: 01/03/2019 with MD Mague Alexander  Previous Refill Encounter(s): 11/21/2018 per MD Mague Alexander #[id number]    Requested Prescriptions     Pending Prescriptions Disp Refills    zolpidem (AMBIEN) 5 mg tablet [Pharmacy Med Name: ZOLPIDEM 5MG TABLETS] 30 Tab 0     Sig: Take 1 Tab by mouth nightly as needed for Sleep. Max Daily Amount: 5 mg.

## 2018-12-10 NOTE — TELEPHONE ENCOUNTER
Please contact patient and find out why she already needs a refill of this if just filled on 11/21/2018. Thanks.

## 2018-12-11 RX ORDER — ZOLPIDEM TARTRATE 5 MG/1
5 TABLET ORAL
Qty: 30 TAB | Refills: 0 | OUTPATIENT
Start: 2018-12-11

## 2018-12-11 NOTE — TELEPHONE ENCOUNTER
I think this came through surescript[facility]   Spoke with pt, she said she didn't need it until 12/18 and she thought the request went to the transplant doctor? im not sure if he is filling it too? It doesn't appear that he is-- I reviewed the  and it was filled last by us on 11/21-- before that, it was filled by pain management Polly Baker Md in 1/2018.  She said to disregard the request

## 2018-12-12 ENCOUNTER — TELEPHONE (OUTPATIENT)
Dept: INTERNAL MEDICINE CLINIC | Age: 63
End: 2018-12-12

## 2018-12-12 RX ORDER — FLUCONAZOLE 150 MG/1
150 TABLET ORAL DAILY
Qty: 1 TAB | Refills: 0 | Status: SHIPPED | OUTPATIENT
Start: 2018-12-12 | End: 2018-12-13

## 2018-12-12 NOTE — TELEPHONE ENCOUNTER
Sunday Khoury prescribed pt ABX (Cipro) pt has finished and now has a yeast infection is asking for something called into pharmacy.     Pt has appt 01/03 with Max Lugo

## 2018-12-21 DIAGNOSIS — G47.00 INSOMNIA, UNSPECIFIED TYPE: ICD-10-CM

## 2018-12-28 RX ORDER — ZOLPIDEM TARTRATE 5 MG/1
TABLET ORAL
Qty: 30 TAB | Refills: 0 | OUTPATIENT
Start: 2018-12-28 | End: 2019-12-19

## 2018-12-28 NOTE — TELEPHONE ENCOUNTER
Reviewed report generated by the [place]. Does not demonstrate aberrancies or inconsistencies with regard to the prescribing of controlled medications to this patient by other providers. Last filled Zolpidem 11/21/2018 per .

## 2019-01-03 ENCOUNTER — OFFICE VISIT (OUTPATIENT)
Dept: INTERNAL MEDICINE CLINIC | Age: 64
End: 2019-01-03

## 2019-01-03 VITALS
SYSTOLIC BLOOD PRESSURE: 146 MMHG | TEMPERATURE: 99.2 F | BODY MASS INDEX: 32.96 KG/M2 | OXYGEN SATURATION: 97 % | DIASTOLIC BLOOD PRESSURE: 86 MMHG | HEIGHT: 63 IN | WEIGHT: 186 LBS | HEART RATE: 74 BPM | RESPIRATION RATE: 16 BRPM

## 2019-01-03 DIAGNOSIS — I50.32 CHRONIC DIASTOLIC HEART FAILURE (HCC): ICD-10-CM

## 2019-01-03 DIAGNOSIS — E78.5 HYPERLIPIDEMIA, UNSPECIFIED HYPERLIPIDEMIA TYPE: ICD-10-CM

## 2019-01-03 DIAGNOSIS — I10 ESSENTIAL HYPERTENSION: Primary | ICD-10-CM

## 2019-01-03 DIAGNOSIS — M15.9 PRIMARY OSTEOARTHRITIS INVOLVING MULTIPLE JOINTS: ICD-10-CM

## 2019-01-03 DIAGNOSIS — M85.89 OSTEOPENIA OF MULTIPLE SITES: ICD-10-CM

## 2019-01-03 DIAGNOSIS — I51.9 LEFT VENTRICULAR DIASTOLIC DYSFUNCTION: ICD-10-CM

## 2019-01-03 DIAGNOSIS — R80.9 TYPE 2 DIABETES MELLITUS WITH MICROALBUMINURIA, WITHOUT LONG-TERM CURRENT USE OF INSULIN (HCC): ICD-10-CM

## 2019-01-03 DIAGNOSIS — E11.29 TYPE 2 DIABETES MELLITUS WITH MICROALBUMINURIA, WITHOUT LONG-TERM CURRENT USE OF INSULIN (HCC): ICD-10-CM

## 2019-01-03 DIAGNOSIS — Z87.448 H/O RENAL FAILURE: ICD-10-CM

## 2019-01-03 DIAGNOSIS — D84.9 IMMUNOSUPPRESSED STATUS (HCC): ICD-10-CM

## 2019-01-03 DIAGNOSIS — K59.03 CONSTIPATION DUE TO PAIN MEDICATION: ICD-10-CM

## 2019-01-03 DIAGNOSIS — R80.1 PERSISTENT PROTEINURIA: ICD-10-CM

## 2019-01-03 DIAGNOSIS — Z12.31 SCREENING MAMMOGRAM, ENCOUNTER FOR: ICD-10-CM

## 2019-01-03 DIAGNOSIS — Z91.14 NONCOMPLIANCE WITH MEDICATIONS: ICD-10-CM

## 2019-01-03 DIAGNOSIS — F51.04 CHRONIC INSOMNIA: ICD-10-CM

## 2019-01-03 DIAGNOSIS — Z94.0 KIDNEY TRANSPLANT STATUS, LIVING RELATED DONOR: ICD-10-CM

## 2019-01-03 DIAGNOSIS — K21.9 GASTROESOPHAGEAL REFLUX DISEASE WITHOUT ESOPHAGITIS: ICD-10-CM

## 2019-01-03 RX ORDER — ONDANSETRON 4 MG/1
4 TABLET, ORALLY DISINTEGRATING ORAL
Qty: 40 TAB | Refills: 1 | Status: SHIPPED | OUTPATIENT
Start: 2019-01-03 | End: 2019-12-19

## 2019-01-03 RX ORDER — LISINOPRIL 40 MG/1
80 TABLET ORAL DAILY
Qty: 60 TAB | Refills: 0
Start: 2019-01-03 | End: 2019-05-26 | Stop reason: SDUPTHER

## 2019-01-03 NOTE — PROGRESS NOTES
Chief Complaint   Patient presents with    Heart Problem     6 week follow up with labs. Health Maintenance Due   Topic Date Due    BREAST CANCER SCRN MAMMOGRAM  09/16/2012    EYE EXAM RETINAL OR DILATED  08/04/2017     1. Have you been to the ER, urgent care clinic or hospitalized since your last visit? NO.     2. Have you seen or consulted any other health care providers outside of the [address] [place] [address] since your last visit (Include any pap smears or colon screening)?  YES

## 2019-01-03 NOTE — PATIENT INSTRUCTIONS
DASH Diet: Care Instructions  Your Care Instructions    The DASH diet is an eating plan that can help lower your blood pressure. DASH stands for Dietary Approaches to Stop Hypertension. Hypertension is high blood pressure. The DASH diet focuses on eating foods that are high in calcium, potassium, and magnesium. These nutrients can lower blood pressure. The foods that are highest in these nutrients are fruits, vegetables, low-fat dairy products, nuts, seeds, and legumes. But taking calcium, potassium, and magnesium supplements instead of eating foods that are high in those nutrients does not have the same effect. The DASH diet also includes whole grains, fish, and poultry. The DASH diet is one of several lifestyle changes your doctor may recommend to lower your high blood pressure. Your doctor may also want you to decrease the amount of sodium in your diet. Lowering sodium while following the DASH diet can lower blood pressure even further than just the DASH diet alone. Follow-up care is a key part of your treatment and safety. Be sure to make and go to all appointments, and call your doctor if you are having problems. It's also a good idea to know your test results and keep a list of the medicines you take. How can you care for yourself at home? Following the DASH diet  · Eat 4 to 5 servings of fruit each day. A serving is 1 medium-sized piece of fruit, ½ cup chopped or canned fruit, 1/4 cup dried fruit, or 4 ounces (½ cup) of fruit juice. Choose fruit more often than fruit juice. · Eat 4 to 5 servings of vegetables each day. A serving is 1 cup of lettuce or raw leafy vegetables, ½ cup of chopped or cooked vegetables, or 4 ounces (½ cup) of vegetable juice. Choose vegetables more often than vegetable juice. · Get 2 to 3 servings of low-fat and fat-free dairy each day. A serving is 8 ounces of milk, 1 cup of yogurt, or 1 ½ ounces of cheese. · Eat 6 to 8 servings of grains each day.  A serving is 1 slice of bread, 1 ounce of dry cereal, or ½ cup of cooked rice, pasta, or cooked cereal. Try to choose whole-grain products as much as possible. · Limit lean meat, poultry, and fish to 2 servings each day. A serving is 3 ounces, about the size of a deck of cards. · Eat 4 to 5 servings of nuts, seeds, and legumes (cooked dried beans, lentils, and split peas) each week. A serving is 1/3 cup of nuts, 2 tablespoons of seeds, or ½ cup of cooked beans or peas. · Limit fats and oils to 2 to 3 servings each day. A serving is 1 teaspoon of vegetable oil or 2 tablespoons of salad dressing. · Limit sweets and added sugars to 5 servings or less a week. A serving is 1 tablespoon jelly or jam, ½ cup sorbet, or 1 cup of lemonade. · Eat less than 2,300 milligrams (mg) of sodium a day. If you limit your sodium to 1,500 mg a day, you can lower your blood pressure even more. Tips for success  · Start small. Do not try to make dramatic changes to your diet all at once. You might feel that you are missing out on your favorite foods and then be more likely to not follow the plan. Make small changes, and stick with them. Once those changes become habit, add a few more changes. · Try some of the following:  ? Make it a goal to eat a fruit or vegetable at every meal and at snacks. This will make it easy to get the recommended amount of fruits and vegetables each day. ? Try yogurt topped with fruit and nuts for a snack or healthy dessert. ? Add lettuce, tomato, cucumber, and onion to sandwiches. ? Combine a ready-made pizza crust with low-fat mozzarella cheese and lots of vegetable toppings. Try using tomatoes, squash, spinach, broccoli, carrots, cauliflower, and onions. ? Have a variety of cut-up vegetables with a low-fat dip as an appetizer instead of chips and dip. ? Sprinkle sunflower seeds or chopped almonds over salads. Or try adding chopped walnuts or almonds to cooked vegetables.   ? Try some vegetarian meals using beans and peas. Add garbanzo or kidney beans to salads. Make burritos and tacos with mashed xie beans or black beans. Where can you learn more? Go to http://kolby-yuli.info/. Enter K695 in the search box to learn more about \"DASH Diet: Care Instructions. \"  Current as of: December 6, 2017  Content Version: 11.8  © 6903-4291 [employer]. Care instructions adapted under license by [employer] (which disclaims liability or warranty for this information). If you have questions about a medical condition or this instruction, always ask your healthcare professional. [address] any warranty or liability for your use of this information. High Cholesterol: Care Instructions  Your Care Instructions    Cholesterol is a type of fat in your blood. It is needed for many body functions, such as making new cells. Cholesterol is made by your body. It also comes from food you eat. High cholesterol means that you have too much of the fat in your blood. This raises your risk of a heart attack and stroke. LDL and HDL are part of your total cholesterol. LDL is the \"bad\" cholesterol. High LDL can raise your risk for heart disease, heart attack, and stroke. HDL is the \"good\" cholesterol. It helps clear bad cholesterol from the body. High HDL is linked with a lower risk of heart disease, heart attack, and stroke. Your cholesterol levels help your doctor find out your risk for having a heart attack or stroke. You and your doctor can talk about whether you need to lower your risk and what treatment is best for you. A heart-healthy lifestyle along with medicines can help lower your cholesterol and your risk. The way you choose to lower your risk will depend on how high your risk is for heart attack and stroke. It will also depend on how you feel about taking medicines. Follow-up care is a key part of your treatment and safety.  Be sure to make and go to all appointments, and call your doctor if you are having problems. It's also a good idea to know your test results and keep a list of the medicines you take. How can you care for yourself at home? · Eat a variety of foods every day. Good choices include fruits, vegetables, whole grains (like oatmeal), dried beans and peas, nuts and seeds, soy products (like tofu), and fat-free or low-fat dairy products. · Replace butter, margarine, and hydrogenated or partially hydrogenated oils with olive and canola oils. (Canola oil margarine without trans fat is fine.)  · Replace red meat with fish, poultry, and soy protein (like tofu). · Limit processed and packaged foods like chips, crackers, and cookies. · Bake, broil, or steam foods. Don't pena them. · Be physically active. Get at least 30 minutes of exercise on most days of the week. Walking is a good choice. You also may want to do other activities, such as running, swimming, cycling, or playing tennis or team sports. · Stay at a healthy weight or lose weight by making the changes in eating and physical activity listed above. Losing just a small amount of weight, even 5 to 10 pounds, can reduce your risk for having a heart attack or stroke. · Do not smoke. When should you call for help? Watch closely for changes in your health, and be sure to contact your doctor if:    · You need help making lifestyle changes.     · You have questions about your medicine. Where can you learn more? Go to http://kolby-yuli.info/. Enter E286 in the search box to learn more about \"High Cholesterol: Care Instructions. \"  Current as of: December 6, 2017  Content Version: 11.8  © 8386-0650 [employer]. Care instructions adapted under license by [employer] (which disclaims liability or warranty for this information).  If you have questions about a medical condition or this instruction, always ask your healthcare professional. [address] any warranty or liability for your use of this information.

## 2019-01-06 NOTE — PROGRESS NOTES
HPI:   Sabas Gould is a 58y.o. year old female who presents today for evaluation of hypertension, hyperlipidemia, chronic diastolic heart failure, diabetes mellitus, ESRD s/p living donor cadaveric renal transplant (4/1980), osteopenia, osteoarthritis, lumbar degenerative disease, chronic pain syndrome, squamous cell skin cancers, depression, anxiety, GERD, and chronic constipation. On 11/6/2018, she admitted that she was no longer taking her immunosuppressant medications for her kidney transplant. She stated that she had stopped taking prednisone about a year ago, and then stopped tacrolimus several months ago although is not specific as to when. However, review of record showed that her tacrolimus level was therapeutic at 5.2 on 4/27/2018 at her annual follow-up visit in the renal transplant clinic. She stated that she was told by one of her kidney doctors that \"her brother must have been a twin for her transplanted kidney to have lasted this long\". She stated that she believed him even though her brother is 11years older, and she decided on her own that she no longer needed to take her immunosuppressants. She states that she also did not like the fact that she would \"get so many colds\" while taking the medications. She became concerned when she was informed of the development of increasing proteinuria on he labs. She was seen in follow-up on 11/21/2018 and reported that she had restarted her kidney transplant immunosuppressant medications, and was taking tacrolimus 2 mg bid and prednisone 5 mg every other day. Her tacrolimus level was measured at 1.7 and she continued to show proteinuria with urine microalbumin/ creatinine ratio 150 mg/g, and urinalysis measuring 30 mg/dl protein. Her creatinine remained relatively stable at 0.95/ eGFR 59. She was advised to follow-up with the transplant clinic, and returns today for follow-up.  She states that she did see Dr. Alexandra Miller, and repeat labs showed an increase in her creatinine to 1.22/ eGFR 44, urinalysis showed 100 mg/dl protein, random urine protein 61 mg/dl with urine creatinine 81.2. She states that she was instructed to have follow-up labs in 4 weeks, and that she may require a renal biopsy if they continue to be abnormal. She states that her antihypertensive regimen was changed with increase in her dose of lisinopril to 40 mg bid and discontinuation of amlodipine and Imdur. She reports that she is otherwise feeling well and without complaints. She has a history of hypertension, previoiusly treated with lisinopril, Imdur, amlodipine, and lasix as needed, but regimen now changed as discussed. She has been monitoring her blood pressure at home intermittently, and reports that it is generally controlled, ranging 120-130/ 80-85. She also has a history of hyperlipidemia, treated previously with high intensity dose simvastatin at 80 mg daily. She was changed to rosuvastatin in 8/2018, but noticed severe muscle aches and stopped taking it with improvement. She was subsequently restarted on simvastatin at 40 mg dose. In 10/2017, she was admitted to [facility] with dyspnea on exertion, and chest xray revealed mild cardiac enlargement with pulmonary vascular congestion and diffusely increased interstitial markings. Echocardiogram showed normal LV size and function (EF 65%), mild concentric LVH, grade 2 diastolic dysfunction, and a pharmacologic nuclear stress test was a normal low risk study without evidence of ischemia or prior infarction, and calculated EF 70%. She was diagnosed with acute on chronic diastolic heart failure, and treated with lasix and diuresed approximately five liters with improvement. She was intolerant to beta blockers. She currently denies any chest pain, shortness of breath at rest or with exertion, palpitations, lightheadedness, PND, orthopnea, or edema. She is now being followed by Dr. Nanci Chandler.      She has a history of diabetes mellitus, not currently being treated with medication. She denies any polyuria, polydipsia, nocturia, or blurry vision, and has no history of retinopathy, neuropathy, or nephropathy. She has not been having regular eye exams. She has a history of renal failure and is s/p a living donor cadaveric renal transplant from her brother in 4/1980. She had been noncompliant with her regimen as discussed, but has resumed taking tacrolimus and prednisone one month ago. She is followed in the [facility] by Dr. Ruht Alves and Dr. Jose Alfredo Brown. She has secondary osteopenia due to chronic steroid use, and was being treated with alendronate. However, she states that she discontinued taking it. Her last bone density study was in 5/2017 showing T-scores:  femoral neck left -0.1, lumbar -0.4, and distal radius -1.2. She continues to take calcium and Vitamin D supplements. She has no history of pathologic fractures. She has a history of osteoarthritis and is s/p right hip replacement by Dr. Savanna Castle in 2015. She also has chronic neck and low back pain secondary to degenerative disease. She had been followed previously in the [facility] pain management center, but has weaned herself off of pain medications. She has stopped taking Percocet and MS Contin. She states that she has not noticed a significant increase in her neck or low back pain. She denies any fevers, chills, weight change, saddle paresthesia, neurogenic bowel or bladder symptoms, or recent trauma. She has a history of GERD, treated with omeprazole. She had an upper endoscopy in 5/2014 by Dr. Will Ambrosio which was normal. She also has difficulty with chronic constipation, secondary to narcotic use. She is being treated with Linzess. She had a screening colonoscopy in 6/2011 which showed evidence of diverticulosis. Follow-up recommended for 10 years. She denies any abdominal pain, nausea, vomiting, melena, hematochezia, or change in bowel movements.     She has a history of multiple squamous cell carcinomas of the skin. She is being followed closely by Dr. Pham Alba. She reports that she was treated with Efudex in 8/2018 with subsequent exfoliation. She does report that she was treated with doxycycline in 6/2018 for a possible MRSA infection on her wrist. She states that her grandson had a confirmed infection and she was taking care of him. She states that it completely resolved. She has a history of depression and anxiety, treated with Prozac. However, she states that she is no longer taking it. She is using Ambien for insomnia. She states that she feels that her symptoms are currently well controlled. Past Medical History:   Diagnosis Date    Calculus of kidney     Chronic diastolic heart failure ([facility] 8/13/2018    Chronic pain syndrome 7/16/2013    Constipation due to pain medication 4/8/2016    Degeneration of lumbar or lumbosacral intervertebral disc     Depression     Diabetes mellitus     Essential hypertension 8/13/2018    KIM (generalized anxiety disorder) 4/29/2015    H/O renal failure 8/13/2018    Secondary to glomerulonephritis.   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    Type 2 diabetes mellitus with microalbuminuria ([facility] 11/10/2015     Past Surgical History:   Procedure Laterality Date    ABDOMEN SURGERY PROC UNLISTED      HX GYN      Ovary    HX HEENT      nose surgery    HX HYSTERECTOMY      HX ORTHOPAEDIC  [id number]    hip replacement    HX UROLOGICAL      Kidney Transplant    NEPHRECTOMY      bilateral     Current Outpatient Medications   Medication Sig    ondansetron (ZOFRAN ODT) 4 mg disintegrating tablet Take 1 Tab by mouth every eight (8) hours as needed for Nausea.  lisinopril (PRINIVIL, ZESTRIL) 40 mg tablet Take 2 Tabs by mouth daily.  omeprazole (PRILOSEC) 40 mg capsule Take 1 Cap by mouth daily.  ergocalciferol (ERGOCALCIFEROL) 50,000 unit capsule Take 1 Cap by mouth every seven (7) days.  magnesium oxide (MAGOX) 400 mg tablet Take 400 mg by mouth daily.  simvastatin (ZOCOR) 40 mg tablet Take 1 Tab by mouth nightly.  furosemide (LASIX) 20 mg tablet TK 1 T PO BID    tacrolimus (PROGRAF) 1 mg capsule TAKE 2 CAPSULES BY MOUTH TWICE DAILY    zolpidem (AMBIEN) 5 mg tablet TAKE 1 TABLET BY MOUTH EVERY NIGHT AT BEDTIME AS NEEDED FOR SLEEP    albuterol (PROVENTIL HFA, VENTOLIN HFA, PROAIR HFA) 90 mcg/actuation inhaler Take 1 Puff by inhalation every four (4) hours as needed for Wheezing.  linaclotide (LINZESS) 290 mcg cap capsule Take 1 Cap by mouth Daily (before breakfast). For chronic opioid-induced constipation    DOCUSATE SODIUM (COLACE PO) Take  by mouth.  folic acid 908 mcg tablet Take 400 mcg by mouth daily. No current facility-administered medications for this visit. Allergies and Intolerances: Allergies   Allergen Reactions    Asa-Acetaminophen-Caff-Potass Unknown (comments)    Aspirin Other (comments)    Beta-Blockers (Beta-Adrenergic Blocking Agts) Other (comments)     Sent into [facility].     Bystolic [Nebivolol] Other (comments)    Ibuprofen Other (comments)     Renal transplant patient    Lipitor [Atorvastatin] Unknown (comments)    Nsaids (Non-Steroidal Anti-Inflammatory Drug) Unknown (comments)    Sirolimus Other (comments)     pneumonitis    Toprol Xl [Metoprolol Succinate] Other (comments)     Asthma    Venlafaxine Other (comments)     Patient does not recall taking this medication     Family History: Her sister was diagnosed with breast cancer at age 61; her father had CAD and  during CABG  Family History   Problem Relation Age of Onset    Hypertension Father     Heart Disease Father     Alcohol abuse Father     Arthritis-osteo Father     Arthritis-osteo Brother     Cancer Maternal Grandmother         breast    Lung Disease Sister     High Cholesterol Mother     Arthritis-osteo Mother     Arthritis-osteo Sister     Liver Disease Sister      Social History:   She  reports that she quit smoking about 16 years ago. she has never used smokeless tobacco. Smoked  2-3 ppd for 10 years, stopping 20 years ago. She is  with one adopted son. She is a retired . Social History     Substance and Sexual Activity   Alcohol Use No     Immunization History:  Immunization History   Administered Date(s) Administered    Influenza Vaccine 12/18/2012, 11/11/2013    Influenza Vaccine (Quad) PF 11/10/2015, 11/21/2018    Pneumococcal Conjugate (PCV-13) 11/21/2018    Pneumococcal Polysaccharide (PPSV-23) 11/28/2014    Tdap 08/09/2018       Review of Systems:   As above included in HPI. Otherwise 11 point review of systems negative including constitutional, skin, HENT, eyes, respiratory, cardiovascular, gastrointestinal, genitourinary, musculoskeletal, endocrine, hematologic, allergy, and neurologic. Physical:   Vitals:   BP: 146/86  HR: 74  WT: 186 lb (84.4 kg)  BMI:  32.95 kg/m2    Exam:   Patient appears in no apparent distress. Affect is appropriate. HEENT: PERRLA, anicteric, oropharynx clear, no JVD, adenopathy or thyromegaly. No carotid bruits or radiated murmur. Lungs: clear to auscultation, no wheezes, rhonchi, or rales. Heart: regular rate and rhythm. No murmur, rubs, gallops  Abdomen: soft, nontender, nondistended, normal bowel sounds, no hepatosplenomegaly or masses. Extremities: without edema. Pulses 1-2+ bilaterally. Review of Data:  Labs:  No visits with results within 1 Month(s) from this visit.    Latest known visit with results is:   Hospital Outpatient Visit on 11/30/2018   Component Date Value Ref Range Status    WBC 11/30/2018 10.2 4.6 - 13.2 K/uL Final    RBC 11/30/2018 4.43  4.20 - 5.30 M/uL Final    HGB 11/30/2018 13.4  12.0 - 16.0 g/dL Final    HCT 11/30/2018 40.6  35.0 - 45.0 % Final    MCV 11/30/2018 91.6  74.0 - 97.0 FL Final    MCH 11/30/2018 30.2  24.0 - 34.0 PG Final    MCHC 11/30/2018 33.0  31.0 - 37.0 g/dL Final    RDW 11/30/2018 13.5  11.6 - 14.5 % Final    PLATELET 67/92/2734 750  135 - 420 K/uL Final    MPV 11/30/2018 9.2  9.2 - 11.8 FL Final    NEUTROPHILS 11/30/2018 62  40 - 73 % Final    LYMPHOCYTES 11/30/2018 30  21 - 52 % Final    MONOCYTES 11/30/2018 6  3 - 10 % Final    EOSINOPHILS 11/30/2018 2  0 - 5 % Final    BASOPHILS 11/30/2018 0  0 - 2 % Final    ABS. NEUTROPHILS 11/30/2018 6.3  1.8 - 8.0 K/UL Final    ABS. LYMPHOCYTES 11/30/2018 3.1  0.9 - 3.6 K/UL Final    ABS. MONOCYTES 11/30/2018 0.6  0.05 - 1.2 K/UL Final    ABS. EOSINOPHILS 11/30/2018 0.2  0.0 - 0.4 K/UL Final    ABS.  BASOPHILS 11/30/2018 0.0  0.0 - 0.1 K/UL Final    DF 11/30/2018 AUTOMATED    Final    Magnesium 11/30/2018 2.3  1.6 - 2.6 mg/dL Final    Sodium 11/30/2018 140  136 - 145 mmol/L Final    Potassium 11/30/2018 4.1  3.5 - 5.5 mmol/L Final    Chloride 11/30/2018 107  100 - 108 mmol/L Final    CO2 11/30/2018 25  21 - 32 mmol/L Final    Anion gap 11/30/2018 8  3.0 - 18 mmol/L Final    Glucose 11/30/2018 180* 74 - 99 mg/dL Final    BUN 11/30/2018 21* 7.0 - 18 MG/DL Final    Creatinine 11/30/2018 1.22  0.6 - 1.3 MG/DL Final    BUN/Creatinine ratio 11/30/2018 17  12 - 20   Final    GFR est AA 11/30/2018 54* >60 ml/min/1.73m2 Final    GFR est non-AA 11/30/2018 44* >60 ml/min/1.73m2 Final    Calcium 11/30/2018 9.3  8.5 - 10.1 MG/DL Final    Phosphorus 11/30/2018 4.1  2.5 - 4.9 MG/DL Final    Albumin 11/30/2018 4.5  3.4 - 5.0 g/dL Final    Tacrolimus 11/30/2018 2.3  2.0 - 20.0 ng/mL Final    Color 11/30/2018 YELLOW    Final    Appearance 11/30/2018 CLEAR    Final    Specific gravity 11/30/2018 1.014  1.005 - 1.030 Final    pH (UA) 2018 5.0  5.0 - 8.0   Final    Protein 2018 100* NEG mg/dL Final    Glucose 2018 NEGATIVE   NEG mg/dL Final    Ketone 2018 NEGATIVE   NEG mg/dL Final    Bilirubin 2018 NEGATIVE   NEG   Final    Blood 2018 NEGATIVE   NEG   Final    Urobilinogen 2018 0.2  0.2 - 1.0 EU/dL Final    Nitrites 2018 NEGATIVE   NEG   Final    Leukocyte Esterase 2018 NEGATIVE   NEG   Final    Creatinine, urine 2018 81.20  30 - 125 mg/dL Final    Protein, urine random 2018 61* <11.9 mg/dL Final    WBC 2018 0 to 3  0 - 4 /hpf Final    RBC 2018 0 to 3  0 - 5 /hpf Final    Epithelial cells 2018 2+  0 - 5 /lpf Final    Hyaline cast 2018 0 to 3  0 - 2 /lpf Final     Health Maintenance:  Screening:    Mammogram: negative (2016) [facility]. Overdue. PAP smear: s/p CATY/BSO for endometriosis   Colorectal: colonoscopy (2011) divertculosis. Due . Depression: no longer taking Prozac   DM (HbA1c/FPG): HbA1c 6.6 (2018)   Hepatitis C: negative (2018)   Falls: none   DEXA: osteopenia (2017) [facility]. Glaucoma: regular eye exams with Dr. Karen Henry. Overdue.    Smokin-30 pack years, stopped 20 years ago   Vitamin D: 22.1 (2018)   Medicare Wellness: N/A    Impression:  Patient Active Problem List   Diagnosis Code    Lumbosacral radiculopathy M54.17    Degeneration of lumbar or lumbosacral intervertebral disc M51.37    Chronic insomnia F51.04    Kidney transplant status, living related donor Z94.0    Chronic pain syndrome G89.4    KIM (generalized anxiety disorder) F41.1    Type 2 diabetes mellitus with microalbuminuria (HCC) E11.29, R80.9    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    Constipation due to pain medication K59.03    History of depression Z86.59    Osteopenia of multiple sites M85.89    Essential hypertension I10    Chronic diastolic heart failure (HCC) I50.32    Hyperlipidemia E78.5    H/O renal failure Z87.448    GERD (gastroesophageal reflux disease) K21.9    Primary osteoarthritis involving multiple joints M15.0    Immunosuppressed status (HCC) D89.9    Left ventricular diastolic dysfunction P38.7    Noncompliance with medications Z91.14    Persistent proteinuria R80.1       Plan:  1. S/p renal transplant from living related donor. Patient admitted to noncompliance with her immunosuppressant drugs in 11/2018, stopping prednisone about a year ago and stopping tacrolimus several months prior on her own. Noted evidence of trace protein on urinalysis and microalbuminuria at last two checks in 8/2018 (83-97 mg/g). However, significant worsening of proteinuria noted at last visit with urine microalbumin/creatinine ratio 461 mg/g (11/6/2018). Creatinine has remained stable. She admitted to noncompliance when this was brought to her attention and she became very concerned. She restarted tacrolimus at 2 mg bid and prednisone 5 mg qod. Discussed that she should contact the renal transplant clinic, particularly as they would be able to advise her on the best way to resume, but she stated that she \"will never be able to get another transplant if they find out she had stopped the medications\". Tacrolimus level was noted to be low at 1.7, and she was again advised to follow-up with transplant. Reports today that did follow-up and creatinine noted to have increased to 1.22/ eGFR 44 with urinalysis showing 100 mg /dl protein. Plan for repeat this month and may require renal transplant if continuing to be abnormal. Continue to follow. 2. Hypertension. Blood pressure elevated today, but she states that it has been well controlled at home on her new regimen of lisinopril 40 mg bid. Imdur and amlodipine were discontinued by Dr. Trista Rubio. Stressed importance of compliance with medications as important to protect kidney. Instructed to continue to monitor her blood pressure daily.    3. Chronic diastolic heart failure. Grade 2 with normal systolic function on echocardiogram in 10/2017. Hemodynamically stable on current regimen as above. Does not tolerate beta blockers. Instructed to restrict sodium and follow weights. 4. Hyperlipidemia. Attempted to change to rosuvastatin, but patient developed myalgias. Restarated simvastatin 40 mg daily with , indicative of poor control. Will recheck next visit prior to adjusting dose. Discussed importance of weight loss and improved control of blood sugar. Continue to follow. 5. Diabetes mellitus. HbA1c improved to 6.6 in 11/2018 without medications. Started metformin  mg daily with dinner in 8/2018, but patient discontinued stating that sugars are now normal with dietary changes. No known evidence of retinopathy. Patient referred to Dr. Karin Calderón but has not yet scheduled appointment. Foot exam normal (8/2018). On Ace-I and will restart statin. Urine microalbumin/ creatinine ratio with evidence of macroalbuminuria as discussed. Will follow as achieve better blood sugar and blood pressure control. 6. Osteopenia. Last bone density scan 5/2017 . Using femoral neck T-scores, calculated FRAX score estimates her 10 year risk of a major osteoporetic fracture at 10 % and hip fracture at 0.3 % in context of chronic steroid use and secondary osteoporosis. Also, noted report of thoracic compression deformities on chest CT scan in 11/2017 at [facility]. She admitted to no longer taking prednisone, will now resume therapy with 5 mg qod. She stopped taking alendronate although unclear when she discontinued. Continue calcium and Vitamin D. On 50,000 U dose weekly of ergocalciferol, although doubt compliance given low level. She states that she will resume today. Encouraged exercise, particularly weight bearing activities. 7. Upper respiratory infection with bronchospasm.  Patient presented in 11/2018 with cough productive of purulent sputum and coarse breath sounds with diffuse expiratory wheezing on exam. Chest x-ray was negative for pneumonia. She was treated with doxycycline 100 mg bid for 10 days, and will also give albuterol inhaler and prednisone 40 mg daily for 5 days given diffuse wheezing. Tessalon for cough. Had significant improvement. Suspect underlying COPD given significant smoking history in past. Reports breathing now returned to baseline. Follow. 8. Osteoarthritis. Patient with multiple areas of arthritis contributing to her chronic pain. However, attempting to wean from narcotics, and discontinued Percocet and using minimal MS Contin 30 mg as needed. Not experiencing increased pain currently, but will follow. Will use non-narcotic based regimen if needed. 9. GERD. Appears well controlled on omeprazole. Upper endoscopy in 5/2014 negative. Using Zofran for nausea as needed. 10. Chronic constipation. Most likely related to chronic narcotics. Improved with weaning. Continue Linzess. 11. Squamous cell carcinoma of skin. Under care of Dr. Chelsie Cisneros. Being treated with Efudex. Related to chronic immunosuppression. 12. Depression. No longer taking Prozac and appears well controlled. Reported insomnia since restarting tacrolimus and restarted on Ambien.  reviewed. 13. Obesity. Emphasized importance of lifestyle modifications, including diet, exercise, and weight loss. Will continue to address. 13. Health maintenance. Received Pneumovax. Given Tdap. Not candidate for Shingrix vaccine until further studied in immunosuppressed. Given influenza vaccine and Prevnar 13 at last visit. Ordered mammogram again as overdue, but not yet scheduled. Encouraged to do so. Colonoscopy due 2021. Referred to Dr. Vidya Mead for eye exams. Encouraged to schedule. Vitamin D level low, and states will resume ergocalciferol. Patient understands recommendations and agrees with plan. Follow-up in 3 months.

## 2019-01-26 RX ORDER — BENZONATATE 100 MG/1
CAPSULE ORAL
Qty: 30 CAP | Refills: 0 | Status: SHIPPED | OUTPATIENT
Start: 2019-01-26 | End: 2019-05-10

## 2019-02-04 ENCOUNTER — HOSPITAL ENCOUNTER (OUTPATIENT)
Dept: MAMMOGRAPHY | Age: 64
Discharge: HOME | End: 2019-02-04
Attending: INTERNAL MEDICINE
Payer: COMMERCIAL

## 2019-02-04 DIAGNOSIS — Z12.31 VISIT FOR SCREENING MAMMOGRAM: ICD-10-CM

## 2019-02-04 PROCEDURE — 77063 BREAST TOMOSYNTHESIS BI: CPT

## 2019-02-14 ENCOUNTER — TELEPHONE (OUTPATIENT)
Dept: INTERNAL MEDICINE CLINIC | Age: 64
End: 2019-02-14

## 2019-02-21 ENCOUNTER — TELEPHONE (OUTPATIENT)
Dept: INTERNAL MEDICINE CLINIC | Age: 64
End: 2019-02-21

## 2019-02-21 NOTE — TELEPHONE ENCOUNTER
called- he went to [facility] ER on Tuesday- dx with bronchitis, some fluid on lungs- she a compromised immune system and she is developing nasal congestion and generally feels bad, no fever- does she need to be seen or can you prescribe something?     He was put on doxycycline

## 2019-02-22 ENCOUNTER — OFFICE VISIT (OUTPATIENT)
Dept: INTERNAL MEDICINE CLINIC | Age: 64
End: 2019-02-22

## 2019-02-22 ENCOUNTER — TELEPHONE (OUTPATIENT)
Dept: INTERNAL MEDICINE CLINIC | Age: 64
End: 2019-02-22

## 2019-02-22 VITALS
RESPIRATION RATE: 15 BRPM | WEIGHT: 188.8 LBS | HEART RATE: 80 BPM | TEMPERATURE: 98.1 F | OXYGEN SATURATION: 97 % | BODY MASS INDEX: 33.45 KG/M2 | DIASTOLIC BLOOD PRESSURE: 70 MMHG | SYSTOLIC BLOOD PRESSURE: 110 MMHG | HEIGHT: 63 IN

## 2019-02-22 DIAGNOSIS — J06.9 RECURRENT URI (UPPER RESPIRATORY INFECTION): Primary | ICD-10-CM

## 2019-02-22 RX ORDER — PREDNISONE 5 MG/1
2.5 TABLET ORAL EVERY OTHER DAY
COMMUNITY
End: 2022-06-07 | Stop reason: SDUPTHER

## 2019-02-22 RX ORDER — AMOXICILLIN AND CLAVULANATE POTASSIUM 875; 125 MG/1; MG/1
1 TABLET, FILM COATED ORAL 2 TIMES DAILY
Qty: 14 TAB | Refills: 0 | Status: SHIPPED | OUTPATIENT
Start: 2019-02-22 | End: 2019-03-01

## 2019-02-22 NOTE — PROGRESS NOTES
1. Have you been to the ER, urgent care clinic or hospitalized since your last visit? NO.     2. Have you seen or consulted any other health care providers outside of the [address] [place] [address] since your last visit (Include any pap smears or colon screening)? NO      Do you have an Advanced Directive? NO    Would you like information on Advanced Directives?  NO

## 2019-02-22 NOTE — PROGRESS NOTES
Lena Carlin is a 58 y.o.  female and presents with    Chief Complaint   Patient presents with    Cold Symptoms     Pt here for head congestion, nasal congestion, sore throat. x 3 weeks        Subjective:  HPI  Mrs. Tamiko Ennis presents today with complaints with cold like symptoms of head congestion, nasal congestion, sore throat x 1 weeks, however reports was sick with similar symptoms about 3 weeks ago that seemed to get somewhat better.  was sick and she reports placed on Doxycycline. She has been using OTC cold medication, denies frequent use of albuterol. She is with a history of renal transplant taking Prednisone and Prograf, type 2 DM, and diastolic heart failure. She denies asthma or COPD, former smoker. Additional Concerns: none     ROS   Review of Systems   Constitutional: Positive for fever and malaise/fatigue. She reports a one time fever of 99.6 over a couple of days, took Tylenol about 1 hour ago   HENT: Positive for congestion and sore throat. Negative for ear pain, hearing loss and tinnitus. Right ear with ear fullness   Eyes: Negative. Respiratory: Positive for cough and sputum production. Negative for hemoptysis, shortness of breath and wheezing. Cardiovascular: Negative. Gastrointestinal: Positive for constipation. Genitourinary: Negative. Musculoskeletal: Positive for myalgias. Skin: Negative. Neurological: Positive for headaches. Psychiatric/Behavioral: Negative. Allergies   Allergen Reactions    Asa-Acetaminophen-Caff-Potass Unknown (comments)    Aspirin Other (comments)    Beta-Blockers (Beta-Adrenergic Blocking Agts) Other (comments)     Sent into [facility].     Bystolic [Nebivolol] Other (comments)    Ibuprofen Other (comments)     Renal transplant patient    Lipitor [Atorvastatin] Unknown (comments)    Nsaids (Non-Steroidal Anti-Inflammatory Drug) Unknown (comments)    Sirolimus Other (comments)     pneumonitis    Toprol Xl [Metoprolol Succinate] Other (comments)     Asthma    Venlafaxine Other (comments)     Patient does not recall taking this medication       Current Outpatient Medications   Medication Sig Dispense Refill    predniSONE (DELTASONE) 5 mg tablet Take  by mouth every other day.  amoxicillin-clavulanate (AUGMENTIN) 875-125 mg per tablet Take 1 Tab by mouth two (2) times a day for 7 days. 14 Tab 0    benzonatate (TESSALON) 100 mg capsule TAKE 1 CAPSULE BY MOUTH THREE TIMES DAILY FOR UP TO 7 DAYS AS NEEDED FOR COUGH 30 Cap 0    ondansetron (ZOFRAN ODT) 4 mg disintegrating tablet Take 1 Tab by mouth every eight (8) hours as needed for Nausea. 40 Tab 1    lisinopril (PRINIVIL, ZESTRIL) 40 mg tablet Take 2 Tabs by mouth daily. 60 Tab 0    omeprazole (PRILOSEC) 40 mg capsule Take 1 Cap by mouth daily. 90 Cap 2    albuterol (PROVENTIL HFA, VENTOLIN HFA, PROAIR HFA) 90 mcg/actuation inhaler Take 1 Puff by inhalation every four (4) hours as needed for Wheezing. 1 Inhaler 1    magnesium oxide (MAGOX) 400 mg tablet Take 400 mg by mouth daily.  simvastatin (ZOCOR) 40 mg tablet Take 1 Tab by mouth nightly. 90 Tab 2    furosemide (LASIX) 20 mg tablet TK 1 T PO BID  1    tacrolimus (PROGRAF) 1 mg capsule TAKE 2 CAPSULES BY MOUTH TWICE DAILY  12    linaclotide (LINZESS) 290 mcg cap capsule Take 1 Cap by mouth Daily (before breakfast). For chronic opioid-induced constipation 30 Cap 2    DOCUSATE SODIUM (COLACE PO) Take  by mouth.  zolpidem (AMBIEN) 5 mg tablet TAKE 1 TABLET BY MOUTH EVERY NIGHT AT BEDTIME AS NEEDED FOR SLEEP 30 Tab 0    ergocalciferol (ERGOCALCIFEROL) 50,000 unit capsule Take 1 Cap by mouth every seven (7) days. 12 Cap 3    folic acid 140 mcg tablet Take 400 mcg by mouth daily.          Social History     Socioeconomic History    Marital status:      Spouse name: Not on file    Number of children: Not on file    Years of education: Not on file    Highest education level: Not on file   Social Needs    Financial resource strain: Not on file    Food insecurity - worry: Not on file    Food insecurity - inability: Not on file    Transportation needs - medical: Not on file    Transportation needs - non-medical: Not on file   Occupational History    Not on file   Tobacco Use    Smoking status: Former Smoker     Last attempt to quit: 2002     Years since quittin.6    Smokeless tobacco: Never Used   Substance and Sexual Activity    Alcohol use: No    Drug use: No    Sexual activity: Not Currently     Partners: Male   Other Topics Concern    Not on file   Social History Narrative    Not on file       Past Medical History:   Diagnosis Date    Calculus of kidney     Chronic diastolic heart failure ([facility] [address] 2018    Chronic pain syndrome 2013    Constipation due to pain medication 2016    Degeneration of lumbar or lumbosacral intervertebral disc     Depression     Diabetes mellitus     Essential hypertension 2018    KIM (generalized anxiety disorder) 2015    H/O renal failure 2018    Secondary to glomerulonephritis.   S/P living related donor transplant in 1980    History of depression 2017    Hypercholesterolemia     Hyperlipidemia 2018    Kidney transplant status, living related donor 10/24/2012    Lumbosacral radiculopathy     Osteopenia of multiple sites 2018    Primary iridocyclitis     Status post total replacement of right hip 2016    Type 2 diabetes mellitus with microalbuminuria ([facility] [address] 11/10/2015       Past Surgical History:   Procedure Laterality Date    ABDOMEN SURGERY PROC UNLISTED      HX GYN      Ovary    HX HEENT      nose surgery    HX HYSTERECTOMY      HX ORTHOPAEDIC  [id number]    hip replacement    HX UROLOGICAL      Kidney Transplant    NEPHRECTOMY      bilateral       Family History   Problem Relation Age of Onset    Hypertension Father     Heart Disease Father     Alcohol abuse Father     Arthritis-osteo Father     Arthritis-osteo Brother     Cancer Maternal Grandmother         breast    Lung Disease Sister     High Cholesterol Mother     Arthritis-osteo Mother     Arthritis-osteo Sister     Liver Disease Sister        Objective:  Vitals:    02/22/19 1523   BP: 110/70   Pulse: 80   Resp: 15   Temp: 98.1 °F (36.7 °C)   TempSrc: Oral   SpO2: 97%   Weight: 188 lb 12.8 oz (85.6 kg)   Height: 5' 3\" (1.6 m)   PainSc:   8   PainLoc: Face       LABS   Results for orders placed or performed during the hospital encounter of 11/30/18   CBC WITH AUTOMATED DIFF   Result Value Ref Range    WBC 10.2 4.6 - 13.2 K/uL    RBC 4.43 4.20 - 5.30 M/uL    HGB 13.4 12.0 - 16.0 g/dL    HCT 40.6 35.0 - 45.0 %    MCV 91.6 74.0 - 97.0 FL    MCH 30.2 24.0 - 34.0 PG    MCHC 33.0 31.0 - 37.0 g/dL    RDW 13.5 11.6 - 14.5 %    PLATELET 761 461 - 837 K/uL    MPV 9.2 9.2 - 11.8 FL    NEUTROPHILS 62 40 - 73 %    LYMPHOCYTES 30 21 - 52 %    MONOCYTES 6 3 - 10 %    EOSINOPHILS 2 0 - 5 %    BASOPHILS 0 0 - 2 %    ABS. NEUTROPHILS 6.3 1.8 - 8.0 K/UL    ABS. LYMPHOCYTES 3.1 0.9 - 3.6 K/UL    ABS. MONOCYTES 0.6 0.05 - 1.2 K/UL    ABS. EOSINOPHILS 0.2 0.0 - 0.4 K/UL    ABS.  BASOPHILS 0.0 0.0 - 0.1 K/UL    DF AUTOMATED     MAGNESIUM   Result Value Ref Range    Magnesium 2.3 1.6 - 2.6 mg/dL   RENAL FUNCTION PANEL   Result Value Ref Range    Sodium 140 136 - 145 mmol/L    Potassium 4.1 3.5 - 5.5 mmol/L    Chloride 107 100 - 108 mmol/L    CO2 25 21 - 32 mmol/L    Anion gap 8 3.0 - 18 mmol/L    Glucose 180 (H) 74 - 99 mg/dL    BUN 21 (H) 7.0 - 18 MG/DL    Creatinine 1.22 0.6 - 1.3 MG/DL    BUN/Creatinine ratio 17 12 - 20      GFR est AA 54 (L) >60 ml/min/1.73m2    GFR est non-AA 44 (L) >60 ml/min/1.73m2    Calcium 9.3 8.5 - 10.1 MG/DL    Phosphorus 4.1 2.5 - 4.9 MG/DL    Albumin 4.5 3.4 - 5.0 g/dL   TACROLIMUS, WHOLE BLOOD   Result Value Ref Range    Tacrolimus 2.3 2.0 - 20.0 ng/mL   URINALYSIS W/ RFLX MICROSCOPIC   Result Value Ref Range    Color YELLOW      Appearance CLEAR      Specific gravity 1.014 1.005 - 1.030      pH (UA) 5.0 5.0 - 8.0      Protein 100 (A) NEG mg/dL    Glucose NEGATIVE  NEG mg/dL    Ketone NEGATIVE  NEG mg/dL    Bilirubin NEGATIVE  NEG      Blood NEGATIVE  NEG      Urobilinogen 0.2 0.2 - 1.0 EU/dL    Nitrites NEGATIVE  NEG      Leukocyte Esterase NEGATIVE  NEG     CREATININE, UR, RANDOM   Result Value Ref Range    Creatinine, urine 81.20 30 - 125 mg/dL   PROTEIN URINE, RANDOM   Result Value Ref Range    Protein, urine random 61 (H) <11.9 mg/dL   URINE MICROSCOPIC ONLY   Result Value Ref Range    WBC 0 to 3 0 - 4 /hpf    RBC 0 to 3 0 - 5 /hpf    Epithelial cells 2+ 0 - 5 /lpf    Hyaline cast 0 to 3 0 - 2 /lpf       TESTS  none    PE  Physical Exam   Constitutional: She appears well-developed and well-nourished. She appears ill. No distress. Looks tired and fatigued   HENT:   Head: Normocephalic and atraumatic. Right Ear: External ear normal.   Left Ear: External ear normal.   Nose: Nose normal.   Mouth/Throat: Oropharynx is clear and moist. No oropharyngeal exudate. Eyes: Pupils are equal, round, and reactive to light. Right conjunctiva is injected. Left conjunctiva is injected. Neck: Normal range of motion. Cardiovascular: Normal rate, regular rhythm, normal heart sounds and intact distal pulses. Pulmonary/Chest: Effort normal and breath sounds normal. No respiratory distress. She has no wheezes. She has no rales. She exhibits no tenderness. Positive dry cough/bronchospasm in office   Abdominal: Soft. Bowel sounds are normal.   Musculoskeletal: Normal range of motion. Lymphadenopathy:     She has cervical adenopathy. Neurological: She is alert. Skin: Skin is warm and dry. She is not diaphoretic. Psychiatric: She has a normal mood and affect. Her behavior is normal. Judgment and thought content normal.   Vitals reviewed. Assessment/Plan:    1.  Viral versus bacterial sinusitis- due to waxing and waning of URI symptoms will provide Augmentin x 7 days. She has tylenol on board may be masking fever. Discussed if symptoms without improvement or worsening then follow up. Lab review: no lab studies available for review at time of visit    Today's Visit:   Diagnoses and all orders for this visit:    1. Recurrent URI (upper respiratory infection)  -     amoxicillin-clavulanate (AUGMENTIN) 875-125 mg per tablet; Take 1 Tab by mouth two (2) times a day for 7 days. Health Maintenance: Deferred to PCP. I have discussed the diagnosis with the patient and the intended plan as seen in the above orders. The patient has received an after-visit summary and questions were answered concerning future plans. I have discussed medication side effects and warnings with the patient as well. I have reviewed the plan of care with the patient, accepted their input and they are in agreement with the treatment goals. Follow-up Disposition: Not on File   More than 1/2 of this 15 minute visit was spent in counseling and coordination of care, as described above.     JUDITH Dhillon  Internist of [address].  Phone: [phone]  Fax: [phone]

## 2019-02-22 NOTE — TELEPHONE ENCOUNTER
Called patient back and advised that she would have to be seen for symptoms per maren. Asked patient who diagnosis her with the flu. Patient stated that she was told at last visit that it was more viral and it was leading towards the flu. Patient stated that her sinuses are compacted and really does needs an antibiotic.

## 2019-02-22 NOTE — TELEPHONE ENCOUNTER
Called patient per Dedrick Chavis to triage. Patient stated that she is having nasel congestion, Sore throat, face soreness,low grade fever 99.6, headache. Patient stated that she think she is getting a sinus infection. Patient reports both her and her spouse having the flu a few weeks ago and that it just hasn't gotten better. Patient reports she had a transplant and she her immune system is lower and she she needs an antibiotic. Patient reports taking Tylenol, Mucinex, day & Nyquil and it is not helping. Patient stated that the spouse went to the emergency room because he fainted on Tuesday and they gave him Doxycycline. Patient stated that she feels very weak and doesn't really want to go anywhere and would really if something could be sent to the pharmacy for her. Advised patient I will give her a call back.

## 2019-03-28 ENCOUNTER — HOSPITAL ENCOUNTER (OUTPATIENT)
Dept: LAB | Age: 64
Discharge: HOME | End: 2019-03-28
Payer: COMMERCIAL

## 2019-03-28 LAB
APPEARANCE UR: CLEAR
BILIRUB UR QL: NEGATIVE
COLOR UR: NORMAL
GLUCOSE UR STRIP.AUTO-MCNC: NEGATIVE MG/DL
HGB UR QL STRIP: NEGATIVE
KETONES UR QL STRIP.AUTO: NEGATIVE MG/DL
LEUKOCYTE ESTERASE UR QL STRIP.AUTO: NEGATIVE
NITRITE UR QL STRIP.AUTO: NEGATIVE
PH UR STRIP: 5 [PH] (ref 5–8)
PROT UR STRIP-MCNC: NEGATIVE MG/DL
SP GR UR REFRACTOMETRY: 1.01 (ref 1–1.03)
UROBILINOGEN UR QL STRIP.AUTO: 0.2 EU/DL (ref 0.2–1)

## 2019-03-28 PROCEDURE — 81003 URINALYSIS AUTO W/O SCOPE: CPT

## 2019-03-28 PROCEDURE — 36415 COLL VENOUS BLD VENIPUNCTURE: CPT

## 2019-03-28 PROCEDURE — 87086 URINE CULTURE/COLONY COUNT: CPT

## 2019-03-29 LAB
BACTERIA SPEC CULT: NORMAL
SERVICE CMNT-IMP: NORMAL

## 2019-05-09 ENCOUNTER — TELEPHONE (OUTPATIENT)
Dept: INTERNAL MEDICINE CLINIC | Age: 64
End: 2019-05-09

## 2019-05-09 NOTE — TELEPHONE ENCOUNTER
Chief Complaint   Patient presents with    Chest Pain       Patients  called stating that the patient was seen at the [facility] ER yesterday for chest pain. The patients  stated that she is still  experiencing the chest pain, along with abdomen swelling, and difficulty breathing. Patients  advised to bring his wife to a different ER for further evaluation. Patients  verbalizes understanding.

## 2019-05-10 ENCOUNTER — APPOINTMENT (OUTPATIENT)
Dept: CT IMAGING | Age: 64
End: 2019-05-10
Attending: EMERGENCY MEDICINE
Payer: COMMERCIAL

## 2019-05-10 ENCOUNTER — HOSPITAL ENCOUNTER (EMERGENCY)
Age: 64
Discharge: HOME | End: 2019-05-10
Attending: EMERGENCY MEDICINE
Payer: COMMERCIAL

## 2019-05-10 VITALS
DIASTOLIC BLOOD PRESSURE: 91 MMHG | BODY MASS INDEX: 31.71 KG/M2 | HEART RATE: 80 BPM | RESPIRATION RATE: 19 BRPM | WEIGHT: 179 LBS | TEMPERATURE: 98.1 F | OXYGEN SATURATION: 97 % | HEIGHT: 63 IN | SYSTOLIC BLOOD PRESSURE: 146 MMHG

## 2019-05-10 DIAGNOSIS — M62.830 BACK MUSCLE SPASM: ICD-10-CM

## 2019-05-10 DIAGNOSIS — R10.9 RIGHT SIDED ABDOMINAL PAIN: Primary | ICD-10-CM

## 2019-05-10 DIAGNOSIS — R10.13 ABDOMINAL PAIN, EPIGASTRIC: ICD-10-CM

## 2019-05-10 LAB
ALBUMIN SERPL-MCNC: 4 G/DL (ref 3.4–5)
ALBUMIN/GLOB SERPL: 1.2 {RATIO} (ref 0.8–1.7)
ALP SERPL-CCNC: 69 U/L (ref 45–117)
ALT SERPL-CCNC: 34 U/L (ref 13–56)
ANION GAP SERPL CALC-SCNC: 12 MMOL/L (ref 3–18)
APPEARANCE UR: CLEAR
AST SERPL-CCNC: 12 U/L (ref 15–37)
ATRIAL RATE: 78 BPM
BASOPHILS # BLD: 0 K/UL (ref 0–0.1)
BASOPHILS NFR BLD: 0 % (ref 0–2)
BILIRUB SERPL-MCNC: 0.2 MG/DL (ref 0.2–1)
BILIRUB UR QL: NEGATIVE
BUN SERPL-MCNC: 17 MG/DL (ref 7–18)
BUN/CREAT SERPL: 16 (ref 12–20)
CALCIUM SERPL-MCNC: 9.2 MG/DL (ref 8.5–10.1)
CALCULATED P AXIS, ECG09: 44 DEGREES
CALCULATED R AXIS, ECG10: 6 DEGREES
CALCULATED T AXIS, ECG11: 24 DEGREES
CHLORIDE SERPL-SCNC: 105 MMOL/L (ref 100–108)
CK MB CFR SERPL CALC: 1 % (ref 0–4)
CK MB SERPL-MCNC: 1.4 NG/ML (ref 5–25)
CK SERPL-CCNC: 136 U/L (ref 26–192)
CO2 SERPL-SCNC: 26 MMOL/L (ref 21–32)
COLOR UR: YELLOW
CREAT SERPL-MCNC: 1.04 MG/DL (ref 0.6–1.3)
DIAGNOSIS, 93000: NORMAL
DIFFERENTIAL METHOD BLD: ABNORMAL
EOSINOPHIL # BLD: 0.2 K/UL (ref 0–0.4)
EOSINOPHIL NFR BLD: 2 % (ref 0–5)
ERYTHROCYTE [DISTWIDTH] IN BLOOD BY AUTOMATED COUNT: 13.4 % (ref 11.6–14.5)
GLOBULIN SER CALC-MCNC: 3.3 G/DL (ref 2–4)
GLUCOSE SERPL-MCNC: 106 MG/DL (ref 74–99)
GLUCOSE UR STRIP.AUTO-MCNC: NEGATIVE MG/DL
HCT VFR BLD AUTO: 36.2 % (ref 35–45)
HGB BLD-MCNC: 11.8 G/DL (ref 12–16)
HGB UR QL STRIP: NEGATIVE
KETONES UR QL STRIP.AUTO: NEGATIVE MG/DL
LEUKOCYTE ESTERASE UR QL STRIP.AUTO: NEGATIVE
LIPASE SERPL-CCNC: 197 U/L (ref 73–393)
LYMPHOCYTES # BLD: 3.1 K/UL (ref 0.9–3.6)
LYMPHOCYTES NFR BLD: 33 % (ref 21–52)
MCH RBC QN AUTO: 31.1 PG (ref 24–34)
MCHC RBC AUTO-ENTMCNC: 32.6 G/DL (ref 31–37)
MCV RBC AUTO: 95.3 FL (ref 74–97)
MONOCYTES # BLD: 0.9 K/UL (ref 0.05–1.2)
MONOCYTES NFR BLD: 10 % (ref 3–10)
NEUTS SEG # BLD: 5.2 K/UL (ref 1.8–8)
NEUTS SEG NFR BLD: 55 % (ref 40–73)
NITRITE UR QL STRIP.AUTO: NEGATIVE
P-R INTERVAL, ECG05: 162 MS
PH UR STRIP: 6 [PH] (ref 5–8)
PLATELET # BLD AUTO: 296 K/UL (ref 135–420)
PMV BLD AUTO: 10.1 FL (ref 9.2–11.8)
POTASSIUM SERPL-SCNC: 4.3 MMOL/L (ref 3.5–5.5)
PROT SERPL-MCNC: 7.3 G/DL (ref 6.4–8.2)
PROT UR STRIP-MCNC: NEGATIVE MG/DL
Q-T INTERVAL, ECG07: 354 MS
QRS DURATION, ECG06: 84 MS
QTC CALCULATION (BEZET), ECG08: 403 MS
RBC # BLD AUTO: 3.8 M/UL (ref 4.2–5.3)
SODIUM SERPL-SCNC: 143 MMOL/L (ref 136–145)
SP GR UR REFRACTOMETRY: <1.005 (ref 1–1.03)
TROPONIN I SERPL-MCNC: <0.02 NG/ML (ref 0–0.04)
UROBILINOGEN UR QL STRIP.AUTO: 0.2 EU/DL (ref 0.2–1)
VENTRICULAR RATE, ECG03: 78 BPM
WBC # BLD AUTO: 9.4 K/UL (ref 4.6–13.2)

## 2019-05-10 PROCEDURE — 85025 COMPLETE CBC W/AUTO DIFF WBC: CPT

## 2019-05-10 PROCEDURE — 96374 THER/PROPH/DIAG INJ IV PUSH: CPT

## 2019-05-10 PROCEDURE — 80053 COMPREHEN METABOLIC PANEL: CPT

## 2019-05-10 PROCEDURE — 74176 CT ABD & PELVIS W/O CONTRAST: CPT

## 2019-05-10 PROCEDURE — 83690 ASSAY OF LIPASE: CPT

## 2019-05-10 PROCEDURE — 96375 TX/PRO/DX INJ NEW DRUG ADDON: CPT

## 2019-05-10 PROCEDURE — 96361 HYDRATE IV INFUSION ADD-ON: CPT

## 2019-05-10 PROCEDURE — 99284 EMERGENCY DEPT VISIT MOD MDM: CPT

## 2019-05-10 PROCEDURE — 82550 ASSAY OF CK (CPK): CPT

## 2019-05-10 PROCEDURE — 74011250636 HC RX REV CODE- 250/636: Performed by: EMERGENCY MEDICINE

## 2019-05-10 PROCEDURE — 93005 ELECTROCARDIOGRAM TRACING: CPT

## 2019-05-10 PROCEDURE — 81003 URINALYSIS AUTO W/O SCOPE: CPT

## 2019-05-10 RX ORDER — MORPHINE SULFATE 4 MG/ML
4 INJECTION INTRAVENOUS
Status: COMPLETED | OUTPATIENT
Start: 2019-05-10 | End: 2019-05-10

## 2019-05-10 RX ORDER — METHOCARBAMOL 750 MG/1
750 TABLET, FILM COATED ORAL
Qty: 10 TAB | Refills: 0 | Status: SHIPPED | OUTPATIENT
Start: 2019-05-10 | End: 2019-05-19

## 2019-05-10 RX ORDER — FAMOTIDINE 20 MG/1
20 TABLET, FILM COATED ORAL 2 TIMES DAILY
Qty: 10 TAB | Refills: 0 | Status: SHIPPED | OUTPATIENT
Start: 2019-05-10 | End: 2019-05-15 | Stop reason: SDUPTHER

## 2019-05-10 RX ORDER — ONDANSETRON 2 MG/ML
4 INJECTION INTRAMUSCULAR; INTRAVENOUS
Status: COMPLETED | OUTPATIENT
Start: 2019-05-10 | End: 2019-05-10

## 2019-05-10 RX ADMIN — MORPHINE SULFATE 4 MG: 4 INJECTION INTRAVENOUS at 17:10

## 2019-05-10 RX ADMIN — ONDANSETRON 4 MG: 2 INJECTION INTRAMUSCULAR; INTRAVENOUS at 17:10

## 2019-05-10 RX ADMIN — SODIUM CHLORIDE 1000 ML: 900 INJECTION, SOLUTION INTRAVENOUS at 17:10

## 2019-05-10 NOTE — ED NOTES
I have reviewed discharge instructions with the patient. The patient and spouse verbalized understanding.  Patient armband removed and shredded

## 2019-05-10 NOTE — ED NOTES
IV catheter discontinued intact. Site without signs and symptoms of complications. Dressing and pressure applied.

## 2019-05-10 NOTE — ED PROVIDER NOTES
EMERGENCY DEPARTMENT HISTORY AND PHYSICAL EXAM    4:24 PM      Date: 5/10/2019  Patient Name: Carleen Sanchez    History of Presenting Illness     Chief Complaint   Patient presents with    Abdominal Pain         History Provided By: Patient    Additional History (Context): Carleen Sanchez is a 58 y.o. female with diabetes, hypertension and Lumbar radiculopathy, degenerative lumbar disease kidney transplant who presents with chief complaint of stabbing, gassy mid abdominal pain for the past few days that is worse with movement. She reports associated nausea but no vomiting, fever, trauma, dizziness, chest pain, shortness of breath, numbness, cough, dysuria, constipation, diarrhea, or bloody stool. Does report having some muscle cramps intermittently in her low back but denies any bladder or bowel dysfunction. No other complaints. PCP: Gaby Olvera MD        Past History     Past Medical History:  Past Medical History:   Diagnosis Date    Calculus of kidney     Chronic diastolic heart failure ([facility] 8/13/2018    Chronic pain syndrome 7/16/2013    Constipation due to pain medication 4/8/2016    Degeneration of lumbar or lumbosacral intervertebral disc     Depression     Diabetes mellitus     Essential hypertension 8/13/2018    KIM (generalized anxiety disorder) 4/29/2015    H/O renal failure 8/13/2018    Secondary to glomerulonephritis.   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    Type 2 diabetes mellitus with microalbuminuria ([address] 11/10/2015       Past Surgical History:  Past Surgical History:   Procedure Laterality Date    ABDOMEN SURGERY PROC UNLISTED      HX GYN      Ovary    HX HEENT      nose surgery    HX HYSTERECTOMY      HX ORTHOPAEDIC  [id number]    hip replacement    HX UROLOGICAL      Kidney Transplant    NEPHRECTOMY      bilateral       Family History:  Family History   Problem Relation Age of Onset    Hypertension Father     Heart Disease Father     Alcohol abuse Father     Arthritis-osteo Father     Arthritis-osteo Brother     Cancer Maternal Grandmother         breast    Lung Disease Sister     High Cholesterol Mother    Jim Marmolejo Mother    Aundria Dadenio Arthritis-osteo Sister     Liver Disease Sister        Social History:  Social History     Tobacco Use    Smoking status: Former Smoker     Last attempt to quit: 2002     Years since quittin.8    Smokeless tobacco: Never Used   Substance Use Topics    Alcohol use: No    Drug use: No       Allergies: Allergies   Allergen Reactions    Asa-Acetaminophen-Caff-Potass Unknown (comments)    Aspirin Other (comments)    Beta-Blockers (Beta-Adrenergic Blocking Agts) Other (comments)     Sent into [facility].  Bystolic [Nebivolol] Other (comments)    Ibuprofen Other (comments)     Renal transplant patient    Lipitor [Atorvastatin] Unknown (comments)    Nsaids (Non-Steroidal Anti-Inflammatory Drug) Unknown (comments)    Sirolimus Other (comments)     pneumonitis    Toprol Xl [Metoprolol Succinate] Other (comments)     Asthma    Venlafaxine Other (comments)     Patient does not recall taking this medication         Review of Systems       Review of Systems   Constitutional: Negative for chills and fever. HENT: Negative for congestion, rhinorrhea, sore throat and trouble swallowing. Eyes: Negative for visual disturbance. Respiratory: Negative for cough, shortness of breath and wheezing. Cardiovascular: Negative for chest pain. Gastrointestinal: Positive for abdominal pain and nausea. Negative for vomiting. Endocrine: Negative for polyuria. Genitourinary: Negative for dysuria.    Musculoskeletal: Negative for arthralgias and neck stiffness. Skin: Negative for pallor and rash. Neurological: Negative for dizziness, weakness, numbness and headaches. Hematological: Does not bruise/bleed easily. Psychiatric/Behavioral: Negative for confusion and dysphoric mood. All other systems reviewed and are negative. Physical Exam     Visit Vitals  /82   Pulse 79   Temp 97.7 °F (36.5 °C)   Resp 17   Ht 5' 2.75\" (1.594 m)   Wt 81.2 kg (179 lb)   SpO2 97%   BMI 31.96 kg/m²         Physical Exam   Constitutional: She is oriented to person, place, and time. She appears well-developed and well-nourished. No distress. HENT:   Head: Normocephalic and atraumatic. Mouth/Throat: Oropharynx is clear and moist.   Eyes: Pupils are equal, round, and reactive to light. Conjunctivae are normal. No scleral icterus. Neck: Normal range of motion. Neck supple. Cardiovascular: Normal rate and intact distal pulses. Capillary refill < 3 seconds   Pulmonary/Chest: Effort normal and breath sounds normal. No respiratory distress. She has no wheezes. Abdominal: Soft. Bowel sounds are normal. She exhibits no distension. There is tenderness. There is guarding. Epigastric and right-sided abdominal pain with guarding    Old postsurgical scars on the abdomen   Musculoskeletal: Normal range of motion. She exhibits no edema. Lymphadenopathy:     She has no cervical adenopathy. Neurological: She is alert and oriented to person, place, and time. No cranial nerve deficit. Skin: Skin is warm and dry. No rash noted. She is not diaphoretic. Nursing note and vitals reviewed.         Diagnostic Study Results     Labs -  Recent Results (from the past 12 hour(s))   EKG, 12 LEAD, INITIAL    Collection Time: 05/10/19  4:27 PM   Result Value Ref Range    Ventricular Rate 78 BPM    Atrial Rate 78 BPM    P-R Interval 162 ms    QRS Duration 84 ms    Q-T Interval 354 ms    QTC Calculation (Bezet) 403 ms    Calculated P Axis 44 degrees    Calculated R Axis 6 degrees    Calculated T Axis 24 degrees    Diagnosis       Normal sinus rhythm  ST abnormality, possible digitalis effect  Abnormal ECG  When compared with ECG of 09-NOV-2015 14:07,  No significant change was found     CBC WITH AUTOMATED DIFF    Collection Time: 05/10/19  4:45 PM   Result Value Ref Range    WBC 9.4 4.6 - 13.2 K/uL    RBC 3.80 (L) 4.20 - 5.30 M/uL    HGB 11.8 (L) 12.0 - 16.0 g/dL    HCT 36.2 35.0 - 45.0 %    MCV 95.3 74.0 - 97.0 FL    MCH 31.1 24.0 - 34.0 PG    MCHC 32.6 31.0 - 37.0 g/dL    RDW 13.4 11.6 - 14.5 %    PLATELET 348 033 - 553 K/uL    MPV 10.1 9.2 - 11.8 FL    NEUTROPHILS 55 40 - 73 %    LYMPHOCYTES 33 21 - 52 %    MONOCYTES 10 3 - 10 %    EOSINOPHILS 2 0 - 5 %    BASOPHILS 0 0 - 2 %    ABS. NEUTROPHILS 5.2 1.8 - 8.0 K/UL    ABS. LYMPHOCYTES 3.1 0.9 - 3.6 K/UL    ABS. MONOCYTES 0.9 0.05 - 1.2 K/UL    ABS. EOSINOPHILS 0.2 0.0 - 0.4 K/UL    ABS. BASOPHILS 0.0 0.0 - 0.1 K/UL    DF AUTOMATED     METABOLIC PANEL, COMPREHENSIVE    Collection Time: 05/10/19  4:45 PM   Result Value Ref Range    Sodium 143 136 - 145 mmol/L    Potassium 4.3 3.5 - 5.5 mmol/L    Chloride 105 100 - 108 mmol/L    CO2 26 21 - 32 mmol/L    Anion gap 12 3.0 - 18 mmol/L    Glucose 106 (H) 74 - 99 mg/dL    BUN 17 7.0 - 18 MG/DL    Creatinine 1.04 0.6 - 1.3 MG/DL    BUN/Creatinine ratio 16 12 - 20      GFR est AA >60 >60 ml/min/1.73m2    GFR est non-AA 53 (L) >60 ml/min/1.73m2    Calcium 9.2 8.5 - 10.1 MG/DL    Bilirubin, total 0.2 0.2 - 1.0 MG/DL    ALT (SGPT) 34 13 - 56 U/L    AST (SGOT) 12 (L) 15 - 37 U/L    Alk.  phosphatase 69 45 - 117 U/L    Protein, total 7.3 6.4 - 8.2 g/dL    Albumin 4.0 3.4 - 5.0 g/dL    Globulin 3.3 2.0 - 4.0 g/dL    A-G Ratio 1.2 0.8 - 1.7     LIPASE    Collection Time: 05/10/19  4:45 PM   Result Value Ref Range    Lipase 197 73 - 393 U/L   CARDIAC PANEL,(CK, CKMB & TROPONIN)    Collection Time: 05/10/19  4:45 PM   Result Value Ref Range     26 - 192 U/L    CK - MB 1.4 <3.6 ng/ml    CK-MB Index 1.0 0.0 - 4.0 %    Troponin-I, QT <0.02 0.0 - 0.045 NG/ML   URINALYSIS W/ RFLX MICROSCOPIC    Collection Time: 05/10/19  5:45 PM   Result Value Ref Range    Color YELLOW      Appearance CLEAR      Specific gravity <1.005 (L) 1.005 - 1.030    pH (UA) 6.0 5.0 - 8.0      Protein NEGATIVE  NEG mg/dL    Glucose NEGATIVE  NEG mg/dL    Ketone NEGATIVE  NEG mg/dL    Bilirubin NEGATIVE  NEG      Blood NEGATIVE  NEG      Urobilinogen 0.2 0.2 - 1.0 EU/dL    Nitrites NEGATIVE  NEG      Leukocyte Esterase NEGATIVE  NEG         Radiologic Studies -   CT ABD PELV WO CONT   Final Result   IMPRESSION:    1. No acute pathology in the abdomen or pelvis. Likely bilateral adnexal cysts. These were not present on the prior CT. Consider correlation with ultrasound as the patient is postmenopausal.               Medical Decision Making   I am the first provider for this patient. I reviewed the vital signs, available nursing notes, past medical history, past surgical history, family history and social history. Vital Signs-Reviewed the patient's vital signs. Pulse Oximetry Analysis -  97 on room air (Interpretation) normal    Cardiac Monitor:  Rate: 79  Rhythm:  Normal Sinus Rhythm     EKG: Interpreted by the EP.    Time Interpreted: 4:28 PM   Rate: 78   Rhythm: Normal Sinus Rhythm    Interpretation: Normal QRS duration, normal QTC, no ST elevation, nonspecific ST change, no T wave inversions       Records Reviewed: Nursing Notes and Old Medical Records (Time of Review: 4:24 PM)    Provider Notes (Medical Decision Making): DDX: Pancreatitis, GERD, biliary disease, appendicitis, hernia, constipation, AAA    Labs, urine, analgesia, nausea medicine, CT abdomen pelvis  MDM    Medications   morphine injection 4 mg (4 mg IntraVENous Given 5/10/19 1710)   ondansetron (ZOFRAN) injection 4 mg (4 mg IntraVENous Given 5/10/19 1710)   sodium chloride 0.9 % bolus infusion 1,000 mL (0 mL IntraVENous IV Completed 5/10/19 1810)           ED Course: Progress Notes, Reevaluation, and Consults:  Labs reassuring    Nothing acute on CT abdomen pelvis. Does have bilateral ovarian cysts    Patient reports that she had a hysterectomy and she did not have ovaries to her knowledge. will have her get outpatient pelvic ultrasound by her PCP. This is nonemergent can be done as outpatient, incidental finding. Patient does not have any pelvic pain, or weight loss. I have reassessed the patient. I have discussed the workup, results and plan with the patient and patient is in agreement. Patient is feeling better. Patient will be prescribed Robaxin. Patient was discharge in stable condition. Patient was given outpatient follow up. Patient is to return to emergency department if any new or worsening condition. Diagnosis     Clinical Impression:   1. Right sided abdominal pain    2. Abdominal pain, epigastric    3. Back muscle spasm        Disposition: Discharged    Follow-up Information     Follow up With Specialties Details Why Contact Info    Connor Cedillo MD Internal Medicine Schedule an appointment as soon as possible for a visit in 3 days Discuss need for follow up pelvic ultrasound to evaluate possible bilateral low pelvic cysts [address] [facility] [place] [address] [place] [address] [facility] [place], [place] [address] [facility]      Mary Vasquez MD Gastroenterology, Internal Medicine Schedule an appointment as soon as possible for a visit in 3 days  [address] [facility] [address]  [address] [address] [address]  [facility] DEPT Emergency Medicine  As needed, If symptoms worsen [address] [facility]  [phone]           Patient's Medications   Start Taking    FAMOTIDINE (PEPCID) 20 MG TABLET    Take 1 Tab by mouth two (2) times a day for 5 days.  Indications: indigestion    METHOCARBAMOL (ROBAXIN-750) 750 MG TABLET    Take 1 Tab by mouth three (3) times daily as needed (Musculoskeletal pain; muscle spasms) for up to 5 days. Continue Taking    DOCUSATE SODIUM (COLACE PO)    Take  by mouth. FUROSEMIDE (LASIX) 20 MG TABLET    TK 1 T PO BID    LISINOPRIL (PRINIVIL, ZESTRIL) 40 MG TABLET    Take 2 Tabs by mouth daily. MAGNESIUM OXIDE (MAGOX) 400 MG TABLET    Take 400 mg by mouth daily. OMEPRAZOLE (PRILOSEC) 40 MG CAPSULE    Take 1 Cap by mouth daily. ONDANSETRON (ZOFRAN ODT) 4 MG DISINTEGRATING TABLET    Take 1 Tab by mouth every eight (8) hours as needed for Nausea. PREDNISONE (DELTASONE) 5 MG TABLET    Take  by mouth every other day. SIMVASTATIN (ZOCOR) 40 MG TABLET    Take 1 Tab by mouth nightly. TACROLIMUS (PROGRAF) 1 MG CAPSULE    TAKE 2 CAPSULES BY MOUTH TWICE DAILY    ZOLPIDEM (AMBIEN) 5 MG TABLET    TAKE 1 TABLET BY MOUTH EVERY NIGHT AT BEDTIME AS NEEDED FOR SLEEP   These Medications have changed    No medications on file   Stop Taking    ALBUTEROL (PROVENTIL HFA, VENTOLIN HFA, PROAIR HFA) 90 MCG/ACTUATION INHALER    Take 1 Puff by inhalation every four (4) hours as needed for Wheezing. BENZONATATE (TESSALON) 100 MG CAPSULE    TAKE 1 CAPSULE BY MOUTH THREE TIMES DAILY FOR UP TO 7 DAYS AS NEEDED FOR COUGH    ERGOCALCIFEROL (ERGOCALCIFEROL) 50,000 UNIT CAPSULE    Take 1 Cap by mouth every seven (7) days. FOLIC ACID 164 MCG TABLET    Take 400 mcg by mouth daily. LINACLOTIDE (LINZESS) 290 MCG CAP CAPSULE    Take 1 Cap by mouth Daily (before breakfast). For chronic opioid-induced constipation         Magdi Fagan DO    Dragon medical dictation software was used for portions of this report. Unintended transcription errors may occur. My signature above authenticates this document and my orders, the final    diagnosis (es), discharge prescription (s), and instructions in the Epic    record.

## 2019-05-10 NOTE — DISCHARGE INSTRUCTIONS
Patient Education     If you were prescribed any medication take as directed. Do not drive or use heavy equipment if prescribed narcotics. Follow up with your primary care physician or with specialist as directed. Return to the emergency room with any new or worsening conditions. Abdominal Pain: Care Instructions  Your Care Instructions    Abdominal pain has many possible causes. Some aren't serious and get better on their own in a few days. Others need more testing and treatment. If your pain continues or gets worse, you need to be rechecked and may need more tests to find out what is wrong. You may need surgery to correct the problem. Don't ignore new symptoms, such as fever, nausea and vomiting, urination problems, pain that gets worse, and dizziness. These may be signs of a more serious problem. Your doctor may have recommended a follow-up visit in the next 8 to 12 hours. If you are not getting better, you may need more tests or treatment. The doctor has checked you carefully, but problems can develop later. If you notice any problems or new symptoms, get medical treatment right away. Follow-up care is a key part of your treatment and safety. Be sure to make and go to all appointments, and call your doctor if you are having problems. It's also a good idea to know your test results and keep a list of the medicines you take. How can you care for yourself at home? · Rest until you feel better. · To prevent dehydration, drink plenty of fluids, enough so that your urine is light yellow or clear like water. Choose water and other caffeine-free clear liquids until you feel better. If you have kidney, heart, or liver disease and have to limit fluids, talk with your doctor before you increase the amount of fluids you drink. · If your stomach is upset, eat mild foods, such as rice, dry toast or crackers, bananas, and applesauce. Try eating several small meals instead of two or three large ones.   · Wait until 48 hours after all symptoms have gone away before you have spicy foods, alcohol, and drinks that contain caffeine. · Do not eat foods that are high in fat. · Avoid anti-inflammatory medicines such as aspirin, ibuprofen (Advil, Motrin), and naproxen (Aleve). These can cause stomach upset. Talk to your doctor if you take daily aspirin for another health problem. When should you call for help? Call 911 anytime you think you may need emergency care. For example, call if:    · You passed out (lost consciousness).     · You pass maroon or very bloody stools.     · You vomit blood or what looks like coffee grounds.     · You have new, severe belly pain.    Call your doctor now or seek immediate medical care if:    · Your pain gets worse, especially if it becomes focused in one area of your belly.     · You have a new or higher fever.     · Your stools are black and look like tar, or they have streaks of blood.     · You have unexpected vaginal bleeding.     · You have symptoms of a urinary tract infection. These may include:  ? Pain when you urinate. ? Urinating more often than usual.  ? Blood in your urine.     · You are dizzy or lightheaded, or you feel like you may faint.    Watch closely for changes in your health, and be sure to contact your doctor if:    · You are not getting better after 1 day (24 hours). Where can you learn more? Go to http://kolby-yuli.info/. Enter U696 in the search box to learn more about \"Abdominal Pain: Care Instructions. \"  Current as of: September 23, 2018  Content Version: 11.9  © 2031-6917 [employer]. Care instructions adapted under license by [employer] (which disclaims liability or warranty for this information). If you have questions about a medical condition or this instruction, always ask your healthcare professional. [address] any warranty or liability for your use of this information.          Patient Education        Back Spasm: Care Instructions  Your Care Instructions  A back spasm is sudden tightness and pain in your back muscles. It may happen from overuse or an injury. Things like sleeping in an awkward way, bending, lifting, standing, or sitting can sometimes cause a spasm. But the cause isn't always clear. Home treatment includes using heat or ice, taking over-the-counter (OTC) pain medicines, and avoiding activities that may cause back pain. For a back spasm that doesn't get better with home care, your doctor may prescribe medicine. Treatments such as massage or manipulation may also help ease a back spasm. Your doctor may also suggest exercise or physical therapy to help improve strength and flexibility in your back muscles. In most cases, getting back to your normal activities is good for your back. Just make sure to avoid doing things that make your pain worse. Follow-up care is a key part of your treatment and safety. Be sure to make and go to all appointments, and call your doctor if you are having problems. It's also a good idea to know your test results and keep a list of the medicines you take. How can you care for yourself at home?   Heat, ice, and medicines    · To relieve pain, use heat or ice (whichever feels better) on the affected area. ? Put a warm water bottle, a heating pad set on low, or a warm cloth on your back. Put a thin cloth between the heating pad and your skin. Do not go to sleep with a heating pad on your skin. ? Try ice or a cold pack on the area for 10 to 20 minutes at a time. Put a thin cloth between the ice and your skin.     · For most back pain you can take over-the-counter pain medicine. Nonsteroidal anti-inflammatory drugs (NSAIDs) such as ibuprofen or naproxen seem to work best. But if you can't take NSAIDs you can try acetaminophen. Your doctor can prescribe stronger medicines if needed. Be safe with medicines. Read and follow all instructions on the label.  Body positions and posture    · Sit or lie in positions that are most comfortable for you and that reduce pain. Try one of these positions when you lie down:  ? Lie on your back with your knees bent and supported by large pillows. ? Lie on the floor with your legs on the seat of a sofa or chair. ? Lie on your side with your knees and hips bent and a pillow between your legs. ? Lie on your stomach if it does not make pain worse.     · Do not sit up in bed. Avoid soft couches and twisted positions.     · Avoid bed rest after the first day of back pain. Bed rest can help relieve pain at first, but it delays healing. Continued rest without activity is usually not good for your back.     · If you must sit for long periods of time, take breaks from sitting. Change positions every 30 minutes. Get up and walk around, or lie in a comfortable position. Activity    · Take short walks several times a day. You can start with 5 to 10 minutes, 3 or 4 times a day, and work up to longer walks. Walk on level surfaces and avoid hills and stairs until your back starts to feel better.     · After your back spasm starts to feel better, try to stretch your muscles every day, especially before and after exercise and at bedtime. Regular stretching can help relax your muscles.     · To prevent future back pain, do exercises to stretch and strengthen your back and stomach. Learn to use good posture, safe lifting techniques, and other ways to move to help you avoid back pain. When should you call for help? Call 911 anytime you think you may need emergency care. For example, call if:    · You are unable to move an arm or a leg at all.   [facility] your doctor now or seek immediate medical care if:    · You have new or worse symptoms in your legs, belly, or buttocks. Symptoms may include:  ? Numbness or tingling. ? Weakness.   ? Pain.     · You lose bladder or bowel control.    Watch closely for changes in your health, and be sure to contact your doctor if:    · You have a fever, lose weight, or don't feel well.     · You do not get better as expected. Where can you learn more? Go to http://kolby-yuli.info/. Enter E232 in the search box to learn more about \"Back Spasm: Care Instructions. \"  Current as of: September 20, 2018  Content Version: 11.9  © 5232-9115 [employer]. Care instructions adapted under license by [employer] (which disclaims liability or warranty for this information). If you have questions about a medical condition or this instruction, always ask your healthcare professional. [place] [address] any warranty or liability for your use of this information.

## 2019-05-13 ENCOUNTER — TELEPHONE (OUTPATIENT)
Dept: INTERNAL MEDICINE CLINIC | Age: 64
End: 2019-05-13

## 2019-05-13 NOTE — TELEPHONE ENCOUNTER
Spouse called and states patient was seen Friday in [facility] ED and CT scan pelvis showed cyst on ovaries but patient had complete hysterectomy years again and ovaries were removed so they are concerned about finding. ED said she needs to get pelvic ultrasound and be seen by PCP. I have scheduled her for Wednesday 2:00 but didn't know if you wanted to go ahead and order U/S or not.

## 2019-05-15 ENCOUNTER — OFFICE VISIT (OUTPATIENT)
Dept: INTERNAL MEDICINE CLINIC | Age: 64
End: 2019-05-15

## 2019-05-15 ENCOUNTER — HOSPITAL ENCOUNTER (OUTPATIENT)
Dept: LAB | Age: 64
Discharge: HOME | End: 2019-05-15
Payer: COMMERCIAL

## 2019-05-15 VITALS
DIASTOLIC BLOOD PRESSURE: 80 MMHG | WEIGHT: 184 LBS | TEMPERATURE: 99.5 F | RESPIRATION RATE: 18 BRPM | OXYGEN SATURATION: 96 % | HEART RATE: 105 BPM | SYSTOLIC BLOOD PRESSURE: 148 MMHG | HEIGHT: 63 IN | BODY MASS INDEX: 32.6 KG/M2

## 2019-05-15 DIAGNOSIS — Z94.0 KIDNEY TRANSPLANT STATUS, LIVING RELATED DONOR: ICD-10-CM

## 2019-05-15 DIAGNOSIS — I50.32 CHRONIC DIASTOLIC HEART FAILURE (HCC): ICD-10-CM

## 2019-05-15 DIAGNOSIS — R93.5 ABNORMAL CT SCAN, PELVIS: ICD-10-CM

## 2019-05-15 DIAGNOSIS — D84.9 IMMUNOSUPPRESSED STATUS (HCC): ICD-10-CM

## 2019-05-15 DIAGNOSIS — N94.9 ADNEXAL FULLNESS: ICD-10-CM

## 2019-05-15 DIAGNOSIS — R80.9 TYPE 2 DIABETES MELLITUS WITH MICROALBUMINURIA, WITHOUT LONG-TERM CURRENT USE OF INSULIN (HCC): ICD-10-CM

## 2019-05-15 DIAGNOSIS — M15.9 PRIMARY OSTEOARTHRITIS INVOLVING MULTIPLE JOINTS: ICD-10-CM

## 2019-05-15 DIAGNOSIS — E11.29 TYPE 2 DIABETES MELLITUS WITH MICROALBUMINURIA, WITHOUT LONG-TERM CURRENT USE OF INSULIN (HCC): ICD-10-CM

## 2019-05-15 DIAGNOSIS — I51.9 LEFT VENTRICULAR DIASTOLIC DYSFUNCTION: ICD-10-CM

## 2019-05-15 DIAGNOSIS — K21.9 GASTROESOPHAGEAL REFLUX DISEASE WITHOUT ESOPHAGITIS: ICD-10-CM

## 2019-05-15 DIAGNOSIS — I10 ESSENTIAL HYPERTENSION: Primary | ICD-10-CM

## 2019-05-15 DIAGNOSIS — R80.1 PERSISTENT PROTEINURIA: ICD-10-CM

## 2019-05-15 DIAGNOSIS — E78.5 HYPERLIPIDEMIA, UNSPECIFIED HYPERLIPIDEMIA TYPE: ICD-10-CM

## 2019-05-15 DIAGNOSIS — Z86.59 HISTORY OF DEPRESSION: ICD-10-CM

## 2019-05-15 DIAGNOSIS — I10 ESSENTIAL HYPERTENSION: ICD-10-CM

## 2019-05-15 DIAGNOSIS — F41.1 GAD (GENERALIZED ANXIETY DISORDER): ICD-10-CM

## 2019-05-15 DIAGNOSIS — Z91.14 NONCOMPLIANCE WITH MEDICATIONS: ICD-10-CM

## 2019-05-15 LAB
BASOPHILS # BLD: 0 K/UL (ref 0–0.1)
BASOPHILS NFR BLD: 0 % (ref 0–2)
DIFFERENTIAL METHOD BLD: ABNORMAL
EOSINOPHIL # BLD: 0.3 K/UL (ref 0–0.4)
EOSINOPHIL NFR BLD: 3 % (ref 0–5)
ERYTHROCYTE [DISTWIDTH] IN BLOOD BY AUTOMATED COUNT: 13.6 % (ref 11.6–14.5)
EST. AVERAGE GLUCOSE BLD GHB EST-MCNC: 177 MG/DL
HBA1C MFR BLD: 7.8 % (ref 4.2–5.6)
HCT VFR BLD AUTO: 38.3 % (ref 35–45)
HGB BLD-MCNC: 12.3 G/DL (ref 12–16)
LYMPHOCYTES # BLD: 3 K/UL (ref 0.9–3.6)
LYMPHOCYTES NFR BLD: 30 % (ref 21–52)
MCH RBC QN AUTO: 30.2 PG (ref 24–34)
MCHC RBC AUTO-ENTMCNC: 32.1 G/DL (ref 31–37)
MCV RBC AUTO: 94.1 FL (ref 74–97)
MONOCYTES # BLD: 0.8 K/UL (ref 0.05–1.2)
MONOCYTES NFR BLD: 8 % (ref 3–10)
NEUTS SEG # BLD: 5.9 K/UL (ref 1.8–8)
NEUTS SEG NFR BLD: 59 % (ref 40–73)
PLATELET # BLD AUTO: 344 K/UL (ref 135–420)
PMV BLD AUTO: 9.6 FL (ref 9.2–11.8)
RBC # BLD AUTO: 4.07 M/UL (ref 4.2–5.3)
WBC # BLD AUTO: 10 K/UL (ref 4.6–13.2)

## 2019-05-15 PROCEDURE — 36415 COLL VENOUS BLD VENIPUNCTURE: CPT

## 2019-05-15 PROCEDURE — 85025 COMPLETE CBC W/AUTO DIFF WBC: CPT

## 2019-05-15 PROCEDURE — 80053 COMPREHEN METABOLIC PANEL: CPT

## 2019-05-15 PROCEDURE — 83036 HEMOGLOBIN GLYCOSYLATED A1C: CPT

## 2019-05-15 PROCEDURE — 81001 URINALYSIS AUTO W/SCOPE: CPT

## 2019-05-15 PROCEDURE — 82306 VITAMIN D 25 HYDROXY: CPT

## 2019-05-15 PROCEDURE — 82043 UR ALBUMIN QUANTITATIVE: CPT

## 2019-05-15 RX ORDER — FAMOTIDINE 20 MG/1
20 TABLET, FILM COATED ORAL
Qty: 60 TAB | Refills: 2 | Status: SHIPPED | OUTPATIENT
Start: 2019-05-15 | End: 2019-05-20

## 2019-05-15 RX ORDER — OMEPRAZOLE 40 MG/1
40 CAPSULE, DELAYED RELEASE ORAL DAILY
Qty: 90 CAP | Refills: 2 | Status: SHIPPED | OUTPATIENT
Start: 2019-05-15 | End: 2020-07-25

## 2019-05-15 RX ORDER — SERTRALINE HYDROCHLORIDE 25 MG/1
25 TABLET, FILM COATED ORAL DAILY
Qty: 90 TAB | Refills: 1 | Status: SHIPPED | OUTPATIENT
Start: 2019-05-15 | End: 2019-06-11

## 2019-05-15 NOTE — PROGRESS NOTES
Chief Complaint   Patient presents with    Transitions Of Care     Follow up from 5/8/2019 [facility] ER for chronic pain. 5/10/2019 [address] [place] [address] ER for abdominal pain. Patient states she has an appointment in June 2019 for Gastroenterology. Health Maintenance Due   Topic Date Due    EYE EXAM RETINAL OR DILATED  09/18/1965    HEMOGLOBIN A1C Q6M  05/05/2019     1. Have you been to the ER, urgent care clinic or hospitalized since your last visit? YES.     2. Have you seen or consulted any other health care providers outside of the [address] [place] [address] since your last visit (Include any pap smears or colon screening)? YES, Dr. Joe Mas, GYN, Last seen 7 days.

## 2019-05-15 NOTE — PATIENT INSTRUCTIONS
DASH Diet: Care Instructions  Your Care Instructions    The DASH diet is an eating plan that can help lower your blood pressure. DASH stands for Dietary Approaches to Stop Hypertension. Hypertension is high blood pressure. The DASH diet focuses on eating foods that are high in calcium, potassium, and magnesium. These nutrients can lower blood pressure. The foods that are highest in these nutrients are fruits, vegetables, low-fat dairy products, nuts, seeds, and legumes. But taking calcium, potassium, and magnesium supplements instead of eating foods that are high in those nutrients does not have the same effect. The DASH diet also includes whole grains, fish, and poultry. The DASH diet is one of several lifestyle changes your doctor may recommend to lower your high blood pressure. Your doctor may also want you to decrease the amount of sodium in your diet. Lowering sodium while following the DASH diet can lower blood pressure even further than just the DASH diet alone. Follow-up care is a key part of your treatment and safety. Be sure to make and go to all appointments, and call your doctor if you are having problems. It's also a good idea to know your test results and keep a list of the medicines you take. How can you care for yourself at home? Following the DASH diet  · Eat 4 to 5 servings of fruit each day. A serving is 1 medium-sized piece of fruit, ½ cup chopped or canned fruit, 1/4 cup dried fruit, or 4 ounces (½ cup) of fruit juice. Choose fruit more often than fruit juice. · Eat 4 to 5 servings of vegetables each day. A serving is 1 cup of lettuce or raw leafy vegetables, ½ cup of chopped or cooked vegetables, or 4 ounces (½ cup) of vegetable juice. Choose vegetables more often than vegetable juice. · Get 2 to 3 servings of low-fat and fat-free dairy each day. A serving is 8 ounces of milk, 1 cup of yogurt, or 1 ½ ounces of cheese. · Eat 6 to 8 servings of grains each day.  A serving is 1 slice of bread, 1 ounce of dry cereal, or ½ cup of cooked rice, pasta, or cooked cereal. Try to choose whole-grain products as much as possible. · Limit lean meat, poultry, and fish to 2 servings each day. A serving is 3 ounces, about the size of a deck of cards. · Eat 4 to 5 servings of nuts, seeds, and legumes (cooked dried beans, lentils, and split peas) each week. A serving is 1/3 cup of nuts, 2 tablespoons of seeds, or ½ cup of cooked beans or peas. · Limit fats and oils to 2 to 3 servings each day. A serving is 1 teaspoon of vegetable oil or 2 tablespoons of salad dressing. · Limit sweets and added sugars to 5 servings or less a week. A serving is 1 tablespoon jelly or jam, ½ cup sorbet, or 1 cup of lemonade. · Eat less than 2,300 milligrams (mg) of sodium a day. If you limit your sodium to 1,500 mg a day, you can lower your blood pressure even more. Tips for success  · Start small. Do not try to make dramatic changes to your diet all at once. You might feel that you are missing out on your favorite foods and then be more likely to not follow the plan. Make small changes, and stick with them. Once those changes become habit, add a few more changes. · Try some of the following:  ? Make it a goal to eat a fruit or vegetable at every meal and at snacks. This will make it easy to get the recommended amount of fruits and vegetables each day. ? Try yogurt topped with fruit and nuts for a snack or healthy dessert. ? Add lettuce, tomato, cucumber, and onion to sandwiches. ? Combine a ready-made pizza crust with low-fat mozzarella cheese and lots of vegetable toppings. Try using tomatoes, squash, spinach, broccoli, carrots, cauliflower, and onions. ? Have a variety of cut-up vegetables with a low-fat dip as an appetizer instead of chips and dip. ? Sprinkle sunflower seeds or chopped almonds over salads. Or try adding chopped walnuts or almonds to cooked vegetables.   ? Try some vegetarian meals using beans and peas. Add garbanzo or kidney beans to salads. Make burritos and tacos with mashed xie beans or black beans. Where can you learn more? Go to http://kolby-yuli.info/. Enter J281 in the search box to learn more about \"DASH Diet: Care Instructions. \"  Current as of: July 22, 2018  Content Version: 11.9  © 0744-0814 [email or web address]. Care instructions adapted under license by [employer] (which disclaims liability or warranty for this information). If you have questions about a medical condition or this instruction, always ask your healthcare professional. [address] any warranty or liability for your use of this information.

## 2019-05-16 LAB
25(OH)D3 SERPL-MCNC: 29.3 NG/ML (ref 30–100)
ALBUMIN SERPL-MCNC: 4.6 G/DL (ref 3.4–5)
ALBUMIN/GLOB SERPL: 1.4 {RATIO} (ref 0.8–1.7)
ALP SERPL-CCNC: 78 U/L (ref 45–117)
ALT SERPL-CCNC: 38 U/L (ref 13–56)
ANION GAP SERPL CALC-SCNC: 12 MMOL/L (ref 3–18)
APPEARANCE UR: CLEAR
AST SERPL-CCNC: 22 U/L (ref 15–37)
BACTERIA URNS QL MICRO: ABNORMAL /HPF
BILIRUB SERPL-MCNC: 0.3 MG/DL (ref 0.2–1)
BILIRUB UR QL: NEGATIVE
BUN SERPL-MCNC: 16 MG/DL (ref 7–18)
BUN/CREAT SERPL: 13 (ref 12–20)
CALCIUM SERPL-MCNC: 9.5 MG/DL (ref 8.5–10.1)
CHLORIDE SERPL-SCNC: 107 MMOL/L (ref 100–108)
CO2 SERPL-SCNC: 22 MMOL/L (ref 21–32)
COLOR UR: YELLOW
CREAT SERPL-MCNC: 1.26 MG/DL (ref 0.6–1.3)
CREAT UR-MCNC: 71 MG/DL (ref 30–125)
EPITH CASTS URNS QL MICRO: ABNORMAL /LPF (ref 0–5)
GLOBULIN SER CALC-MCNC: 3.3 G/DL (ref 2–4)
GLUCOSE SERPL-MCNC: 157 MG/DL (ref 74–99)
GLUCOSE UR STRIP.AUTO-MCNC: NEGATIVE MG/DL
HGB UR QL STRIP: NEGATIVE
KETONES UR QL STRIP.AUTO: NEGATIVE MG/DL
LEUKOCYTE ESTERASE UR QL STRIP.AUTO: NEGATIVE
MICROALBUMIN UR-MCNC: 8.56 MG/DL (ref 0–3)
MICROALBUMIN/CREAT UR-RTO: 121 MG/G (ref 0–30)
NITRITE UR QL STRIP.AUTO: NEGATIVE
PH UR STRIP: 5 [PH] (ref 5–8)
POTASSIUM SERPL-SCNC: 4 MMOL/L (ref 3.5–5.5)
PROT SERPL-MCNC: 7.9 G/DL (ref 6.4–8.2)
PROT UR STRIP-MCNC: NEGATIVE MG/DL
RBC #/AREA URNS HPF: ABNORMAL /HPF (ref 0–5)
SODIUM SERPL-SCNC: 141 MMOL/L (ref 136–145)
SP GR UR REFRACTOMETRY: 1.02 (ref 1–1.03)
UROBILINOGEN UR QL STRIP.AUTO: 0.2 EU/DL (ref 0.2–1)
WBC URNS QL MICRO: ABNORMAL /HPF (ref 0–4)

## 2019-05-17 ENCOUNTER — TELEPHONE (OUTPATIENT)
Dept: INTERNAL MEDICINE CLINIC | Age: 64
End: 2019-05-17

## 2019-05-17 NOTE — TELEPHONE ENCOUNTER
No visits with results within 2 Day(s) from this visit. Latest known visit with results is:   Hospital Outpatient Visit on 05/15/2019   Component Date Value Ref Range Status    WBC 05/15/2019 10.0  4.6 - 13.2 K/uL Final    RBC 05/15/2019 4.07* 4.20 - 5.30 M/uL Final    HGB 05/15/2019 12.3  12.0 - 16.0 g/dL Final    HCT 05/15/2019 38.3  35.0 - 45.0 % Final    MCV 05/15/2019 94.1  74.0 - 97.0 FL Final    MCH 05/15/2019 30.2  24.0 - 34.0 PG Final    MCHC 05/15/2019 32.1  31.0 - 37.0 g/dL Final    RDW 05/15/2019 13.6  11.6 - 14.5 % Final    PLATELET 18/46/4850 196  135 - 420 K/uL Final    MPV 05/15/2019 9.6  9.2 - 11.8 FL Final    NEUTROPHILS 05/15/2019 59  40 - 73 % Final    LYMPHOCYTES 05/15/2019 30  21 - 52 % Final    MONOCYTES 05/15/2019 8  3 - 10 % Final    EOSINOPHILS 05/15/2019 3  0 - 5 % Final    BASOPHILS 05/15/2019 0  0 - 2 % Final    ABS. NEUTROPHILS 05/15/2019 5.9  1.8 - 8.0 K/UL Final    ABS. LYMPHOCYTES 05/15/2019 3.0  0.9 - 3.6 K/UL Final    ABS. MONOCYTES 05/15/2019 0.8  0.05 - 1.2 K/UL Final    ABS. EOSINOPHILS 05/15/2019 0.3  0.0 - 0.4 K/UL Final    ABS.  BASOPHILS 05/15/2019 0.0  0.0 - 0.1 K/UL Final    DF 05/15/2019 AUTOMATED    Final    Hemoglobin A1c 05/15/2019 7.8* 4.2 - 5.6 % Final    Est. average glucose 05/15/2019 177  mg/dL Final    Sodium 05/15/2019 141  136 - 145 mmol/L Final    Potassium 05/15/2019 4.0  3.5 - 5.5 mmol/L Final    Chloride 05/15/2019 107  100 - 108 mmol/L Final    CO2 05/15/2019 22  21 - 32 mmol/L Final    Anion gap 05/15/2019 12  3.0 - 18 mmol/L Final    Glucose 05/15/2019 157* 74 - 99 mg/dL Final    BUN 05/15/2019 16  7.0 - 18 MG/DL Final    Creatinine 05/15/2019 1.26  0.6 - 1.3 MG/DL Final    BUN/Creatinine ratio 05/15/2019 13  12 - 20   Final    GFR est AA 05/15/2019 52* >60 ml/min/1.73m2 Final    GFR est non-AA 05/15/2019 43* >60 ml/min/1.73m2 Final    Calcium 05/15/2019 9.5  8.5 - 10.1 MG/DL Final    Bilirubin, total 05/15/2019 0.3 0.2 - 1.0 MG/DL Final    ALT (SGPT) 05/15/2019 38  13 - 56 U/L Final    AST (SGOT) 05/15/2019 22  15 - 37 U/L Final    Alk. phosphatase 05/15/2019 78  45 - 117 U/L Final    Protein, total 05/15/2019 7.9  6.4 - 8.2 g/dL Final    Albumin 05/15/2019 4.6  3.4 - 5.0 g/dL Final    Globulin 05/15/2019 3.3  2.0 - 4.0 g/dL Final    A-G Ratio 05/15/2019 1.4  0.8 - 1.7   Final    Microalbumin,urine random 05/15/2019 8.56* 0 - 3.0 MG/DL Final    Creatinine, urine 05/15/2019 71.00  30 - 125 mg/dL Final    Microalbumin/Creat ratio (mg/g cre* 05/15/2019 121* 0 - 30 mg/g Final    Color 05/15/2019 YELLOW    Final    Appearance 05/15/2019 CLEAR    Final    Specific gravity 05/15/2019 1.017  1.005 - 1.030   Final    pH (UA) 05/15/2019 5.0  5.0 - 8.0   Final    Protein 05/15/2019 NEGATIVE   NEG mg/dL Final    Glucose 05/15/2019 NEGATIVE   NEG mg/dL Final    Ketone 05/15/2019 NEGATIVE   NEG mg/dL Final    Bilirubin 05/15/2019 NEGATIVE   NEG   Final    Blood 05/15/2019 NEGATIVE   NEG   Final    Urobilinogen 05/15/2019 0.2  0.2 - 1.0 EU/dL Final    Nitrites 05/15/2019 NEGATIVE   NEG   Final    Leukocyte Esterase 05/15/2019 NEGATIVE   NEG   Final    WBC 05/15/2019 0 to 2  0 - 4 /hpf Final    RBC 05/15/2019 0 to 2  0 - 5 /hpf Final    Epithelial cells 05/15/2019 FEW  0 - 5 /lpf Final    Bacteria 05/15/2019 FEW* NEG /hpf Final    Vitamin D 25-Hydroxy 05/15/2019 29.3* 30 - 100 ng/mL Final       Called and discussed lab results with patient. Discussed the following:    * Increase in HbA1c to 7.8 and instructed to restart metformin. Stressed importance of compliance. *Advised of persistence of microalbuminuria, although improved, and need for good blood sugar and blood pressure control. *Discussed increase in creatinine to 1.26/ eGFR 43 and patient states that she is overdue for appointment with Renal Transplant. Advised to schedule.     *Discussed low Vitamin D level and advised to increase supplement to 5000 U daily. Please fax lab results from visit to Dr. Meredith Rodrigez at [facility] clinic. Also, patient needs appointment in 3 months with labs. Please schedule.

## 2019-05-17 NOTE — PROGRESS NOTES
Called and discussed lab results with patient. Advised of increase in HbA1c to 7.8 and instructed to restart metformin. Stressed importance of compliance. Also advised of persistence of microalbuminuria, although improved, and need for good blood sugar and blood pressure control. Discussed increase in creatinine to 1.26/ eGFR 43 and patient states that she is overdue for appointment with Renal Transplant. Discussed low Vitamin D level and advised to increase supplement to 5000 U daily.

## 2019-05-19 PROBLEM — N94.9 ADNEXAL FULLNESS: Status: ACTIVE | Noted: 2019-05-19

## 2019-05-19 PROBLEM — R93.5 ABNORMAL CT SCAN, PELVIS: Status: ACTIVE | Noted: 2019-05-19

## 2019-05-19 RX ORDER — ISOSORBIDE MONONITRATE 30 MG/1
30 TABLET, EXTENDED RELEASE ORAL DAILY
Refills: 3 | COMMUNITY
Start: 2019-04-02 | End: 2019-12-19

## 2019-05-19 RX ORDER — AMLODIPINE BESYLATE 5 MG/1
5 TABLET ORAL DAILY
Refills: 2 | COMMUNITY
Start: 2019-02-21 | End: 2019-05-19

## 2019-05-19 NOTE — PROGRESS NOTES
HPI:   Dewight Area is a 58y.o. year old female who presents today for post-ED follow-up. She has a history of hypertension, hyperlipidemia, chronic diastolic heart failure, diabetes mellitus, ESRD s/p living donor cadaveric renal transplant (4/1980), osteopenia, osteoarthritis, lumbar degenerative disease, chronic pain syndrome, squamous cell skin cancers, depression, anxiety, GERD, and noncompliance. On 5/8/2019, she presented to [facility] ED complaining of thoracic back pain. She informed the ED physicians that she had weaned herself from her chronic pain medications and was requesting pain medicine since her pain had increased. She was given a single dose of gabapentin 300 mg, Zofran, and two Percocet, and she was discharged with a prescription for gabapentin 100 mg tid. Due to continued pain, she presented to the [facility] ED on 5/10/2019 and was complaining of sharp, stabbing mid abdominal pain. She was found to be tender in the epigastric and right abdomen. Lab evaluation included Hb 11.8/ Hct 36.2, creatinine 1.04/ eGFR 53, lipase 197, troponin and urinalysis negative. and an abdominal/pelvic CT scan was obtained showing no acute abdominal pathology, but an incidental finding of a suggestion of bilateral adnexal cysts, right measuring 1.6 cm and the left measuring 1.3 cm. She was discharged with a prescription for famotidine. She was also referred to Dr. Ld Clay for follow-up, and she states that she has an appointment scheduled for 6/5/2019. She expresses concern regarding the findings on the CT scan suggestive of bilateral adnexal cysts. She states that she had a complete hysterectomy many years ago and had her ovaries removed in 2002 due to endometriosis. Review of her record shows a pelvic ultrasound from 11/11/2002 showing bilateral pelvic masses in the area of the ovaries, the left appearing to represent the ovary with an adjacent fluid collection and the right being an indeterminate mass.  The patient states that this is what prompted the removal of her ovaries. She had had a hysterectomy several years prior to this. She denies any lower abdominal pain or bloating. She is otherwise without complaints. On 11/6/2018, she admitted that she was no longer taking her immunosuppressant medications for her kidney transplant. She stated that she had stopped taking prednisone for about a year, and then stopped tacrolimus for several months although is not specific as to when. However, review of record showed that her tacrolimus level was therapeutic at 5.2 on 4/27/2018 at her annual follow-up visit in the renal transplant clinic. She stated that she was told by one of her kidney doctors that \"her brother must have been a twin for her transplanted kidney to have lasted this long\". She stated that she believed him even though her brother is 11years older, and she decided on her own that she no longer needed to take her immunosuppressants. She states that she also did not like the fact that she would \"get so many colds\" while taking the medications. She became concerned when she was informed of the development of increasing proteinuria on he labs. She was seen in follow-up on 11/21/2018 and reported that she had restarted her kidney transplant immunosuppressant medications, and was taking tacrolimus 2 mg bid and prednisone 5 mg every other day. Her tacrolimus level was measured at 1.7 and she continued to show proteinuria with urine microalbumin/ creatinine ratio 150 mg/g, and urinalysis measuring 30 mg/dl protein. Her creatinine remained relatively stable at 0.95/ eGFR 59. She subsequently followed up with Dr. Bernadine De Santiago, and repeat labs showed an increase in her creatinine to 1.22/ eGFR 44, urinalysis showed 100 mg/dl protein, random urine protein 61 mg/dl with urine creatinine 81.2.  She stated that she was instructed to have follow-up labs in 4 weeks, and that she may require a renal biopsy if they continued to be abnormal. She also stated that her antihypertensive regimen was changed with increase in her dose of lisinopril to 40 mg bid, and amlodipine and Imdur were discontinued. However, she has not followed up with Dr. Kenneth Dee since her last visit in 1/2019. She has a history of hypertension, treated previously with lisinopril, Imdur, amlodipine, and lasix as needed. She has not been monitoring her blood pressure at home. She also has a history of hyperlipidemia, treated previously with high intensity dose simvastatin at 80 mg daily. She was changed to rosuvastatin in 8/2018, but noticed severe muscle aches and stopped taking it with improvement. She was subsequently restarted on simvastatin at 40 mg dose. In 10/2017, she was admitted to [facility] with dyspnea on exertion, and chest xray revealed mild cardiac enlargement with pulmonary vascular congestion and diffusely increased interstitial markings. Echocardiogram showed normal LV size and function (EF 65%), mild concentric LVH, grade 2 diastolic dysfunction, and a pharmacologic nuclear stress test was a normal low risk study without evidence of ischemia or prior infarction, and calculated EF 70%. She was diagnosed with acute on chronic diastolic heart failure, and treated with lasix and diuresed approximately five liters with improvement. She was intolerant to beta blockers. She currently denies any chest pain, shortness of breath at rest or with exertion, palpitations, lightheadedness, PND, orthopnea, or edema. She is now being followed by Dr. Say Davenport. She has a history of diabetes mellitus, not currently being treated with medication. She denies any polyuria, polydipsia, nocturia, or blurry vision, and has no history of retinopathy, neuropathy, or nephropathy. She has not been having regular eye exams. She has a history of renal failure and is s/p a living donor cadaveric renal transplant from her brother in 4/1980.  She had been noncompliant with her regimen as discussed, but has resumed taking tacrolimus and prednisone one month ago. She is followed in the [facility] by Dr. Camille Sandoval and Dr. Cindi Gotti. She has secondary osteopenia due to chronic steroid use, and was being treated with alendronate. However, she states that she discontinued taking it. Her last bone density study was in 5/2017 showing T-scores:  femoral neck left -0.1, lumbar -0.4, and distal radius -1.2. She continues to take calcium and Vitamin D supplements. She has no history of pathologic fractures. She has a history of osteoarthritis and is s/p right hip replacement by Dr. Emigdio Jordan in 2015. She also has chronic neck and low back pain secondary to degenerative disease. She had been followed previously in the [facility] pain management center, but has weaned herself off of pain medications. She has stopped taking Percocet and MS Contin. She states that she has not noticed a significant increase in her neck or low back pain. She denies any fevers, chills, weight change, saddle paresthesia, neurogenic bowel or bladder symptoms, or recent trauma. She has a history of GERD, treated with omeprazole. She had an upper endoscopy in 5/2014 by Dr. Paulo Antoine which was normal. She also had difficulty with chronic constipation, secondary to narcotic use. She was being treated with Linzess, although discontinued after she weaned herself from taking narcotics. She had a screening colonoscopy in 6/2011 which showed evidence of diverticulosis. Follow-up recommended for 10 years. She denies any abdominal pain, nausea, vomiting, melena, hematochezia, or change in bowel movements. She has a history of multiple squamous cell carcinomas of the skin. She is being followed closely by Dr. Leslie Hernandez. She reports that she was treated with Efudex in 8/2018 with subsequent exfoliation.  She does report that she was treated with doxycycline in 6/2018 for a possible MRSA infection on her wrist. She states that her grandson had a confirmed infection and she was taking care of him. She states that it completely resolved. She has a history of depression and anxiety, treated previously with Prozac. However, she states that she is no longer taking it. She does report increasing anxiety symptoms today. She also is using Ambien for insomnia. She states that she feels that her symptoms are currently well controlled. Past Medical History:   Diagnosis Date    Calculus of kidney     Chronic diastolic heart failure ([facility] [address] 8/13/2018    Chronic pain syndrome 7/16/2013    Constipation due to pain medication 4/8/2016    Degeneration of lumbar or lumbosacral intervertebral disc     Depression     Diabetes mellitus     Essential hypertension 8/13/2018    KIM (generalized anxiety disorder) 4/29/2015    H/O renal failure 8/13/2018    Secondary to glomerulonephritis. 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    Type 2 diabetes mellitus with microalbuminuria ([facility] [address] 11/10/2015     Past Surgical History:   Procedure Laterality Date    ABDOMEN SURGERY PROC UNLISTED      HX GYN      Ovary    HX HEENT      nose surgery    HX HYSTERECTOMY      HX ORTHOPAEDIC  [id number]    hip replacement    HX UROLOGICAL      Kidney Transplant    NEPHRECTOMY      bilateral     Current Outpatient Medications   Medication Sig    omeprazole (PRILOSEC) 40 mg capsule Take 1 Cap by mouth daily.  famotidine (PEPCID) 20 mg tablet Take 1 Tab by mouth two (2) times daily as needed (reflux). Indications: indigestion    sertraline (ZOLOFT) 25 mg tablet Take 1 Tab by mouth daily.     predniSONE (DELTASONE) 5 mg tablet Take  by mouth every other day.  lisinopril (PRINIVIL, ZESTRIL) 40 mg tablet Take 2 Tabs by mouth daily. (Patient taking differently: Take 20 mg by mouth daily.)    simvastatin (ZOCOR) 40 mg tablet Take 1 Tab by mouth nightly.  furosemide (LASIX) 20 mg tablet TK 1 T PO BID    tacrolimus (PROGRAF) 1 mg capsule TAKE 2 CAPSULES BY MOUTH TWICE DAILY    DOCUSATE SODIUM (COLACE PO) Take  by mouth.  ondansetron (ZOFRAN ODT) 4 mg disintegrating tablet Take 1 Tab by mouth every eight (8) hours as needed for Nausea.  zolpidem (AMBIEN) 5 mg tablet TAKE 1 TABLET BY MOUTH EVERY NIGHT AT BEDTIME AS NEEDED FOR SLEEP    magnesium oxide (MAGOX) 400 mg tablet Take 400 mg by mouth daily. No current facility-administered medications for this visit. Allergies and Intolerances: Allergies   Allergen Reactions    Asa-Acetaminophen-Caff-Potass Unknown (comments)    Aspirin Other (comments)    Beta-Blockers (Beta-Adrenergic Blocking Agts) Other (comments)     Sent into [employer].     Bystolic [Nebivolol] Other (comments)    Ibuprofen Other (comments)     Renal transplant patient    Lipitor [Atorvastatin] Unknown (comments)    Nsaids (Non-Steroidal Anti-Inflammatory Drug) Unknown (comments)    Sirolimus Other (comments)     pneumonitis    Toprol Xl [Metoprolol Succinate] Other (comments)     Asthma    Venlafaxine Other (comments)     Patient does not recall taking this medication     Family History: Her sister was diagnosed with breast cancer at age 61; her father had CAD and  during CABG  Family History   Problem Relation Age of Onset    Hypertension Father     Heart Disease Father     Alcohol abuse Father     Arthritis-osteo Father     Arthritis-osteo Brother     Cancer Maternal Grandmother         breast    Lung Disease Sister     High Cholesterol Mother     Arthritis-osteo Mother     Arthritis-osteo Sister     Liver Disease Sister      Social History:   She  reports that she quit smoking about 16 years ago. She has never used smokeless tobacco. Smoked  2-3 ppd for 10 years, stopping 20 years ago. She is  with one adopted son. She is a retired . Social History     Substance and Sexual Activity   Alcohol Use No     Immunization History:  Immunization History   Administered Date(s) Administered    Influenza Vaccine 12/18/2012, 11/11/2013    Influenza Vaccine (Quad) PF 11/10/2015, 11/21/2018    Pneumococcal Conjugate (PCV-13) 11/21/2018    Pneumococcal Polysaccharide (PPSV-23) 11/28/2014    Tdap 08/09/2018       Review of Systems:   As above included in HPI. Otherwise 11 point review of systems negative including constitutional, skin, HENT, eyes, respiratory, cardiovascular, gastrointestinal, genitourinary, musculoskeletal, endocrine, hematologic, allergy, and neurologic. Physical:   Vitals:   BP: 148/80  HR: (!) 105  WT: 184 lb (83.5 kg)  BMI:  32.85 kg/m2    Exam:   Patient appears in no apparent distress. Affect is appropriate. HEENT: PERRLA, anicteric, oropharynx clear, no JVD, adenopathy or thyromegaly. No carotid bruits or radiated murmur. Lungs: clear to auscultation, no wheezes, rhonchi, or rales. Heart: regular rate and rhythm. No murmur, rubs, gallops  Abdomen: soft, nontender, nondistended, normal bowel sounds, no hepatosplenomegaly or masses. Extremities: without edema. Pulses 1-2+ bilaterally.     Review of Data:  Labs:  Admission on 05/10/2019, Discharged on 05/10/2019   Component Date Value Ref Range Status    Ventricular Rate 05/10/2019 78  BPM Final    Atrial Rate 05/10/2019 78  BPM Final    P-R Interval 05/10/2019 162  ms Final    QRS Duration 05/10/2019 84  ms Final    Q-T Interval 05/10/2019 354  ms Final    QTC Calculation (Bezet) 05/10/2019 403  ms Final    Calculated P Axis 05/10/2019 44  degrees Final    Calculated R Axis 05/10/2019 6  degrees Final    Calculated T Axis 05/10/2019 24  degrees Final    Diagnosis 05/10/2019    Final Value:Normal sinus rhythm  ST abnormality, possible digitalis effect  Abnormal ECG  When compared with ECG of 09-NOV-2015 14:07,  No significant change was found  Confirmed by Lissy Camara ([id number]) on 5/10/2019 10:05:17 PM      WBC 05/10/2019 9.4  4.6 - 13.2 K/uL Final    RBC 05/10/2019 3.80* 4.20 - 5.30 M/uL Final    HGB 05/10/2019 11.8* 12.0 - 16.0 g/dL Final    HCT 05/10/2019 36.2  35.0 - 45.0 % Final    MCV 05/10/2019 95.3  74.0 - 97.0 FL Final    MCH 05/10/2019 31.1  24.0 - 34.0 PG Final    MCHC 05/10/2019 32.6  31.0 - 37.0 g/dL Final    RDW 05/10/2019 13.4  11.6 - 14.5 % Final    PLATELET 08/55/4965 859  135 - 420 K/uL Final    MPV 05/10/2019 10.1  9.2 - 11.8 FL Final    NEUTROPHILS 05/10/2019 55  40 - 73 % Final    LYMPHOCYTES 05/10/2019 33  21 - 52 % Final    MONOCYTES 05/10/2019 10  3 - 10 % Final    EOSINOPHILS 05/10/2019 2  0 - 5 % Final    BASOPHILS 05/10/2019 0  0 - 2 % Final    ABS. NEUTROPHILS 05/10/2019 5.2  1.8 - 8.0 K/UL Final    ABS. LYMPHOCYTES 05/10/2019 3.1  0.9 - 3.6 K/UL Final    ABS. MONOCYTES 05/10/2019 0.9  0.05 - 1.2 K/UL Final    ABS. EOSINOPHILS 05/10/2019 0.2  0.0 - 0.4 K/UL Final    ABS.  BASOPHILS 05/10/2019 0.0  0.0 - 0.1 K/UL Final    DF 05/10/2019 AUTOMATED    Final    Sodium 05/10/2019 143  136 - 145 mmol/L Final    Potassium 05/10/2019 4.3  3.5 - 5.5 mmol/L Final    Chloride 05/10/2019 105  100 - 108 mmol/L Final    CO2 05/10/2019 26  21 - 32 mmol/L Final    Anion gap 05/10/2019 12  3.0 - 18 mmol/L Final    Glucose 05/10/2019 106* 74 - 99 mg/dL Final    BUN 05/10/2019 17  7.0 - 18 MG/DL Final    Creatinine 05/10/2019 1.04  0.6 - 1.3 MG/DL Final    BUN/Creatinine ratio 05/10/2019 16  12 - 20   Final    GFR est AA 05/10/2019 >60  >60 ml/min/1.73m2 Final    GFR est non-AA 05/10/2019 53* >60 ml/min/1.73m2 Final    Calcium 05/10/2019 9.2  8.5 - 10.1 MG/DL Final    Bilirubin, total 05/10/2019 0.2  0.2 - 1.0 MG/DL Final    ALT (SGPT) 05/10/2019 34  13 - 56 U/L Final    AST (SGOT) 05/10/2019 12* 15 - 37 U/L Final    Alk. phosphatase 05/10/2019 69  45 - 117 U/L Final    Protein, total 05/10/2019 7.3  6.4 - 8.2 g/dL Final    Albumin 05/10/2019 4.0  3.4 - 5.0 g/dL Final    Globulin 05/10/2019 3.3  2.0 - 4.0 g/dL Final    A-G Ratio 05/10/2019 1.2  0.8 - 1.7   Final    Lipase 05/10/2019 197  73 - 393 U/L Final    Color 05/10/2019 YELLOW    Final    Appearance 05/10/2019 CLEAR    Final    Specific gravity 05/10/2019 <1.005* 1.005 - 1.030 Final    pH (UA) 05/10/2019 6.0  5.0 - 8.0   Final    Protein 05/10/2019 NEGATIVE   NEG mg/dL Final    Glucose 05/10/2019 NEGATIVE   NEG mg/dL Final    Ketone 05/10/2019 NEGATIVE   NEG mg/dL Final    Bilirubin 05/10/2019 NEGATIVE   NEG   Final    Blood 05/10/2019 NEGATIVE   NEG   Final    Urobilinogen 05/10/2019 0.2  0.2 - 1.0 EU/dL Final    Nitrites 05/10/2019 NEGATIVE   NEG   Final    Leukocyte Esterase 05/10/2019 NEGATIVE   NEG   Final    CK 05/10/2019 136  26 - 192 U/L Final    CK - MB 05/10/2019 1.4  <3.6 ng/ml Final    CK-MB Index 05/10/2019 1.0  0.0 - 4.0 % Final    Troponin-I, QT 05/10/2019 <0.02  0.0 - 0.045 NG/ML Final     Health Maintenance:  Screening:    Mammogram: negative (2016) [facility]. Overdue. PAP smear: s/p CATY/BSO for endometriosis   Colorectal: colonoscopy (2011) divertculosis. Dr. Betsy Lynn. Due . Depression: no longer taking Prozac   DM (HbA1c/FPG): HbA1c 6.6 (2018)   Hepatitis C: negative (2018)   Falls: none   DEXA: osteopenia (2017) [facility]. Glaucoma: regular eye exams with Dr. Breanne Castillo. Overdue.    Smokin-30 pack years, stopped 20 years ago   Vitamin D: 22.1 (2018)   Medicare Wellness: N/A    Impression:  Patient Active Problem List   Diagnosis Code    Lumbosacral radiculopathy M54.17    Degeneration of lumbar or lumbosacral intervertebral disc M51.37    Chronic insomnia F51.04    Kidney transplant status, living related donor Z94.0    Chronic pain syndrome G89.4    KIM (generalized anxiety disorder) F41.1    Type 2 diabetes mellitus with microalbuminuria ([facility]) E11.29, R80.9    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    Constipation due to pain medication K59.03    History of depression Z86.59    Osteopenia of multiple sites M85.89    Essential hypertension I10    Chronic diastolic heart failure ([facility]) I50.32    Hyperlipidemia E78.5    H/O renal failure Z87.448    GERD (gastroesophageal reflux disease) K21.9    Primary osteoarthritis involving multiple joints M15.0    Immunosuppressed status ([facility]) D89.9    Left ventricular diastolic dysfunction M22.0    Noncompliance with medications Z91.14    Persistent proteinuria R80.1       Plan:  1. S/p renal transplant from living related donor. Patient admitted to noncompliance with her immunosuppressant drugs in 11/2018, stopping prednisone about a year ago and stopping tacrolimus several months prior on her own. Noted evidence of trace protein on urinalysis and microalbuminuria at last two checks in 8/2018 (83-97 mg/g). However, significant worsening of proteinuria noted at last visit with urine microalbumin/creatinine ratio 461 mg/g (11/6/2018). Creatinine had remained stable. She admitted to noncompliance when this was brought to her attention and she became very concerned. She restarted tacrolimus at 2 mg bid and prednisone 5 mg qod. Discussed that she should contact the renal transplant clinic, particularly as they would be able to advise her on the best way to resume, but she stated that she \"will never be able to get another transplant if they find out she had stopped the medications\". Tacrolimus level was noted to be low at 1.7, and she was again advised to follow-up with transplant. Reports today that did follow-up and creatinine noted to have increased to 1.22/ eGFR 44 with urinalysis showing 100 mg /dl protein.  Plan was to repeat in one month to determine need for renal biopsy if continuing to be abnormal. However, patient did not follow-up. Will reassess today. 2. Hypertension. Blood pressure quite elevated today, and patient states that she is taking lisinopril 40 mg daily, not 40 mg bid as prescribed. Unclear if she is also taking Imdur as recently refilled. She had stated that Imdur and amlodipine were discontinued by Dr. Kenneth Dee. Stressed importance of compliance with medications as important to protect kidney. Instructed to continue to monitor her blood pressure daily, but unclear if she will monitor. Will request last note from Dr. Kenneth Dee. 3. Chronic diastolic heart failure. Grade 2 with normal systolic function on echocardiogram in 10/2017. Hemodynamically stable on current regimen as above. Does not tolerate beta blockers. Instructed to restrict sodium and follow weights. 4. Hyperlipidemia. Attempted to change to rosuvastatin, but patient developed myalgias. Restarted simvastatin 40 mg daily with , indicative of poor control. Will recheck today, and adjust dose as needed. Discussed importance of weight loss and improved control of blood sugar. Continue to follow. 5. Diabetes mellitus. HbA1c improved to 6.6 in 11/2018 without medications. Started metformin  mg daily with dinner in 8/2018, but patient discontinued stating that sugars are now normal with dietary changes. No known evidence of retinopathy. Patient referred to Dr. Magan Jones but has not yet scheduled appointment. Foot exam normal (8/2018). On Ace-I and will restart statin. Urine microalbumin/ creatinine ratio with evidence of macroalbuminuria as discussed. Will reassess today and stress importance of achieving better blood sugar and blood pressure control. 6. Osteopenia. Last bone density scan 5/2017 .  Using femoral neck T-scores, calculated FRAX score estimates her 10 year risk of a major osteoporetic fracture at 10 % and hip fracture at 0.3 % in context of chronic steroid use and secondary osteoporosis. Also, noted report of thoracic compression deformities on chest CT scan in 11/2017 at [facility]. She admitted to no longer taking prednisone, will now resume therapy with 5 mg qod. She stopped taking alendronate although unclear when she discontinued. Continue calcium and Vitamin D. On 50,000 U dose weekly of ergocalciferol, although doubt compliance given low level. She states that she will resume today. Encouraged exercise, particularly weight bearing activities. 7. Bilateral adnexal cysts. Patient is s/p BSO in 2002 and found to have endometriosis. Now with CT scan showing masses in the area of her prior ovaries. Will obtain pelvic ultrasound to evaluate further. 8. Osteoarthritis. Patient with multiple areas of arthritis contributing to her chronic pain. However, attempting to wean from narcotics, and discontinued Percocet and using minimal MS Contin 30 mg as needed. Not experiencing increased pain currently, but will follow. Will use non-narcotic based regimen if needed. 9. GERD. Appears well controlled on omeprazole. Prescribed Pepcid by ED for presumed increase in symptoms as cause for pain. Has follow-up appointment with Dr. Randee Rivero in 6/2019 for further evaluation. Upper endoscopy in 5/2014 negative. Using Zofran for nausea as needed. 10. Chronic constipation. Most likely related to chronic narcotics. Improved with weaning. No longer taking Linzess. 11. Squamous cell carcinoma of skin. Under care of Dr. Silva Reinoso. Being treated with Efudex. Related to chronic immunosuppression. 12. Depression. No longer taking Prozac and reports increasing anxiety. Will begin Zoloft 25 mg daily and monitor for response. Reported insomnia since restarting tacrolimus and restarted on Ambien. However, has not requested refill since 12/2018. Follow. 13. Obesity. Emphasized importance of lifestyle modifications, including diet, exercise, and weight loss. Will continue to address.   13. Health maintenance. Received Pneumovax. Given Tdap. Will address Shingrix vaccine, although unclear if advisable in immunosuppressed. Ordered mammogram again as overdue, but not yet scheduled. Encouraged to do so. Colonoscopy due 2021. Referred to Dr. Dat Bardales for eye exams. Encouraged to schedule. Vitamin D level low, and states will resume ergocalciferol. Total time: 40 minutes spent with the patient in face-to-face consultation of which greater than 50% was spent on counseling, answering questions and/or coordination of care. Complex medical review and management performed. Patient understands recommendations and agrees with plan. Follow-up in 4 weeks. [facility] Outpatient Visit on 05/15/2019   Component Date Value Ref Range Status    WBC 05/15/2019 10.0  4.6 - 13.2 K/uL Final    RBC 05/15/2019 4.07* 4.20 - 5.30 M/uL Final    HGB 05/15/2019 12.3  12.0 - 16.0 g/dL Final    HCT 05/15/2019 38.3  35.0 - 45.0 % Final    MCV 05/15/2019 94.1  74.0 - 97.0 FL Final    MCH 05/15/2019 30.2  24.0 - 34.0 PG Final    MCHC 05/15/2019 32.1  31.0 - 37.0 g/dL Final    RDW 05/15/2019 13.6  11.6 - 14.5 % Final    PLATELET 47/24/3556 950  135 - 420 K/uL Final    MPV 05/15/2019 9.6  9.2 - 11.8 FL Final    NEUTROPHILS 05/15/2019 59  40 - 73 % Final    LYMPHOCYTES 05/15/2019 30  21 - 52 % Final    MONOCYTES 05/15/2019 8  3 - 10 % Final    EOSINOPHILS 05/15/2019 3  0 - 5 % Final    BASOPHILS 05/15/2019 0  0 - 2 % Final    ABS. NEUTROPHILS 05/15/2019 5.9  1.8 - 8.0 K/UL Final    ABS. LYMPHOCYTES 05/15/2019 3.0  0.9 - 3.6 K/UL Final    ABS. MONOCYTES 05/15/2019 0.8  0.05 - 1.2 K/UL Final    ABS. EOSINOPHILS 05/15/2019 0.3  0.0 - 0.4 K/UL Final    ABS.  BASOPHILS 05/15/2019 0.0  0.0 - 0.1 K/UL Final    DF 05/15/2019 AUTOMATED    Final    Hemoglobin A1c 05/15/2019 7.8* 4.2 - 5.6 % Final    Est. average glucose 05/15/2019 177  mg/dL Final    Sodium 05/15/2019 141  136 - 145 mmol/L Final    Potassium 05/15/2019 4.0  3.5 - 5.5 mmol/L Final    Chloride 05/15/2019 107  100 - 108 mmol/L Final    CO2 05/15/2019 22  21 - 32 mmol/L Final    Anion gap 05/15/2019 12  3.0 - 18 mmol/L Final    Glucose 05/15/2019 157* 74 - 99 mg/dL Final    BUN 05/15/2019 16  7.0 - 18 MG/DL Final    Creatinine 05/15/2019 1.26  0.6 - 1.3 MG/DL Final    BUN/Creatinine ratio 05/15/2019 13  12 - 20   Final    GFR est AA 05/15/2019 52* >60 ml/min/1.73m2 Final    GFR est non-AA 05/15/2019 43* >60 ml/min/1.73m2 Final    Calcium 05/15/2019 9.5  8.5 - 10.1 MG/DL Final    Bilirubin, total 05/15/2019 0.3  0.2 - 1.0 MG/DL Final    ALT (SGPT) 05/15/2019 38  13 - 56 U/L Final    AST (SGOT) 05/15/2019 22  15 - 37 U/L Final    Alk.  phosphatase 05/15/2019 78  45 - 117 U/L Final    Protein, total 05/15/2019 7.9  6.4 - 8.2 g/dL Final    Albumin 05/15/2019 4.6  3.4 - 5.0 g/dL Final    Globulin 05/15/2019 3.3  2.0 - 4.0 g/dL Final    A-G Ratio 05/15/2019 1.4  0.8 - 1.7   Final    Microalbumin,urine random 05/15/2019 8.56* 0 - 3.0 MG/DL Final    Creatinine, urine 05/15/2019 71.00  30 - 125 mg/dL Final    Microalbumin/Creat ratio (mg/g cre* 05/15/2019 121* 0 - 30 mg/g Final    Color 05/15/2019 YELLOW    Final    Appearance 05/15/2019 CLEAR    Final    Specific gravity 05/15/2019 1.017  1.005 - 1.030   Final    pH (UA) 05/15/2019 5.0  5.0 - 8.0   Final    Protein 05/15/2019 NEGATIVE   NEG mg/dL Final    Glucose 05/15/2019 NEGATIVE   NEG mg/dL Final    Ketone 05/15/2019 NEGATIVE   NEG mg/dL Final    Bilirubin 05/15/2019 NEGATIVE   NEG   Final    Blood 05/15/2019 NEGATIVE   NEG   Final    Urobilinogen 05/15/2019 0.2  0.2 - 1.0 EU/dL Final    Nitrites 05/15/2019 NEGATIVE   NEG   Final    Leukocyte Esterase 05/15/2019 NEGATIVE   NEG   Final    WBC 05/15/2019 0 to 2  0 - 4 /hpf Final    RBC 05/15/2019 0 to 2  0 - 5 /hpf Final    Epithelial cells 05/15/2019 FEW  0 - 5 /lpf Final    Bacteria 05/15/2019 FEW* NEG /hpf Final    Vitamin D 25-Hydroxy 05/15/2019 29.3* 30 - 100 ng/mL Final     Called and discussed lab results with patient. Discussed the following:     * Increase in HbA1c to 7.8 and instructed to restart metformin  mg daily. Stressed importance of compliance. *Advised of persistence of microalbuminuria at 121 mg/g, and although improved, in need for good blood sugar and blood pressure control. *Discussed increase in creatinine to 1.26/ eGFR 43. Patient overdue for appointment with Renal Transplant. Advised to schedule. *Discussed low Vitamin D level and advised to increase supplement to 5000 U daily.

## 2019-05-20 ENCOUNTER — TELEPHONE (OUTPATIENT)
Dept: INTERNAL MEDICINE CLINIC | Age: 64
End: 2019-05-20

## 2019-05-20 RX ORDER — RANITIDINE 150 MG/1
150 TABLET, FILM COATED ORAL 2 TIMES DAILY
Qty: 60 TAB | Refills: 2 | Status: SHIPPED | OUTPATIENT
Start: 2019-05-20 | End: 2019-12-19 | Stop reason: ALTCHOICE

## 2019-05-20 NOTE — TELEPHONE ENCOUNTER
Pepcid 20mg is not covered by patient's insurance. Cash price is $31 but patient can also get this strength OTC. Called pharmacy to check if insurance gave any indication as to what was covered and they did not. Please advise.

## 2019-05-21 NOTE — TELEPHONE ENCOUNTER
Called patient and verified full name and date of birth. Informed patient of messages and she verbalized understanding and stated that she will  the Ranitidine 150 mg at her pharmacy.

## 2019-05-21 NOTE — TELEPHONE ENCOUNTER
Will try ranitidine 150 mg bid. Script ordered. Otherwise will need to purchase over the counter. Already on omeprazole.

## 2019-05-23 ENCOUNTER — HOSPITAL ENCOUNTER (OUTPATIENT)
Dept: ULTRASOUND IMAGING | Age: 64
Discharge: HOME | End: 2019-05-23
Attending: INTERNAL MEDICINE
Payer: COMMERCIAL

## 2019-05-23 DIAGNOSIS — N94.9 ADNEXAL FULLNESS: ICD-10-CM

## 2019-05-23 DIAGNOSIS — R93.5 ABNORMAL CT SCAN, PELVIS: ICD-10-CM

## 2019-05-23 PROCEDURE — 76856 US EXAM PELVIC COMPLETE: CPT

## 2019-05-27 ENCOUNTER — TELEPHONE (OUTPATIENT)
Dept: INTERNAL MEDICINE CLINIC | Age: 64
End: 2019-05-27

## 2019-05-27 DIAGNOSIS — E55.9 VITAMIN D DEFICIENCY: ICD-10-CM

## 2019-05-27 DIAGNOSIS — Z94.0 KIDNEY TRANSPLANT STATUS, LIVING RELATED DONOR: ICD-10-CM

## 2019-05-27 DIAGNOSIS — R80.9 TYPE 2 DIABETES MELLITUS WITH MICROALBUMINURIA, WITHOUT LONG-TERM CURRENT USE OF INSULIN (HCC): Primary | ICD-10-CM

## 2019-05-27 DIAGNOSIS — I10 ESSENTIAL HYPERTENSION: ICD-10-CM

## 2019-05-27 DIAGNOSIS — E11.29 TYPE 2 DIABETES MELLITUS WITH MICROALBUMINURIA, WITHOUT LONG-TERM CURRENT USE OF INSULIN (HCC): Primary | ICD-10-CM

## 2019-05-27 DIAGNOSIS — R80.1 PERSISTENT PROTEINURIA: ICD-10-CM

## 2019-05-27 NOTE — TELEPHONE ENCOUNTER
Please let the patient know that the pelvic ultrasound did not see any abnormality to correspond to the pelvic CT scan findings. Recommendation by radiology would be to obtain a transvaginal ultrasound for a better look at the area in the pelvis. If willing, will place order. Please ask if she would be willing to proceed. Also please schedule her for a follow-up appointment. Please schedule for 8/2019 with labs prior. Thanks.

## 2019-05-28 NOTE — TELEPHONE ENCOUNTER
Called patient and verified full name and date of birth. Informed patient of Dr. Herson Blount' message/ orders and patient verbalized understanding. Patient states that she will have her GYN to schedule the pelvic tranvaginal ultrasound. I scheduled her for a 4 month follow up with Dr. Herson Blount on September 3, 2019 at 12:30pm due to no availability in August. Patient states she will go to [facility] to have her labs drawn a week prior to her appointment.

## 2019-06-11 ENCOUNTER — TELEPHONE (OUTPATIENT)
Dept: INTERNAL MEDICINE CLINIC | Age: 64
End: 2019-06-11

## 2019-06-11 RX ORDER — SERTRALINE HYDROCHLORIDE 50 MG/1
50 TABLET, FILM COATED ORAL DAILY
Qty: 90 TAB | Refills: 1 | Status: SHIPPED | OUTPATIENT
Start: 2019-06-11 | End: 2019-07-16

## 2019-06-11 NOTE — TELEPHONE ENCOUNTER
Pt's spouse calling. Said the Sertraline 25 mg hasn't been effective for her and said Dr Del Rosario Munford asked them to call back and let her know, that she could increase dosage if necessary. Please advise him or her at [phone].

## 2019-06-11 NOTE — TELEPHONE ENCOUNTER
Called and verified patient's full name and date of birth. Informed patient of Dr. Manoj Ortiz' message/ orders and patient verbalized understanding without questions or concerns.

## 2019-06-11 NOTE — TELEPHONE ENCOUNTER
Recommend increasing dose to 50 mg daily. Will take 3-4 weeks for therapeutic response to become noticeable. Please instruct her to take two of the 25 mg tablets until gone, and will also send in a new script for the 50 mg dose tablets to begin after current supply depleted. Please ask her to call with an update in 4 weeks. Script sent to [facility].

## 2019-07-15 ENCOUNTER — TELEPHONE (OUTPATIENT)
Dept: INTERNAL MEDICINE CLINIC | Age: 64
End: 2019-07-15

## 2019-07-16 RX ORDER — SERTRALINE HYDROCHLORIDE 100 MG/1
100 TABLET, FILM COATED ORAL DAILY
Qty: 90 TAB | Refills: 2 | Status: SHIPPED | OUTPATIENT
Start: 2019-07-16 | End: 2019-10-23

## 2019-07-16 NOTE — TELEPHONE ENCOUNTER
Please clarify. Did not prescribe Xanax. Is she referring to Zoloft? Is she finding it to be helpful at all?

## 2019-07-16 NOTE — TELEPHONE ENCOUNTER
Called patient and verified full name and date of birth. Informed patient of Dr. Magda Quintana' message. Patient is requesting to up the dose of Zoloft and states that is working only a little. Please advise.

## 2019-07-16 NOTE — TELEPHONE ENCOUNTER
She may increase to 100 mg daily. Please advise her to take two (2) of the 50 mg dose until gone, and will send a new prescription for the 100 mg tablet. She should hopefully see a response in 4-5 weeks. Please ask her to call if not improved. Script sent to [place].

## 2019-07-16 NOTE — TELEPHONE ENCOUNTER
Called and verified patient's full name and date of birth. Informed patient of Dr. Josep Noonan' instructions/ orders and patient verbalized understanding without questions or concerns.

## 2019-08-07 RX ORDER — AMLODIPINE BESYLATE 5 MG/1
5 TABLET ORAL DAILY
Qty: 90 TAB | Refills: 1 | Status: SHIPPED | OUTPATIENT
Start: 2019-08-07 | End: 2020-01-31

## 2019-08-07 NOTE — TELEPHONE ENCOUNTER
Please ask the patient to bring all of her medications with her to her next visit so that we can clarify what she is taking.

## 2019-08-07 NOTE — TELEPHONE ENCOUNTER
Patient is requesting a refill on amLODIPine (NORVASC) 5 mg. I see this med as being discontinued. Please advise and pend new Rx if appropriate.     Last visit:  5/15/19 with MD Oj Manuel  Next appt:  9/3/19 with MD Oj Manuel

## 2019-10-03 NOTE — PROGRESS NOTES
Chief Complaint   Patient presents with    Hypertension     2 month follow up with lab review. Health Maintenance Due   Topic Date Due    Shingrix Vaccine Age 49> (1 of 2) 09/18/2004    BREAST CANCER SCRN MAMMOGRAM  09/16/2012    Pneumococcal 19-64 Highest Risk (2 of 3 - PCV13) 11/28/2015    EYE EXAM RETINAL OR DILATED Q1  08/04/2016    Influenza Age 5 to Adult  08/01/2018     Patient states she has been having a productive coughing, stuffiness, aches in chest from coughing for at least a week. 1. Have you been to the ER, urgent care clinic or hospitalized since your last visit? NO.     2. Have you seen or consulted any other health care providers outside of the [address] [place] [address] since your last visit (Include any pap smears or colon screening)?  NO Immunocompromised Skin [facility]

## 2019-10-09 RX ORDER — SIMVASTATIN 40 MG/1
TABLET, FILM COATED ORAL
Qty: 90 TAB | Refills: 0 | Status: SHIPPED | OUTPATIENT
Start: 2019-10-09 | End: 2019-12-20

## 2019-10-23 ENCOUNTER — APPOINTMENT (OUTPATIENT)
Dept: GENERAL RADIOLOGY | Age: 64
End: 2019-10-23
Attending: PHYSICIAN ASSISTANT
Payer: COMMERCIAL

## 2019-10-23 ENCOUNTER — TELEPHONE (OUTPATIENT)
Dept: INTERNAL MEDICINE CLINIC | Age: 64
End: 2019-10-23

## 2019-10-23 ENCOUNTER — HOSPITAL ENCOUNTER (EMERGENCY)
Age: 64
Discharge: HOME | End: 2019-10-23
Attending: EMERGENCY MEDICINE
Payer: COMMERCIAL

## 2019-10-23 VITALS
OXYGEN SATURATION: 97 % | DIASTOLIC BLOOD PRESSURE: 82 MMHG | HEART RATE: 84 BPM | HEIGHT: 63 IN | TEMPERATURE: 99.5 F | SYSTOLIC BLOOD PRESSURE: 152 MMHG | RESPIRATION RATE: 20 BRPM | WEIGHT: 180 LBS | BODY MASS INDEX: 31.89 KG/M2

## 2019-10-23 DIAGNOSIS — J20.9 ACUTE BRONCHITIS, UNSPECIFIED ORGANISM: Primary | ICD-10-CM

## 2019-10-23 PROCEDURE — 87081 CULTURE SCREEN ONLY: CPT

## 2019-10-23 PROCEDURE — 99282 EMERGENCY DEPT VISIT SF MDM: CPT

## 2019-10-23 PROCEDURE — 71046 X-RAY EXAM CHEST 2 VIEWS: CPT

## 2019-10-23 RX ORDER — BENZONATATE 100 MG/1
100 CAPSULE ORAL
Qty: 21 CAP | Refills: 0 | Status: SHIPPED | OUTPATIENT
Start: 2019-10-23 | End: 2019-10-30

## 2019-10-23 RX ORDER — LEVOFLOXACIN 750 MG/1
750 TABLET ORAL DAILY
Qty: 5 TAB | Refills: 0 | Status: SHIPPED | OUTPATIENT
Start: 2019-10-23 | End: 2019-10-28

## 2019-10-23 NOTE — ED NOTES
I have reviewed discharge instructions with the patient. The patient verbalized understanding. Current Discharge Medication List      START taking these medications    Details   benzonatate (TESSALON PERLES) 100 mg capsule Take 1 Cap by mouth three (3) times daily as needed for Cough for up to 7 days. Qty: 21 Cap, Refills: 0      levoFLOXacin (LEVAQUIN) 750 mg tablet Take 1 Tab by mouth daily for 5 days.   Qty: 5 Tab, Refills: 0         Patient armband removed and shredded

## 2019-10-23 NOTE — ED PROVIDER NOTES
EMERGENCY DEPARTMENT HISTORY AND PHYSICAL EXAM      Date: 10/23/2019  Patient Name: Jaci Ennis    History of Presenting Illness     Chief Complaint   Patient presents with    Cold Symptoms    Ear Fullness    Sore Throat       History Provided By: Patient    HPI: Jaci Ennis, 71 y.o. female PMHx significant for depression, hypercholesterolemia, kidney stone, DM, htn, CHF, kidney transplant, osteopenia, depression, KIM presents ambulatory to the ED with cc of productive cough x 1 mo with assoc intermittent sore throat. Denies fever/chills, N/V, diarrhea. Pt has been taking OTC medication without relief of sx. Denies aggravating or alleviating sx.  with similar sx. There are no other complaints, changes, or physical findings at this time. PCP: Ary Luong MD    No current facility-administered medications on file prior to encounter. Current Outpatient Medications on File Prior to Encounter   Medication Sig Dispense Refill    simvastatin (ZOCOR) 40 mg tablet TAKE 1 TABLET BY MOUTH EVERY NIGHT 90 Tab 0    amLODIPine (NORVASC) 5 mg tablet Take 1 Tab by mouth daily. 90 Tab 1    [DISCONTINUED] sertraline (ZOLOFT) 100 mg tablet Take 1 Tab by mouth daily. 90 Tab 2    lisinopril (PRINIVIL, ZESTRIL) 40 mg tablet TAKE 1 TABLET BY MOUTH DAILY 90 Tab 2    raNITIdine (ZANTAC) 150 mg tablet Take 1 Tab by mouth two (2) times a day. 60 Tab 2    isosorbide mononitrate ER (IMDUR) 30 mg tablet Take 30 mg by mouth daily. 3    omeprazole (PRILOSEC) 40 mg capsule Take 1 Cap by mouth daily. 90 Cap 2    predniSONE (DELTASONE) 5 mg tablet Take  by mouth every other day.  ondansetron (ZOFRAN ODT) 4 mg disintegrating tablet Take 1 Tab by mouth every eight (8) hours as needed for Nausea. 40 Tab 1    zolpidem (AMBIEN) 5 mg tablet TAKE 1 TABLET BY MOUTH EVERY NIGHT AT BEDTIME AS NEEDED FOR SLEEP 30 Tab 0    magnesium oxide (MAGOX) 400 mg tablet Take 400 mg by mouth daily.       furosemide (LASIX) 20 mg tablet TK 1 T PO BID  1    tacrolimus (PROGRAF) 1 mg capsule TAKE 2 CAPSULES BY MOUTH TWICE DAILY  12    DOCUSATE SODIUM (COLACE PO) Take  by mouth. Past History     Past Medical History:  Past Medical History:   Diagnosis Date    Calculus of kidney     Chronic diastolic heart failure ([facility] [address] 2018    Chronic pain syndrome 2013    Constipation due to pain medication 2016    Degeneration of lumbar or lumbosacral intervertebral disc     Depression     Diabetes mellitus     Essential hypertension 2018    KIM (generalized anxiety disorder) 2015    H/O renal failure 2018    Secondary to glomerulonephritis.   S/P living related donor transplant in 1980    History of depression 2017    Hypercholesterolemia     Hyperlipidemia 2018    Kidney transplant status, living related donor 10/24/2012    Lumbosacral radiculopathy     Osteopenia of multiple sites 2018    Primary iridocyclitis     Status post total replacement of right hip 2016    Type 2 diabetes mellitus with microalbuminuria ([facility] [address] 11/10/2015       Past Surgical History:  Past Surgical History:   Procedure Laterality Date    ABDOMEN SURGERY PROC UNLISTED      HX GYN      Ovary    HX HEENT      nose surgery    HX HYSTERECTOMY      HX ORTHOPAEDIC  [id number]    hip replacement    HX UROLOGICAL      Kidney Transplant    NEPHRECTOMY      bilateral       Family History:  Family History   Problem Relation Age of Onset    Hypertension Father     Heart Disease Father     Alcohol abuse Father     Arthritis-osteo Father     Arthritis-osteo Brother     Cancer Maternal Grandmother         breast    Lung Disease Sister     High Cholesterol Mother     Arthritis-osteo Mother     Arthritis-osteo Sister     Liver Disease Sister        Social History:  Social History     Tobacco Use    Smoking status: Former Smoker     Last attempt to quit: 2002     Years since quittin.3    Smokeless tobacco: Never Used   Substance Use Topics    Alcohol use: No    Drug use: No       Allergies: Allergies   Allergen Reactions    Asa-Acetaminophen-Caff-Potass Unknown (comments)    Aspirin Other (comments)    Beta-Blockers (Beta-Adrenergic Blocking Agts) Other (comments)     Sent into [employer].  Bystolic [Nebivolol] Other (comments)    Ibuprofen Other (comments)     Renal transplant patient    Lipitor [Atorvastatin] Unknown (comments)    Nsaids (Non-Steroidal Anti-Inflammatory Drug) Unknown (comments)    Sirolimus Other (comments)     pneumonitis    Toprol Xl [Metoprolol Succinate] Other (comments)     Asthma    Venlafaxine Other (comments)     Patient does not recall taking this medication         Review of Systems   Review of Systems   Constitutional: Negative for chills and fever. HENT: Positive for congestion. Respiratory: Positive for cough. Negative for shortness of breath. Cardiovascular: Negative for chest pain. Gastrointestinal: Negative for abdominal pain, nausea and vomiting. Genitourinary: Negative for flank pain. Musculoskeletal: Negative for back pain and myalgias. Skin: Negative for color change, pallor, rash and wound. Neurological: Negative for dizziness, weakness and light-headedness. All other systems reviewed and are negative. Physical Exam   Physical Exam   Constitutional: She is oriented to person, place, and time. She appears well-developed and well-nourished. No distress. HENT:   Head: Normocephalic and atraumatic. Right Ear: Tympanic membrane, external ear and ear canal normal.   Left Ear: Tympanic membrane, external ear and ear canal normal.   Nose: Mucosal edema present. Right sinus exhibits no maxillary sinus tenderness and no frontal sinus tenderness. Left sinus exhibits no maxillary sinus tenderness and no frontal sinus tenderness. Mouth/Throat: Uvula is midline and oropharynx is clear and moist. No posterior oropharyngeal erythema. No tonsillar or pharyngeal erythema, swelling or exudate     Eyes: Conjunctivae are normal.   Cardiovascular: Normal rate, regular rhythm and normal heart sounds. Pulmonary/Chest: Effort normal and breath sounds normal. No respiratory distress. Lungs CTA   Abdominal: Soft. Bowel sounds are normal. She exhibits no distension. Musculoskeletal: Normal range of motion. Neurological: She is alert and oriented to person, place, and time. Skin: Skin is warm. No rash noted. Psychiatric: She has a normal mood and affect. Her behavior is normal.   Nursing note and vitals reviewed. Diagnostic Study Results     Labs -     Recent Results (from the past 12 hour(s))   STREP THROAT SCREEN    Collection Time: 10/23/19  3:14 PM   Result Value Ref Range    Special Requests: NO SPECIAL REQUESTS      Strep Screen NEGATIVE       Culture result: PENDING        Radiologic Studies -   XR CHEST PA LAT   Final Result   IMPRESSION:      No acute pulmonary disease. CT Results  (Last 48 hours)    None        CXR Results  (Last 48 hours)               10/23/19 1519  XR CHEST PA LAT Final result    Impression:  IMPRESSION:       No acute pulmonary disease. Narrative:  TWO VIEW CHEST RADIOGRAPH        CPT CODE: 36521       INDICATION: Cough for one month, history of kidney transplant. COMPARISON: 11/6/2018       FINDINGS:        The cardiomediastinal silhouette is within normal limits. The pulmonary   vascular markings are unremarkable. There is no evidence of focal   consolidation, pleural effusion or pneumothorax. Degenerative disc disease. .                 Medical Decision Making   I am the first provider for this patient. I reviewed the vital signs, available nursing notes, past medical history, past surgical history, family history and social history. Vital Signs-Reviewed the patient's vital signs.   Patient Vitals for the past 12 hrs:   Temp Pulse Resp BP SpO2   10/23/19 1510 99.5 °F (37.5 °C) 84 20 152/82 97 %         Records Reviewed: Nursing Notes and Old Medical Records      Provider Notes (Medical Decision Making):   DDx: PNA, Bronchitis, URI, Tonsillitis, Pharyngitis       ED Course:   Initial assessment performed. The patients presenting problems have been discussed, and they are in agreement with the care plan formulated and outlined with them. I have encouraged them to ask questions as they arise throughout their visit. Discussed normal CXR with pt, and bronchitis is typically caused by a virus. Pt requesting abx. Disposition:  Discussed imaging results with pt along with dx and treatment plan. Discussed importance of PCP follow up. All questions answered. Pt voiced they understood. Return if sx worsen. PLAN:  1. Discharge Medication List as of 10/23/2019  4:16 PM      START taking these medications    Details   benzonatate (TESSALON PERLES) 100 mg capsule Take 1 Cap by mouth three (3) times daily as needed for Cough for up to 7 days. , Normal, Disp-21 Cap, R-0         CONTINUE these medications which have NOT CHANGED    Details   simvastatin (ZOCOR) 40 mg tablet TAKE 1 TABLET BY MOUTH EVERY NIGHT, Normal, Disp-90 Tab, R-0      amLODIPine (NORVASC) 5 mg tablet Take 1 Tab by mouth daily. , Normal, Disp-90 Tab, R-1      lisinopril (PRINIVIL, ZESTRIL) 40 mg tablet TAKE 1 TABLET BY MOUTH DAILY, Normal, Disp-90 Tab, R-2      raNITIdine (ZANTAC) 150 mg tablet Take 1 Tab by mouth two (2) times a day., Normal, Disp-60 Tab, R-2      isosorbide mononitrate ER (IMDUR) 30 mg tablet Take 30 mg by mouth daily. , Historical Med, R-3      omeprazole (PRILOSEC) 40 mg capsule Take 1 Cap by mouth daily. , Normal, Disp-90 Cap, R-2      predniSONE (DELTASONE) 5 mg tablet Take  by mouth every other day., Historical Med      ondansetron (ZOFRAN ODT) 4 mg disintegrating tablet Take 1 Tab by mouth every eight (8) hours as needed for Nausea., Normal, Disp-40 Tab, R-1      zolpidem (AMBIEN) 5 mg tablet TAKE 1 TABLET BY MOUTH EVERY NIGHT AT BEDTIME AS NEEDED FOR SLEEP, Phone In, Disp-30 Tab, R-0      magnesium oxide (MAGOX) 400 mg tablet Take 400 mg by mouth daily. , Historical Med      furosemide (LASIX) 20 mg tablet TK 1 T PO BID, Historical Med, R-1      tacrolimus (PROGRAF) 1 mg capsule TAKE 2 CAPSULES BY MOUTH TWICE DAILY, Historical Med, R-12      DOCUSATE SODIUM (COLACE PO) Take  by mouth., Historical Med           2. Follow-up Information     Follow up With Specialties Details Why Contact Info    Amanuel Fletcher MD Internal Medicine Schedule an appointment as soon as possible for a visit in 1 day for PCP f/u [phone] [facility] [phone] [facility] EMERGENCY DEPT Emergency Medicine  If symptoms worsen [phone] [facility]  [phone]        Return to ED if worse     Diagnosis     Clinical Impression:   1. Acute bronchitis, unspecified organism        Attestations:    FARA Zavala    Please note that this dictation was completed with [employer], the computer voice recognition software. Quite often unanticipated grammatical, syntax, homophones, and other interpretive errors are inadvertently transcribed by the computer software. Please disregard these errors. Please excuse any errors that have escaped final proofreading. Thank you.

## 2019-10-23 NOTE — TELEPHONE ENCOUNTER
Called patient's  and verified patient's full name and date of birth. Informed him of Dr. Escobar Monday message and he verbalized understanding and state the patient has already went to [facility] ER and was seen and treated for bronchitis and got a chest x-ray.

## 2019-10-23 NOTE — TELEPHONE ENCOUNTER
Husbandcalling asking to see Dr. Sen Romeo today for cold. Says he and wife have had colds for a month and now both coughing like crazy. Please advise. Message under  chart also.

## 2019-10-23 NOTE — DISCHARGE INSTRUCTIONS
Patient Education        Bronchitis: Care Instructions  Your Care Instructions    Bronchitis is inflammation of the bronchial tubes, which carry air to the lungs. The tubes swell and produce mucus, or phlegm. The mucus and inflamed bronchial tubes make you cough. You may have trouble breathing. Most cases of bronchitis are caused by viruses like those that cause colds. Antibiotics usually do not help and they may be harmful. Bronchitis usually develops rapidly and lasts about 2 to 3 weeks in otherwise healthy people. Follow-up care is a key part of your treatment and safety. Be sure to make and go to all appointments, and call your doctor if you are having problems. It's also a good idea to know your test results and keep a list of the medicines you take. How can you care for yourself at home? · Take all medicines exactly as prescribed. Call your doctor if you think you are having a problem with your medicine. · Get some extra rest.  · Take an over-the-counter pain medicine, such as acetaminophen (Tylenol), ibuprofen (Advil, Motrin), or naproxen (Aleve) to reduce fever and relieve body aches. Read and follow all instructions on the label. · Do not take two or more pain medicines at the same time unless the doctor told you to. Many pain medicines have acetaminophen, which is Tylenol. Too much acetaminophen (Tylenol) can be harmful. · Take an over-the-counter cough medicine that contains dextromethorphan to help quiet a dry, hacking cough so that you can sleep. Avoid cough medicines that have more than one active ingredient. Read and follow all instructions on the label. · Breathe moist air from a humidifier, hot shower, or sink filled with hot water. The heat and moisture will thin mucus so you can cough it out. · Do not smoke. Smoking can make bronchitis worse. If you need help quitting, talk to your doctor about stop-smoking programs and medicines.  These can increase your chances of quitting for good.  When should you call for help? Call 911 anytime you think you may need emergency care. For example, call if:    · You have severe trouble breathing.    Call your doctor now or seek immediate medical care if:    · You have new or worse trouble breathing.     · You cough up dark brown or bloody mucus (sputum).     · You have a new or higher fever.     · You have a new rash.    Watch closely for changes in your health, and be sure to contact your doctor if:    · You cough more deeply or more often, especially if you notice more mucus or a change in the color of your mucus.     · You are not getting better as expected. Where can you learn more? Go to http://kolby-yuli.info/. Enter H333 in the search box to learn more about \"Bronchitis: Care Instructions. \"  Current as of: June 9, 2019  Content Version: 12.2  © 3068-1842 [employer]. Care instructions adapted under license by [employer] (which disclaims liability or warranty for this information). If you have questions about a medical condition or this instruction, always ask your healthcare professional. [address] any warranty or liability for your use of this information.

## 2019-10-23 NOTE — ED TRIAGE NOTES
C/o cold symptoms, ear fullness and congestion x one month. Patient states that she is a transplant patient currently on immunosuppressant medications.    States last dose of Tylenol at 1420

## 2019-10-25 LAB
B-HEM STREP THROAT QL CULT: NEGATIVE
BACTERIA SPEC CULT: NORMAL
SERVICE CMNT-IMP: NORMAL

## 2019-12-18 ENCOUNTER — TELEPHONE (OUTPATIENT)
Dept: INTERNAL MEDICINE CLINIC | Age: 64
End: 2019-12-18

## 2019-12-18 NOTE — TELEPHONE ENCOUNTER
Called patient and verified full name and date of birth. Informed patient of Dr. Dajuan Mendoza message and she verbalized understanding.

## 2019-12-18 NOTE — TELEPHONE ENCOUNTER
Pt was at [address] for her blood sugar    Today it was 396 patient is very concerned.     Wants to talk to dr or nurse asap

## 2019-12-19 ENCOUNTER — TELEPHONE (OUTPATIENT)
Dept: INTERNAL MEDICINE CLINIC | Age: 64
End: 2019-12-19

## 2019-12-19 ENCOUNTER — OFFICE VISIT (OUTPATIENT)
Dept: INTERNAL MEDICINE CLINIC | Age: 64
End: 2019-12-19

## 2019-12-19 ENCOUNTER — HOSPITAL ENCOUNTER (OUTPATIENT)
Dept: LAB | Age: 64
Discharge: HOME | End: 2019-12-19
Payer: COMMERCIAL

## 2019-12-19 VITALS
RESPIRATION RATE: 18 BRPM | OXYGEN SATURATION: 98 % | DIASTOLIC BLOOD PRESSURE: 90 MMHG | TEMPERATURE: 98.5 F | HEART RATE: 92 BPM | BODY MASS INDEX: 32.96 KG/M2 | WEIGHT: 186 LBS | SYSTOLIC BLOOD PRESSURE: 144 MMHG | HEIGHT: 63 IN

## 2019-12-19 DIAGNOSIS — M15.9 PRIMARY OSTEOARTHRITIS INVOLVING MULTIPLE JOINTS: ICD-10-CM

## 2019-12-19 DIAGNOSIS — D84.9 IMMUNOSUPPRESSED STATUS (HCC): ICD-10-CM

## 2019-12-19 DIAGNOSIS — Z23 ENCOUNTER FOR IMMUNIZATION: ICD-10-CM

## 2019-12-19 DIAGNOSIS — R10.10 PAIN OF UPPER ABDOMEN: ICD-10-CM

## 2019-12-19 DIAGNOSIS — E78.5 HYPERLIPIDEMIA, UNSPECIFIED HYPERLIPIDEMIA TYPE: ICD-10-CM

## 2019-12-19 DIAGNOSIS — I50.32 CHRONIC DIASTOLIC HEART FAILURE (HCC): ICD-10-CM

## 2019-12-19 DIAGNOSIS — E11.65 UNCONTROLLED TYPE 2 DIABETES MELLITUS WITH HYPERGLYCEMIA (HCC): Primary | ICD-10-CM

## 2019-12-19 DIAGNOSIS — R80.9 TYPE 2 DIABETES MELLITUS WITH MICROALBUMINURIA, WITHOUT LONG-TERM CURRENT USE OF INSULIN (HCC): ICD-10-CM

## 2019-12-19 DIAGNOSIS — K21.9 GASTROESOPHAGEAL REFLUX DISEASE WITHOUT ESOPHAGITIS: ICD-10-CM

## 2019-12-19 DIAGNOSIS — E55.9 VITAMIN D DEFICIENCY: ICD-10-CM

## 2019-12-19 DIAGNOSIS — E11.65 TYPE 2 DIABETES MELLITUS WITH HYPERGLYCEMIA, WITHOUT LONG-TERM CURRENT USE OF INSULIN (HCC): ICD-10-CM

## 2019-12-19 DIAGNOSIS — R80.1 PERSISTENT PROTEINURIA: ICD-10-CM

## 2019-12-19 DIAGNOSIS — R93.5 ABNORMAL CT SCAN, PELVIS: ICD-10-CM

## 2019-12-19 DIAGNOSIS — F51.04 CHRONIC INSOMNIA: ICD-10-CM

## 2019-12-19 DIAGNOSIS — Z91.14 NONCOMPLIANCE WITH MEDICATIONS: ICD-10-CM

## 2019-12-19 DIAGNOSIS — I10 ESSENTIAL HYPERTENSION: ICD-10-CM

## 2019-12-19 DIAGNOSIS — E11.29 TYPE 2 DIABETES MELLITUS WITH MICROALBUMINURIA, WITHOUT LONG-TERM CURRENT USE OF INSULIN (HCC): ICD-10-CM

## 2019-12-19 DIAGNOSIS — N94.9 ADNEXAL FULLNESS: ICD-10-CM

## 2019-12-19 DIAGNOSIS — Z94.0 KIDNEY TRANSPLANT STATUS, LIVING RELATED DONOR: ICD-10-CM

## 2019-12-19 DIAGNOSIS — F41.1 GAD (GENERALIZED ANXIETY DISORDER): ICD-10-CM

## 2019-12-19 LAB
25(OH)D3 SERPL-MCNC: 20.8 NG/ML (ref 30–100)
ALBUMIN SERPL-MCNC: 4.3 G/DL (ref 3.4–5)
ALBUMIN/GLOB SERPL: 1.1 {RATIO} (ref 0.8–1.7)
ALP SERPL-CCNC: 80 U/L (ref 45–117)
ALT SERPL-CCNC: 40 U/L (ref 13–56)
ANION GAP SERPL CALC-SCNC: 10 MMOL/L (ref 3–18)
APPEARANCE UR: CLEAR
AST SERPL-CCNC: 21 U/L (ref 10–38)
BACTERIA URNS QL MICRO: NEGATIVE /HPF
BASOPHILS # BLD: 0.1 K/UL (ref 0–0.1)
BASOPHILS NFR BLD: 1 % (ref 0–2)
BILIRUB SERPL-MCNC: 0.4 MG/DL (ref 0.2–1)
BILIRUB UR QL: NEGATIVE
BUN SERPL-MCNC: 17 MG/DL (ref 7–18)
BUN/CREAT SERPL: 16 (ref 12–20)
CALCIUM SERPL-MCNC: 9.2 MG/DL (ref 8.5–10.1)
CHLORIDE SERPL-SCNC: 105 MMOL/L (ref 100–111)
CHOLEST SERPL-MCNC: 280 MG/DL
CO2 SERPL-SCNC: 24 MMOL/L (ref 21–32)
COLOR UR: YELLOW
CREAT SERPL-MCNC: 1.09 MG/DL (ref 0.6–1.3)
CREAT UR-MCNC: 42 MG/DL (ref 30–125)
DIFFERENTIAL METHOD BLD: ABNORMAL
EOSINOPHIL # BLD: 0.2 K/UL (ref 0–0.4)
EOSINOPHIL NFR BLD: 2 % (ref 0–5)
EPITH CASTS URNS QL MICRO: ABNORMAL /LPF (ref 0–5)
ERYTHROCYTE [DISTWIDTH] IN BLOOD BY AUTOMATED COUNT: 13.6 % (ref 11.6–14.5)
EST. AVERAGE GLUCOSE BLD GHB EST-MCNC: 212 MG/DL
GLOBULIN SER CALC-MCNC: 3.8 G/DL (ref 2–4)
GLUCOSE SERPL-MCNC: 331 MG/DL (ref 74–99)
GLUCOSE UR STRIP.AUTO-MCNC: >1000 MG/DL
HBA1C MFR BLD: 9 % (ref 4.2–5.6)
HCT VFR BLD AUTO: 38.2 % (ref 35–45)
HDLC SERPL-MCNC: 49 MG/DL (ref 40–60)
HDLC SERPL: 5.7 {RATIO} (ref 0–5)
HGB BLD-MCNC: 12.7 G/DL (ref 12–16)
HGB UR QL STRIP: NEGATIVE
KETONES UR QL STRIP.AUTO: NEGATIVE MG/DL
LDLC SERPL CALC-MCNC: ABNORMAL MG/DL (ref 0–100)
LEUKOCYTE ESTERASE UR QL STRIP.AUTO: NEGATIVE
LIPID PROFILE,FLP: ABNORMAL
LYMPHOCYTES # BLD: 1.5 K/UL (ref 0.9–3.6)
LYMPHOCYTES NFR BLD: 17 % (ref 21–52)
MAGNESIUM SERPL-MCNC: 1.9 MG/DL (ref 1.6–2.6)
MCH RBC QN AUTO: 31 PG (ref 24–34)
MCHC RBC AUTO-ENTMCNC: 33.2 G/DL (ref 31–37)
MCV RBC AUTO: 93.2 FL (ref 74–97)
MICROALBUMIN UR-MCNC: 75.4 MG/DL (ref 0–3)
MICROALBUMIN/CREAT UR-RTO: 1795 MG/G (ref 0–30)
MONOCYTES # BLD: 0.7 K/UL (ref 0.05–1.2)
MONOCYTES NFR BLD: 8 % (ref 3–10)
NEUTS SEG # BLD: 6.4 K/UL (ref 1.8–8)
NEUTS SEG NFR BLD: 72 % (ref 40–73)
NITRITE UR QL STRIP.AUTO: NEGATIVE
PH UR STRIP: 5.5 [PH] (ref 5–8)
PLATELET # BLD AUTO: 322 K/UL (ref 135–420)
PMV BLD AUTO: 9.7 FL (ref 9.2–11.8)
POTASSIUM SERPL-SCNC: 4.3 MMOL/L (ref 3.5–5.5)
PROT SERPL-MCNC: 8.1 G/DL (ref 6.4–8.2)
PROT UR STRIP-MCNC: 100 MG/DL
RBC # BLD AUTO: 4.1 M/UL (ref 4.2–5.3)
RBC #/AREA URNS HPF: ABNORMAL /HPF (ref 0–5)
SODIUM SERPL-SCNC: 139 MMOL/L (ref 136–145)
SP GR UR REFRACTOMETRY: 1.02 (ref 1–1.03)
TRIGL SERPL-MCNC: 441 MG/DL (ref ?–150)
TSH SERPL DL<=0.05 MIU/L-ACNC: 1.98 UIU/ML (ref 0.36–3.74)
UROBILINOGEN UR QL STRIP.AUTO: 0.2 EU/DL (ref 0.2–1)
VLDLC SERPL CALC-MCNC: ABNORMAL MG/DL
WBC # BLD AUTO: 8.9 K/UL (ref 4.6–13.2)
WBC URNS QL MICRO: ABNORMAL /HPF (ref 0–4)

## 2019-12-19 PROCEDURE — 82043 UR ALBUMIN QUANTITATIVE: CPT

## 2019-12-19 PROCEDURE — 83735 ASSAY OF MAGNESIUM: CPT

## 2019-12-19 PROCEDURE — 83036 HEMOGLOBIN GLYCOSYLATED A1C: CPT

## 2019-12-19 PROCEDURE — 82306 VITAMIN D 25 HYDROXY: CPT

## 2019-12-19 PROCEDURE — 85025 COMPLETE CBC W/AUTO DIFF WBC: CPT

## 2019-12-19 PROCEDURE — 80061 LIPID PANEL: CPT

## 2019-12-19 PROCEDURE — 84443 ASSAY THYROID STIM HORMONE: CPT

## 2019-12-19 PROCEDURE — 80053 COMPREHEN METABOLIC PANEL: CPT

## 2019-12-19 PROCEDURE — 81001 URINALYSIS AUTO W/SCOPE: CPT

## 2019-12-19 RX ORDER — PEN NEEDLE, DIABETIC 30 GX3/16"
NEEDLE, DISPOSABLE MISCELLANEOUS
Qty: 100 PEN NEEDLE | Refills: 5 | Status: SHIPPED | OUTPATIENT
Start: 2019-12-19

## 2019-12-19 RX ORDER — SUCRALFATE 1 G/10ML
1 SUSPENSION ORAL 4 TIMES DAILY
Qty: 414 ML | Refills: 2 | Status: SHIPPED | OUTPATIENT
Start: 2019-12-19 | End: 2021-02-24 | Stop reason: ALTCHOICE

## 2019-12-19 RX ORDER — INSULIN GLARGINE 100 [IU]/ML
10 INJECTION, SOLUTION SUBCUTANEOUS
Qty: 2 ADJUSTABLE DOSE PRE-FILLED PEN SYRINGE | Refills: 3 | Status: SHIPPED | OUTPATIENT
Start: 2019-12-19 | End: 2019-12-26

## 2019-12-19 NOTE — PATIENT INSTRUCTIONS
Vaccine Information Statement    Influenza (Flu) Vaccine (Inactivated or Recombinant): What You Need to Know    Many Vaccine Information Statements are available in Kiswahili and other languages. See www.immunize.org/vis  Hojas de información sobre vacunas están disponibles en español y en muchos otros idiomas. Visite www.immunize.org/vis    1. Why get vaccinated? Influenza vaccine can prevent influenza (flu). Flu is a contagious disease that spreads around the [facility] every year, usually between October and May. Anyone can get the flu, but it is more dangerous for some people. Infants and young children, people 72years of age and older, pregnant women, and people with certain health conditions or a weakened immune system are at greatest risk of flu complications. Pneumonia, bronchitis, sinus infections and ear infections are examples of flu-related complications. If you have a medical condition, such as heart disease, cancer or diabetes, flu can make it worse. Flu can cause fever and chills, sore throat, muscle aches, fatigue, cough, headache, and runny or stuffy nose. Some people may have vomiting and diarrhea, though this is more common in children than adults. Each year thousands of people in the [facility] die from flu, and many more are hospitalized. Flu vaccine prevents millions of illnesses and flu-related visits to the doctor each year. 2. Influenza vaccines     CDC recommends everyone 10months of age and older get vaccinated every flu season. Children 6 months through 6years of age may need 2 doses during a single flu season. Everyone else needs only 1 dose each flu season. It takes about 2 weeks for protection to develop after vaccination. There are many flu viruses, and they are always changing. Each year a new flu vaccine is made to protect against three or four viruses that are likely to cause disease in the upcoming flu season.  Even when the vaccine doesnt exactly match these viruses, it may still provide some protection. Influenza vaccine does not cause flu. Influenza vaccine may be given at the same time as other vaccines. 3. Talk with your health care provider    Tell your vaccine provider if the person getting the vaccine:   Has had an allergic reaction after a previous dose of influenza vaccine, or has any severe, life-threatening allergies.  Has ever had Guillain-Barré Syndrome (also called GBS). In some cases, your health care provider may decide to postpone influenza vaccination to a future visit. People with minor illnesses, such as a cold, may be vaccinated. People who are moderately or severely ill should usually wait until they recover before getting influenza vaccine. Your health care provider can give you more information. 4. Risks of a reaction     Soreness, redness, and swelling where shot is given, fever, muscle aches, and headache can happen after influenza vaccine.  There may be a very small increased risk of Guillain-Barré Syndrome (GBS) after inactivated influenza vaccine (the flu shot). [facility] children who get the flu shot along with pneumococcal vaccine (PCV13), and/or DTaP vaccine at the same time might be slightly more likely to have a seizure caused by fever. Tell your health care provider if a child who is getting flu vaccine has ever had a seizure. People sometimes faint after medical procedures, including vaccination. Tell your provider if you feel dizzy or have vision changes or ringing in the ears. As with any medicine, there is a very remote chance of a vaccine causing a severe allergic reaction, other serious injury, or death. 5. What if there is a serious problem? An allergic reaction could occur after the vaccinated person leaves the clinic.  If you see signs of a severe allergic reaction (hives, swelling of the face and throat, difficulty breathing, a fast heartbeat, dizziness, or weakness), call 9-1-1 and get the person to the nearest hospital.    For other signs that concern you, call your health care provider. Adverse reactions should be reported to the Vaccine Adverse Event Reporting System (VAERS). Your health care provider will usually file this report, or you can do it yourself. Visit the VAERS website at www.vaers. hhs.gov or call 3-403.445.3070. VAERS is only for reporting reactions, and VAERS staff do not give medical advice. 6. The National Vaccine Injury Compensation Program    The [facility] Vaccine Injury Compensation Program (VICP) is a federal program that was created to compensate people who may have been injured by certain vaccines. Visit the VICP website at www.hrsa.gov/vaccinecompensation or call 2-506.611.5819 to learn about the program and about filing a claim. There is a time limit to file a claim for compensation. 7. How can I learn more?  Ask your health care provider.  Call your local or state health department.  Contact the Centers for Disease Control and Prevention (CDC):  - Call 1-374.893.5429 (1-240-MNA-INFO) or  - Visit CDCs influenza website at www.cdc.gov/flu    Vaccine Information Statement (Interim)  Inactivated Influenza Vaccine   8/15/2019  42 KAEL Ortiz [id number]   Department of Health and Human Services  Centers for Disease Control and Prevention    Office Use Only      Vaccine Information Statement    Pneumococcal Polysaccharide Vaccine (PPSV23): What You Need to Know    Many Vaccine Information Statements are available in Tajik and other languages. See www.immunize.org/vis  Hojas de información sobre vacunas están disponibles en español y en muchos otros idiomas. Visite www.immunize.org/vis    1. Why get vaccinated? Pneumococcal polysaccharide vaccine (PPSV23) can prevent pneumococcal disease. Pneumococcal disease refers to any illness caused by pneumococcal bacteria.  These bacteria can cause many types of illnesses, including pneumonia, which is an infection of the lungs. Pneumococcal bacteria are one of the most common causes of pneumonia. Besides pneumonia, pneumococcal bacteria can also cause:   Ear infections   Sinus infections   Meningitis (infection of the tissue covering the brain and spinal cord)   Bacteremia (bloodstream infection)    Anyone can get pneumococcal disease, but children under 3years of age, people with certain medical conditions, adults 72 years or older, and cigarette smokers are at the highest risk. Most pneumococcal infections are mild. However, some can result in long-term problems, such as brain damage or hearing loss. Meningitis, bacteremia, and pneumonia caused by pneumococcal disease can be fatal.     2. PPSV23     PPSV23 protects against 23 types of bacteria that cause pneumococcal disease. PPSV23 is recommended for:   All adults 72 years or older,   Anyone 2 years or older with certain medical conditions that can lead to an increased risk for pneumococcal disease. Most people need only one dose of PPSV23. A second dose of PPSV23, and another type of pneumococcal vaccine called PCV13, are recommended for certain high-risk groups. Your health care provider can give you more information. People 65 years or older should get a dose of PPSV23 even if they have already gotten one or more doses of the vaccine before they turned 72.    3. Talk with your health care provider    Tell your vaccine provider if the person getting the vaccine:   Has had an allergic reaction after a previous dose of PPSV23, or has any severe, life-threatening allergies. In some cases, your health care provider may decide to postpone PPSV23 vaccination to a future visit. People with minor illnesses, such as a cold, may be vaccinated. People who are moderately or severely ill should usually wait until they recover before getting PPSV23. Your health care provider can give you more information.     4. Risks of a vaccine reaction     Redness or pain where the shot is given, feeling tired, fever, or muscle aches can happen after PPSV23. People sometimes faint after medical procedures, including vaccination. Tell your provider if you feel dizzy or have vision changes or ringing in the ears. As with any medicine, there is a very remote chance of a vaccine causing a severe allergic reaction, other serious injury, or death. 5. What if there is a serious problem? An allergic reaction could occur after the vaccinated person leaves the clinic. If you see signs of a severe allergic reaction (hives, swelling of the face and throat, difficulty breathing, a fast heartbeat, dizziness, or weakness), call 9-1-1 and get the person to the nearest hospital.    For other signs that concern you, call your health care provider. Adverse reactions should be reported to the Vaccine Adverse Event Reporting System (VAERS). Your health care provider will usually file this report, or you can do it yourself. Visit the VAERS website at www.vaers. hhs.gov or call 1-456.976.1611. VAERS is only for reporting reactions, and VAERS staff do not give medical advice. 6. How can I learn more?  Ask your health care provider.  Call your local or state health department.    Contact the Centers for Disease Control and Prevention (CDC):  - Call 0-066-973--176-329-4688 (5-8845-553-EOT-INFO) or  - Visit CDCs website at www.cdc.gov/vaccines    Vaccine Information Statement   PPSV23   10/30/2019    [facility] for Disease Control and Prevention    Office Use Only

## 2019-12-19 NOTE — TELEPHONE ENCOUNTER
Patients  calling in and asking for a script for the pen needles that go with the insulin. Pharmacy stated that they need a script for it.

## 2019-12-19 NOTE — PROGRESS NOTES
Chief Complaint   Patient presents with    Hypertension     Follow up with lab review.  Heart Problem     Follow up with lab review.  ED Follow-up     Follow up from [facility] for hyerglycemia on 12/16/2019 and [facility] on 10/23/2019 for acute bronchitis. Health Maintenance Due   Topic Date Due    EYE EXAM RETINAL OR DILATED  09/18/1965    Influenza Age 5 to Adult  08/01/2019    FOOT EXAM Q1  08/13/2019    GLAUCOMA SCREENING Q2Y  09/18/2019    HEMOGLOBIN A1C Q6M  11/15/2019    Pneumococcal 65+ years (2 of 2 - PPSV23) 11/28/2019     Patient given influenza vaccine, FLUAD, in right deltoid, per verbal order from Dr. Indira River with read back. Instructed patient to sit and wait 10-20 minutes before leaving the premises so that we can watch for any complications or adverse reactions. Patient given vaccine information statement handout before vaccine was given. Patient tolerated well without adverse reactions or complications. 1. Have you been to the ER, urgent care clinic or hospitalized since your last visit? YES.     2. Have you seen or consulted any other health care providers outside of the [address] [place] [address] since your last visit (Include any pap smears or colon screening)? NO      Do you have an Advanced Directive? NO    Would you like information on Advanced Directives?  NO    Learning Assessment 5/15/2019   PRIMARY LEARNER Patient   HIGHEST LEVEL OF EDUCATION - PRIMARY LEARNER  2 YEARS OF COLLEGE   BARRIERS PRIMARY LEARNER NONE   CO-LEARNER CAREGIVER No   PRIMARY LANGUAGE ENGLISH   LEARNER PREFERENCE PRIMARY LISTENING     READING     DEMONSTRATION   ANSWERED BY patient   RELATIONSHIP SELF

## 2019-12-20 ENCOUNTER — TELEPHONE (OUTPATIENT)
Dept: INTERNAL MEDICINE CLINIC | Age: 64
End: 2019-12-20

## 2019-12-20 RX ORDER — ERGOCALCIFEROL 1.25 MG/1
50000 CAPSULE ORAL
Qty: 12 CAP | Refills: 1 | Status: SHIPPED | OUTPATIENT
Start: 2019-12-20 | End: 2020-05-12

## 2019-12-20 RX ORDER — ROSUVASTATIN CALCIUM 20 MG/1
20 TABLET, COATED ORAL
Qty: 90 TAB | Refills: 2 | Status: SHIPPED | OUTPATIENT
Start: 2019-12-20 | End: 2020-01-31 | Stop reason: ALTCHOICE

## 2019-12-20 NOTE — TELEPHONE ENCOUNTER
Hospital Outpatient Visit on 12/19/2019   Component Date Value Ref Range Status    WBC 12/19/2019 8.9  4.6 - 13.2 K/uL Final    RBC 12/19/2019 4.10* 4.20 - 5.30 M/uL Final    HGB 12/19/2019 12.7  12.0 - 16.0 g/dL Final    HCT 12/19/2019 38.2  35.0 - 45.0 % Final    MCV 12/19/2019 93.2  74.0 - 97.0 FL Final    MCH 12/19/2019 31.0  24.0 - 34.0 PG Final    MCHC 12/19/2019 33.2  31.0 - 37.0 g/dL Final    RDW 12/19/2019 13.6  11.6 - 14.5 % Final    PLATELET 75/50/4772 365  135 - 420 K/uL Final    MPV 12/19/2019 9.7  9.2 - 11.8 FL Final    NEUTROPHILS 12/19/2019 72  40 - 73 % Final    LYMPHOCYTES 12/19/2019 17* 21 - 52 % Final    MONOCYTES 12/19/2019 8  3 - 10 % Final    EOSINOPHILS 12/19/2019 2  0 - 5 % Final    BASOPHILS 12/19/2019 1  0 - 2 % Final    ABS. NEUTROPHILS 12/19/2019 6.4  1.8 - 8.0 K/UL Final    ABS. LYMPHOCYTES 12/19/2019 1.5  0.9 - 3.6 K/UL Final    ABS. MONOCYTES 12/19/2019 0.7  0.05 - 1.2 K/UL Final    ABS. EOSINOPHILS 12/19/2019 0.2  0.0 - 0.4 K/UL Final    ABS.  BASOPHILS 12/19/2019 0.1  0.0 - 0.1 K/UL Final    DF 12/19/2019 AUTOMATED    Final    Hemoglobin A1c 12/19/2019 9.0* 4.2 - 5.6 % Final    Est. average glucose 12/19/2019 212  mg/dL Final    Magnesium 12/19/2019 1.9  1.6 - 2.6 mg/dL Final    Sodium 12/19/2019 139  136 - 145 mmol/L Final    Potassium 12/19/2019 4.3  3.5 - 5.5 mmol/L Final    Chloride 12/19/2019 105  100 - 111 mmol/L Final    CO2 12/19/2019 24  21 - 32 mmol/L Final    Anion gap 12/19/2019 10  3.0 - 18 mmol/L Final    Glucose 12/19/2019 331* 74 - 99 mg/dL Final    BUN 12/19/2019 17  7.0 - 18 MG/DL Final    Creatinine 12/19/2019 1.09  0.6 - 1.3 MG/DL Final    BUN/Creatinine ratio 12/19/2019 16  12 - 20   Final    GFR est AA 12/19/2019 >60  >60 ml/min/1.73m2 Final    GFR est non-AA 12/19/2019 50* >60 ml/min/1.73m2 Final    Calcium 12/19/2019 9.2  8.5 - 10.1 MG/DL Final    Bilirubin, total 12/19/2019 0.4  0.2 - 1.0 MG/DL Final    ALT (SGPT) 12/19/2019 40  13 - 56 U/L Final    AST (SGOT) 12/19/2019 21  10 - 38 U/L Final    Alk. phosphatase 12/19/2019 80  45 - 117 U/L Final    Protein, total 12/19/2019 8.1  6.4 - 8.2 g/dL Final    Albumin 12/19/2019 4.3  3.4 - 5.0 g/dL Final    Globulin 12/19/2019 3.8  2.0 - 4.0 g/dL Final    A-G Ratio 12/19/2019 1.1  0.8 - 1.7   Final    TSH 12/19/2019 1.98  0.36 - 3.74 uIU/mL Final    Color 12/19/2019 YELLOW    Final    Appearance 12/19/2019 CLEAR    Final    Specific gravity 12/19/2019 1.019  1.005 - 1.030   Final    pH (UA) 12/19/2019 5.5  5.0 - 8.0   Final    Protein 12/19/2019 100* NEG mg/dL Final    Glucose 12/19/2019 >1,000* NEG mg/dL Final    Ketone 12/19/2019 NEGATIVE   NEG mg/dL Final    Bilirubin 12/19/2019 NEGATIVE   NEG   Final    Blood 12/19/2019 NEGATIVE   NEG   Final    Urobilinogen 12/19/2019 0.2  0.2 - 1.0 EU/dL Final    Nitrites 12/19/2019 NEGATIVE   NEG   Final    Leukocyte Esterase 12/19/2019 NEGATIVE   NEG   Final    WBC 12/19/2019 0 to 1  0 - 4 /hpf Final    RBC 12/19/2019 NONE  0 - 5 /hpf Final    Epithelial cells 12/19/2019 FEW  0 - 5 /lpf Final    Bacteria 12/19/2019 NEGATIVE   NEG /hpf Final    Vitamin D 25-Hydroxy 12/19/2019 20.8* 30 - 100 ng/mL Final    Microalbumin,urine random 12/19/2019 75.40* 0 - 3.0 MG/DL Final    Creatinine, urine 12/19/2019 42.00  30 - 125 mg/dL Final    Microalbumin/Creat ratio (mg/g cre* 12/19/2019 1,795* 0 - 30 mg/g Final    LIPID PROFILE 12/19/2019        Final    Cholesterol, total 12/19/2019 280* <200 MG/DL Final    Triglyceride 12/19/2019 441* <150 MG/DL Final    HDL Cholesterol 12/19/2019 49  40 - 60 MG/DL Final    LDL, calculated 12/19/2019 LDL AND VLDL CHOLESTEROL NOT CALCULATED WHEN TRIGLYCERIDES >400 MG/DL OR HDL CHOLESTEROL <20 MG/DL  0 - 100 MG/DL Final    VLDL, calculated 12/19/2019 Calculation not valid with this patient's other Lipid values.   MG/DL Final    CHOL/HDL Ratio 12/19/2019 5.7* 0 - 5.0   Final       Called and discussed lab results with patient. Blood sugar this morning 412 after taking 10 U Lantus last night. Advised to increase to 12 U tomorrow night if still elevated tomorrow and will call Monday with readings. Patient reports compliance with simvastatin. Will change to rosuvastatin 20 mg daily. Also will treat low Vitamin D level with 50,000 U weekly. Scripts sent to [facility]. Patient did not schedule a follow-up appointment. Please call patient and schedule 10 AM on 1/8/2019 which looks available.

## 2019-12-22 ENCOUNTER — PATIENT MESSAGE (OUTPATIENT)
Dept: INTERNAL MEDICINE CLINIC | Age: 64
End: 2019-12-22

## 2019-12-23 NOTE — TELEPHONE ENCOUNTER
From: Roland Cazares  To: Elizabeth Mathew MD  Sent: 12/22/2019 8:35 PM EST  Subject: Test Results Question    Dr. Sen Romeo, I called the [address] [place] [address] and spoke to a nurse. She told me to increase insulin to 12 units as you suggested you may do. I increased it on December 21 at 8:00 pm. I can call and talk to one of the Drs. on Monday.     Dec. 20, 6a 412 11:20a 338 2p 317 4:30 p 250 10p 364 Dec.21 7a 319 12a 340 4p 311 7:40p 219 Dec.22 &:30a 363 4:30p 330 8:30p 243

## 2019-12-23 NOTE — PROGRESS NOTES
HPI:   Js Jacobs is a 71y.o. year old female who presents today for post-ED follow-up. She has a history of hypertension, hyperlipidemia, chronic diastolic heart failure, diabetes mellitus, ESRD s/p living donor cadaveric renal transplant (4/1980), osteopenia, osteoarthritis, lumbar degenerative disease, chronic pain syndrome, squamous cell skin cancers, depression, anxiety, GERD, and noncompliance. She was last seen on 5/15/2019 and has been lost to follow-up. She reports today that she has been experiencing polyuria, polydipsia, and fatigue for the last several weeks. She states that this followed an upper respiratory infection that resolved after Thanksgiving. She states that on 12/16/2019, she checked her blood sugar at home and it measured 575. She states that she took two 500 mg metformin and presented to [facility] ED for evaluation. He states that initial blood sugar was elevated at 343, and she was administered 8 units of regular insulin and IV fluids. Evaluation included Na 134, Cl 93, K 4.2, creatinine 1.1/ eGFR 47, Ca 10.1, urinalysis protein 100, glucose >500, chest x-ray negative, EKG sinus tachycardia 100. He was subsequently discharged on metformin 500 mg bid and instructed to follow up with her primary care. She reports today that her blood sugars have been measuring between 400-550 since discharge. He's continuing to take metformin, but has developed upper abdominal pain, cramping, and diarrhea. She states that she did follow up for her annual visit with the renal transplant clinic on 7/1/2019 and was told that everything was stable. She admits that she has not been monitoring her blood sugars at home prior to this week. She is otherwise without new complaints. On 5/8/2019, she presented to [facility] ED complaining of thoracic back pain.  She informed the ED physicians that she had weaned herself from her chronic pain medications and was requesting pain medicine since her pain had increased. She was given a single dose of gabapentin 300 mg, Zofran, and two Percocet, and she was discharged with a prescription for gabapentin 100 mg tid. Due to continued pain, she presented to the [facility] ED on 5/10/2019 and was complaining of sharp, stabbing mid abdominal pain. She was found to be tender in the epigastric and right abdomen. Lab evaluation included Hb 11.8/ Hct 36.2, creatinine 1.04/ eGFR 53, lipase 197, troponin and urinalysis negative. and an abdominal/pelvic CT scan was obtained showing no acute abdominal pathology, but an incidental finding of a suggestion of bilateral adnexal cysts, right measuring 1.6 cm and the left measuring 1.3 cm. She was discharged with a prescription for famotidine. She was also referred to Dr. Abraham Berry for follow-up. She expresses concern regarding the findings on the CT scan suggestive of bilateral adnexal cysts. She states that she had a complete hysterectomy many years ago and had her ovaries removed in 2002 due to endometriosis. Review of her record shows a pelvic ultrasound from 11/11/2002 showing bilateral pelvic masses in the area of the ovaries, the left appearing to represent the ovary with an adjacent fluid collection and the right being an indeterminate mass. The patient states that this is what prompted the removal of her ovaries. She had had a hysterectomy several years prior to this. She denies any lower abdominal pain or bloating. She underwent evaluation by Dr. Abraham Berry on 6/18/2019, and recommendation was to proceed with an upper endoscopy, but the patient cancelled the appointment. She also underwent a pelvic ultrasound (5/23/2019) which did not confirm findings seen on CT scan, but recommendation was to proceed with a transvaginal ultrasound.  However, the patient stated that she wished to follow-up with her gynecologist.     On 11/6/2018, she admitted that she was no longer taking her immunosuppressant medications for her kidney transplant. She stated that she had stopped taking prednisone for about a year, and then stopped tacrolimus for several months although is not specific as to when. However, review of record showed that her tacrolimus level was therapeutic at 5.2 on 4/27/2018 at her annual follow-up visit in the renal transplant clinic. She stated that she was told by one of her kidney doctors that \"her brother must have been a twin for her transplanted kidney to have lasted this long\". She stated that she believed him even though her brother is 11years older, and she decided on her own that she no longer needed to take her immunosuppressants. She states that she also did not like the fact that she would \"get so many colds\" while taking the medications. She became concerned when she was informed of the development of increasing proteinuria on he labs. She was seen in follow-up on 11/21/2018 and reported that she had restarted her kidney transplant immunosuppressant medications, and was taking tacrolimus 2 mg bid and prednisone 5 mg every other day. Her tacrolimus level was measured at 1.7 and she continued to show proteinuria with urine microalbumin/ creatinine ratio 150 mg/g, and urinalysis measuring 30 mg/dl protein. Her creatinine remained relatively stable at 0.95/ eGFR 59. She subsequently followed up with Dr. Parish Bautista, and repeat labs showed an increase in her creatinine to 1.22/ eGFR 44, urinalysis showed 100 mg/dl protein, random urine protein 61 mg/dl with urine creatinine 81.2. She stated that she was instructed to have follow-up labs in 4 weeks, and that she may require a renal biopsy if they continued to be abnormal. She also stated that her antihypertensive regimen was changed with increase in her dose of lisinopril to 40 mg bid, and amlodipine and Imdur were discontinued. However, she has not followed up with Dr. Parish Bautista since her last visit in 1/2019.      She has a history of hypertension, treated previously with lisinopril, Imdur, amlodipine, and lasix as needed. She has not been monitoring her blood pressure at home. She also has a history of hyperlipidemia, treated previously with high intensity dose simvastatin at 80 mg daily. She was changed to rosuvastatin in 8/2018, but noticed severe muscle aches and stopped taking it with improvement. She was subsequently restarted on simvastatin at 40 mg dose. In 10/2017, she was admitted to [facility] with dyspnea on exertion, and chest xray revealed mild cardiac enlargement with pulmonary vascular congestion and diffusely increased interstitial markings. Echocardiogram showed normal LV size and function (EF 65%), mild concentric LVH, grade 2 diastolic dysfunction, and a pharmacologic nuclear stress test was a normal low risk study without evidence of ischemia or prior infarction, and calculated EF 70%. She was diagnosed with acute on chronic diastolic heart failure, and treated with lasix and diuresed approximately five liters with improvement. She was intolerant to beta blockers. She currently denies any chest pain, shortness of breath at rest or with exertion, palpitations, lightheadedness, PND, orthopnea, or edema. She is now being followed by Dr. Fabian Minor. She has a history of diabetes mellitus, not currently being treated with medication. She denies any polyuria, polydipsia, nocturia, or blurry vision, and has no history of retinopathy, neuropathy, or nephropathy. She has not been having regular eye exams. She has a history of renal failure and is s/p a living donor cadaveric renal transplant from her brother in 4/1980. She had been noncompliant with her regimen as discussed, but has resumed taking tacrolimus and prednisone one month ago. She is followed in the [facility] by Dr. Juanito Fang and Dr. Merrill Adkins. She has secondary osteopenia due to chronic steroid use, and was being treated with alendronate.  However, she states that she discontinued taking it. Her last bone density study was in 5/2017 showing T-scores:  femoral neck left -0.1, lumbar -0.4, and distal radius -1.2. She continues to take calcium and Vitamin D supplements. She has no history of pathologic fractures. She has a history of osteoarthritis and is s/p right hip replacement by Dr. Bella Santos in 2015. She also has chronic neck and low back pain secondary to degenerative disease. She had been followed previously in the [facility] pain management center, but has weaned herself off of pain medications. She has stopped taking Percocet and MS Contin. She states that she has not noticed a significant increase in her neck or low back pain. She denies any fevers, chills, weight change, saddle paresthesia, neurogenic bowel or bladder symptoms, or recent trauma. She has a history of GERD, treated with omeprazole. She had an upper endoscopy in 5/2014 by Dr. Lidia Franklin which was normal. She also had difficulty with chronic constipation, secondary to narcotic use. She was being treated with Linzess, although discontinued after she weaned herself from taking narcotics. She had a screening colonoscopy in 6/2011 which showed evidence of diverticulosis. Follow-up recommended for 10 years. She denies any abdominal pain, nausea, vomiting, melena, hematochezia, or change in bowel movements. She has a history of multiple squamous cell carcinomas of the skin. She is being followed closely by Dr. Марина Kahn. She reports that she was treated with Efudex in 8/2018 with subsequent exfoliation. She does report that she was treated with doxycycline in 6/2018 for a possible MRSA infection on her wrist. She states that her grandson had a confirmed infection and she was taking care of him. She states that it completely resolved. She has a history of depression and anxiety, treated previously with Prozac. However, she states that she is no longer taking it. She does report increasing anxiety symptoms today.  She also is using Ambien for insomnia. She states that she feels that her symptoms are currently well controlled. Past Medical History:   Diagnosis Date    Calculus of kidney     Chronic diastolic heart failure ([facility] [address] 8/13/2018    Chronic pain syndrome 7/16/2013    Constipation due to pain medication 4/8/2016    Degeneration of lumbar or lumbosacral intervertebral disc     Depression     Diabetes mellitus     Essential hypertension 8/13/2018    KIM (generalized anxiety disorder) 4/29/2015    H/O renal failure 8/13/2018    Secondary to glomerulonephritis. 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    Type 2 diabetes mellitus with microalbuminuria ([facility] [address] 11/10/2015     Past Surgical History:   Procedure Laterality Date    ABDOMEN SURGERY PROC UNLISTED      HX GYN      Ovary    HX HEENT      nose surgery    HX HYSTERECTOMY      HX ORTHOPAEDIC  [id number]    hip replacement    HX UROLOGICAL      Kidney Transplant    NEPHRECTOMY      bilateral     Current Outpatient Medications   Medication Sig    insulin glargine (LANTUS,BASAGLAR) 100 unit/mL (3 mL) inpn 10 Units by SubCUTAneous route nightly.  sucralfate (CARAFATE) 100 mg/mL suspension Take 10 mL by mouth four (4) times daily. Indications: reflux/ abdominal pain    Insulin Needles, Disposable, 31 gauge x 5/16\" ndle Use with Lantus insulin qhs.  amLODIPine (NORVASC) 5 mg tablet Take 1 Tab by mouth daily.  lisinopril (PRINIVIL, ZESTRIL) 40 mg tablet TAKE 1 TABLET BY MOUTH DAILY    omeprazole (PRILOSEC) 40 mg capsule Take 1 Cap by mouth daily.  predniSONE (DELTASONE) 5 mg tablet Take  by mouth every other day.     tacrolimus (PROGRAF) 1 mg capsule TAKE 2 CAPSULES BY MOUTH TWICE DAILY    rosuvastatin (CRESTOR) 20 mg tablet Take 1 Tab by mouth nightly.  ergocalciferol (ERGOCALCIFEROL) 50,000 unit capsule Take 1 Cap by mouth every seven (7) days. No current facility-administered medications for this visit. Allergies and Intolerances: Allergies   Allergen Reactions    Asa-Acetaminophen-Caff-Potass Unknown (comments)    Aspirin Other (comments)    Beta-Blockers (Beta-Adrenergic Blocking Agts) Other (comments)     Sent into [employer].  Bystolic [Nebivolol] Other (comments)    Ibuprofen Other (comments)     Renal transplant patient    Lipitor [Atorvastatin] Unknown (comments)    Nsaids (Non-Steroidal Anti-Inflammatory Drug) Unknown (comments)    Sirolimus Other (comments)     pneumonitis    Toprol Xl [Metoprolol Succinate] Other (comments)     Asthma    Venlafaxine Other (comments)     Patient does not recall taking this medication     Family History: Her sister was diagnosed with breast cancer at age 61; her father had CAD and  during CABG  Family History   Problem Relation Age of Onset    Hypertension Father     Heart Disease Father     Alcohol abuse Father     Arthritis-osteo Father     Arthritis-osteo Brother     Cancer Maternal Grandmother         breast    Lung Disease Sister     High Cholesterol Mother     Arthritis-osteo Mother     Arthritis-osteo Sister     Liver Disease Sister      Social History:   She  reports that she quit smoking about 17 years ago. She has never used smokeless tobacco. Smoked  2-3 ppd for 10 years, stopping 20 years ago. She is  with one adopted son. She is a retired .   Social History     Substance and Sexual Activity   Alcohol Use No     Immunization History:  Immunization History   Administered Date(s) Administered    Influenza Vaccine 2012, 2013    Influenza Vaccine (Quad) PF 11/10/2015, 2018    Influenza Vaccine (Tri) Adjuvanted 2019    Pneumococcal Conjugate (PCV-13) 2018    Pneumococcal Polysaccharide (PPSV-23) 2014    Tdap 2018       Review of Systems:   As above included in HPI. Otherwise 11 point review of systems negative including constitutional, skin, HENT, eyes, respiratory, cardiovascular, gastrointestinal, genitourinary, musculoskeletal, endocrine, hematologic, allergy, and neurologic. Physical:   Vitals:   BP: 144/90  HR: 92  WT: 186 lb (84.4 kg)  BMI:  32.95 kg/m2    Exam:   Patient appears in no apparent distress. Affect is appropriate. HEENT: PERRLA, anicteric, oropharynx clear, no JVD, adenopathy or thyromegaly. No carotid bruits or radiated murmur. Lungs: clear to auscultation, no wheezes, rhonchi, or rales. Heart: regular rate and rhythm. No murmur, rubs, gallops  Abdomen: soft, nontender, nondistended, normal bowel sounds, no hepatosplenomegaly or masses. Extremities: without edema. Pulses 1-2+ bilaterally. Review of Data:  Labs:  No visits with results within 1 Month(s) from this visit. Latest known visit with results is:   Admission on 10/23/2019, Discharged on 10/23/2019   Component Date Value Ref Range Status    Special Requests: 10/23/2019 NO SPECIAL REQUESTS    Final    Strep Screen 10/23/2019 NEGATIVE     Final    Culture result: 10/23/2019 NO BETA HEMOLYTIC STREPTOCOCCUS GROUP A ISOLATED    Final     Health Maintenance:  Screening:    Mammogram: negative (2019) [facility]. PAP smear: s/p CATY/BSO for endometriosis   Colorectal: colonoscopy (2011) divertculosis. Dr. Denae Davis. Due . Depression: no longer taking Prozac   DM (HbA1c/FPG): HbA1c 7.8 (2019)   Hepatitis C: negative (2018)   Falls: none   DEXA: within normal limits (2019) [facility]   Glaucoma: regular eye exams with Dr. Aashish Watters. Overdue.    Smokin-30 pack years, stopped 20 years ago   Vitamin D: 29.3 (2019)   Medicare Wellness: N/A    Impression:  Patient Active Problem List   Diagnosis Code    Lumbosacral radiculopathy M54.17    Degeneration of lumbar or lumbosacral intervertebral disc M51.37    Chronic insomnia F51.04    Kidney transplant status, living related donor Z80.0    Chronic pain syndrome G89.4    KIM (generalized anxiety disorder) F41.1    Type 2 diabetes mellitus with microalbuminuria ([facility]) E11.29, R80.9    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    History of depression Z86.59    Osteopenia of multiple sites M85.89    Essential hypertension I10    Chronic diastolic heart failure ([facility]) I50.32    Hyperlipidemia E78.5    H/O renal failure Z87.448    GERD (gastroesophageal reflux disease) K21.9    Primary osteoarthritis involving multiple joints M15.0    Immunosuppressed status ([facility]) D89.9    Left ventricular diastolic dysfunction S69.6    Noncompliance with medications Z91.14    Persistent proteinuria R80.1    Abnormal CT scan, pelvis R93.5    Adnexal fullness N94.9       Plan:  1. Diabetes mellitus. HbA1c improved to 6.6 in 11/2018 without medications, but had increased to 7.8 in 5/2019. Patient was instructed at that time to resume taking metformin  mg daily with dinner, but she did not do as instructed and did not monitor her blood sugars. She was lost to follow-up until today and reports several week history of polyuria and polydipsia, and blood sugar measured for the first time on 12/16/2019 and noted to be 575. She presented to the [facility] ED and was given 8 units of regular insulin and instructed to begin metformin 500 mg bid. Despite treatment with metformin, blood sugars noted to be 400-550 and unable to tolerate metformin ER due to GI distress. Instructed to discontinue metformin and begin Lantus 10 units qHS. Advised to monitor her fasting blood sugar each morning and evening and to provide readings to office in 3 days. Advised to titrate dose up by 2 units if blood sugar continues to be elevated.  Will adjust until reach fasting glucose goal of <130 in AM. No known evidence of retinopathy, but patient referred to Dr. Kathie Goldstein and has yet to schedule an appointment. Foot exam normal (8/2018). Will perform next visit. On Ace-I and statin. Urine microalbumin/ creatinine ratio with evidence of macroalbuminuria as discussed. Will reassess today and stress importance of achieving better blood sugar and blood pressure control. 2. S/p renal transplant from living related donor. Patient admitted to noncompliance with her immunosuppressant drugs in 11/2018, stopping prednisone about a year ago and stopping tacrolimus several months prior on her own. Noted evidence of trace protein on urinalysis and microalbuminuria at last two checks in 8/2018 (83-97 mg/g). However, significant worsening of proteinuria noted at last visit with urine microalbumin/creatinine ratio 461 mg/g (11/6/2018). Creatinine had remained stable. She admitted to noncompliance when this was brought to her attention and she became very concerned. She restarted tacrolimus at 2 mg bid and prednisone 5 mg qod. Discussed that she should contact the renal transplant clinic, particularly as they would be able to advise her on the best way to resume, but she stated that she \"will never be able to get another transplant if they find out she had stopped the medications\". Tacrolimus level was noted to be low at 1.7, and she was again advised to follow-up with transplant. Reports today that did follow-up and creatinine noted to have increased to 1.22/ eGFR 44 with urinalysis showing 100 mg /dl protein. Plan was to repeat in one month to determine need for renal biopsy if continuing to be abnormal. However, patient did not follow-up. She states that she did go for her annual exam in 7/2019 and was told that everything was stable. Will request note. 3. Hypertension. Blood pressure mildly elevated today, and patient states that she is taking lisinopril 40 mg daily and amlodipine 5 mg daily. She is not taking Imdur. Instructed to continue to monitor her blood pressure daily, but unclear if she will monitor. Renal function with creatinine 1.1/ eGFR 47 in the ED, but hyperglycemic with urine glucose 500 so likely a component of dehydration. Advised to increase water intake and will reassess today. 4. Chronic diastolic heart failure. Grade 2 with normal systolic function on echocardiogram in 10/2017. Hemodynamically stable on current regimen as above. Does not tolerate beta blockers. Instructed to restrict sodium and follow weights. 5. Hyperlipidemia. On simvastatin 40 mg daily with , indicative of poor control. Will recheck today, and adjust dose as needed. Unclear if compliant with dose. Discussed importance of weight loss and improved control of blood sugar. Continue to follow. 6. Osteopenia. Last bone density scan 5/2017 . Using femoral neck T-scores, calculated FRAX score estimates her 10 year risk of a major osteoporetic fracture at 10 % and hip fracture at 0.3 % in context of chronic steroid use and secondary osteoporosis. Also, noted report of thoracic compression deformities on chest CT scan in 11/2017 at [facility]. She admitted to no longer taking prednisone, will now resume therapy with 5 mg qod. She stopped taking alendronate although unclear when she discontinued. Continue calcium and Vitamin D. On 50,000 U dose weekly of ergocalciferol, although doubt compliance given low level. She states that she will resume today. Encouraged exercise, particularly weight bearing activities. Repeat bone density scan 6/2019 within normal limits for age. 7. Bilateral adnexal cysts. Patient is s/p BSO in 2002 and found to have endometriosis. Now with CT scan showing masses in the area of her prior ovaries. Pelvic ultrasound inadequate, and recommended transvaginal ultrasound. Patient wished to follow-up with her gynecologist. Will address if did so at next visit. .   8. Osteoarthritis.  Patient with multiple areas of arthritis contributing to her chronic pain. However, weaned from narcotics, and discontinued Percocet and MS Contin. Not experiencing increased pain currently, but will follow. Will use non-narcotic based regimen if needed. 9. GERD. Increasing symptoms noted in 5/2019, but did not pursue endoscopy as recommended by Dr. Rosmery Guillermo. Now with worsening symptoms over last several days since starting metformin. Instructed to discontinue metformin and increase dose of omeprazole to 40 mg bid. Will also prescribe Carafate as needed. Will refer for follow-up with Dr. Rosmery Guillermo. Upper endoscopy in 5/2014 negative. Using Zofran for nausea as needed. 10. Chronic constipation. Most likely related to chronic narcotics. Improved with weaning. No longer taking Linzess. 11. Squamous cell carcinoma of skin. Under care of Dr. Ruthanne Alpers. Being treated with Efudex. Related to chronic immunosuppression. 12. Depression. No longer taking Prozac and reports increasing anxiety. Started on Zoloft but states that no longer taking. Reported insomnia since restarting tacrolimus and restarted on Ambien. Follow. 13. Obesity. Emphasized importance of lifestyle modifications, including diet, exercise, and weight loss. Will continue to address. 13. Health maintenance. Will give influenza vaccine today. Received Pneumovax in past, but due for second dose. Will give next visit. Given Tdap. Will address Shingrix vaccine, although unclear if advisable in immunosuppressed. Completed mammogram. Stressed need for eye exam. Referred to Dr. Nicol Richards for eye exams previously. Will place referral again and encouraged to schedule. Colonoscopy due 2021. Vitamin D level low, and states will resume ergocalciferol. Total time: 40 minutes spent with the patient in face-to-face consultation of which greater than 50% was spent on counseling, answering questions and/or coordination of care. Complex medical review and management performed.     Patient understands recommendations and agrees with plan. Follow-up in 2 weeks. [facility] Outpatient Visit on 12/19/2019   Component Date Value Ref Range Status    WBC 12/19/2019 8.9  4.6 - 13.2 K/uL Final    RBC 12/19/2019 4.10* 4.20 - 5.30 M/uL Final    HGB 12/19/2019 12.7  12.0 - 16.0 g/dL Final    HCT 12/19/2019 38.2  35.0 - 45.0 % Final    MCV 12/19/2019 93.2  74.0 - 97.0 FL Final    MCH 12/19/2019 31.0  24.0 - 34.0 PG Final    MCHC 12/19/2019 33.2  31.0 - 37.0 g/dL Final    RDW 12/19/2019 13.6  11.6 - 14.5 % Final    PLATELET 69/60/0477 319  135 - 420 K/uL Final    MPV 12/19/2019 9.7  9.2 - 11.8 FL Final    NEUTROPHILS 12/19/2019 72  40 - 73 % Final    LYMPHOCYTES 12/19/2019 17* 21 - 52 % Final    MONOCYTES 12/19/2019 8  3 - 10 % Final    EOSINOPHILS 12/19/2019 2  0 - 5 % Final    BASOPHILS 12/19/2019 1  0 - 2 % Final    ABS. NEUTROPHILS 12/19/2019 6.4  1.8 - 8.0 K/UL Final    ABS. LYMPHOCYTES 12/19/2019 1.5  0.9 - 3.6 K/UL Final    ABS. MONOCYTES 12/19/2019 0.7  0.05 - 1.2 K/UL Final    ABS. EOSINOPHILS 12/19/2019 0.2  0.0 - 0.4 K/UL Final    ABS.  BASOPHILS 12/19/2019 0.1  0.0 - 0.1 K/UL Final    DF 12/19/2019 AUTOMATED    Final    Hemoglobin A1c 12/19/2019 9.0* 4.2 - 5.6 % Final    Est. average glucose 12/19/2019 212  mg/dL Final    Magnesium 12/19/2019 1.9  1.6 - 2.6 mg/dL Final    Sodium 12/19/2019 139  136 - 145 mmol/L Final    Potassium 12/19/2019 4.3  3.5 - 5.5 mmol/L Final    Chloride 12/19/2019 105  100 - 111 mmol/L Final    CO2 12/19/2019 24  21 - 32 mmol/L Final    Anion gap 12/19/2019 10  3.0 - 18 mmol/L Final    Glucose 12/19/2019 331* 74 - 99 mg/dL Final    BUN 12/19/2019 17  7.0 - 18 MG/DL Final    Creatinine 12/19/2019 1.09  0.6 - 1.3 MG/DL Final    BUN/Creatinine ratio 12/19/2019 16  12 - 20   Final    GFR est AA 12/19/2019 >60  >60 ml/min/1.73m2 Final    GFR est non-AA 12/19/2019 50* >60 ml/min/1.73m2 Final    Calcium 12/19/2019 9.2  8.5 - 10.1 MG/DL Final  Bilirubin, total 12/19/2019 0.4  0.2 - 1.0 MG/DL Final    ALT (SGPT) 12/19/2019 40  13 - 56 U/L Final    AST (SGOT) 12/19/2019 21  10 - 38 U/L Final    Alk. phosphatase 12/19/2019 80  45 - 117 U/L Final    Protein, total 12/19/2019 8.1  6.4 - 8.2 g/dL Final    Albumin 12/19/2019 4.3  3.4 - 5.0 g/dL Final    Globulin 12/19/2019 3.8  2.0 - 4.0 g/dL Final    A-G Ratio 12/19/2019 1.1  0.8 - 1.7   Final    TSH 12/19/2019 1.98  0.36 - 3.74 uIU/mL Final    Color 12/19/2019 YELLOW    Final    Appearance 12/19/2019 CLEAR    Final    Specific gravity 12/19/2019 1.019  1.005 - 1.030   Final    pH (UA) 12/19/2019 5.5  5.0 - 8.0   Final    Protein 12/19/2019 100* NEG mg/dL Final    Glucose 12/19/2019 >1,000* NEG mg/dL Final    Ketone 12/19/2019 NEGATIVE   NEG mg/dL Final    Bilirubin 12/19/2019 NEGATIVE   NEG   Final    Blood 12/19/2019 NEGATIVE   NEG   Final    Urobilinogen 12/19/2019 0.2  0.2 - 1.0 EU/dL Final    Nitrites 12/19/2019 NEGATIVE   NEG   Final    Leukocyte Esterase 12/19/2019 NEGATIVE   NEG   Final    WBC 12/19/2019 0 to 1  0 - 4 /hpf Final    RBC 12/19/2019 NONE  0 - 5 /hpf Final    Epithelial cells 12/19/2019 FEW  0 - 5 /lpf Final    Bacteria 12/19/2019 NEGATIVE   NEG /hpf Final    Vitamin D 25-Hydroxy 12/19/2019 20.8* 30 - 100 ng/mL Final    Microalbumin,urine random 12/19/2019 75.40* 0 - 3.0 MG/DL Final    Creatinine, urine 12/19/2019 42.00  30 - 125 mg/dL Final    Microalbumin/Creat ratio (mg/g cre* 12/19/2019 1,795* 0 - 30 mg/g Final    LIPID PROFILE 12/19/2019        Final    Cholesterol, total 12/19/2019 280* <200 MG/DL Final    Triglyceride 12/19/2019 441* <150 MG/DL Final    HDL Cholesterol 12/19/2019 49  40 - 60 MG/DL Final    LDL, calculated 12/19/2019 LDL AND VLDL CHOLESTEROL NOT CALCULATED WHEN TRIGLYCERIDES >400 MG/DL OR HDL CHOLESTEROL <20 MG/DL  0 - 100 MG/DL Final    VLDL, calculated 12/19/2019 Calculation not valid with this patient's other Lipid values. MG/DL Final    CHOL/HDL Ratio 12/19/2019 5.7* 0 - 5.0   Final     Called and discussed lab results with patient. Discussed the following:     * Increase in HbA1c to 9.0 and instructed to continue with plan to start Lantus 10 U daily. Stressed importance of compliance. *Advised of worsening albuminuria at 1795 mg/g, likely related to poor blood sugar and blood pressure control. *Discussed elevated creatinine to 1.09/ eGFR 50. Likely contributed to by dehydration given glycosuria. *Discussed low Vitamin D level and advised to restart ergocalciferol. Lipid panel uncontrolled. Will change to rosuvastatin 20 mg daily.

## 2019-12-23 NOTE — TELEPHONE ENCOUNTER
Called and spoke with patient. Instructed to increase Lantus to 15 U nightly. Will send updated blood sugar readings on 12/26/2019 or call sooner if no improvement.

## 2019-12-26 ENCOUNTER — TELEPHONE (OUTPATIENT)
Dept: INTERNAL MEDICINE CLINIC | Age: 64
End: 2019-12-26

## 2019-12-26 RX ORDER — INSULIN GLARGINE 100 [IU]/ML
20 INJECTION, SOLUTION SUBCUTANEOUS
Qty: 1 ADJUSTABLE DOSE PRE-FILLED PEN SYRINGE | Refills: 0
Start: 2019-12-26 | End: 2019-12-26 | Stop reason: SDUPTHER

## 2019-12-26 RX ORDER — INSULIN GLARGINE 100 [IU]/ML
20 INJECTION, SOLUTION SUBCUTANEOUS
Qty: 2 ADJUSTABLE DOSE PRE-FILLED PEN SYRINGE | Refills: 2 | Status: SHIPPED | OUTPATIENT
Start: 2019-12-26 | End: 2019-12-27

## 2019-12-26 NOTE — TELEPHONE ENCOUNTER
----- Message from Codie Rice LPN sent at 21/44/3894  9:55 AM EST -----  Regarding: FW: Non-Urgent Medical Question  Contact: [phone]    ----- Message -----  From: Luis Saldivar  Sent: 12/26/2019   9:48 AM EST  To: [facility]  Subject: Non-Urgent Medical Question                      Insulin results,                   Dec.23  7:30a  345     Dec.24 7:30a 295     Dec.25  7a  298       Dec.26 7:30a 340                  12a      300                        3p   285              12:3op 288                          5p 292                       8p   285                8:30p 296

## 2019-12-26 NOTE — TELEPHONE ENCOUNTER
Spoke with patient and instructed her that based on blood sugar readings that she provided, she should increase dose of Lantus to 20 U daily. Advised her to continue to monitor her blood sugars and send readings again in one week. Goal fasting morning blood sugar is < 130. Have asked Leticia Sears to check in with patient in my absence next week. Appreciate her help! New script for Lantus for 20 U dose sent to [facility].

## 2019-12-27 NOTE — TELEPHONE ENCOUNTER
Changed order from insulin glargine (Lantus) to insulin detemir (Levemir). It is the same dosing since it is equivalent in strength and action. Script sent to [facility]. Please let patient know.

## 2020-01-01 ENCOUNTER — TELEPHONE (OUTPATIENT)
Dept: INTERNAL MEDICINE CLINIC | Age: 65
End: 2020-01-01

## 2020-01-01 NOTE — TELEPHONE ENCOUNTER
She has been using 20 units of Levemir, glucose was 411 earlier today, it is still well over 300 without eating today. I told  her I would recommend 26 or 30 units tonight, I thought 30 would not cause hypoglycemia, so she will use 30 each evening for now. This call was taken at 2:45 PM January 1.

## 2020-01-03 ENCOUNTER — TELEPHONE (OUTPATIENT)
Dept: INTERNAL MEDICINE CLINIC | Age: 65
End: 2020-01-03

## 2020-01-03 NOTE — TELEPHONE ENCOUNTER
Called patient to follow up about blood sugar values per request from Dr. Bobo Suarez. Patient states she is currently taking Lantus 30 units at night after dinner - states she started this dose on Wednesday night 1/1/20 after speaking with Dr. Oscar Martinez. States she will start the Levemir tomorrow when she runs out of her Lantus. Reports checking BG - fasting and before meals; states BG seems higher in the morning  -Wednesday , before lunch 483, before dinner 398  -Yesterday , before lunch 398, before dinner 276  -Today     Instructed patient to increase to Lantus 32 units daily for now and continue checking BG TID as she has been and also check BG 2 hours after dinner. Discussed that if over the weekend when she switches to Levemir she notices that her BG remains elevated, she can increase by another 2 units to Levemir 34 units daily. Consider splitting Levemir dose to BID dosing in the future. Will follow up with patient on Monday to assess BG. Discussed that patient may need mealtime insulin doses if postprandial numbers remain high. Will discuss this with Dr. Bobo Suarez. Patient expressed understanding and had no further questions. Patient understands that she is to call on-call doctor at clinic over the weekend if BG remains elevated or if she has lows. Thank you for allowing me to participate in the care of this patient.     Cheyenne Krishnan, PharmD  Clinical Pharmacist Specialist

## 2020-01-06 ENCOUNTER — TELEPHONE (OUTPATIENT)
Dept: INTERNAL MEDICINE CLINIC | Age: 65
End: 2020-01-06

## 2020-01-06 NOTE — TELEPHONE ENCOUNTER
Called patient to follow up about blood sugar values per request from Dr. Herson Blount. Patient state she is currently taking Levemir 32 units. States since she's been on Levemir, she's had symptoms including feeling \"very wiped out, dragging\" and nausea or constipation, especially when she is first waking up. States the symptoms started yesterday. States on Friday night she increased Lantus to 32 units per our discussion from last telephone call. When she ran out of Lantus, she started Levemir 32 units on Saturday night after dinner. Reports BG readings as the following:    Friday - , 349 after breakfast, 349 1 hour after dinner  Saturday - , 260 before lunch, 349 1 hour after dinner  Sunday - , 188 after breakfast, 240 1 hour after dinner  Today - , 198 before lunch    Instructed patient to decrease Levemir to 28 units daily since patient is concerned that her recent decrease in BG is causing her symptoms of fatigue, and she also reports she has been eating less due to stomach upset. Reviewed appropriate treatment of hypoglycemia with patient. Will discuss with Dr. Herson Blount and follow up with patient tomorrow. Will consider splitting Levemir total daily dose into BID dosing. Thank you for allowing me to participate in the care of this patient.     Von Malloy, PharmD  Clinical Pharmacist Specialist

## 2020-01-07 ENCOUNTER — TELEPHONE (OUTPATIENT)
Dept: INTERNAL MEDICINE CLINIC | Age: 65
End: 2020-01-07

## 2020-01-07 NOTE — TELEPHONE ENCOUNTER
Called patient to follow up about blood sugar values per request from Dr. Federica Coleman. States last night she took Levemir 15 units since she was worried about having a low BG. Reports BG readings as the following:  -Last night before dinner was 140, then was up to 190 at night  -Today BG this AM was 196, then 164 when checked 2 hours after lunch    States her grandson was sick this weekend; states she had diarrhea last night and all day today. States she hasn't been eating much today but has been eating the same amount as she did yesterday. States her stomach has been hurting since Saturday, which she thinks is due to virus as her whole family is sick. Discussed that depending on her food intake today and her BG reading tonight, she may decrease Levemir to 10 or 12 units if she is worried about having a low. Discussed importance of her staying hydrated and trying to maintain food intake. Discussed that she may drink a few sips of juice or gatorade if she feels weak from her sugar being lower than she was previously used to. Discussed that she should check BG if she is feeling faint to assess for a low BG (BG <70) and treat as appropriate. She has appointment scheduled with Dr. Federica Coleman tomorrow. Instructed patient to call office tomorrow morning to report how she is feeling and to assess if she should come in or have appointment rescheduled. Instructed patient to bring her glucometer and BG log to her appointment with Dr. Federica Coleman. Patient expressed understanding and had no further questions. Thank you for allowing me to participate in the care of this patient.     Oliverio Sapp, PharmD  Clinical Pharmacist Specialist

## 2020-01-08 ENCOUNTER — OFFICE VISIT (OUTPATIENT)
Dept: INTERNAL MEDICINE CLINIC | Age: 65
End: 2020-01-08

## 2020-01-08 VITALS
TEMPERATURE: 98.8 F | HEART RATE: 85 BPM | HEIGHT: 63 IN | DIASTOLIC BLOOD PRESSURE: 80 MMHG | BODY MASS INDEX: 32.96 KG/M2 | OXYGEN SATURATION: 97 % | WEIGHT: 186 LBS | SYSTOLIC BLOOD PRESSURE: 118 MMHG | RESPIRATION RATE: 16 BRPM

## 2020-01-08 DIAGNOSIS — F41.1 GAD (GENERALIZED ANXIETY DISORDER): ICD-10-CM

## 2020-01-08 DIAGNOSIS — R93.5 ABNORMAL CT SCAN, PELVIS: ICD-10-CM

## 2020-01-08 DIAGNOSIS — E78.5 HYPERLIPIDEMIA, UNSPECIFIED HYPERLIPIDEMIA TYPE: ICD-10-CM

## 2020-01-08 DIAGNOSIS — D84.9 IMMUNOSUPPRESSED STATUS (HCC): ICD-10-CM

## 2020-01-08 DIAGNOSIS — R80.9 TYPE 2 DIABETES MELLITUS WITH MICROALBUMINURIA, WITH LONG-TERM CURRENT USE OF INSULIN (HCC): Primary | ICD-10-CM

## 2020-01-08 DIAGNOSIS — Z23 ENCOUNTER FOR IMMUNIZATION: ICD-10-CM

## 2020-01-08 DIAGNOSIS — K21.9 GASTROESOPHAGEAL REFLUX DISEASE WITHOUT ESOPHAGITIS: ICD-10-CM

## 2020-01-08 DIAGNOSIS — E11.29 TYPE 2 DIABETES MELLITUS WITH MICROALBUMINURIA, WITH LONG-TERM CURRENT USE OF INSULIN (HCC): Primary | ICD-10-CM

## 2020-01-08 DIAGNOSIS — I10 ESSENTIAL HYPERTENSION: ICD-10-CM

## 2020-01-08 DIAGNOSIS — I50.32 CHRONIC DIASTOLIC HEART FAILURE (HCC): ICD-10-CM

## 2020-01-08 DIAGNOSIS — Z87.448 H/O RENAL FAILURE: ICD-10-CM

## 2020-01-08 DIAGNOSIS — Z79.4 TYPE 2 DIABETES MELLITUS WITH MICROALBUMINURIA, WITH LONG-TERM CURRENT USE OF INSULIN (HCC): Primary | ICD-10-CM

## 2020-01-08 DIAGNOSIS — Z94.0 KIDNEY TRANSPLANT STATUS, LIVING RELATED DONOR: ICD-10-CM

## 2020-01-08 DIAGNOSIS — F51.04 CHRONIC INSOMNIA: ICD-10-CM

## 2020-01-08 DIAGNOSIS — Z86.59 HISTORY OF DEPRESSION: ICD-10-CM

## 2020-01-08 DIAGNOSIS — Z91.14 NONCOMPLIANCE WITH MEDICATIONS: ICD-10-CM

## 2020-01-08 DIAGNOSIS — R80.1 PERSISTENT PROTEINURIA: ICD-10-CM

## 2020-01-08 NOTE — PROGRESS NOTES
Chief Complaint   Patient presents with    Diabetes     3 week follow up with lab review. Health Maintenance Due   Topic Date Due    EYE EXAM RETINAL OR DILATED  09/18/1965    Shingrix Vaccine Age 50> (1 of 2) 09/18/2004    FOOT EXAM Q1  08/13/2019    GLAUCOMA SCREENING Q2Y  09/18/2019    Pneumococcal 65+ years (2 of 2 - PPSV23) 11/28/2019     Patient given pneumococcal vaccine, Pneumovax 23, in right deltoid, per verbal order from Dr. Ila Sanon with read back. Instructed patient to sit and wait 10-20 minutes before leaving the premises so that we can watch for any complications or adverse reactions. Patient given vaccine information statement handout before vaccine was given. Patient tolerated well without adverse reactions or complications. 1. Have you been to the ER, urgent care clinic or hospitalized since your last visit? NO.     2. Have you seen or consulted any other health care providers outside of the [address] [place] [address] since your last visit (Include any pap smears or colon screening)? NO      Learning Assessment 5/15/2019   PRIMARY LEARNER Patient   HIGHEST LEVEL OF EDUCATION - PRIMARY LEARNER  2 YEARS OF COLLEGE   BARRIERS PRIMARY LEARNER NONE   CO-LEARNER CAREGIVER No   PRIMARY LANGUAGE ENGLISH   LEARNER PREFERENCE PRIMARY LISTENING     READING     DEMONSTRATION   ANSWERED BY patient   RELATIONSHIP SELF     Abuse Screening Questionnaire 1/8/2020   Do you ever feel afraid of your partner? N   Are you in a relationship with someone who physically or mentally threatens you? N   Is it safe for you to go home? Y     3 most recent PHQ Screens 1/8/2020   PHQ Not Done -   Little interest or pleasure in doing things Not at all   Feeling down, depressed, irritable, or hopeless Not at all   Total Score PHQ 2 0     Fall Risk Assessment, last 12 mths 1/8/2020   Able to walk? Yes   Fall in past 12 months?  No

## 2020-01-08 NOTE — PATIENT INSTRUCTIONS
Continue to monitor blood sugars. Goal fasting morning blood sugar . Please call with questions. Learning About Meal Planning for Diabetes  Why plan your meals? Meal planning can be a key part of managing diabetes. Planning meals and snacks with the right balance of carbohydrate, protein, and fat can help you keep your blood sugar at the target level you set with your doctor. You don't have to eat special foods. You can eat what your family eats, including sweets once in a while. But you do have to pay attention to how often you eat and how much you eat of certain foods. You may want to work with a dietitian or a certified diabetes educator. He or she can give you tips and meal ideas and can answer your questions about meal planning. This health professional can also help you reach a healthy weight if that is one of your goals. What plan is right for you? Your dietitian or diabetes educator may suggest that you start with the plate format or carbohydrate counting. The plate format  The plate format is a simple way to help you manage how you eat. You plan meals by learning how much space each food should take on a plate. Using the plate format helps you spread carbohydrate throughout the day. It can make it easier to keep your blood sugar level within your target range. It also helps you see if you're eating healthy portion sizes. To use the plate format, you put non-starchy vegetables on half your plate. Add meat or meat substitutes on one-quarter of the plate. Put a grain or starchy vegetable (such as brown rice or a potato) on the final quarter of the plate. You can add a small piece of fruit and some low-fat or fat-free milk or yogurt, depending on your carbohydrate goal for each meal.  Here are some tips for using the plate format:  · Make sure that you are not using an oversized plate. A 9-inch plate is best. Many restaurants use larger plates.   · Get used to using the plate format at home. Then you can use it when you eat out. · Write down your questions about using the plate format. Talk to your doctor, a dietitian, or a diabetes educator about your concerns. Carbohydrate counting  With carbohydrate counting, you plan meals based on the amount of carbohydrate in each food. Carbohydrate raises blood sugar higher and more quickly than any other nutrient. It is found in desserts, breads and cereals, and fruit. It's also found in starchy vegetables such as potatoes and corn, grains such as rice and pasta, and milk and yogurt. Spreading carbohydrate throughout the day helps keep your blood sugar levels within your target range. Your daily amount depends on several things, including your weight, how active you are, which diabetes medicines you take, and what your goals are for your blood sugar levels. A registered dietitian or diabetes educator can help you plan how much carbohydrate to include in each meal and snack. A guideline for your daily amount of carbohydrate is:  · 45 to 60 grams at each meal. That's about the same as 3 to 4 carbohydrate servings. · 15 to 20 grams at each snack. That's about the same as 1 carbohydrate serving. The Nutrition Facts label on packaged foods tells you how much carbohydrate is in a serving of the food. First, look at the serving size on the food label. Is that the amount you eat in a serving? All of the nutrition information on a food label is based on that serving size. So if you eat more or less than that, you'll need to adjust the other numbers. Total carbohydrate is the next thing you need to look for on the label. If you count carbohydrate servings, one serving of carbohydrate is 15 grams. For foods that don't come with labels, such as fresh fruits and vegetables, you'll need a guide that lists carbohydrate in these foods. Ask your doctor, dietitian, or diabetes educator about books or other nutrition guides you can use.   If you take insulin, you need to know how many grams of carbohydrate are in a meal. This lets you know how much rapid-acting insulin to take before you eat. If you use an insulin pump, you get a constant rate of insulin during the day. So the pump must be programmed at meals to give you extra insulin to cover the rise in blood sugar after meals. When you know how much carbohydrate you will eat, you can take the right amount of insulin. Or, if you always use the same amount of insulin, you need to make sure that you eat the same amount of carbohydrate at meals. If you need more help to understand carbohydrate counting and food labels, ask your doctor, dietitian, or diabetes educator. How do you get started with meal planning? Here are some tips to get started:  · Plan your meals a week at a time. Don't forget to include snacks too. · Use cookbooks or online recipes to plan several main meals. Plan some quick meals for busy nights. You also can double some recipes that freeze well. Then you can save half for other busy nights when you don't have time to cook. · Make sure you have the ingredients you need for your recipes. If you're running low on basic items, put these items on your shopping list too. · List foods that you use to make breakfasts, lunches, and snacks. List plenty of fruits and vegetables. · Post this list on the refrigerator. Add to it as you think of more things you need. · Take the list to the store to do your weekly shopping. Follow-up care is a key part of your treatment and safety. Be sure to make and go to all appointments, and call your doctor if you are having problems. It's also a good idea to know your test results and keep a list of the medicines you take. Where can you learn more? Go to http://kolby-yuli.info/. Cruz Reza in the search box to learn more about \"Learning About Meal Planning for Diabetes. \"  Current as of: April 16, 2019  Content Version: 12.2  © 6083-7282 [employer]. Care instructions adapted under license by [employer] (which disclaims liability or warranty for this information). If you have questions about a medical condition or this instruction, always ask your healthcare professional. [address] any warranty or liability for your use of this information.

## 2020-01-12 ENCOUNTER — TELEPHONE (OUTPATIENT)
Dept: INTERNAL MEDICINE CLINIC | Age: 65
End: 2020-01-12

## 2020-01-12 DIAGNOSIS — R19.7 DIARRHEA OF PRESUMED INFECTIOUS ORIGIN: Primary | ICD-10-CM

## 2020-01-12 NOTE — TELEPHONE ENCOUNTER
Received call from patient and her  regarding persistence of diarrhea. Reports continuing although denies any fever, chills, or abdominal pain. No recent antibiotic use. However, will check stool for routine cultures, O/P and C.diff. Please prepare supplies for patient to . Orders placed. Thanks.

## 2020-01-13 ENCOUNTER — DOCUMENTATION ONLY (OUTPATIENT)
Dept: INTERNAL MEDICINE CLINIC | Age: 65
End: 2020-01-13

## 2020-01-13 NOTE — PROGRESS NOTES
HPI:   Jocelyn Meraz is a 71y.o. year old female who presents today for follow-up. She has a history of hypertension, hyperlipidemia, chronic diastolic heart failure, diabetes mellitus, ESRD s/p living donor cadaveric renal transplant (4/1980), osteopenia, osteoarthritis, lumbar degenerative disease, chronic pain syndrome, squamous cell skin cancers, depression, anxiety, GERD, and noncompliance. She was last seen on 12/19/2019 after antonino lost to follow-up since 5/2019. She reported that she has been experiencing polyuria, polydipsia, and fatigue for several weeks. She stated that on 12/16/2019, she checked her blood sugar at home and it measured 575. She states that she took two 500 mg metformin and presented to [facility] ED for evaluation. She stated that initial blood sugar was elevated at 343, and she was administered 8 units of regular insulin and IV fluids. Evaluation included Na 134, Cl 93, K 4.2, creatinine 1.1/ eGFR 47, Ca 10.1, urinalysis protein 100, glucose >500, chest x-ray negative, EKG sinus tachycardia 100. She was subsequently discharged on metformin 500 mg bid and reported that her blood sugars had been measuring between 400-550 since discharge. She stated that she was continuing to take metformin, but had developed upper abdominal pain, cramping, and diarrhea. She was advised to discontinue metformin and begin Lantus 10 U qHS. She has titrated her dose according to her fasting morning blood sugars, and had increased to 32 U qHS, and then switched to Levemir on 1/4/2020 due to formulary issues. She subsequently developed a diarrheal illness and had significantly decreased po intake. She began noticing her blood sugar was decreasing and due to concerns regarding possible hypoglycemia, she decreased her dose of Levemir to 15 U qHS, with reported fasting blood sugars ranging 150-190.  She presents today and reports that she has continued to have watery diarrhea several times a day, although following a dose of Imodium today, her diarrhea seems to have ceased. She states that she is hopeful that she may be recovering from the illness. She states that both her  and grandson have the same illness. She states that she has noticed some crampy abdominal pain, but denies any fever, chills, nausea, or vomiting. She is otherwise without new complaints. On 5/8/2019, she presented to [facility] ED complaining of thoracic back pain. She informed the ED physicians that she had weaned herself from her chronic pain medications and was requesting pain medicine since her pain had increased. She was given a single dose of gabapentin 300 mg, Zofran, and two Percocet, and she was discharged with a prescription for gabapentin 100 mg tid. Due to continued pain, she presented to the [facility] ED on 5/10/2019 and was complaining of sharp, stabbing mid abdominal pain. She was found to be tender in the epigastric and right abdomen. Lab evaluation included Hb 11.8/ Hct 36.2, creatinine 1.04/ eGFR 53, lipase 197, troponin and urinalysis negative. and an abdominal/pelvic CT scan was obtained showing no acute abdominal pathology, but an incidental finding of a suggestion of bilateral adnexal cysts, right measuring 1.6 cm and the left measuring 1.3 cm. She was discharged with a prescription for famotidine. She was also referred to Dr. Randee Rivero for follow-up. She expresses concern regarding the findings on the CT scan suggestive of bilateral adnexal cysts. She states that she had a complete hysterectomy many years ago and had her ovaries removed in 2002 due to endometriosis. Review of her record shows a pelvic ultrasound from 11/11/2002 showing bilateral pelvic masses in the area of the ovaries, the left appearing to represent the ovary with an adjacent fluid collection and the right being an indeterminate mass. The patient states that this is what prompted the removal of her ovaries.  She had had a hysterectomy several years prior to this. She denies any lower abdominal pain or bloating. She underwent evaluation by Dr. Sendy Pabon on 6/18/2019, and recommendation was to proceed with an upper endoscopy, but the patient cancelled the appointment. She also underwent a pelvic ultrasound (5/23/2019) which did not confirm findings seen on CT scan, but recommendation was to proceed with a transvaginal ultrasound. However, the patient stated that she wished to follow-up with her gynecologist.     On 11/6/2018, she admitted that she was no longer taking her immunosuppressant medications for her kidney transplant. She stated that she had stopped taking prednisone for about a year, and then stopped tacrolimus for several months although is not specific as to when. However, review of record showed that her tacrolimus level was therapeutic at 5.2 on 4/27/2018 at her annual follow-up visit in the renal transplant clinic. She stated that she was told by one of her kidney doctors that \"her brother must have been a twin for her transplanted kidney to have lasted this long\". She stated that she believed him even though her brother is 11years older, and she decided on her own that she no longer needed to take her immunosuppressants. She states that she also did not like the fact that she would \"get so many colds\" while taking the medications. She became concerned when she was informed of the development of increasing proteinuria on he labs. She was seen in follow-up on 11/21/2018 and reported that she had restarted her kidney transplant immunosuppressant medications, and was taking tacrolimus 2 mg bid and prednisone 5 mg every other day. Her tacrolimus level was measured at 1.7 and she continued to show proteinuria with urine microalbumin/ creatinine ratio 150 mg/g, and urinalysis measuring 30 mg/dl protein. Her creatinine remained relatively stable at 0.95/ eGFR 59.  She subsequently followed up with Dr. Milan Cheadle, and repeat labs showed an increase in her creatinine to 1.22/ eGFR 44, urinalysis showed 100 mg/dl protein, random urine protein 61 mg/dl with urine creatinine 81.2. She stated that she was instructed to have follow-up labs in 4 weeks, and that she may require a renal biopsy if they continued to be abnormal. She also stated that her antihypertensive regimen was changed with increase in her dose of lisinopril to 40 mg bid, and amlodipine and Imdur were discontinued. However, she has not followed up with Dr. Bernadine De Santiago since her last visit in 1/2019. She has a history of hypertension, treated previously with lisinopril, Imdur, amlodipine, and lasix as needed. She has not been monitoring her blood pressure at home. She also has a history of hyperlipidemia, treated previously with high intensity dose simvastatin at 80 mg daily. She was changed to rosuvastatin in 8/2018, but noticed severe muscle aches and stopped taking it with improvement. She was subsequently restarted on simvastatin at 40 mg dose. In 10/2017, she was admitted to [facility] with dyspnea on exertion, and chest xray revealed mild cardiac enlargement with pulmonary vascular congestion and diffusely increased interstitial markings. Echocardiogram showed normal LV size and function (EF 65%), mild concentric LVH, grade 2 diastolic dysfunction, and a pharmacologic nuclear stress test was a normal low risk study without evidence of ischemia or prior infarction, and calculated EF 70%. She was diagnosed with acute on chronic diastolic heart failure, and treated with lasix and diuresed approximately five liters with improvement. She was intolerant to beta blockers. She currently denies any chest pain, shortness of breath at rest or with exertion, palpitations, lightheadedness, PND, orthopnea, or edema. She is now being followed by Dr. Shilpa Washington. She has a history of diabetes mellitus, which had not been treated with medication until recent events as outlined.  She denies any polyuria, polydipsia, nocturia, or blurry vision, and has no history of retinopathy, neuropathy, or nephropathy. She has not been having regular eye exams. She has a history of renal failure and is s/p a living donor cadaveric renal transplant from her brother in 4/1980. She had been noncompliant with her regimen as discussed, but has resumed taking tacrolimus and prednisone one month ago. She is followed in the [facility] by Dr. Guillermina Rubio and Dr. Allen Wells. She has secondary osteopenia due to chronic steroid use, and was being treated with alendronate. However, she states that she discontinued taking it. Her last bone density study was in 5/2017 showing T-scores:  femoral neck left -0.1, lumbar -0.4, and distal radius -1.2. She continues to take calcium and Vitamin D supplements. She has no history of pathologic fractures. She has a history of osteoarthritis and is s/p right hip replacement by Dr. Kendrick Yusuf in 2015. She also has chronic neck and low back pain secondary to degenerative disease. She had been followed previously in the [facility] pain management center, but has weaned herself off of pain medications. She has stopped taking Percocet and MS Contin. She states that she has not noticed a significant increase in her neck or low back pain. She denies any fevers, chills, weight change, saddle paresthesia, neurogenic bowel or bladder symptoms, or recent trauma. She has a history of GERD, treated with omeprazole. She had an upper endoscopy in 5/2014 by Dr. Clive Kirkland which was normal. She also had difficulty with chronic constipation, secondary to narcotic use. She was being treated with Linzess, although discontinued after she weaned herself from taking narcotics. She had a screening colonoscopy in 6/2011 which showed evidence of diverticulosis. Follow-up recommended for 10 years.  She denies any abdominal pain, nausea, vomiting, melena, hematochezia, or change in bowel movements. She has a history of multiple squamous cell carcinomas of the skin. She is being followed closely by Dr. Felipe Maat. She reports that she was treated with Efudex in 8/2018 with subsequent exfoliation. She does report that she was treated with doxycycline in 6/2018 for a possible MRSA infection on her wrist. She states that her grandson had a confirmed infection and she was taking care of him. She states that it completely resolved. She has a history of depression and anxiety, treated previously with Prozac. However, she states that she is no longer taking it. She does report increasing anxiety symptoms today. She also is using Ambien for insomnia. She states that she feels that her symptoms are currently well controlled. Past Medical History:   Diagnosis Date    Calculus of kidney     Chronic diastolic heart failure ([address] 8/13/2018    Chronic pain syndrome 7/16/2013    Constipation due to pain medication 4/8/2016    Degeneration of lumbar or lumbosacral intervertebral disc     Depression     Diabetes mellitus     Essential hypertension 8/13/2018    KIM (generalized anxiety disorder) 4/29/2015    H/O renal failure 8/13/2018    Secondary to glomerulonephritis.   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    Type 2 diabetes mellitus with microalbuminuria ([address] 11/10/2015     Past Surgical History:   Procedure Laterality Date    ABDOMEN SURGERY PROC UNLISTED      HX GYN      Ovary    HX HEENT      nose surgery    HX HYSTERECTOMY      HX ORTHOPAEDIC  [id number]    hip replacement    HX UROLOGICAL      Kidney Transplant    NEPHRECTOMY      bilateral     Current Outpatient Medications   Medication Sig    DOXYCYCLINE CALCIUM PO Take  by mouth.  insulin detemir U-100 (LEVEMIR FLEXTOUCH) 100 unit/mL (3 mL) inpn 20 Units by SubCUTAneous route nightly. (Patient taking differently: 32 Units by SubCUTAneous route nightly.)    rosuvastatin (CRESTOR) 20 mg tablet Take 1 Tab by mouth nightly.  ergocalciferol (ERGOCALCIFEROL) 50,000 unit capsule Take 1 Cap by mouth every seven (7) days.  sucralfate (CARAFATE) 100 mg/mL suspension Take 10 mL by mouth four (4) times daily. Indications: reflux/ abdominal pain    Insulin Needles, Disposable, 31 gauge x \" ndle Use with Lantus insulin qhs.  amLODIPine (NORVASC) 5 mg tablet Take 1 Tab by mouth daily.  lisinopril (PRINIVIL, ZESTRIL) 40 mg tablet TAKE 1 TABLET BY MOUTH DAILY    omeprazole (PRILOSEC) 40 mg capsule Take 1 Cap by mouth daily. (Patient taking differently: Take 80 mg by mouth daily.)    predniSONE (DELTASONE) 5 mg tablet Take 2.5 mg by mouth every other day.  tacrolimus (PROGRAF) 1 mg capsule TAKE 2 CAPSULES BY MOUTH TWICE DAILY     No current facility-administered medications for this visit. Allergies and Intolerances: Allergies   Allergen Reactions    Asa-Acetaminophen-Caff-Potass Unknown (comments)    Aspirin Other (comments)    Beta-Blockers (Beta-Adrenergic Blocking Agts) Other (comments)     [facility].     Bystolic [Nebivolol] Other (comments)    Ibuprofen Other (comments)     Renal transplant patient    Lipitor [Atorvastatin] Unknown (comments)    Nsaids (Non-Steroidal Anti-Inflammatory Drug) Unknown (comments)    Sirolimus Other (comments)     pneumonitis    Toprol Xl [Metoprolol Succinate] Other (comments)     Asthma    Venlafaxine Other (comments)     Patient does not recall taking this medication     Family History: Her sister was diagnosed with breast cancer at age 61; her father had CAD and  during CABG  Family History   Problem Relation Age of Onset    Hypertension Father    Isaiah Heart Disease Father     Alcohol abuse Father     Arthritis-osteo Father     Arthritis-osteo Brother     Cancer Maternal Grandmother         breast    Lung Disease Sister     High Cholesterol Mother     Arthritis-osteo Mother     Arthritis-osteo Sister     Liver Disease Sister      Social History:   She  reports that she quit smoking about 33 years ago. She has a 15.00 pack-year smoking history. She has never used smokeless tobacco. Smoked  2-3 ppd for 10 years, stopping 20 years ago. She is  with one adopted son. She is a retired . Social History     Substance and Sexual Activity   Alcohol Use No     Immunization History:  Immunization History   Administered Date(s) Administered    Influenza Vaccine 12/18/2012, 11/11/2013    Influenza Vaccine (Quad) PF 11/10/2015, 11/21/2018    Influenza Vaccine (Tri) Adjuvanted 12/19/2019    Pneumococcal Conjugate (PCV-13) 11/21/2018    Pneumococcal Polysaccharide (PPSV-23) 11/28/2014, 01/08/2020    Tdap 08/09/2018       Review of Systems:   As above included in HPI. Otherwise 11 point review of systems negative including constitutional, skin, HENT, eyes, respiratory, cardiovascular, gastrointestinal, genitourinary, musculoskeletal, endocrine, hematologic, allergy, and neurologic. Physical:   Vitals:   BP: 118/80  HR: 85  WT: 186 lb (84.4 kg)  BMI:  32.95 kg/m2    Exam:   Patient appears in no apparent distress. Affect is appropriate. HEENT: PERRLA, anicteric, oropharynx clear, no JVD, adenopathy or thyromegaly. No carotid bruits or radiated murmur. Lungs: clear to auscultation, no wheezes, rhonchi, or rales. Heart: regular rate and rhythm. No murmur, rubs, gallops  Abdomen: soft, nontender, nondistended, normal bowel sounds, no hepatosplenomegaly or masses. Extremities: without edema. Pulses 1-2+ bilaterally.     Review of Data:  Labs:  Hospital Outpatient Visit on 12/19/2019   Component Date Value Ref Range Status    WBC 12/19/2019 8.9  4.6 - 13.2 K/uL Final    RBC 12/19/2019 4.10* 4.20 - 5.30 M/uL Final    HGB 12/19/2019 12.7  12.0 - 16.0 g/dL Final    HCT 12/19/2019 38.2  35.0 - 45.0 % Final    MCV 12/19/2019 93.2  74.0 - 97.0 FL Final    MCH 12/19/2019 31.0  24.0 - 34.0 PG Final    MCHC 12/19/2019 33.2  31.0 - 37.0 g/dL Final    RDW 12/19/2019 13.6  11.6 - 14.5 % Final    PLATELET 63/67/2096 958  135 - 420 K/uL Final    MPV 12/19/2019 9.7  9.2 - 11.8 FL Final    NEUTROPHILS 12/19/2019 72  40 - 73 % Final    LYMPHOCYTES 12/19/2019 17* 21 - 52 % Final    MONOCYTES 12/19/2019 8  3 - 10 % Final    EOSINOPHILS 12/19/2019 2  0 - 5 % Final    BASOPHILS 12/19/2019 1  0 - 2 % Final    ABS. NEUTROPHILS 12/19/2019 6.4  1.8 - 8.0 K/UL Final    ABS. LYMPHOCYTES 12/19/2019 1.5  0.9 - 3.6 K/UL Final    ABS. MONOCYTES 12/19/2019 0.7  0.05 - 1.2 K/UL Final    ABS. EOSINOPHILS 12/19/2019 0.2  0.0 - 0.4 K/UL Final    ABS. BASOPHILS 12/19/2019 0.1  0.0 - 0.1 K/UL Final    DF 12/19/2019 AUTOMATED    Final    Hemoglobin A1c 12/19/2019 9.0* 4.2 - 5.6 % Final    Est. average glucose 12/19/2019 212  mg/dL Final    Magnesium 12/19/2019 1.9  1.6 - 2.6 mg/dL Final    Sodium 12/19/2019 139  136 - 145 mmol/L Final    Potassium 12/19/2019 4.3  3.5 - 5.5 mmol/L Final    Chloride 12/19/2019 105  100 - 111 mmol/L Final    CO2 12/19/2019 24  21 - 32 mmol/L Final    Anion gap 12/19/2019 10  3.0 - 18 mmol/L Final    Glucose 12/19/2019 331* 74 - 99 mg/dL Final    BUN 12/19/2019 17  7.0 - 18 MG/DL Final    Creatinine 12/19/2019 1.09  0.6 - 1.3 MG/DL Final    BUN/Creatinine ratio 12/19/2019 16  12 - 20   Final    GFR est AA 12/19/2019 >60  >60 ml/min/1.73m2 Final    GFR est non-AA 12/19/2019 50* >60 ml/min/1.73m2 Final    Calcium 12/19/2019 9.2  8.5 - 10.1 MG/DL Final    Bilirubin, total 12/19/2019 0.4  0.2 - 1.0 MG/DL Final    ALT (SGPT) 12/19/2019 40  13 - 56 U/L Final    AST (SGOT) 12/19/2019 21  10 - 38 U/L Final    Alk. phosphatase 12/19/2019 80  45 - 117 U/L Final    Protein, total 12/19/2019 8.1  6.4 - 8.2 g/dL Final    Albumin 12/19/2019 4.3  3.4 - 5.0 g/dL Final    Globulin 12/19/2019 3.8  2.0 - 4.0 g/dL Final    A-G Ratio 12/19/2019 1.1  0.8 - 1.7   Final    TSH 12/19/2019 1.98  0.36 - 3.74 uIU/mL Final    Color 12/19/2019 YELLOW    Final    Appearance 12/19/2019 CLEAR    Final    Specific gravity 12/19/2019 1.019  1.005 - 1.030   Final    pH (UA) 12/19/2019 5.5  5.0 - 8.0   Final    Protein 12/19/2019 100* NEG mg/dL Final    Glucose 12/19/2019 >1,000* NEG mg/dL Final    Ketone 12/19/2019 NEGATIVE   NEG mg/dL Final    Bilirubin 12/19/2019 NEGATIVE   NEG   Final    Blood 12/19/2019 NEGATIVE   NEG   Final    Urobilinogen 12/19/2019 0.2  0.2 - 1.0 EU/dL Final    Nitrites 12/19/2019 NEGATIVE   NEG   Final    Leukocyte Esterase 12/19/2019 NEGATIVE   NEG   Final    WBC 12/19/2019 0 to 1  0 - 4 /hpf Final    RBC 12/19/2019 NONE  0 - 5 /hpf Final    Epithelial cells 12/19/2019 FEW  0 - 5 /lpf Final    Bacteria 12/19/2019 NEGATIVE   NEG /hpf Final    Vitamin D 25-Hydroxy 12/19/2019 20.8* 30 - 100 ng/mL Final    Microalbumin,urine random 12/19/2019 75.40* 0 - 3.0 MG/DL Final    Creatinine, urine 12/19/2019 42.00  30 - 125 mg/dL Final    Microalbumin/Creat ratio (mg/g cre* 12/19/2019 1,795* 0 - 30 mg/g Final    LIPID PROFILE 12/19/2019        Final    Cholesterol, total 12/19/2019 280* <200 MG/DL Final    Triglyceride 12/19/2019 441* <150 MG/DL Final    HDL Cholesterol 12/19/2019 49  40 - 60 MG/DL Final    LDL, calculated 12/19/2019 LDL AND VLDL CHOLESTEROL NOT CALCULATED WHEN TRIGLYCERIDES >400 MG/DL OR HDL CHOLESTEROL <20 MG/DL  0 - 100 MG/DL Final    VLDL, calculated 12/19/2019 Calculation not valid with this patient's other Lipid values. MG/DL Final    CHOL/HDL Ratio 12/19/2019 5.7* 0 - 5.0   Final     Health Maintenance:  Screening:    Mammogram: negative (2/2019) [facility].    PAP smear: s/p CATY/BSO for endometriosis   Colorectal: colonoscopy (2011) divertculosis. Dr. Jani Alba. Due . Depression: no longer taking Prozac   DM (HbA1c/FPG): HbA1c 9.0 (2019)   Hepatitis C: negative (2018)   Falls: none   DEXA: within normal limits (2019) [facility]   Glaucoma: regular eye exams with Dr. Asya Mccullough. Overdue. Smokin-30 pack years, stopped 20 years ago   Vitamin D: 20.8 (2019)   Medicare Wellness: N/A    Impression:  Patient Active Problem List   Diagnosis Code    Lumbosacral radiculopathy M54.17    Degeneration of lumbar or lumbosacral intervertebral disc M51.37    Chronic insomnia F51.04    Kidney transplant status, living related donor Z94.0    Chronic pain syndrome G89.4    KIM (generalized anxiety disorder) F41.1    Type 2 diabetes mellitus with microalbuminuria (HCC) E11.29, R80.9    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    History of depression Z86.59    Osteopenia of multiple sites M85.89    Essential hypertension I10    Chronic diastolic heart failure (HCC) I50.32    Hyperlipidemia E78.5    H/O renal failure Z87.448    GERD (gastroesophageal reflux disease) K21.9    Primary osteoarthritis involving multiple joints M15.0    Immunosuppressed status ([facility]) D89.9    Left ventricular diastolic dysfunction L57.3    Noncompliance with medications Z91.14    Persistent proteinuria R80.1    Abnormal CT scan, pelvis R93.5    Adnexal fullness N94.9       Plan:  1. Diabetes mellitus. HbA1c had improved to 6.6 in 2018 without medications, but had increased to 7.8 in 2019. Patient was instructed at that time to resume taking metformin  mg daily with dinner, but she did not do as instructed and did not monitor her blood sugars. She was lost to follow-up until her last visit and reported several week history of polyuria and polydipsia, and blood sugar measured for the first time on 2019 was noted to be 575.  She presented to the [facility] ED and was given 8 units of regular insulin and instructed to begin metformin 500 mg bid. Despite treatment with metformin, blood sugars noted to be 400-550 and unable to tolerate metformin ER due to GI distress. Instructed to discontinue metformin and begin Lantus 10 units qHS. Her dose was titrated to 32 U qHS due to continued elevation of blood sugars, and she was then switched to Levemir 32 U daily due to cost. She developed a diarrheal illness with decreased blood sugars, and she decreased her dose of Levemir to 15 U qHS with reported continued good control. Advised to monitor her fasting blood sugar each morning and evening as her diet improves, and attempt to titrate dose so as to achieve a fasting glucose goal of <130 in AM. No known evidence of retinopathy, but patient referred to Dr. Jenny Marley and has yet to schedule an appointment. Foot exam normal (8/2018). Will perform next visit. On Ace-I and statin. Urine microalbumin/ creatinine ratio with evidence of macroalbuminuria. Will reassess next visit and stress importance of achieving better blood sugar and blood pressure control. 2. S/p renal transplant from living related donor. Patient admitted to noncompliance with her immunosuppressant drugs in 11/2018, stopping prednisone about a year ago and stopping tacrolimus several months prior on her own. Noted evidence of trace protein on urinalysis and microalbuminuria at last two checks in 8/2018 (83-97 mg/g). However, significant worsening of proteinuria noted at last visit with urine microalbumin/creatinine ratio 461 mg/g (11/6/2018). Creatinine had remained stable. She admitted to noncompliance when this was brought to her attention and she became very concerned. She restarted tacrolimus at 2 mg bid and prednisone 5 mg qod.  Discussed that she should contact the renal transplant clinic, particularly as they would be able to advise her on the best way to resume, but she stated that she \"will never be able to get another transplant if they find out she had stopped the medications\". Tacrolimus level was noted to be low at 1.7, and she was again advised to follow-up with transplant. Reports today that did follow-up and creatinine noted to have increased to 1.22/ eGFR 44 with urinalysis showing 100 mg /dl protein. Plan was to repeat in one month to determine need for renal biopsy if continuing to be abnormal. However, patient did not follow-up. She states that she did go for her annual exam in 7/2019 and was told that everything was stable. Will request note. 3. Hypertension. Blood pressure well controlled today on lisinopril 40 mg daily and amlodipine 5 mg daily. She is not taking Imdur. Instructed to continue to monitor her blood pressure daily, but unclear if she will monitor. Renal function with creatinine 1.1/ eGFR 47 in the ED, but hyperglycemic with urine glucose 500 so likely a component of dehydration. Repeat at visit showing creatinine 1.09/ eGFR 50, likely still a component of dehydration. Hopefully will improve as blood sugar control improves. Will reassess at next visit. 4. Chronic diastolic heart failure. Grade 2 with normal systolic function on echocardiogram in 10/2017. Hemodynamically stable on current regimen as above. Does not tolerate beta blockers. Instructed to restrict sodium and follow weights. 5. Hyperlipidemia. On simvastatin 40 mg daily with  (5/2019), indicative of poor control. Repeat at last visit with total chol 280, , HDL 49, and LDL not calculated. Changed to rosuvastatin 20 mg daily. Will reassess at next visit. Discussed importance of weight loss and improved control of blood sugar. Continue to follow. 6. Osteopenia. Last bone density scan 5/2017 . Using femoral neck T-scores, calculated FRAX score estimates her 10 year risk of a major osteoporetic fracture at 10 % and hip fracture at 0.3 % in context of chronic steroid use and secondary osteoporosis.  Also, noted report of thoracic compression deformities on chest CT scan in 11/2017 at [facility]. She admitted to no longer taking prednisone, will now resume therapy with 5 mg qod. She stopped taking alendronate although unclear when she discontinued. Continue calcium and Vitamin D. On 50,000 U dose weekly of ergocalciferol, although doubt compliance given low level. She states that she will resume today. Encouraged exercise, particularly weight bearing activities. Repeat bone density scan 6/2019 within normal limits for age. 7. Bilateral adnexal cysts. Patient is s/p BSO in 2002 and found to have endometriosis. Now with CT scan showing masses in the area of her prior ovaries. Pelvic ultrasound inadequate, and recommended transvaginal ultrasound. Patient wished to follow-up with her gynecologist. Will address if did so at next visit. 8. Osteoarthritis. Patient with multiple areas of arthritis contributing to her chronic pain. However, weaned from narcotics, and discontinued Percocet and MS Contin. Not experiencing increased pain currently, but will follow. Will use non-narcotic based regimen if needed. 9. GERD. Increasing symptoms noted in 5/2019, but did not pursue endoscopy as recommended by Dr. Paulo Antoine. Now with worsening symptoms over last several days since starting metformin. Instructed to discontinue metformin and increase dose of omeprazole to 40 mg bid. Also prescribed Carafate as needed. Referred for follow-up with Dr. Paulo Antoine. Will address if scheduled at next visit. Upper endoscopy in 5/2014 negative. Using Zofran for nausea as needed. 10. Chronic constipation. Most likely related to chronic narcotics. Improved with weaning. No longer taking Linzess. 11. Squamous cell carcinoma of skin. Under care of Dr. Dotty Fleming. Being treated with Efudex. Related to chronic immunosuppression. 12. Depression. No longer taking Prozac and reports increasing anxiety. Started on Zoloft but states that no longer taking.  Reported insomnia since restarting tacrolimus and restarted on Ambien. Follow. 13. Obesity. Emphasized importance of lifestyle modifications, including diet, exercise, and weight loss. Will continue to address. 13. Health maintenance. Already received influenza vaccine. Received Pneumovax in past, but due for second dose. Will give today. Given Tdap. Will address Shingrix vaccine, although unclear if advisable in immunosuppressed. Completed mammogram. Stressed need for eye exam. Referred to Dr. Asya Mccullough for eye exams previously. Referral placed again at last visit. Will address if scheduled at next visit. Colonoscopy due 2021. Vitamin D level low, and states will resume ergocalciferol. Patient understands recommendations and agrees with plan. Follow-up in 8 weeks.

## 2020-01-15 ENCOUNTER — TELEPHONE (OUTPATIENT)
Dept: INTERNAL MEDICINE CLINIC | Age: 65
End: 2020-01-15

## 2020-01-15 DIAGNOSIS — R80.9 TYPE 2 DIABETES MELLITUS WITH MICROALBUMINURIA, WITH LONG-TERM CURRENT USE OF INSULIN (HCC): Primary | ICD-10-CM

## 2020-01-15 DIAGNOSIS — Z79.4 TYPE 2 DIABETES MELLITUS WITH MICROALBUMINURIA, WITH LONG-TERM CURRENT USE OF INSULIN (HCC): Primary | ICD-10-CM

## 2020-01-15 DIAGNOSIS — E11.29 TYPE 2 DIABETES MELLITUS WITH MICROALBUMINURIA, WITH LONG-TERM CURRENT USE OF INSULIN (HCC): Primary | ICD-10-CM

## 2020-01-15 NOTE — TELEPHONE ENCOUNTER
Called patient to follow up on BG per request from Dr. Wendy Gauthier. Patient states BG are down in the 100s. She states the past few days the night time BG have been coming up. States she is taking Levemir 15 units at night after dinner (around 7-8 PM). Reports BG over the past week. States she is checking BG fasting, before lunch, and before dinner:  1/9 - 164, 176, 150  1/10 - 164, 153, 150  1/11 - 143, 137, 132 (took 10 units at night)  1/12 - 170, 114, 150  1/13 - 158, 165, 135 (took 10 units at night)  1/14 - 176, 177  1/15 - 182    Patient denies any BG <70. Denies any symptoms of hypoglycemia. States she decreased her Levemir dose to 10 units on the nights when her BG is in the 130s due to fear of hypoglycemia. Stater her diet is the following:  Breakfast - Mostly eggs and 1 piece of whole grain toast, sometimes cereal  Lunch - Soup or beans  Dinner - Vegetables, rice, meat    States she is able to eat regularly now compared to last telephone call when she was ill. States her diarrhea has resolved; reports she has had some ongoing nausea which has been giving her trouble with sleep. States she gets piercing/stabbing pain in upper right hand side of her abdomen on and off for years, which she believes is due to her GERD. Counseled patient on food triggers and other factors that can exacerbate GERD. States she decreased Prilosec to 40 mg daily per Dr. Wendy Gauthier. Will discuss patient's ongoing nausea with Dr. Wendy Gauthier. Scheduled patient for PharmD Appointment on 1/21/2020 at 1 PM to discuss diabetes education and medication options. Instructed patient to bring all of her medications, her glucometer, and her BG log to visit. Instructed her to continue Levemir 15 units at night time for now and continue checking BG TID; discussed that she can decrease her Levemir to 10 units when her night time BG is 130s due to her concerns about hypoglycemia.     Patient expressed understanding and had no additional questions. Thank you for allowing me to participate in the care of this patient.     Nahum Shane, PharmD  Clinical Pharmacist Specialist

## 2020-01-17 RX ORDER — ONDANSETRON 4 MG/1
4 TABLET, ORALLY DISINTEGRATING ORAL
Qty: 40 TAB | Refills: 1 | Status: SHIPPED | OUTPATIENT
Start: 2020-01-17 | End: 2020-03-05 | Stop reason: SDUPTHER

## 2020-01-17 NOTE — TELEPHONE ENCOUNTER
Last Visit: 1/8/20 with MD Nika Salcedo  Next Appointment: 3/18/20 with MD Navarrete  Previous Refill Encounter(s): 1/3/19 #40 with 1 refill    Requested Prescriptions     Pending Prescriptions Disp Refills    ondansetron (ZOFRAN ODT) 4 mg disintegrating tablet 40 Tab 1     Sig: Take 1 Tab by mouth every eight (8) hours as needed for Nausea or Nausea or Vomiting.

## 2020-01-21 ENCOUNTER — OFFICE VISIT (OUTPATIENT)
Dept: INTERNAL MEDICINE CLINIC | Age: 65
End: 2020-01-21

## 2020-01-21 DIAGNOSIS — Z79.4 TYPE 2 DIABETES MELLITUS WITH MICROALBUMINURIA, WITH LONG-TERM CURRENT USE OF INSULIN (HCC): Primary | ICD-10-CM

## 2020-01-21 DIAGNOSIS — R80.9 TYPE 2 DIABETES MELLITUS WITH MICROALBUMINURIA, WITH LONG-TERM CURRENT USE OF INSULIN (HCC): Primary | ICD-10-CM

## 2020-01-21 DIAGNOSIS — E11.29 TYPE 2 DIABETES MELLITUS WITH MICROALBUMINURIA, WITH LONG-TERM CURRENT USE OF INSULIN (HCC): Primary | ICD-10-CM

## 2020-01-21 NOTE — PROGRESS NOTES
CC:  Diabetes management    S/O: Jossue James is a 71 y.o. female referred by Dr. Radha Eli for diabetes management. Current diabetes regimen consists of the following: Levemir 15 units nightly.  -States she had self-titrated insulin up to 30 units once a day at night - takes this around 7 PM   -States she went up to 30 units yesterday     Reports having foot and leg cramps that are waking her up at night. States she noticed it worsened when her Levemir dose was increased to 30 units daily. Patient reports the following signs/symptoms of diabetes: polyuria, blurry vision, polyphagia, fatigue, and \"pricking feeling on my legs\". Patient denies recent hypoglycemic symptoms. Patient checks blood sugars 3-8 times per day and readings run 180s-320s mg/dL.  Most recent A1C was 9.0 % on 12/19/19.  -Brought glucometer to appointment today   -States meter is \"getting crazy numbers\"; states she has been worried about her BG so she has been checking BG multiple times per day  -1/21 - 220 at 7 AM (fasting), 166 at 9:41 AM (fasting)  -1/20 - 182 (2 hours after dinner), 182 (2 hours after lunch), 229 (before lunch), 324 (fasting at 8:34 AM), 214 at 7:11 AM (fasting)  -1/19 - 204 (2 hours after dinner), 285 (4 PM after food), 266 (2 PM before food), 198 fasting  -1/18 -  260 (before dinner), 188 (fasting)    A typical days food intake is as follows:  Breakfast: Around 11 AM - boiled eggs, scrambled eggs, turkey haney; avoiding carbs  Lunch: Around 1 PM - 3 PM - has soup (progresso chunky soup), usually doesn't eat much  Dinner: Around 7 PM - has fish or chicken (baked or grilled), eats a lot of wild rice, broccoli, green beans   -Last night had pizza - didn't eat crust   -Sometimes has fried chicken   -Occasionally has french fries  Beverages: Coffee with stevia and sugar free creamer (states makes her sugar \"shon high\"), water, diet coke/sprite/tea  Snacks: Has 3 snacks per day - whole grain crackers and cheese, pork rinds  -States she had tried intermittent fasting today and her BG was down to 130s  -Reports nausea - worsened with water, iced tea  -States eats 6 times per day - smaller meals    Exercise: Walks 2 hours every day until recently when it got cold    Patient denies adherence with Her medications. Patient reports adverse effects from their medications - states has had cramping in legs which she noticed when she started the Levemir.  -Misses medications in mornings once in a while - once every couple weeks   -Usually takes the insulin with the transplant meds - around 7 pm at dinner time   -States hasn't missed insulin at all  -States she has [address] for the next 2 months for prescription insurance coverage; switching to Medicare in the next few months    Past Medical History:   Diagnosis Date    Calculus of kidney     Chronic diastolic heart failure ([facility] [address] 8/13/2018    Chronic pain syndrome 7/16/2013    Constipation due to pain medication 4/8/2016    Degeneration of lumbar or lumbosacral intervertebral disc     Depression     Diabetes mellitus     Essential hypertension 8/13/2018    KIM (generalized anxiety disorder) 4/29/2015    H/O renal failure 8/13/2018    Secondary to glomerulonephritis.   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    Type 2 diabetes mellitus with microalbuminuria ([address] 11/10/2015     Past Surgical History:   Procedure Laterality Date    ABDOMEN SURGERY PROC UNLISTED      HX GYN      Ovary    HX HEENT      nose surgery    HX HYSTERECTOMY      HX ORTHOPAEDIC  [id number]    hip replacement    HX UROLOGICAL      Kidney Transplant    NEPHRECTOMY      bilateral     Family History   Problem Relation Age of Onset    Hypertension Father     Heart Disease Father     Alcohol abuse Father     Arthritis-osteo Father     Arthritis-osteo Brother     Cancer Maternal Grandmother         breast    Lung Disease Sister     High Cholesterol Mother     Arthritis-osteo Mother     Arthritis-osteo Sister     Liver Disease Sister      Social History     Socioeconomic History    Marital status:      Spouse name: Not on file    Number of children: Not on file    Years of education: Not on file    Highest education level: Not on file   Tobacco Use    Smoking status: Former Smoker     Packs/day: 1.00     Years: 15.00     Pack years: 15.00     Last attempt to quit:      Years since quittin.0    Smokeless tobacco: Never Used   Substance and Sexual Activity    Alcohol use: No    Drug use: No    Sexual activity: Not Currently     Partners: Male     Allergies   Allergen Reactions    Asa-Acetaminophen-Caff-Potass Unknown (comments)    Aspirin Other (comments)    Beta-Blockers (Beta-Adrenergic Blocking Agts) Other (comments)     Sent into [facility].  Bystolic [Nebivolol] Other (comments)    Ibuprofen Other (comments)     Renal transplant patient    Lipitor [Atorvastatin] Myalgia     Had muscle aches and pains    Nsaids (Non-Steroidal Anti-Inflammatory Drug) Unknown (comments)    Sirolimus Other (comments)     pneumonitis    Toprol Xl [Metoprolol Succinate] Other (comments)     Asthma    Venlafaxine Other (comments)     Patient does not recall taking this medication     Current Outpatient Medications   Medication Sig    insulin detemir U-100 (LEVEMIR FLEXTOUCH) 100 unit/mL (3 mL) inpn Inject 15 units every morning and 15 units every evening. Or as directed.  ondansetron (ZOFRAN ODT) 4 mg disintegrating tablet Take 1 Tab by mouth every eight (8) hours as needed for Nausea or Nausea or Vomiting.  DOXYCYCLINE CALCIUM PO Take 40 mg by mouth daily.     rosuvastatin (CRESTOR) 20 mg tablet Take 1 Tab by mouth nightly.  ergocalciferol (ERGOCALCIFEROL) 50,000 unit capsule Take 1 Cap by mouth every seven (7) days.  sucralfate (CARAFATE) 100 mg/mL suspension Take 10 mL by mouth four (4) times daily. Indications: reflux/ abdominal pain    Insulin Needles, Disposable, 31 gauge x 5/16\" ndle Use with Lantus insulin qhs.  amLODIPine (NORVASC) 5 mg tablet Take 1 Tab by mouth daily.  lisinopril (PRINIVIL, ZESTRIL) 40 mg tablet TAKE 1 TABLET BY MOUTH DAILY    omeprazole (PRILOSEC) 40 mg capsule Take 1 Cap by mouth daily.  predniSONE (DELTASONE) 5 mg tablet Take 2.5 mg by mouth every other day.  tacrolimus (PROGRAF) 1 mg capsule TAKE 2 CAPSULES BY MOUTH TWICE DAILY     No current facility-administered medications for this visit. There were no vitals taken for this visit. Data reviewed:  Lab Results   Component Value Date/Time    Hemoglobin A1c 9.0 (H) 12/19/2019 10:16 AM     Lab Results   Component Value Date/Time    Cholesterol, total 280 (H) 12/19/2019 10:16 AM    HDL Cholesterol 49 12/19/2019 10:16 AM    LDL, calculated  12/19/2019 10:16 AM     LDL AND VLDL CHOLESTEROL NOT CALCULATED WHEN TRIGLYCERIDES >400 MG/DL OR HDL CHOLESTEROL <20 MG/DL    VLDL, calculated  12/19/2019 10:16 AM     Calculation not valid with this patient's other Lipid values. Triglyceride 441 (H) 12/19/2019 10:16 AM    CHOL/HDL Ratio 5.7 (H) 12/19/2019 10:16 AM     Lab Results   Component Value Date/Time    ALT (SGPT) 40 12/19/2019 10:16 AM    AST (SGOT) 21 12/19/2019 10:16 AM    Alk.  phosphatase 80 12/19/2019 10:16 AM    Bilirubin, direct 0.0 08/28/2012 11:15 PM    Bilirubin, total 0.4 12/19/2019 10:16 AM     Lab Results   Component Value Date/Time    Creatinine 1.09 12/19/2019 10:16 AM     Lab Results   Component Value Date/Time    Microalbumin/Creat ratio (mg/g creat) 1,795 (H) 12/19/2019 10:16 AM    Microalbumin,urine random 75.40 (H) 12/19/2019 10:16 AM       Screenings:  Last eye exam was 8/13/18  Last foot exam - due    Assessment/Plan:     1. Diabetes: uncontrolled with goal A1C at least <7%. Blood glucose readings: high with 320s readings the highest consistently. -Reviewed diabetes teaching including pathophysiology of diabetes, different diabetes medications, A1c and BG goals, diabetes foot care, symptoms of hypoglycemia and hyperglycemia, long-term consequences of uncontrolled diabetes    A. Medications:  Stressed importance of medication adherence on overall diabetes control as well as with preventing complications of diabetes. Reviewed symptoms of hypoglycemia and how to treat. Instructed patient to continue to monitor blood sugars 2-3 times daily (fasting, before a meal, at bedtime) and call the office if: she has symptoms of hypoglycemia or BG <70  -Changed patient's Levemir dosing to 15 units BID   -Instructed patient to take doses at least 10-12 hours apart   -Discussed that high BG in 200s and 300s could be due to Levemir's effect wearing off  -Sent new prescriptions for BG monitor, test strips, and lancets to patient's pharmacy per her request  -Reiterated importance of patient checking BG several times per day at important times of day - fasting, before meals, 2 hours after meals, bedtime   -Consider checking prescription coverage of CGM as this may be good option for patient given her worry with tracking BG   -Will discuss CGM rx with Dr. Malik Marmolejo  -Consider therapy with GLP-1 agonist such as Trulicity in the future    B. Diet/lifestyle:  Reinforced importance of regular activity/exercise to help improve insulin resistance and reviewed food labels/carbohydrates/general carb guidelines for meals (45-60 g) and snacks (goal of 15g).  Patient education materials were provided and reviewed with the patient.  -Reiterated importance of patient being consistent with meals and carb intake (goal 45 grams or less per meal, 15 grams or less for snack)   -Recommended against intermittent fasting for patient, discussed that this greatly increases risk of hypoglycemia especially since she is on insulin  -Discussed with patient that she should also review dietary education with her transplant team; she stated during visit that they have given her limits on protein intake given her renal transplant  -Encouraged patient to continue to be active    C. Screenings/Prevention:  Vaccinations: Patient is eligible for the following vaccinations: none  Dilated eye exam (recommended at least every 2 years or annually if retinopathy present): due  Albumin-to creatinine ratio (recommended annually): next due 12/19/2020  Foot Exam (recommended annually): due    2. Hypertension:  Blood pressure is at goal of < 140/90 mm Hg per the ADA guidelines (/80 on 1/8/2020). Emphasized the importance of regular physical activity, restricting salt in diet and weight maintenance/loss on overall blood pressure control.  -Currently on lisinopril 40 mg daily and amlodipine 5 mg daily    3. Hyperlipidemia:  LDL currently is not <100 mg/dL. Current lipid treatment guidelines recommend at least moderate-intensity statin doses to decrease ASCVD risk.  -Currently on Crestor 20 mg daily    4. Medication reconciliation was completed during the visit. Medications Discontinued During This Encounter   Medication Reason    insulin detemir U-100 (LEVEMIR FLEXTOUCH) 100 unit/mL (3 mL) inpn Reorder   -Reiterated importance of medication adherence, especially with her insulin and transplant medications  -Discussed importance of patient ensuring she gets Medicare Part D coverage for prescription insurance coverage once she transitions over to Medicare    Patient verbalized understanding of the information presented and all of the patients questions were answered. AVS was handed to the patient and information reviewed. Patient advised to call the office with any additional questions or concerns. Follow-up: 2 weeks.  Notification of recommendations will be sent to Dr. Maria Del Rosario Carter MD for review.     Thank you for the consult,  Litzy Bhatti [facility]    Time spent with the patient:  75 mins  Time spent documentin mins

## 2020-01-21 NOTE — PATIENT INSTRUCTIONS
Your Visit Summary:    1.) Check and document your blood sugar first thing in the morning (fasting), before a meal (lunch and dinner), and before bedtime. Bring your meter/log to all future visits. I will call you on Friday to follow up on blood sugars. 2.) Make sure you are consistent with your meals and snacks. Follow the plate method, but make sure you follow up with your transplant team about your protein restrictions. 3.) Change your Levemir dose to 15 units every morning and 15 units every night. Make sure doses are 10-12 hours apart. Your blood sugar goals:  - Fasting (first thing in the morning)  blood sugar: 80 - 130   - 1 to 2 hours after a meal: less than 180     When you experience symptoms of low blood sugar (example: less than 70):  - Confirm low reading by checking your blood sugar.   - Then treat with 15 grams of carbohydrates (one-half cup of juice or regular soda, or 4-5 glucose tablets). - Wait 15 minutes to recheck blood sugar.   - Then eat a protein containing meal/snack to prevent another low blood sugar episode. (example: peanut butter + crackers)    Other recommendations:  - Schedule an annual eye exam.  - Check your feet daily for any signs of open wounds, cuts, or sores. - Given your risk factors, the following vaccines are recommended: annual flu shot, age-based pneumococcal vaccines (Pneumovax, Prevnar 13). In addition to taking your medications as directed, improving your blood sugar involves modifying your nutrition and maximizing the amount of physical activity. Nutrition:  - When reviewing a nutrition label, focus on the serving size, total calories, fat (and type of fats), total carbohydrates, sugar (and amount of added sugar), amount of fiber (good for your digestive), and amount of protein.   Refer to your nutrition label guide for more information.  - For a meal : max 45 - 60 grams of carbohydrates  - For a snack: max 15 grams of carbohydrates  - Reduce amount of saturated and trans fat. Consider more unsaturated fat options as they are better for your heart health. - Have at least 1 serving of lean fat protein with each meal.    - Increase fiber intake slowly to prevent constipation.   - Substitute fruit juices for the whole fruit    Physical Activity:  - Aim for 30 minutes of consistent, moderately intensive, physical activity a day for 5 days or an average of 150 minutes per week. - Start slow, increase as tolerated. For example: Walk every day, working up to 30 minutes of brisk walking, 5 days a week--or split the 30 minutes into two 15-minute or three 10-minute walks. - If you sit for a long time, get up and move/stretch every 90 minutes. Please call [phone] for any medication or diabetes questions.     Ruby Pope, Clinical Pharmacist

## 2020-01-21 NOTE — Clinical Note
Hi Dr. Layne Smith have attached my note from Ms. White Said yesterday. She has been very anxious about her BG, checking up to 8 times per day and cutting out carbs. She also self-titrated Levemir up to 30 units daily. She told me yesterday that she was also trying intermittent fasting that day. I counseled her in detail about the risk of hypoglycemia from intermittent fasting, especially since she is on insulin. I changed her Levemir to 15 units BID; we are hoping this will help control her BG and also decrease the leg cramping she has been getting. I sent an rx for a new glucometer for her per her request. Future options for her would including trying to get CGM covered and possibly trying a GLP-1 like Trulicity. I am seeing her again in 2 weeks for follow-up. Please let me know if you have any additional recommendations for her. Dev Feliz, PharmDClinical Pharmacist Specialist

## 2020-01-22 RX ORDER — LANCETS
EACH MISCELLANEOUS
Qty: 100 EACH | Refills: 11 | Status: SHIPPED | OUTPATIENT
Start: 2020-01-22 | End: 2020-02-04 | Stop reason: SDUPTHER

## 2020-01-22 RX ORDER — INSULIN PUMP SYRINGE, 3 ML
EACH MISCELLANEOUS
Qty: 1 KIT | Refills: 0 | Status: SHIPPED | OUTPATIENT
Start: 2020-01-22 | End: 2020-02-04 | Stop reason: SDUPTHER

## 2020-01-31 ENCOUNTER — TELEPHONE (OUTPATIENT)
Dept: INTERNAL MEDICINE CLINIC | Age: 65
End: 2020-01-31

## 2020-01-31 RX ORDER — PRAVASTATIN SODIUM 20 MG/1
20 TABLET ORAL
Qty: 90 TAB | Refills: 2 | Status: SHIPPED | OUTPATIENT
Start: 2020-01-31 | End: 2020-06-03 | Stop reason: SINTOL

## 2020-01-31 RX ORDER — AMLODIPINE BESYLATE 5 MG/1
TABLET ORAL
Qty: 90 TAB | Refills: 1 | Status: SHIPPED | OUTPATIENT
Start: 2020-01-31 | End: 2020-08-06

## 2020-01-31 NOTE — TELEPHONE ENCOUNTER
Called and spoke to patient. Developed myalgias and which resolved after discontinuation of rosuvastatin. Had similar problems with atorvastatin. Tolerated simvastatin previously, but was not effective in lowering cholesterol. Will proceed with pravastatin. 20 mg daily. Script sent to [place]. Patient has appointment in 3/2020. Please schedule lab appointment 1 week prior to visit.

## 2020-01-31 NOTE — TELEPHONE ENCOUNTER
calling says Crestor is causing patient muscle pain. She is stopping it and going back on her Simvastatin which she had at home. Can you refill the simvastatin or give a different med. Not Lipitor.     Ana on [address]

## 2020-02-03 NOTE — PROGRESS NOTES
CC:  Diabetes management    S/O: Jono Hagan is a 71 y.o. female referred by Dr. Manoj Ortiz for diabetes management. Current diabetes regimen consists of the following: Levemir 15 units BID. Patient reports the following signs/symptoms of diabetes: polyuria (states she urinated a lot yesterday, states she wakes up at 4 AM to urinate), blurry vision. Patient denies recent hypoglycemic symptoms.  -States lowest BG was 119 on 1/24    Patient checks blood sugars 4 times per day and readings run 102-250 mg/dL. Most recent A1C was 9.0 % on 12/19/19.  -Brought BG log to visit today; checks BG fasting, after breakfast, before lunch, and before dinner  -2/4 - 175 (fasting), 243 (after breakfast)  -2/3 - 199, 220, 146, 200  -2/2 - 164, 153, 160, 200  -2/1 - 208, 170, 178, 114  -1/31 - 179, 200, 159, 180  -1/30 - 188, 122, 151, 156  -1/29 - 200, 248, 143, 153  -1/28 - 194, 180, 165, 163    A typical days food intake is as follows:  Breakfast: Coffee with sugar creamer or egg   -Sometimes has big breakfast - haney and eggs, eggs and toast (whole grain, 1 slice)  Lunch: Tacos, burger (doesn't have bun), soup (progresso chunky), salad   -If has big breakfast, doesn't have lunch   -Sometimes eats out around twice a week  Dinner: Kody Griffiths with mashed cauliflower and keilbasa, salmon, meatloaf  Beverages: Coffee with sugar, water, diet tea, diet cokes  Snacks: Cheese, sometimes pork rinds  -States she has been \"dabbling with the ketogenic diet\"  -States she cut down on carbs a lot and has more protein instead  -States she sometimes has pizza - eats only toppings, doesn't eat crust    Exercise: Walks a few miles several times per week    Patient reports adherence with Her medications.  Patient denies adverse effects from their medications.  -States she uses a pillbox at home  -States she has followup appointment for kidney transplant clinic in May  -States she has itching all over her arms, legs, and neck after she takes the Levemir night dose   -States she is having trouble sleeping - sleeps 6-7 hours per night   -Taking melatonin 6 mg daily and Tylenol PM 1,000 mg 1-2 times per day  -States sometimes she misses her tacrolimus - once every 6 months she forgets  -States she is looking at getting [employer] insurance at the end of February    Past Medical History:   Diagnosis Date    Calculus of kidney     Chronic diastolic heart failure ([facility] [address] 8/13/2018    Chronic pain syndrome 7/16/2013    Constipation due to pain medication 4/8/2016    Degeneration of lumbar or lumbosacral intervertebral disc     Depression     Diabetes mellitus     Essential hypertension 8/13/2018    KIM (generalized anxiety disorder) 4/29/2015    H/O renal failure 8/13/2018    Secondary to glomerulonephritis.   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    Type 2 diabetes mellitus with microalbuminuria ([facility] [address] 11/10/2015     Past Surgical History:   Procedure Laterality Date    ABDOMEN SURGERY PROC UNLISTED      HX GYN      Ovary    HX HEENT      nose surgery    HX HYSTERECTOMY      HX ORTHOPAEDIC  [id number]    hip replacement    HX UROLOGICAL      Kidney Transplant    NEPHRECTOMY      bilateral     Family History   Problem Relation Age of Onset    Hypertension Father     Heart Disease Father     Alcohol abuse Father     Arthritis-osteo Father     Arthritis-osteo Brother     Cancer Maternal Grandmother         breast    Lung Disease Sister     High Cholesterol Mother     Arthritis-osteo Mother     Arthritis-osteo Sister     Liver Disease Sister      Social History     Socioeconomic History    Marital status:      Spouse name: Not on file    Number of children: Not on file    Years of education: Not on file    Highest education level: Not on file   Tobacco Use    Smoking status: Former Smoker     Packs/day: 1.00     Years: 15.00     Pack years: 15.00     Last attempt to quit:      Years since quittin.1    Smokeless tobacco: Never Used   Substance and Sexual Activity    Alcohol use: No    Drug use: No    Sexual activity: Not Currently     Partners: Male     Allergies   Allergen Reactions    Asa-Acetaminophen-Caff-Potass Unknown (comments)    Aspirin Other (comments)    Beta-Blockers (Beta-Adrenergic Blocking Agts) Other (comments)     Sent into [facility].  Bystolic [Nebivolol] Other (comments)    Crestor [Rosuvastatin] Myalgia    Ibuprofen Other (comments)     Renal transplant patient    Lipitor [Atorvastatin] Myalgia     Had muscle aches and pains    Nsaids (Non-Steroidal Anti-Inflammatory Drug) Unknown (comments)    Sirolimus Other (comments)     pneumonitis    Toprol Xl [Metoprolol Succinate] Other (comments)     Asthma    Venlafaxine Other (comments)     Patient does not recall taking this medication     Current Outpatient Medications   Medication Sig    MELATONIN PO Take 6 mg by mouth daily.  diphenhydrAMINE-acetaminophen (TYLENOL PM EXTRA STRENGTH)  mg tab Take 2 Tabs by mouth daily as needed.  dulaglutide (TRULICITY) 0.70 AP/9.7 mL sub-q pen 0.5 mL by SubCUTAneous route every seven (7) days.  Blood-Glucose Meter monitoring kit Use to check blood sugar 4 times daily. Diagnosis code E11.9. Please dispense brand covered by her insurance.  glucose blood VI test strips (ASCENSIA AUTODISC VI, ONE TOUCH ULTRA TEST VI) strip Use to check blood sugar 4 times daily. Diagnosis code E11.9. Please dispense brand covered by her insurance.  lancets misc Use to check blood sugar 4 times daily. Diagnosis code E11.9. Please dispense brand covered by her insurance.     amLODIPine (NORVASC) 5 mg tablet TAKE 1 TABLET BY MOUTH DAILY    insulin detemir U-100 (LEVEMIR FLEXTOUCH) 100 unit/mL (3 mL) inpn Inject 15 units every morning and 15 units every evening. Or as directed.  ondansetron (ZOFRAN ODT) 4 mg disintegrating tablet Take 1 Tab by mouth every eight (8) hours as needed for Nausea or Nausea or Vomiting.  DOXYCYCLINE CALCIUM PO Take 40 mg by mouth daily.  ergocalciferol (ERGOCALCIFEROL) 50,000 unit capsule Take 1 Cap by mouth every seven (7) days.  sucralfate (CARAFATE) 100 mg/mL suspension Take 10 mL by mouth four (4) times daily. Indications: reflux/ abdominal pain    Insulin Needles, Disposable, 31 gauge x 5/16\" ndle Use with Lantus insulin qhs.  lisinopril (PRINIVIL, ZESTRIL) 40 mg tablet TAKE 1 TABLET BY MOUTH DAILY    omeprazole (PRILOSEC) 40 mg capsule Take 1 Cap by mouth daily.  predniSONE (DELTASONE) 5 mg tablet Take 2.5 mg by mouth every other day.  tacrolimus (PROGRAF) 1 mg capsule TAKE 2 CAPSULES BY MOUTH TWICE DAILY    pravastatin (PRAVACHOL) 20 mg tablet Take 1 Tab by mouth nightly. No current facility-administered medications for this visit. There were no vitals taken for this visit. Data reviewed:  Lab Results   Component Value Date/Time    Hemoglobin A1c 9.0 (H) 12/19/2019 10:16 AM     Lab Results   Component Value Date/Time    Cholesterol, total 280 (H) 12/19/2019 10:16 AM    HDL Cholesterol 49 12/19/2019 10:16 AM    LDL, calculated  12/19/2019 10:16 AM     LDL AND VLDL CHOLESTEROL NOT CALCULATED WHEN TRIGLYCERIDES >400 MG/DL OR HDL CHOLESTEROL <20 MG/DL    VLDL, calculated  12/19/2019 10:16 AM     Calculation not valid with this patient's other Lipid values. Triglyceride 441 (H) 12/19/2019 10:16 AM    CHOL/HDL Ratio 5.7 (H) 12/19/2019 10:16 AM     Lab Results   Component Value Date/Time    ALT (SGPT) 40 12/19/2019 10:16 AM    AST (SGOT) 21 12/19/2019 10:16 AM    Alk.  phosphatase 80 12/19/2019 10:16 AM    Bilirubin, direct 0.0 08/28/2012 11:15 PM    Bilirubin, total 0.4 12/19/2019 10:16 AM     Lab Results   Component Value Date/Time    Creatinine 1.09 12/19/2019 10:16 AM     Lab Results   Component Value Date/Time    Microalbumin/Creat ratio (mg/g creat) 1,795 (H) 12/19/2019 10:16 AM    Microalbumin,urine random 75.40 (H) 12/19/2019 10:16 AM       Screenings:  Last eye exam - due  Last foot exam was 8/13/18 - due    Assessment/Plan:     1. Diabetes: uncontrolled with goal A1C at least <7%. Blood glucose readings: high with 240s readings the highest consistently. A. Medications:  Stressed importance of medication adherence on overall diabetes control as well as with preventing complications of diabetes. Reviewed symptoms of hypoglycemia and how to treat. Instructed patient to continue to monitor blood sugars 3-4 times daily and call the office if: she has BG <70 or symptoms of hypoglycemia, or if she has trouble affording medications  -Sent new prescription for BG monitor, test strips, and lancets to patient's pharmacy per her request  -Instructed patient to start Trulicity 8.74 mg once every 7 days   -Sent prescription to patient's pharmacy   -Completed education on administration and storage using demo pen  -Instructed patient to continue Levemir 15 units daily   -Instructed patient to call if itching worsens   -Patient and  state they will call prescription insurance company to see if other insulin options would be covered - had spoken with prescription insurance company in the past and were told Levemir was preferred and Lantus was not covered   -PharmD will also contact patient's prescription insurance company to assess alternative options if itching continues    B. Diet/lifestyle:  Reinforced importance of regular activity/exercise to help improve insulin resistance and reviewed food labels/carbohydrates/general carb guidelines for meals (45-60 g) and snacks (goal of 15g).   -Reiterated importance of patient contacting her renal transplant team about any protein limits in her diet due to history of renal transplant   -Instructed patient to call transplant team and schedule to meet with her transplant dietician  -Discussed risks of ketogenic diet - increased protein may increase risk of kidney damage given history of renal transplant   -Patient does state she was told in the past by her kidney transplant team to follow the plate method    C. Screenings/Prevention:  Vaccinations: Patient is eligible for the following vaccinations: none  Dilated eye exam (recommended at least every 2 years or annually if retinopathy present): due  Albumin-to creatinine ratio (recommended annually): 12/19/19  Foot Exam (recommended annually): due    2. Hypertension:  Blood pressure is at goal of < 140/90 mm Hg per the ADA guidelines (/80 on 1/8/2020). Emphasized the importance of regular physical activity, restricting salt in diet and weight maintenance/loss on overall blood pressure control.  -Currently on amlodipine 5 mg daily and lisinopril 40 mg daily    3. Hyperlipidemia:  LDL currently is not <100 mg/dL. Current lipid treatment guidelines recommend at least moderate-intensity statin doses to decrease ASCVD risk.  -Currently on pravastatin 20 mg daily - was switched by PCP from Crestor due to myalgia symptoms   -Patient states hadn't started yet - reiterated importance of compliance with patient    4. Medication reconciliation was completed during the visit. Medications Discontinued During This Encounter   Medication Reason    Blood-Glucose Meter monitoring kit Reorder    glucose blood VI test strips (ASCENSIA AUTODISC VI, ONE TOUCH ULTRA TEST VI) strip Reorder    lancets misc Reorder     Patient verbalized understanding of the information presented and all of the patients questions were answered. AVS was handed to the patient and information reviewed. Patient advised to call the office with any additional questions or concerns. Follow-up: 2 weeks. Notification of recommendations will be sent to Dr. Ml Masterson MD for review.     Thank you for the consult,  Jaspal Carreno, [facility]    Time spent with the patient:  60 mins  Time spent documentin mins

## 2020-02-04 ENCOUNTER — OFFICE VISIT (OUTPATIENT)
Dept: INTERNAL MEDICINE CLINIC | Age: 65
End: 2020-02-04

## 2020-02-04 DIAGNOSIS — R80.9 TYPE 2 DIABETES MELLITUS WITH MICROALBUMINURIA, WITH LONG-TERM CURRENT USE OF INSULIN (HCC): Primary | ICD-10-CM

## 2020-02-04 DIAGNOSIS — E11.29 TYPE 2 DIABETES MELLITUS WITH MICROALBUMINURIA, WITH LONG-TERM CURRENT USE OF INSULIN (HCC): Primary | ICD-10-CM

## 2020-02-04 DIAGNOSIS — Z79.4 TYPE 2 DIABETES MELLITUS WITH MICROALBUMINURIA, WITH LONG-TERM CURRENT USE OF INSULIN (HCC): Primary | ICD-10-CM

## 2020-02-04 RX ORDER — LANCETS
EACH MISCELLANEOUS
Qty: 200 EACH | Refills: 11 | Status: SHIPPED | OUTPATIENT
Start: 2020-02-04 | End: 2022-06-11 | Stop reason: ALTCHOICE

## 2020-02-04 RX ORDER — INSULIN PUMP SYRINGE, 3 ML
EACH MISCELLANEOUS
Qty: 1 KIT | Refills: 0 | Status: SHIPPED | OUTPATIENT
Start: 2020-02-04 | End: 2022-06-11 | Stop reason: ALTCHOICE

## 2020-02-04 RX ORDER — ACETAMINOPHEN/DIPHENHYDRAMINE 500MG-25MG
2 TABLET ORAL
COMMUNITY

## 2020-02-04 NOTE — PATIENT INSTRUCTIONS
Your Visit Summary:    1.) Please  Trulicity 3.78 mg from your pharmacy. Please take it once every 7 days. 2.) Continue to check and document your blood sugar 3-4 times daily: first thing in the morning (fasting), before/after a meal, and before bedtime. Bring your meter/log to all future visits. 3.) Continue your Levemir 15 units twice daily. Your blood sugar goals:  - Fasting (first thing in the morning)  blood sugar: 80 - 130   - 1 to 2 hours after a meal: less than 180     When you experience symptoms of low blood sugar (example: less than 70):  - Confirm low reading by checking your blood sugar.   - Then treat with 15 grams of carbohydrates (one-half cup of juice or regular soda, or 4-5 glucose tablets). - Wait 15 minutes to recheck blood sugar.   - Then eat a protein containing meal/snack to prevent another low blood sugar episode. (example: peanut butter + crackers)    Other recommendations:  - Schedule an annual eye exam.  - Check your feet daily for any signs of open wounds, cuts, or sores. - Given your risk factors, the following vaccines are recommended: annual flu shot, age-based pneumococcal vaccines (Pneumovax, Prevnar 13). In addition to taking your medications as directed, improving your blood sugar involves modifying your nutrition and maximizing the amount of physical activity. Nutrition:  - When reviewing a nutrition label, focus on the serving size, total calories, fat (and type of fats), total carbohydrates, sugar (and amount of added sugar), amount of fiber (good for your digestive), and amount of protein. Refer to your nutrition label guide for more information.  - For a meal : max 45 - 60 grams of carbohydrates  - For a snack: max 15 grams of carbohydrates  - Reduce amount of saturated and trans fat. Consider more unsaturated fat options as they are better for your heart health.    - Have at least 1 serving of lean fat protein with each meal.    - Increase fiber intake slowly to prevent constipation.   - Substitute fruit juices for the whole fruit    Physical Activity:  - Aim for 30 minutes of consistent, moderately intensive, physical activity a day for 5 days or an average of 150 minutes per week. - Start slow, increase as tolerated. For example: Walk every day, working up to 30 minutes of brisk walking, 5 days a week--or split the 30 minutes into two 15-minute or three 10-minute walks. - If you sit for a long time, get up and move/stretch every 90 minutes. Please call [phone] for any medication or diabetes questions.     Milo Mederos, Clinical Pharmacist

## 2020-02-04 NOTE — Clinical Note
Jerardo Haynes have attached my note for Ms. Del Angel Friends. I apologize for the delay. As we discussed, I had her start Trulicity 1.00 mg once every 7 weeks per her request. Based on a few studies, GLP-1 were tolerated in renal transplant patients. However, we should monitor her for GI symptoms as these may cause dehydration. I am seeing her again in 2 weeks to assess. Please let me know if you have any additional recommendations for her. Lucita Cedeno, PharmDClinical Pharmacist Specialist

## 2020-02-04 NOTE — Clinical Note
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 She is unsure if this was from the [id number] [facility]. She and her  state they will discuss alternative covered insulins with her prescription insurance plan like they did with her Lantus. I will also reach out to her insurance to ask about other options. Joshua Segundo, PharmDClinical Pharmacist Specialist

## 2020-02-18 ENCOUNTER — OFFICE VISIT (OUTPATIENT)
Dept: INTERNAL MEDICINE CLINIC | Age: 65
End: 2020-02-18

## 2020-02-18 DIAGNOSIS — Z79.4 TYPE 2 DIABETES MELLITUS WITH MICROALBUMINURIA, WITH LONG-TERM CURRENT USE OF INSULIN (HCC): Primary | ICD-10-CM

## 2020-02-18 DIAGNOSIS — R80.9 TYPE 2 DIABETES MELLITUS WITH MICROALBUMINURIA, WITH LONG-TERM CURRENT USE OF INSULIN (HCC): Primary | ICD-10-CM

## 2020-02-18 DIAGNOSIS — E11.29 TYPE 2 DIABETES MELLITUS WITH MICROALBUMINURIA, WITH LONG-TERM CURRENT USE OF INSULIN (HCC): Primary | ICD-10-CM

## 2020-02-18 NOTE — PATIENT INSTRUCTIONS
Your Visit Summary:     1.) We will increase your Trulicity to 1.5 mg once every 7 days. When you get your Trulicity 1.5 mg pens and start this, decrease your Levemir to 10 units twice daily. 2.) Continue checking your blood sugar 3-4 times daily. Check and document your blood sugar first thing in the morning (fasting), before a meal, and before bedtime. Bring your meter/log to all future visits. 3.) We sent prescriptions for freestyle angie monitor to your pharmacy. Your blood sugar goals:  - Fasting (first thing in the morning)  blood sugar: 80 - 130   - 1 to 2 hours after a meal: less than 180     When you experience symptoms of low blood sugar (example: less than 70):  - Confirm low reading by checking your blood sugar.   - Then treat with 15 grams of carbohydrates (one-half cup of juice or regular soda, or 4-5 glucose tablets). - Wait 15 minutes to recheck blood sugar.   - Then eat a protein containing meal/snack to prevent another low blood sugar episode. (example: peanut butter + crackers)    Other recommendations:  - Schedule an annual eye exam.  - Check your feet daily for any signs of open wounds, cuts, or sores. - Given your risk factors, the following vaccines are recommended: annual flu shot, age-based pneumococcal vaccines (Pneumovax, Prevnar 13). In addition to taking your medications as directed, improving your blood sugar involves modifying your nutrition and maximizing the amount of physical activity. Nutrition:  - When reviewing a nutrition label, focus on the serving size, total calories, fat (and type of fats), total carbohydrates, sugar (and amount of added sugar), amount of fiber (good for your digestive), and amount of protein. Refer to your nutrition label guide for more information.  - For a meal : max 45 - 60 grams of carbohydrates  - For a snack: max 15 grams of carbohydrates  - Reduce amount of saturated and trans fat.   Consider more unsaturated fat options as they are better for your heart health. - Have at least 1 serving of lean fat protein with each meal.    - Increase fiber intake slowly to prevent constipation.   - Substitute fruit juices for the whole fruit    Physical Activity:  - Aim for 30 minutes of consistent, moderately intensive, physical activity a day for 5 days or an average of 150 minutes per week. - Start slow, increase as tolerated. For example: Walk every day, working up to 30 minutes of brisk walking, 5 days a week--or split the 30 minutes into two 15-minute or three 10-minute walks. - If you sit for a long time, get up and move/stretch every 90 minutes. Please call [phone] for any medication or diabetes questions.     Missy Esqueda, Clinical Pharmacist

## 2020-02-18 NOTE — PROGRESS NOTES
CC:  Diabetes management    S/O: Natalie Low is a 71 y.o. female referred by Dr. Bosie Aase for diabetes management. Current diabetes regimen consists of the following: Trulicity 8.84 mg once every 7 days, Levemir 15 units BID. -[facility] she started Trulicity on 5/8/6058 and takes it on Tuesdays  -[facility] she takes Levemir at 7 AM and 7 PM    States she has appointment with transplant doctor March 7 and she was referred to [facility] endocrinology by her transplant doctor. [facility] she has felt tired; Hospitals in [place] this has happened consistently since before she got diagnosed with diabetes. [facility] pharmacy told her that BG monitor with test strips and lancets is around $100 under her prescription insurance. [facility] she still has blood glucose testing supplies at home. Patient reports the following signs/symptoms of diabetes: excessive thirst, polyuria, and polyphagia. Patient denies recent hypoglycemic symptoms. Denies any BG <70. Patient checks blood sugars 4-5 times per day.  Most recent A1C was 9.0 % on 12/19/19.  -Brought BG log - checks fasting, before breakfast (but sometimes after coffee), before lunch, before dinner  2/4 - 175, 243, 172, 199  2/5 - 174, 128, 106, 180  2/6 - 218, 200, 158, 153  2/7 - 176, 130, 116, 127  2/8 - 200, 209, 161, 127  2/9 - 168, 156, 172, 134  2/10 - 158, 163, 128, 106  2/11 - 184, 175, 170, 167  2/12 - 134, 144, 135, 180  2/13 - 194, 197, 135, 124  2/14 - 192, 131, 166, 123  2/15 - 138, 175, 153, 130  2/16 - 201, 183, 141, 199  2/17 - 177, 151, 143, 210  218 - 185, 207    A typical days food intake is as follows:  Breakfast: Around 10 AM - Boiled egg or piece of toast, sometimes turkey  Lunch: Soup, salad, sometimes slice of pizza or banana  Dinner: Gomez Lonnie on taco shell with lettuce and tomato; salmon and a little rice and peas; sometimes beef stew  Beverages: Coffee with sugar free creamer and stevia  Snacks: Mostly at night - vegetable crackers (tomato and parmesan) sometimes, sometimes skinny cow ice cream or glass of Silk  -Patient states she is following plate method and is no longer doing intermittent fasting or ketogenic diet    Exercise: States she walks over a mile at a time, walks more when the weather is nice  -Watches grandson 2-3 times per week    Patient reports adherence with Her medications. Patient reports adverse effects from their medications - feel fatigued. -States she started Trulicity and has taken around 3 doses - denies any nausea, vomiting, diarrhea  -States she started pravastatin a few days ago    Past Medical History:   Diagnosis Date    Calculus of kidney     Chronic diastolic heart failure ([address] 8/13/2018    Chronic pain syndrome 7/16/2013    Constipation due to pain medication 4/8/2016    Degeneration of lumbar or lumbosacral intervertebral disc     Depression     Diabetes mellitus     Essential hypertension 8/13/2018    KIM (generalized anxiety disorder) 4/29/2015    H/O renal failure 8/13/2018    Secondary to glomerulonephritis.   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    Type 2 diabetes mellitus with microalbuminuria ([address] 11/10/2015     Past Surgical History:   Procedure Laterality Date    ABDOMEN SURGERY PROC UNLISTED      HX GYN      Ovary    HX HEENT      nose surgery    HX HYSTERECTOMY      HX ORTHOPAEDIC  [id number]    hip replacement    HX UROLOGICAL      Kidney Transplant    NEPHRECTOMY      bilateral     Family History   Problem Relation Age of Onset    Hypertension Father     Heart Disease Father     Alcohol abuse Father    Jim Jaleel Father     Arthritis-osteo Brother     Cancer Maternal Grandmother breast    Lung Disease Sister     High Cholesterol Mother     Arthritis-osteo Mother     Arthritis-osteo Sister     Liver Disease Sister      Social History     Socioeconomic History    Marital status:      Spouse name: Not on file    Number of children: Not on file    Years of education: Not on file    Highest education level: Not on file   Tobacco Use    Smoking status: Former Smoker     Packs/day: 1.00     Years: 15.00     Pack years: 15.00     Last attempt to quit:      Years since quittin.1    Smokeless tobacco: Never Used   Substance and Sexual Activity    Alcohol use: No    Drug use: No    Sexual activity: Not Currently     Partners: Male     Allergies   Allergen Reactions    Asa-Acetaminophen-Caff-Potass Unknown (comments)    Aspirin Other (comments)    Beta-Blockers (Beta-Adrenergic Blocking Agts) Other (comments)     Sent into [facility].  Bystolic [Nebivolol] Other (comments)    Crestor [Rosuvastatin] Myalgia    Ibuprofen Other (comments)     Renal transplant patient    Lipitor [Atorvastatin] Myalgia     Had muscle aches and pains    Nsaids (Non-Steroidal Anti-Inflammatory Drug) Unknown (comments)    Sirolimus Other (comments)     pneumonitis    Toprol Xl [Metoprolol Succinate] Other (comments)     Asthma    Venlafaxine Other (comments)     Patient does not recall taking this medication     Current Outpatient Medications   Medication Sig    MELATONIN PO Take 6 mg by mouth daily.  diphenhydrAMINE-acetaminophen (TYLENOL PM EXTRA STRENGTH)  mg tab Take 2 Tabs by mouth daily as needed.  dulaglutide (TRULICITY) 7.99 XB/6.6 mL sub-q pen 0.5 mL by SubCUTAneous route every seven (7) days.  Blood-Glucose Meter monitoring kit Use to check blood sugar 4 times daily. Diagnosis code E11.9. Please dispense brand covered by her insurance.  glucose blood VI test strips (ASCENSIA AUTODISC VI, ONE TOUCH ULTRA TEST VI) strip Use to check blood sugar 4 times daily. Diagnosis code E11.9. Please dispense brand covered by her insurance.  lancets misc Use to check blood sugar 4 times daily. Diagnosis code E11.9. Please dispense brand covered by her insurance.  amLODIPine (NORVASC) 5 mg tablet TAKE 1 TABLET BY MOUTH DAILY    pravastatin (PRAVACHOL) 20 mg tablet Take 1 Tab by mouth nightly.  insulin detemir U-100 (LEVEMIR FLEXTOUCH) 100 unit/mL (3 mL) inpn Inject 15 units every morning and 15 units every evening. Or as directed.  ondansetron (ZOFRAN ODT) 4 mg disintegrating tablet Take 1 Tab by mouth every eight (8) hours as needed for Nausea or Nausea or Vomiting.  DOXYCYCLINE CALCIUM PO Take 40 mg by mouth daily.  ergocalciferol (ERGOCALCIFEROL) 50,000 unit capsule Take 1 Cap by mouth every seven (7) days.  sucralfate (CARAFATE) 100 mg/mL suspension Take 10 mL by mouth four (4) times daily. Indications: reflux/ abdominal pain    Insulin Needles, Disposable, 31 gauge x 5/16\" ndle Use with Lantus insulin qhs.  lisinopril (PRINIVIL, ZESTRIL) 40 mg tablet TAKE 1 TABLET BY MOUTH DAILY    omeprazole (PRILOSEC) 40 mg capsule Take 1 Cap by mouth daily.  predniSONE (DELTASONE) 5 mg tablet Take 2.5 mg by mouth every other day.  tacrolimus (PROGRAF) 1 mg capsule TAKE 2 CAPSULES BY MOUTH TWICE DAILY     No current facility-administered medications for this visit. There were no vitals taken for this visit. Data reviewed:  Lab Results   Component Value Date/Time    Hemoglobin A1c 9.0 (H) 12/19/2019 10:16 AM     Lab Results   Component Value Date/Time    Cholesterol, total 280 (H) 12/19/2019 10:16 AM    HDL Cholesterol 49 12/19/2019 10:16 AM    LDL, calculated  12/19/2019 10:16 AM     LDL AND VLDL CHOLESTEROL NOT CALCULATED WHEN TRIGLYCERIDES >400 MG/DL OR HDL CHOLESTEROL <20 MG/DL    VLDL, calculated  12/19/2019 10:16 AM     Calculation not valid with this patient's other Lipid values.     Triglyceride 441 (H) 12/19/2019 10:16 AM    CHOL/HDL Ratio 5.7 (H) 12/19/2019 10:16 AM     Lab Results   Component Value Date/Time    ALT (SGPT) 40 12/19/2019 10:16 AM    AST (SGOT) 21 12/19/2019 10:16 AM    Alk. phosphatase 80 12/19/2019 10:16 AM    Bilirubin, direct 0.0 08/28/2012 11:15 PM    Bilirubin, total 0.4 12/19/2019 10:16 AM     Lab Results   Component Value Date/Time    Creatinine 1.09 12/19/2019 10:16 AM     Lab Results   Component Value Date/Time    Microalbumin/Creat ratio (mg/g creat) 1,795 (H) 12/19/2019 10:16 AM    Microalbumin,urine random 75.40 (H) 12/19/2019 10:16 AM       Screenings:  Last eye exam - due  Last foot exam was 8/13/18 - due    Assessment/Plan:     1. Diabetes: uncontrolled with goal A1C at least <7%. Blood glucose readings: high with 190s-200s readings the highest consistently. A. Medications: Stressed importance of medication adherence on overall diabetes control as well as with preventing complications of diabetes. Reviewed symptoms of hypoglycemia and how to treat. Instructed patient to continue to monitor blood sugars 3-4 times daily and call the office if: she has difficulty affording copay of any medications, if she has any symptoms of hypoglycemia, and if she has any BG <70  -Patient states she has 1 dose of Trulicity 5.01 mg remaining   -Instructed patient to increase to Trulicity 1.5 mg once every 7 days once she finishes last dose of Trulicity 5.83 mg   -Sent prescription for Trulicity 1.5 mg once every 7 days to her pharmacy   -Instructed patient to decrease Levemir to 10 units BID when she starts Trulicity 1.5 mg dose  -Sent prescriptions for freestyle angie sensors and monitor to her pharmacy per her request   -Patient wants to see if these are covered under her prescription insurance plan since she is interested in using freestyle angie    B.  Diet/lifestyle:  Reinforced importance of regular activity/exercise to help improve insulin resistance and reviewed food labels/carbohydrates/general carb guidelines for meals (45-60 g) and snacks (goal of 15g). -Encouraged patient to continue following plate method and following up with her transplant team for their recommendations regarding maximum protein intake    C. Screenings/Prevention:  Vaccinations: Patient is eligible for the following vaccinations: none  Dilated eye exam (recommended at least every 2 years or annually if retinopathy present): due    Albumin-to creatinine ratio (recommended annually): next due 12/19/2020  Foot Exam (recommended annually): due    2. Hypertension:  Blood pressure is at goal of < 140/90 mm Hg per the ADA guidelines (/80 on 1/8/2020). Emphasized the importance of regular physical activity, restricting salt in diet and weight maintenance/loss on overall blood pressure control.  -Currently on amlodipine 5 mg daily, lisinopril 40 mg daily    3. Hyperlipidemia:  LDL currently is not <100 mg/dL. Current lipid treatment guidelines recommend at least moderate-intensity statin doses to decrease ASCVD risk.  -Currently on pravastatin 20 mg daily    4. Medication reconciliation was completed during the visit. There are no discontinued medications. Patient verbalized understanding of the information presented and all of the patients questions were answered. AVS was handed to the patient and information reviewed. Patient advised to call the office with any additional questions or concerns. Follow-up: 4 week office visit - same day as next PCP visit. Notification of recommendations will be sent to Dr. William Buckley MD for review.     Thank you for the consult,  Raul Lopez, [facility]    Time spent with the patient:  35 mins  Time spent documenting: 15 mins

## 2020-02-18 NOTE — Clinical Note
Hi Dr. Jigar Cervantes have attached my note for Ms. Jimena Hester. Her BG has improved on Trulicity. I increased her Trulicity to 1.5 mg once every 7 days. I instructed her to decrease Levemir to 10 units BID once she increases the Trulicity. I also sent prescriptions for the [facility] since she is interested in seeing if this is covered. She also started taking her pravastatin a few days ago. I am seeing her in 4 weeks on the same day as her next visit with you. Please let me know if you have any additional recommendations for her. Reinier Parson, PharmDClinical Pharmacist Specialist

## 2020-02-22 RX ORDER — LISINOPRIL 40 MG/1
TABLET ORAL
Qty: 90 TAB | Refills: 2 | Status: SHIPPED | OUTPATIENT
Start: 2020-02-22 | End: 2020-12-01 | Stop reason: SDUPTHER

## 2020-03-03 ENCOUNTER — TELEPHONE (OUTPATIENT)
Dept: INTERNAL MEDICINE CLINIC | Age: 65
End: 2020-03-03

## 2020-03-03 NOTE — TELEPHONE ENCOUNTER
Pt  called stating pt [facility] a [facility]  And was told it was too soon  Pharmacy told him to ask the DR if she could approve it , he said she had it last filled on 01/17- please advise

## 2020-03-04 NOTE — TELEPHONE ENCOUNTER
Called patient and verified full name and date of birth. Informed patient of Dr. Wendy Gauthier' message and she verbalized understanding. I also let her know what was said by her  when he called us. She states that her  must have forgot to let us know that she lost her prescription that was in her purse. She was given 6 pills by the pharmacy yesterday, 3/3/2020. She states she just needs a refill because she uses it often.

## 2020-03-05 RX ORDER — ONDANSETRON 4 MG/1
4 TABLET, ORALLY DISINTEGRATING ORAL
Qty: 40 TAB | Refills: 1 | Status: SHIPPED | OUTPATIENT
Start: 2020-03-05 | End: 2020-11-13 | Stop reason: SDUPTHER

## 2020-03-07 ENCOUNTER — TELEPHONE (OUTPATIENT)
Dept: INTERNAL MEDICINE CLINIC | Age: 65
End: 2020-03-07

## 2020-03-07 NOTE — TELEPHONE ENCOUNTER
Pt's spouse and pt called Friday after hours. She picked up her refill of levemir insulin and took her prescribed 10 units bid but experienced generalized pruritus after the evening dose. She also had some BGs in the 60-70s range yet has not changed her diet or skipped any meals. No other new sx. No fever/cough sx. She is also on Trulicity. She is concerned that she may be allergic to the levemir formulation. Will hold off on insulin for now and reach out to her on Monday for a f/u call to assess an update on her BGs. I asked for the pt to check her BGs regularly over the weekend and to let me know if it is >250. May need to reduce her dose to 10 units levemir daily instead of 10 units bid or have her come in for reactive hypoglycemia (secondary to a smoldering condition like infection). It's very odd that nothing has changed in her diet yet her BGs have been lower. Please call pt Monday and ascertain what her BGs have been at home. Will forward this message along with her BGs to  for review.      Dr. Cedric Crisostomo  [facility], [address] [place] [address] Str.  Phone: [phone]  Fax: [phone]

## 2020-03-09 ENCOUNTER — TELEPHONE (OUTPATIENT)
Dept: FAMILY MEDICINE CLINIC | Age: 65
End: 2020-03-09

## 2020-03-09 NOTE — TELEPHONE ENCOUNTER
Called patient to follow up on BG and diabetes per request from Dr. Nika Salcedo. States she took her dose of Levemir this past Friday night, and her BG went down quickly to 70 and she had some \"bad itching\". States she saw endocrinologist today at [facility], and her A1C was \"7 point something\". States her home BG were the following over the weekend:  3/6 - fasting 111, before breakfast 128, before lunch 152, before dinner 72 (after Levemir) - stopped Levemir per on-call physician Dr. Yelena Mcgill. 3/7 - fasting 109, before breakfast 108, before lunch 101, before dinner 98   3/8 - fasting 128, before breakfast 168, before lunch 126, before dinner 144  3/9 - fasting 151, before breakfast 149, before lunch 118    States by the next day after she stopped the Levemir, the itching was gone. States she had \"rash almost like hives\". States today endocrinologist stopped the Levemir and instructed her to call endocrinology if her BG comes up above 200. States he continued Trulicity 1.5 mg once weekly (which she takes on Mondays). She denies any nausea, vomiting, or stomach upset on Trulicity. [facility] endocrinologist also recommended she attend diabetes nutrition classes at [facility]. States she saw her transplant doctor yesterday and no changes were made. Instructed patient to follow plan per endocrinology to stop Levemir and continue Trulicity 1.5 mg once weekly. Will discuss plan for patient's diabetes management with Dr. Nika Salcedo. Patient expressed understanding and had no further questions. Thank you for allowing me to participate in the care of this patient.     Dariana Cuevas, PharmD  Clinical Pharmacist Specialist

## 2020-03-10 ENCOUNTER — TELEPHONE (OUTPATIENT)
Dept: INTERNAL MEDICINE CLINIC | Age: 65
End: 2020-03-10

## 2020-03-10 NOTE — TELEPHONE ENCOUNTER
Have received note from renal clinic and put in doctor's box;  had to leave message with nurse line at [facility] diabetes center to fax note

## 2020-03-10 NOTE — TELEPHONE ENCOUNTER
Please request recent office visit notes from renal transplant clinic at [facility] (Dr. Soledad Ying) and [facility] diabetes [place] (endocrinology). Patient states that she was seen by both last week.

## 2020-03-17 ENCOUNTER — TELEPHONE (OUTPATIENT)
Dept: INTERNAL MEDICINE CLINIC | Age: 65
End: 2020-03-17

## 2020-03-17 NOTE — TELEPHONE ENCOUNTER
Called patient to complete Covid-19 screening. Patient has upcoming PharmD appointment scheduled for tomorrow 3/18/2020. COVID-19 Screening Questions 3/17/2020 1/8/2020 12/19/2019 10/23/2019 5/15/2019 5/10/2019   Have you been in contact with someone who was sick? No / Unsure No / Unsure No / Unsure No / Mare Gillespie Yes No / Unsure   Have you traveled internationally in the last month? No No No No No Yes     Patient denies any SOB/fevers/chills or any other symptoms and denies any recent sick exposure or travel. Changed patient's appointment tomorrow to telephone appointment. Notes from Encounter:    States her home BG readings were the following based on her Freestyle Alice:  -3/17 - 125 fasting  -3/16 - 109 fasting, 120 before breakfast, 121 before lunch, 89 before dinner  -3/15 - 143 fasting, 138 before breakfast, 108 before lunch, 130 before dinner  -3/14 - 136 fasting, 108 before breakfast, 130 before lunch, 120 before dinner  -3/13 - 138 fasting, 108 before breakfast, 136 before lunch, 106 before dinner  -3/12 - 101 fasting, 103 before breakfast, 106 before lunch, 96 before dinner  -3/11 - 136 fasting, 99 before breakfast, 101 before lunch, 101 before dinner    Denies any BG <70 since last telephone call on 3/9. Denies any signs of hypoglycemia. States she been off of Levemir and has been taking Trulicity 1.5 mg on Mondays. States she has been staying in at home per instructions from her transplant team.    Instructed patient to continue current regimen of Trulicity 1.5 mg on Mondays and continue monitoring BG multiple times per day using freestyle alice. Instructed patient to call PharmD if she has any BG <70, any signs of hypoglycemia, or if BG starts to increase. Patient states she will also call her endocrinologist at [facility] if BG >200. Reminded patient to use finger prick BG check if freestyle alice shows BG <70 or if her symptoms do not match her freestyle alice readings.     Patient expressed understanding and had no further questions. Will call patient in 2 weeks to follow up.     Von Malloy, GregD  Clinical Pharmacist Specialist

## 2020-04-03 ENCOUNTER — TELEPHONE (OUTPATIENT)
Dept: INTERNAL MEDICINE CLINIC | Age: 65
End: 2020-04-03

## 2020-04-03 DIAGNOSIS — E11.29 TYPE 2 DIABETES MELLITUS WITH MICROALBUMINURIA, WITH LONG-TERM CURRENT USE OF INSULIN (HCC): ICD-10-CM

## 2020-04-03 DIAGNOSIS — R80.9 TYPE 2 DIABETES MELLITUS WITH MICROALBUMINURIA, WITH LONG-TERM CURRENT USE OF INSULIN (HCC): ICD-10-CM

## 2020-04-03 DIAGNOSIS — Z79.4 TYPE 2 DIABETES MELLITUS WITH MICROALBUMINURIA, WITH LONG-TERM CURRENT USE OF INSULIN (HCC): ICD-10-CM

## 2020-04-03 RX ORDER — DULAGLUTIDE 1.5 MG/.5ML
1.5 INJECTION, SOLUTION SUBCUTANEOUS
Qty: 12 EACH | Refills: 3 | Status: SHIPPED | OUTPATIENT
Start: 2020-04-03 | End: 2021-06-23 | Stop reason: SDUPTHER

## 2020-04-03 NOTE — TELEPHONE ENCOUNTER
Pharmacy Note:  Diabetes Follow-up - Telephone    S/O: Placed an outgoing call to patient to follow-up on SMBG readings and diabetes. Patient referred by Henry Michelle MD for diabetes management. Patient also following with Endo at [facility] for care. Patient is currently taking the following for diabetes: Trulicity 1.5 mg once weekly on Mondays. She denies any signs/sx of hypoglycemia. Patient is checking her blood sugar daily with freestyle eyad and reports the following values:  Date Fasting Before Lunch Before Dinner Bedtime   3/31 130 120 110 120   3/30 131 100 110 122   3/29 128 113 130 120   3/28 130 119 110 126   -States her BG are staying pretty consistent  -States she hasn't had any BG <70 since last visit    Medication Adherence:  -Patient reports adherence with medications  -Patient denies adverse effects from medications:     Screenings:  Diabetic Foot and Eye Exam HM Status   Topic Date Due    Diabetic Foot Care  08/13/2019    Eye Exam  01/06/2022       Current Outpatient Medications   Medication Sig    ondansetron (ZOFRAN ODT) 4 mg disintegrating tablet Take 1 Tab by mouth every eight (8) hours as needed for Nausea or Nausea or Vomiting.  lisinopril (PRINIVIL, ZESTRIL) 40 mg tablet TAKE 1 TABLET BY MOUTH DAILY    dulaglutide (TRULICITY) 1.5 OW/2.6 mL sub-q pen 0.5 mL by SubCUTAneous route every seven (7) days.  flash glucose sensor (FREESTYLE EYAD 14 DAY SENSOR) kit Use to check blood sugar at least 4 times daily. Dx E11.9.  flash glucose scanning reader (FREESTYLE EYAD 14 DAY READER) [facility] [place] [facility] Use to check blood sugar at least 4 times daily. Dx E11.9.    MELATONIN PO Take 6 mg by mouth daily.  diphenhydrAMINE-acetaminophen (TYLENOL PM EXTRA STRENGTH)  mg tab Take 2 Tabs by mouth daily as needed.  Blood-Glucose Meter monitoring kit Use to check blood sugar 4 times daily. Diagnosis code E11.9. Please dispense brand covered by her insurance.     glucose blood VI test strips (ASCENSIA AUTODISC VI, ONE TOUCH ULTRA TEST VI) strip Use to check blood sugar 4 times daily. Diagnosis code E11.9. Please dispense brand covered by her insurance.  lancets misc Use to check blood sugar 4 times daily. Diagnosis code E11.9. Please dispense brand covered by her insurance.  amLODIPine (NORVASC) 5 mg tablet TAKE 1 TABLET BY MOUTH DAILY    pravastatin (PRAVACHOL) 20 mg tablet Take 1 Tab by mouth nightly.  insulin detemir U-100 (LEVEMIR FLEXTOUCH) 100 unit/mL (3 mL) inpn Inject 15 units every morning and 15 units every evening. Or as directed.  DOXYCYCLINE CALCIUM PO Take 40 mg by mouth daily.  ergocalciferol (ERGOCALCIFEROL) 50,000 unit capsule Take 1 Cap by mouth every seven (7) days.  sucralfate (CARAFATE) 100 mg/mL suspension Take 10 mL by mouth four (4) times daily. Indications: reflux/ abdominal pain    Insulin Needles, Disposable, 31 gauge x 5/16\" ndle Use with Lantus insulin qhs.  omeprazole (PRILOSEC) 40 mg capsule Take 1 Cap by mouth daily.  predniSONE (DELTASONE) 5 mg tablet Take 2.5 mg by mouth every other day.  tacrolimus (PROGRAF) 1 mg capsule TAKE 2 CAPSULES BY MOUTH TWICE DAILY     No current facility-administered medications for this visit.         Lab Results   Component Value Date/Time    Hemoglobin A1c 9.0 (H) 12/19/2019 10:16 AM       Lab Results   Component Value Date/Time    Sodium 139 12/19/2019 10:16 AM    Potassium 4.3 12/19/2019 10:16 AM    Chloride 105 12/19/2019 10:16 AM    CO2 24 12/19/2019 10:16 AM    Anion gap 10 12/19/2019 10:16 AM    Glucose 331 (H) 12/19/2019 10:16 AM    BUN 17 12/19/2019 10:16 AM    Creatinine 1.09 12/19/2019 10:16 AM    BUN/Creatinine ratio 16 12/19/2019 10:16 AM    GFR est AA >60 12/19/2019 10:16 AM    GFR est non-AA 50 (L) 12/19/2019 10:16 AM    Calcium 9.2 12/19/2019 10:16 AM       Lab Results   Component Value Date/Time    Cholesterol, total 280 (H) 12/19/2019 10:16 AM    HDL Cholesterol 49 12/19/2019 10:16 AM LDL, calculated  12/19/2019 10:16 AM     LDL AND VLDL CHOLESTEROL NOT CALCULATED WHEN TRIGLYCERIDES >400 MG/DL OR HDL CHOLESTEROL <20 MG/DL    Triglyceride 441 (H) 12/19/2019 10:16 AM    CHOL/HDL Ratio 5.7 (H) 12/19/2019 10:16 AM       A/P:    Diabetes:  -Instructed patient to continue current regimen of Trulicity 1.5 mg once weekly  -Reiterated importance of her using freestyle angie monitor at least every 8 hours to check BG; reminded her to double check BG with fingerstick when BG is low or does not match her symptoms  -Will discuss plan for diabetes management for patient with PCP    Reviewed with patient signs/sx/treatment of hypoglycemia. Patient verbalized understanding. Will follow up with patient on 4/24 at 1 PM on telephone.     Sol Osorio, PharmD  Clinical Pharmacist Specialist    CLINICAL PHARMACY CONSULT: MED RECONCILIATION/REVIEW ADDENDUM    For Pharmacy Admin Tracking Only    PHSO: PHSO Patient?: Yes  Total # of Interventions Recommended: Count: 1 - diabetes education  Total Interventions Accepted: 1  Time Spent (min): 2623 [facility], PharmD

## 2020-04-16 NOTE — TELEPHONE ENCOUNTER
Last Appointment:  2/24/2020  Future Appointments   Date Time Provider Seng Willsi   5/4/2020  9:00 AM Jin Whalen  [address]   6/22/2020  7:00 AM Jin Whalen  [address]    Patient calling in to request a refill on Sucralfate. Patient states she takes it twice daily. [facility]

## 2020-04-24 ENCOUNTER — VIRTUAL VISIT (OUTPATIENT)
Dept: INTERNAL MEDICINE CLINIC | Age: 65
End: 2020-04-24

## 2020-04-24 DIAGNOSIS — Z79.4 TYPE 2 DIABETES MELLITUS WITH MICROALBUMINURIA, WITH LONG-TERM CURRENT USE OF INSULIN (HCC): Primary | ICD-10-CM

## 2020-04-24 DIAGNOSIS — E11.29 TYPE 2 DIABETES MELLITUS WITH MICROALBUMINURIA, WITH LONG-TERM CURRENT USE OF INSULIN (HCC): Primary | ICD-10-CM

## 2020-04-24 DIAGNOSIS — R80.9 TYPE 2 DIABETES MELLITUS WITH MICROALBUMINURIA, WITH LONG-TERM CURRENT USE OF INSULIN (HCC): Primary | ICD-10-CM

## 2020-04-24 NOTE — PROGRESS NOTES
Pharmacy Note:  Diabetes Follow-up    S/O: Placed an outgoing call to patient to follow-up on SMBG readings and diabetes. Patient referred by Yolanda Quinn MD for diabetes management. Patient is currently taking the following for diabetes: Trulicity 1.5 mg once weekly on Mondays.  -Established care with endocrinologist at [facility] for diabetes, but states her appointment was cancelled due to Covid-19 and she will not be able to see endocrinologist for months    She denies any signs/sx of hypoglycemia. Denies any BG <70. Denies any signs of hyperglycemia. Last A1C was 9.0 on 12/19/19. Patient is checking Her blood sugar multiple times daily using Freestyle Alice and reports the following values:  Date Fasting Before Dinner   4/24 135    4/23 131 104   4/22 121 100   4/21 129 104   4/20 130 103   4/19 130 129   4/18 130 120   4/17 130 119   -States her BG runs 100s-130s    Diet/Exercise: States has been having hard time finding healthier food lately due to pandemic; states she is having more carbs than usual    Medication Adherence:  -Patient reports adherence with medications  -Patient reports adverse effects from medications  -States she is not taking pravastatin since it made her feel \"wiped out\"    Screenings:  Diabetic Foot and Eye Exam HM Status   Topic Date Due    Diabetic Foot Care  08/13/2019    Eye Exam  01/06/2022       Current Outpatient Medications   Medication Sig    dulaglutide (Trulicity) 1.5 RU/1.4 mL sub-q pen 0.5 mL by SubCUTAneous route every seven (7) days.  ondansetron (ZOFRAN ODT) 4 mg disintegrating tablet Take 1 Tab by mouth every eight (8) hours as needed for Nausea or Nausea or Vomiting.  lisinopril (PRINIVIL, ZESTRIL) 40 mg tablet TAKE 1 TABLET BY MOUTH DAILY    flash glucose sensor (FREESTYLE ALICE 14 DAY SENSOR) kit Use to check blood sugar at least 4 times daily. Dx E11.9.     flash glucose scanning reader (FREESTYLE ALICE 14 DAY READER) misc Use to check blood sugar at least 4 times daily. Dx E11.9.    MELATONIN PO Take 6 mg by mouth daily.  diphenhydrAMINE-acetaminophen (TYLENOL PM EXTRA STRENGTH)  mg tab Take 2 Tabs by mouth daily as needed.  Blood-Glucose Meter monitoring kit Use to check blood sugar 4 times daily. Diagnosis code E11.9. Please dispense brand covered by her insurance.  glucose blood VI test strips (ASCENSIA AUTODISC VI, ONE TOUCH ULTRA TEST VI) strip Use to check blood sugar 4 times daily. Diagnosis code E11.9. Please dispense brand covered by her insurance.  lancets misc Use to check blood sugar 4 times daily. Diagnosis code E11.9. Please dispense brand covered by her insurance.  amLODIPine (NORVASC) 5 mg tablet TAKE 1 TABLET BY MOUTH DAILY    pravastatin (PRAVACHOL) 20 mg tablet Take 1 Tab by mouth nightly.  DOXYCYCLINE CALCIUM PO Take 40 mg by mouth daily.  ergocalciferol (ERGOCALCIFEROL) 50,000 unit capsule Take 1 Cap by mouth every seven (7) days.  sucralfate (CARAFATE) 100 mg/mL suspension Take 10 mL by mouth four (4) times daily. Indications: reflux/ abdominal pain    Insulin Needles, Disposable, 31 gauge x 5/16\" ndle Use with Lantus insulin qhs.  omeprazole (PRILOSEC) 40 mg capsule Take 1 Cap by mouth daily.  predniSONE (DELTASONE) 5 mg tablet Take 2.5 mg by mouth every other day.  tacrolimus (PROGRAF) 1 mg capsule TAKE 2 CAPSULES BY MOUTH TWICE DAILY     No current facility-administered medications for this visit.         Lab Results   Component Value Date/Time    Hemoglobin A1c 9.0 (H) 12/19/2019 10:16 AM       Lab Results   Component Value Date/Time    Sodium 139 12/19/2019 10:16 AM    Potassium 4.3 12/19/2019 10:16 AM    Chloride 105 12/19/2019 10:16 AM    CO2 24 12/19/2019 10:16 AM    Anion gap 10 12/19/2019 10:16 AM    Glucose 331 (H) 12/19/2019 10:16 AM    BUN 17 12/19/2019 10:16 AM    Creatinine 1.09 12/19/2019 10:16 AM    BUN/Creatinine ratio 16 12/19/2019 10:16 AM    GFR est AA >60 12/19/2019 10:16 AM    GFR est non-AA 50 (L) 12/19/2019 10:16 AM    Calcium 9.2 12/19/2019 10:16 AM       Lab Results   Component Value Date/Time    Cholesterol, total 280 (H) 12/19/2019 10:16 AM    HDL Cholesterol 49 12/19/2019 10:16 AM    LDL, calculated  12/19/2019 10:16 AM     LDL AND VLDL CHOLESTEROL NOT CALCULATED WHEN TRIGLYCERIDES >400 MG/DL OR HDL CHOLESTEROL <20 MG/DL    Triglyceride 441 (H) 12/19/2019 10:16 AM    CHOL/HDL Ratio 5.7 (H) 12/19/2019 10:16 AM       A/P:    Diabetes:  -Instructed patient to continue Trulicity 1.5 mg once weekly  -Reiterated importance of continuing to check BG at least 2 times daily    Reviewed with patient signs/sx/treatment of hypoglycemia. Patient verbalized understanding. Will follow up with patient on 5/29 at 11 AM for telephone visit. PharmD will continue to follow with patient until she is able to re-establish care with her endocrinologist; will make PCP aware. Will route note to PCP.     Amy Law, PharmD  Clinical Pharmacist Specialist    CLINICAL PHARMACY CONSULT: MED RECONCILIATION/REVIEW ADDENDUM    For Pharmacy Admin Tracking Only    PHSO: PHSO Patient?: Yes  Total # of Interventions Recommended: Count: 0  Total Interventions Accepted: 0  Time Spent (min): Jose Luis 112, [facility], PharmD

## 2020-04-24 NOTE — Clinical Note
Jerardo Pearce,  I spoke with Ms. Maryellen Mcgill last Friday to follow up on BG; they remain controlled on Trulicity with BG [id number]. She states her follow up appointment with endo was cancelled due to covid-19 and she is not sure when she will be able to see endo again. I will follow up with her monthly just until she can re-estalish care with endo.   Geo Rivers, PharmD Clinical Pharmacist Specialist

## 2020-05-12 RX ORDER — ERGOCALCIFEROL 1.25 MG/1
CAPSULE ORAL
Qty: 12 CAP | Refills: 1 | Status: SHIPPED | OUTPATIENT
Start: 2020-05-12 | End: 2021-01-15 | Stop reason: SDUPTHER

## 2020-05-18 NOTE — TELEPHONE ENCOUNTER
MEDICARE WELLNESS VISIT NOTE      HISTORY OF PRESENT ILLNESS:   Jd Linn presents for his Subsequent Annual Medicare Wellness Visit.   He has complaints or concerns which include SEE H&P.  .      Patient Care Team:  Tony Miguel MD as PCP - General (Internal Medicine)  Adrian Serrano OD as Referring Provider (Optometry)        Patient Active Problem List   Diagnosis   • Raynaud's phenomenon   • Pure hypercholesterolemia   • Agatston coronary artery calcium score between 200 and 399   • Chronic allergic rhinitis   • Esophageal dysphagia   • Incontinence of feces         Past Medical History:   Diagnosis Date   • Allergy    • Commotio retinae of right eye 2018   • Hyperlipemia    • Hyphema, right          Past Surgical History:   Procedure Laterality Date   • Adenoidectomy     • Colonoscopy  2013    F/U 2023   • Hemocult x1  02/07/2012   • Remove tonsils/adenoids,<10 y/o      T & A   • Tonsillectomy           Social History     Tobacco Use   • Smoking status: Never Smoker   • Smokeless tobacco: Never Used   Substance Use Topics   • Alcohol use: Yes     Alcohol/week: 0.8 standard drinks     Types: 1 Standard drinks or equivalent per week     Comment: Occasional   • Drug use: No     Drug use:    Drug Use:    No              Family History - Reviewed    Current Outpatient Medications   Medication Sig Dispense Refill   • atorvastatin (LIPITOR) 20 MG tablet Take 1 tablet by mouth daily. 90 tablet 3   • diphtheria-pertussis, acellular,-tetanus (BOOSTRIX) injection Inject 0.5 mLs into the muscle 1 time for 1 dose. 0.5 mL 0     No current facility-administered medications for this visit.         The following items on the Medicare Health Risk Assessment were found to be positive      Vision and Hearing screens: performed and reviewed    Advance Directive:   The patient has the following documents:  Living Will (Declaration for Physicians)    Cognitive Assessment: no evidence of cognitive dysfunction by direct  Called and spoke with patient. Having sinus drainage of yellow sputum and cough productive of yellow sputum since last visit, worsened over last week. No fever or chills, but not feeling well. Immunosuppressed with renal transplant. Will start Augmentin for 10 days. Script sent to [facility]. observation    Recent PHQ 2/9 Score    PHQ 2:  Date Adult PHQ 2 Score   7/1/2019 0       PHQ 9:       DEPRESSION ASSESSMENT/PLAN:  Depression screening is negative no further plan needed.     Body mass index is 27.64 kg/m².    BMI ASSESSMENT/PLAN:  Patient BMI is within normal range.     Needed Screening/Treatment:   none  Needed follow up:  None    See orders.   See Patient Instructions section.   No follow-ups on file.

## 2020-05-29 ENCOUNTER — VIRTUAL VISIT (OUTPATIENT)
Dept: INTERNAL MEDICINE CLINIC | Age: 65
End: 2020-05-29

## 2020-05-29 DIAGNOSIS — Z79.4 TYPE 2 DIABETES MELLITUS WITH MICROALBUMINURIA, WITH LONG-TERM CURRENT USE OF INSULIN (HCC): Primary | ICD-10-CM

## 2020-05-29 DIAGNOSIS — E11.29 TYPE 2 DIABETES MELLITUS WITH MICROALBUMINURIA, WITH LONG-TERM CURRENT USE OF INSULIN (HCC): Primary | ICD-10-CM

## 2020-05-29 DIAGNOSIS — R80.9 TYPE 2 DIABETES MELLITUS WITH MICROALBUMINURIA, WITH LONG-TERM CURRENT USE OF INSULIN (HCC): Primary | ICD-10-CM

## 2020-05-29 NOTE — PROGRESS NOTES
Pharmacy Note:  Diabetes Follow-up    S/O: Placed an outgoing call to patient to follow-up on SMBG readings and diabetes. Patient referred by Camila Aragon MD for diabetes management. Patient is currently taking the following for diabetes: Trulicity 1.5 mg once weekly on Mondays.  -States she spoke with diabetes teaching center through [facility] endocrinology, but they are not doing classes yet due to pandemic    She denies any signs/sx of hypoglycemia. Denies any BG <70. Last A1C was 9.0 on 12/19/19. Denies any symptoms of hyperglycemia. Patient is checking Her blood sugar 2 times daily and reports the following values:  Date Fasting Bedtime   5/29 138    5/28 135 140   5/27 130 120   5/26 138 128   5/25 135 138   5/24 128 140   5/23 140 135   5/22 130 138   5/21 138 130   -States highest BG has been 140s, lowest has been 120s  -States BG hasn't been over 140    Diet/Exercise:  -Breakfast - mostly eggs, sometimes ham or turkey haney, 1 slice of toast  -Lunch - Doesn't usually eat lunch - has a small snack like cheese and crackers, soup, or salad  -Dinner - Meat and vegetable and carb (rice mostly, sometimes potatoes)  -Sometimes has snack at night  -States she usually walks a few miles or rides bike    Medication Adherence:  -Patient reports adherence with medications  -Patient reports adverse effects from medications  -States she hasn't been taking pravastatin in a while since she was getting cramps with it  -States she was able to tolerate simvastatin at low dose in the past    Screenings:  Diabetic Foot and Eye Exam HM Status   Topic Date Due    Diabetic Foot Care  08/13/2019    Eye Exam  01/06/2022       Current Outpatient Medications   Medication Sig    ergocalciferol (ERGOCALCIFEROL) 1,250 mcg (50,000 unit) capsule TAKE ONE CAPSULE BY MOUTH EVERY 7 DAYS    dulaglutide (Trulicity) 1.5 SW/0.4 mL sub-q pen 0.5 mL by SubCUTAneous route every seven (7) days.     ondansetron (ZOFRAN ODT) 4 mg disintegrating tablet Take 1 Tab by mouth every eight (8) hours as needed for Nausea or Nausea or Vomiting.  lisinopril (PRINIVIL, ZESTRIL) 40 mg tablet TAKE 1 TABLET BY MOUTH DAILY    flash glucose sensor (FREESTYLE EYAD 14 DAY SENSOR) kit Use to check blood sugar at least 4 times daily. Dx E11.9.  flash glucose scanning reader (FREESTYLE EYAD 14 DAY READER) misc Use to check blood sugar at least 4 times daily. Dx E11.9.    MELATONIN PO Take 6 mg by mouth daily.  Blood-Glucose Meter monitoring kit Use to check blood sugar 4 times daily. Diagnosis code E11.9. Please dispense brand covered by her insurance.  glucose blood VI test strips (ASCENSIA AUTODISC VI, ONE TOUCH ULTRA TEST VI) strip Use to check blood sugar 4 times daily. Diagnosis code E11.9. Please dispense brand covered by her insurance.  lancets misc Use to check blood sugar 4 times daily. Diagnosis code E11.9. Please dispense brand covered by her insurance.  amLODIPine (NORVASC) 5 mg tablet TAKE 1 TABLET BY MOUTH DAILY    DOXYCYCLINE CALCIUM PO Take 40 mg by mouth daily.  sucralfate (CARAFATE) 100 mg/mL suspension Take 10 mL by mouth four (4) times daily. Indications: reflux/ abdominal pain    Insulin Needles, Disposable, 31 gauge x 5/16\" ndle Use with Lantus insulin qhs.  omeprazole (PRILOSEC) 40 mg capsule Take 1 Cap by mouth daily.  predniSONE (DELTASONE) 5 mg tablet Take 2.5 mg by mouth every other day.  tacrolimus (PROGRAF) 1 mg capsule TAKE 2 CAPSULES BY MOUTH TWICE DAILY    diphenhydrAMINE-acetaminophen (TYLENOL PM EXTRA STRENGTH)  mg tab Take 2 Tabs by mouth daily as needed.  pravastatin (PRAVACHOL) 20 mg tablet Take 1 Tab by mouth nightly. No current facility-administered medications for this visit.         Lab Results   Component Value Date/Time    Hemoglobin A1c 9.0 (H) 12/19/2019 10:16 AM       Lab Results   Component Value Date/Time    Sodium 139 12/19/2019 10:16 AM    Potassium 4.3 12/19/2019 10:16 AM    Chloride 105 12/19/2019 10:16 AM    CO2 24 12/19/2019 10:16 AM    Anion gap 10 12/19/2019 10:16 AM    Glucose 331 (H) 12/19/2019 10:16 AM    BUN 17 12/19/2019 10:16 AM    Creatinine 1.09 12/19/2019 10:16 AM    BUN/Creatinine ratio 16 12/19/2019 10:16 AM    GFR est AA >60 12/19/2019 10:16 AM    GFR est non-AA 50 (L) 12/19/2019 10:16 AM    Calcium 9.2 12/19/2019 10:16 AM       Lab Results   Component Value Date/Time    Cholesterol, total 280 (H) 12/19/2019 10:16 AM    HDL Cholesterol 49 12/19/2019 10:16 AM    LDL, calculated  12/19/2019 10:16 AM     LDL AND VLDL CHOLESTEROL NOT CALCULATED WHEN TRIGLYCERIDES >400 MG/DL OR HDL CHOLESTEROL <20 MG/DL    Triglyceride 441 (H) 12/19/2019 10:16 AM    CHOL/HDL Ratio 5.7 (H) 12/19/2019 10:16 AM       A/P:    Diabetes:  -Instructed patient to continue Trulicity 6.37 mg once weekly  -Reiterated importance of checking BG 2 times daily  -Will discuss patient's statin therapy with PCP Dr. Sophia Black patient to make sure she spaces doxycycline doses at least 2 hours before or 4 hours after doses of her sucralfate to prevent drug interaction    Reviewed with patient signs/sx/treatment of hypoglycemia. Patient verbalized understanding. Will follow up with patient in 4 weeks for telephone visit. Will continue to follow patient until she can re-establish care with endocrinology. Will route note to PCP.     Sol Osorio PharmD  Clinical Pharmacist Specialist    CLINICAL PHARMACY CONSULT: MED RECONCILIATION/REVIEW ADDENDUM    For Pharmacy Admin Tracking Only    PHSO: PHSO Patient?: No  Total # of Interventions Recommended: Count: 0  Total Interventions Accepted: 0  Time Spent (min): 1223 [facility], PharmD

## 2020-05-29 NOTE — Clinical Note
Jerardo Franklin,  I spoke with Ms. Jena Fowler. She states she hasn't re-established care with endo and gotten dietary counseling there yet since they are not having classes due to pandemic. She states her BG have been [id number] on her Trulicity 1.5 mg once weekly. Of note, she states she self-discontinued her pravastatin since she was getting cramps. She wanted to ask if she could go back on simvastatin or another agent for her cholesterol. We could try Zetia since it looks like she has not tried this yet for her cholesterol. Please let me know what you recommend for her. I will continue to follow her BG monthly until she re-establishes care with endo.   Dwayne Rivers, PharmD Clinical Pharmacist Specialist

## 2020-06-03 ENCOUNTER — TELEPHONE (OUTPATIENT)
Dept: INTERNAL MEDICINE CLINIC | Age: 65
End: 2020-06-03

## 2020-06-03 DIAGNOSIS — E11.29 TYPE 2 DIABETES MELLITUS WITH MICROALBUMINURIA, WITH LONG-TERM CURRENT USE OF INSULIN (HCC): Primary | ICD-10-CM

## 2020-06-03 DIAGNOSIS — E78.5 HYPERLIPIDEMIA, UNSPECIFIED HYPERLIPIDEMIA TYPE: ICD-10-CM

## 2020-06-03 DIAGNOSIS — R80.9 TYPE 2 DIABETES MELLITUS WITH MICROALBUMINURIA, WITH LONG-TERM CURRENT USE OF INSULIN (HCC): Primary | ICD-10-CM

## 2020-06-03 DIAGNOSIS — Z79.4 TYPE 2 DIABETES MELLITUS WITH MICROALBUMINURIA, WITH LONG-TERM CURRENT USE OF INSULIN (HCC): Primary | ICD-10-CM

## 2020-06-03 RX ORDER — EZETIMIBE 10 MG/1
10 TABLET ORAL DAILY
Qty: 30 TAB | Refills: 1 | Status: SHIPPED | OUTPATIENT
Start: 2020-06-03 | End: 2021-02-24 | Stop reason: SDUPTHER

## 2020-06-03 NOTE — TELEPHONE ENCOUNTER
Called patient to provide instructions from PCP Dr. Bhavana Bansal. Instructed patient to start Zetia 10 mg daily for cholesterol and discontinue her pravastatin. Sent Rx to pharmacy per Dr. Esme Escalante instructions. Also instructed patient to call office to make follow up appointment for virtual visit with Dr. Bhavana Bansal. Patient expressed understanding and had no further questions.     Alix Rivas, PharmD  Clinical Pharmacist Specialist

## 2020-06-26 ENCOUNTER — TELEPHONE (OUTPATIENT)
Dept: INTERNAL MEDICINE CLINIC | Age: 65
End: 2020-06-26

## 2020-06-26 NOTE — TELEPHONE ENCOUNTER
Called patient for PharmD telephone appointment for diabetes follow up today. Spoke with patient's , who states patient is not home. He states he will have patient call PharmD to reschedule appointment.     Natalie Mayer, Shira  Clinical Pharmacist Specialist

## 2020-06-29 ENCOUNTER — TELEPHONE (OUTPATIENT)
Dept: INTERNAL MEDICINE CLINIC | Age: 65
End: 2020-06-29

## 2020-06-29 ENCOUNTER — TELEPHONE (OUTPATIENT)
Dept: FAMILY MEDICINE CLINIC | Age: 65
End: 2020-06-29

## 2020-06-29 ENCOUNTER — VIRTUAL VISIT (OUTPATIENT)
Dept: FAMILY MEDICINE CLINIC | Age: 65
End: 2020-06-29

## 2020-06-29 NOTE — TELEPHONE ENCOUNTER
Pharmacy Note:  Diabetes Follow-up    S/O: Patient returned call to PharmD to discuss BG readings. Patient referred by Milagro Chow MD for diabetes management. Patient is currently taking the following for diabetes: Trulicity 1.5 mg once weekly on Tuesdays. She denies any signs/sx of hypoglycemia. Denies any BG <70. Last A1C was 9.0 on 12/19/2019. Denies any polyuria/dipsia/phagia. Reports blurry vision.  -States she saw her eye doctor a few months ago    Patient is checking Her blood sugar 2 times daily and reports the following values:  Date Fasting Bedtime   6/29 120    6/28 130 138   6/27 118 120   6/26 129 130   6/25 135 118   6/24 128 120   6/23 131 138   6/22 129 135   6/21 108 133     Diet/Exercise:  -States she got a puppy and is walking her a lot each day  -Breakfast - 1 piece of toast  -Lunch - Salad or half sandwich (turkey or ham)  -Dinner - Chicken or fish or beef, vegetable, starch  -States she has a snack at night - popcorn or rarely a cookie    Medication Adherence:  -Patient reports adherence with medications  -Patient denies adverse effects from medications    Screenings:  Diabetic Foot and Eye Exam HM Status   Topic Date Due    Diabetic Foot Care  08/13/2019    Eye Exam  01/06/2022       Current Outpatient Medications   Medication Sig    FreeStyle Alice 14 Day Sensor kit USE AT LEAST FOUR TIMES DAILY AS DIRECTED    ezetimibe (ZETIA) 10 mg tablet Take 1 Tab by mouth daily.  ergocalciferol (ERGOCALCIFEROL) 1,250 mcg (50,000 unit) capsule TAKE ONE CAPSULE BY MOUTH EVERY 7 DAYS    dulaglutide (Trulicity) 1.5 ZQ/4.5 mL sub-q pen 0.5 mL by SubCUTAneous route every seven (7) days.  ondansetron (ZOFRAN ODT) 4 mg disintegrating tablet Take 1 Tab by mouth every eight (8) hours as needed for Nausea or Nausea or Vomiting.     lisinopril (PRINIVIL, ZESTRIL) 40 mg tablet TAKE 1 TABLET BY MOUTH DAILY    flash glucose scanning reader (BlazeSTYLE ALICE 14 DAY READER) misc Use to check blood sugar at least 4 times daily. Dx E11.9.    MELATONIN PO Take 6 mg by mouth daily.  diphenhydrAMINE-acetaminophen (TYLENOL PM EXTRA STRENGTH)  mg tab Take 2 Tabs by mouth daily as needed.  Blood-Glucose Meter monitoring kit Use to check blood sugar 4 times daily. Diagnosis code E11.9. Please dispense brand covered by her insurance.  glucose blood VI test strips (ASCENSIA AUTODISC VI, ONE TOUCH ULTRA TEST VI) strip Use to check blood sugar 4 times daily. Diagnosis code E11.9. Please dispense brand covered by her insurance.  lancets misc Use to check blood sugar 4 times daily. Diagnosis code E11.9. Please dispense brand covered by her insurance.  amLODIPine (NORVASC) 5 mg tablet TAKE 1 TABLET BY MOUTH DAILY    DOXYCYCLINE CALCIUM PO Take 40 mg by mouth daily.  sucralfate (CARAFATE) 100 mg/mL suspension Take 10 mL by mouth four (4) times daily. Indications: reflux/ abdominal pain    Insulin Needles, Disposable, 31 gauge x 5/16\" ndle Use with Lantus insulin qhs.  omeprazole (PRILOSEC) 40 mg capsule Take 1 Cap by mouth daily.  predniSONE (DELTASONE) 5 mg tablet Take 2.5 mg by mouth every other day.  tacrolimus (PROGRAF) 1 mg capsule TAKE 2 CAPSULES BY MOUTH TWICE DAILY     No current facility-administered medications for this visit.         Lab Results   Component Value Date/Time    Hemoglobin A1c 9.0 (H) 12/19/2019 10:16 AM       Lab Results   Component Value Date/Time    Sodium 139 12/19/2019 10:16 AM    Potassium 4.3 12/19/2019 10:16 AM    Chloride 105 12/19/2019 10:16 AM    CO2 24 12/19/2019 10:16 AM    Anion gap 10 12/19/2019 10:16 AM    Glucose 331 (H) 12/19/2019 10:16 AM    BUN 17 12/19/2019 10:16 AM    Creatinine 1.09 12/19/2019 10:16 AM    BUN/Creatinine ratio 16 12/19/2019 10:16 AM    GFR est AA >60 12/19/2019 10:16 AM    GFR est non-AA 50 (L) 12/19/2019 10:16 AM    Calcium 9.2 12/19/2019 10:16 AM       Lab Results   Component Value Date/Time    Cholesterol, total 280 (H) 12/19/2019 10:16 AM    HDL Cholesterol 49 12/19/2019 10:16 AM    LDL, calculated  12/19/2019 10:16 AM     LDL AND VLDL CHOLESTEROL NOT CALCULATED WHEN TRIGLYCERIDES >400 MG/DL OR HDL CHOLESTEROL <20 MG/DL    Triglyceride 441 (H) 12/19/2019 10:16 AM    CHOL/HDL Ratio 5.7 (H) 12/19/2019 10:16 AM       A/P:    Diabetes:  -Instructed patient to continue Trulicity 1.5 mg once weekly  -Reiterated importance of checking BG 2-3 times daily  -Patient had previously established care with endocrinologist at [facility], but was unable to attend diabetes classes there due to pandemic  -Instructed patient to call to schedule follow up appointment with endocrinology    Reviewed with patient signs/sx/treatment of hypoglycemia. Patient verbalized understanding. Will sign off on patient and refer back to PCP and endocrinology for management, since patient's BG have been stable. Will route note to PCP.     Jennifer Bonilla, PharmD  Clinical Pharmacist Specialist    CLINICAL PHARMACY CONSULT: MED RECONCILIATION/REVIEW ADDENDUM    For Pharmacy Admin Tracking Only    PHSO: PHSO Patient?: Yes  Total # of Interventions Recommended: Count: 0  Total Interventions Accepted: 0  Time Spent (min): 138 Jovanna Chester, [facility], PharmD

## 2020-06-29 NOTE — PROGRESS NOTES
Pharmacy Note:  Diabetes Follow-up S/O: Placed an outgoing call to patient to follow-up on SMBG readings and diabetes. Patient referred by Edwar Adamson MD for diabetes management. Discussed with patient the Pharmacist Collaborative Practice Agreement. Patient provided verbal and/or electronic (ex. mychart) consent to participate in the collaborative practice agreement between the pharmacist and referred patient. This is in lieu of paper consent due to COVID-19 precautions and the use of remote/virtual visits. Patient is currently taking the following for diabetes: Trulicity 1.5 mg once weekly on Mondays. She {reports/denies} any signs/sx of hypoglycemia. Last A1C was 9.0 on 12/19/2019. Patient is checking Her blood sugar *** times daily and reports the following values: 
Date Fasting Before Lunch Before Dinner Bedtime Diet/Exercise: 
 
Medication Adherence: 
-Patient {Reports or denies:68981} adherence with medications 
-Patient {Reports or denies:82193} adverse effects from medications Screenings: 
Diabetic Foot and Eye Exam  Status Topic Date Due  
 Diabetic Foot Care  08/13/2019 [address] [place] [address] Eye Exam  01/06/2022 Current Outpatient Medications Medication Sig  FreeStyle Alice 14 Day Sensor kit USE AT LEAST FOUR TIMES DAILY AS DIRECTED  ezetimibe (ZETIA) 10 mg tablet Take 1 Tab by mouth daily.  ergocalciferol (ERGOCALCIFEROL) 1,250 mcg (50,000 unit) capsule TAKE ONE CAPSULE BY MOUTH EVERY 7 DAYS  dulaglutide (Trulicity) 1.5 KA/4.1 mL sub-q pen 0.5 mL by SubCUTAneous route every seven (7) days.  ondansetron (ZOFRAN ODT) 4 mg disintegrating tablet Take 1 Tab by mouth every eight (8) hours as needed for Nausea or Nausea or Vomiting.  lisinopril (PRINIVIL, ZESTRIL) 40 mg tablet TAKE 1 TABLET BY MOUTH DAILY  flash glucose scanning reader (FREESTYLE ALICE 14 DAY READER) misc Use to check blood sugar at least 4 times daily. Dx E11.9.  
 MELATONIN PO Take 6 mg by mouth daily.  diphenhydrAMINE-acetaminophen (TYLENOL PM EXTRA STRENGTH)  mg tab Take 2 Tabs by mouth daily as needed.  Blood-Glucose Meter monitoring kit Use to check blood sugar 4 times daily. Diagnosis code E11.9. Please dispense brand covered by her insurance.  glucose blood VI test strips (ASCENSIA AUTODISC VI, ONE TOUCH ULTRA TEST VI) strip Use to check blood sugar 4 times daily. Diagnosis code E11.9. Please dispense brand covered by her insurance.  lancets misc Use to check blood sugar 4 times daily. Diagnosis code E11.9. Please dispense brand covered by her insurance.  amLODIPine (NORVASC) 5 mg tablet TAKE 1 TABLET BY MOUTH DAILY  DOXYCYCLINE CALCIUM PO Take 40 mg by mouth daily.  sucralfate (CARAFATE) 100 mg/mL suspension Take 10 mL by mouth four (4) times daily. Indications: reflux/ abdominal pain  Insulin Needles, Disposable, 31 gauge x 5/16\" ndle Use with Lantus insulin qhs.  omeprazole (PRILOSEC) 40 mg capsule Take 1 Cap by mouth daily.  predniSONE (DELTASONE) 5 mg tablet Take 2.5 mg by mouth every other day.  tacrolimus (PROGRAF) 1 mg capsule TAKE 2 CAPSULES BY MOUTH TWICE DAILY No current facility-administered medications for this visit. Lab Results Component Value Date/Time Hemoglobin A1c 9.0 (H) 12/19/2019 10:16 AM  
 
 
Lab Results Component Value Date/Time Sodium 139 12/19/2019 10:16 AM  
 Potassium 4.3 12/19/2019 10:16 AM  
 Chloride 105 12/19/2019 10:16 AM  
 CO2 24 12/19/2019 10:16 AM  
 Anion gap 10 12/19/2019 10:16 AM  
 Glucose 331 (H) 12/19/2019 10:16 AM  
 BUN 17 12/19/2019 10:16 AM  
 Creatinine 1.09 12/19/2019 10:16 AM  
 BUN/Creatinine ratio 16 12/19/2019 10:16 AM  
 GFR est AA >60 12/19/2019 10:16 AM  
 GFR est non-AA 50 (L) 12/19/2019 10:16 AM  
 Calcium 9.2 12/19/2019 10:16 AM  
 
 
Lab Results Component Value Date/Time Cholesterol, total 280 (H) 2019 10:16 AM  
 HDL Cholesterol 49 2019 10:16 AM  
 LDL, calculated  2019 10:16 AM  
  LDL AND VLDL CHOLESTEROL NOT CALCULATED WHEN TRIGLYCERIDES >400 MG/DL OR HDL CHOLESTEROL <20 MG/DL Triglyceride 441 (H) 2019 10:16 AM  
 CHOL/HDL Ratio 5.7 (H) 2019 10:16 AM  
 
 
A/P:   
Diabetes: 
-Instructed patient to *** 
-Reiterated importance of *** Reviewed with patient signs/sx/treatment of hypoglycemia. Patient verbalized understanding. Will follow up with patient on *** for *** visit. Will route note to PCP. Stella Dupont, PharmD Clinical Pharmacist Specialist 
 
CLINICAL PHARMACY CONSULT: MED RECONCILIATION/REVIEW ADDENDUM For Pharmacy Admin Tracking Only PHSO: PHSO Patient?: Yes Total # of Interventions Recommended: {Count:[id number]:::1} 
{Medication Changes:488569662} {Lab Monitorin} Total Interventions Accepted: {Count:[id number]} Time Spent (min): {Time Spent:455081995} REECE Chavez [facility] [place], PharmD

## 2020-07-25 RX ORDER — OMEPRAZOLE 40 MG/1
CAPSULE, DELAYED RELEASE ORAL
Qty: 90 CAP | Refills: 2 | Status: SHIPPED | OUTPATIENT
Start: 2020-07-25 | End: 2021-05-18 | Stop reason: SDUPTHER

## 2020-08-06 RX ORDER — AMLODIPINE BESYLATE 5 MG/1
TABLET ORAL
Qty: 90 TAB | Refills: 1 | Status: SHIPPED | OUTPATIENT
Start: 2020-08-06 | End: 2021-02-08 | Stop reason: SDUPTHER

## 2020-11-13 RX ORDER — ONDANSETRON 4 MG/1
4 TABLET, ORALLY DISINTEGRATING ORAL
Qty: 40 TAB | Refills: 1 | Status: SHIPPED | OUTPATIENT
Start: 2020-11-13 | End: 2021-07-19

## 2020-11-13 NOTE — TELEPHONE ENCOUNTER
Last Visit: 1/8/20 with MD Amanda Kingston  Next Appointment: none  Previous Refill Encounter(s): 3/5/20 #40 with 1 refill    Requested Prescriptions     Pending Prescriptions Disp Refills    ondansetron (ZOFRAN ODT) 4 mg disintegrating tablet 40 Tab 1     Sig: Take 1 Tab by mouth every eight (8) hours as needed for Nausea or Nausea or Vomiting.

## 2020-11-16 NOTE — TELEPHONE ENCOUNTER
I contact the patient and pharmacy and confirmed they are requesting a refill on Doxycycline 40mg capsules - 1 capsule every day. Pharmacy stated this has been being prescribed by Dr. Cleopatra Abarca since 2018. Patient said she has no idea who Dr. Cleopatra Abarca is. She is in need of a refill. Please advise and pend new Rx if appropriate. Preferred pharmacy is [facility] on [address]     Last visit:  1/8/20 with MD Prema Vu  Next appt:  none

## 2020-11-16 NOTE — TELEPHONE ENCOUNTER
Unable to refill as not sure of indication for chronic treatment with antibiotic. I have never prescribed this for her. From notation in chart from [address] in 1/2020, it seems that she was prescribed this for rosacea from her eye doctor or dermatologist. Please ask her to reach out to the provider she received this from.

## 2020-11-16 NOTE — TELEPHONE ENCOUNTER
Pt aware of message below and verbalized understanding. She will try to figure out who  is and contact them. No further questions or concerns from pt at this time.

## 2020-12-01 RX ORDER — LISINOPRIL 40 MG/1
40 TABLET ORAL DAILY
Qty: 90 TAB | Refills: 0 | Status: SHIPPED | OUTPATIENT
Start: 2020-12-01 | End: 2021-03-08 | Stop reason: SDUPTHER

## 2020-12-01 NOTE — TELEPHONE ENCOUNTER
Last Visit: 1/8/20 with MD Naty Irby  Next Appointment: none  Previous Refill Encounter(s): 2/22/20 #90 with 2 refills    Requested Prescriptions     Pending Prescriptions Disp Refills    lisinopriL (PRINIVIL, ZESTRIL) 40 mg tablet 90 Tab 2     Sig: Take 1 Tab by mouth daily.

## 2020-12-02 ENCOUNTER — TELEPHONE (OUTPATIENT)
Dept: INTERNAL MEDICINE CLINIC | Age: 65
End: 2020-12-02

## 2020-12-02 LAB — LEFT VENTRICULAR EJECTION FRACTION, EXTERNAL: 63

## 2020-12-02 NOTE — TELEPHONE ENCOUNTER
ALYCE-- responded to call about pt needing an appt. [facility] [place] pt is seeing Dr Brennen Pedraza today and has a lot of appts coming up (?). I do see where she is scheduled to come in Jan for pre-op for Dr Christiano Arroyo.

## 2021-01-11 ENCOUNTER — TELEPHONE (OUTPATIENT)
Dept: INTERNAL MEDICINE CLINIC | Age: 66
End: 2021-01-11

## 2021-01-11 NOTE — TELEPHONE ENCOUNTER
Left message to lianna 1/28 appt Dr. Kathryn Gutiérrez out of office. Hit her in another day at end of day since this is a med clearance.  gave [phone] to call her.

## 2021-01-15 RX ORDER — ERGOCALCIFEROL 1.25 MG/1
50000 CAPSULE ORAL
Qty: 12 CAP | Refills: 1 | Status: SHIPPED | OUTPATIENT
Start: 2021-01-15 | End: 2021-02-24 | Stop reason: SDUPTHER

## 2021-01-15 NOTE — TELEPHONE ENCOUNTER
Last Visit: 1/8/20 with MD Melecio Duggan  Next Appointment: 1/27/21 with MD Navarrete  Previous Refill Encounter(s): 5/12/20 #12 with 1 refill    Requested Prescriptions     Pending Prescriptions Disp Refills    ergocalciferol (ERGOCALCIFEROL) 1,250 mcg (50,000 unit) capsule 12 Cap 1     Sig: Take 1 Cap by mouth every seven (7) days.

## 2021-02-08 RX ORDER — AMLODIPINE BESYLATE 5 MG/1
5 TABLET ORAL DAILY
Qty: 90 TAB | Refills: 0 | Status: SHIPPED | OUTPATIENT
Start: 2021-02-08 | End: 2021-05-18 | Stop reason: SDUPTHER

## 2021-02-08 NOTE — TELEPHONE ENCOUNTER
Last Visit: 1/8/20 with MD Cristopher Sun  Next Appointment: none  Previous Refill Encounter(s): 8/6/20 #90 with 1 refill    Requested Prescriptions     Pending Prescriptions Disp Refills    amLODIPine (NORVASC) 5 mg tablet 90 Tab 1     Sig: Take 1 Tab by mouth daily.

## 2021-02-17 ENCOUNTER — TELEPHONE (OUTPATIENT)
Dept: INTERNAL MEDICINE CLINIC | Age: 66
End: 2021-02-17

## 2021-02-17 DIAGNOSIS — I10 ESSENTIAL HYPERTENSION: Primary | ICD-10-CM

## 2021-02-17 DIAGNOSIS — Z79.4 TYPE 2 DIABETES MELLITUS WITH MICROALBUMINURIA, WITH LONG-TERM CURRENT USE OF INSULIN (HCC): ICD-10-CM

## 2021-02-17 DIAGNOSIS — Z94.0 KIDNEY TRANSPLANT STATUS, LIVING RELATED DONOR: ICD-10-CM

## 2021-02-17 DIAGNOSIS — E11.29 TYPE 2 DIABETES MELLITUS WITH MICROALBUMINURIA, WITH LONG-TERM CURRENT USE OF INSULIN (HCC): ICD-10-CM

## 2021-02-17 DIAGNOSIS — D84.9 IMMUNOSUPPRESSED STATUS (HCC): ICD-10-CM

## 2021-02-17 DIAGNOSIS — I50.32 CHRONIC DIASTOLIC HEART FAILURE (HCC): ICD-10-CM

## 2021-02-17 DIAGNOSIS — E78.5 HYPERLIPIDEMIA, UNSPECIFIED HYPERLIPIDEMIA TYPE: ICD-10-CM

## 2021-02-17 DIAGNOSIS — R80.9 TYPE 2 DIABETES MELLITUS WITH MICROALBUMINURIA, WITH LONG-TERM CURRENT USE OF INSULIN (HCC): ICD-10-CM

## 2021-02-17 DIAGNOSIS — M85.89 OSTEOPENIA OF MULTIPLE SITES: ICD-10-CM

## 2021-02-17 DIAGNOSIS — E55.9 VITAMIN D DEFICIENCY, UNSPECIFIED: ICD-10-CM

## 2021-02-17 NOTE — TELEPHONE ENCOUNTER
Pt os sched for labs 02/18- at 11:25am , couldn't get her in at an earlier time she said she can not fast for that long because she is diabetic

## 2021-02-20 ENCOUNTER — HOSPITAL ENCOUNTER (OUTPATIENT)
Dept: LAB | Age: 66
Discharge: HOME | End: 2021-02-20
Payer: COMMERCIAL

## 2021-02-20 DIAGNOSIS — I50.32 CHRONIC DIASTOLIC HEART FAILURE (HCC): ICD-10-CM

## 2021-02-20 DIAGNOSIS — E11.29 TYPE 2 DIABETES MELLITUS WITH MICROALBUMINURIA, WITH LONG-TERM CURRENT USE OF INSULIN (HCC): ICD-10-CM

## 2021-02-20 DIAGNOSIS — Z79.4 TYPE 2 DIABETES MELLITUS WITH MICROALBUMINURIA, WITH LONG-TERM CURRENT USE OF INSULIN (HCC): ICD-10-CM

## 2021-02-20 DIAGNOSIS — Z94.0 KIDNEY TRANSPLANT STATUS, LIVING RELATED DONOR: ICD-10-CM

## 2021-02-20 DIAGNOSIS — E55.9 VITAMIN D DEFICIENCY, UNSPECIFIED: ICD-10-CM

## 2021-02-20 DIAGNOSIS — R80.9 TYPE 2 DIABETES MELLITUS WITH MICROALBUMINURIA, WITH LONG-TERM CURRENT USE OF INSULIN (HCC): ICD-10-CM

## 2021-02-20 DIAGNOSIS — D84.9 IMMUNOSUPPRESSED STATUS (HCC): ICD-10-CM

## 2021-02-20 DIAGNOSIS — M85.89 OSTEOPENIA OF MULTIPLE SITES: ICD-10-CM

## 2021-02-20 DIAGNOSIS — E78.5 HYPERLIPIDEMIA, UNSPECIFIED HYPERLIPIDEMIA TYPE: ICD-10-CM

## 2021-02-20 DIAGNOSIS — I10 ESSENTIAL HYPERTENSION: ICD-10-CM

## 2021-02-20 LAB
25(OH)D3 SERPL-MCNC: 28.1 NG/ML (ref 30–100)
ALBUMIN SERPL-MCNC: 4.1 G/DL (ref 3.4–5)
ALBUMIN/GLOB SERPL: 1.1 {RATIO} (ref 0.8–1.7)
ALP SERPL-CCNC: 86 U/L (ref 45–117)
ALT SERPL-CCNC: 35 U/L (ref 13–56)
ANION GAP SERPL CALC-SCNC: 8 MMOL/L (ref 3–18)
APPEARANCE UR: CLEAR
AST SERPL-CCNC: 24 U/L (ref 10–38)
BACTERIA URNS QL MICRO: ABNORMAL /HPF
BASOPHILS # BLD: 0 K/UL (ref 0–0.1)
BASOPHILS NFR BLD: 1 % (ref 0–2)
BILIRUB SERPL-MCNC: 0.3 MG/DL (ref 0.2–1)
BILIRUB UR QL: NEGATIVE
BUN SERPL-MCNC: 18 MG/DL (ref 7–18)
BUN/CREAT SERPL: 19 (ref 12–20)
CALCIUM SERPL-MCNC: 8.7 MG/DL (ref 8.5–10.1)
CHLORIDE SERPL-SCNC: 107 MMOL/L (ref 100–111)
CHOLEST SERPL-MCNC: 349 MG/DL
CO2 SERPL-SCNC: 24 MMOL/L (ref 21–32)
COLOR UR: YELLOW
CREAT SERPL-MCNC: 0.95 MG/DL (ref 0.6–1.3)
CREAT UR-MCNC: 40 MG/DL (ref 30–125)
DIFFERENTIAL METHOD BLD: NORMAL
EOSINOPHIL # BLD: 0.2 K/UL (ref 0–0.4)
EOSINOPHIL NFR BLD: 3 % (ref 0–5)
EPITH CASTS URNS QL MICRO: ABNORMAL /LPF (ref 0–5)
ERYTHROCYTE [DISTWIDTH] IN BLOOD BY AUTOMATED COUNT: 13.7 % (ref 11.6–14.5)
EST. AVERAGE GLUCOSE BLD GHB EST-MCNC: 146 MG/DL
GLOBULIN SER CALC-MCNC: 3.9 G/DL (ref 2–4)
GLUCOSE SERPL-MCNC: 128 MG/DL (ref 74–99)
GLUCOSE UR STRIP.AUTO-MCNC: NEGATIVE MG/DL
HBA1C MFR BLD: 6.7 % (ref 4.2–5.6)
HCT VFR BLD AUTO: 41.4 % (ref 35–45)
HDLC SERPL-MCNC: 47 MG/DL (ref 40–60)
HDLC SERPL: 7.4 {RATIO} (ref 0–5)
HGB BLD-MCNC: 13.5 G/DL (ref 12–16)
HGB UR QL STRIP: NEGATIVE
KETONES UR QL STRIP.AUTO: NEGATIVE MG/DL
LDLC SERPL CALC-MCNC: 224.6 MG/DL (ref 0–100)
LEUKOCYTE ESTERASE UR QL STRIP.AUTO: ABNORMAL
LIPID PROFILE,FLP: ABNORMAL
LYMPHOCYTES # BLD: 2.3 K/UL (ref 0.9–3.6)
LYMPHOCYTES NFR BLD: 27 % (ref 21–52)
MAGNESIUM SERPL-MCNC: 2.1 MG/DL (ref 1.6–2.6)
MCH RBC QN AUTO: 30.1 PG (ref 24–34)
MCHC RBC AUTO-ENTMCNC: 32.6 G/DL (ref 31–37)
MCV RBC AUTO: 92.4 FL (ref 74–97)
MICROALBUMIN UR-MCNC: 3.74 MG/DL (ref 0–3)
MICROALBUMIN/CREAT UR-RTO: 94 MG/G (ref 0–30)
MONOCYTES # BLD: 0.8 K/UL (ref 0.05–1.2)
MONOCYTES NFR BLD: 9 % (ref 3–10)
NEUTS SEG # BLD: 5.3 K/UL (ref 1.8–8)
NEUTS SEG NFR BLD: 60 % (ref 40–73)
NITRITE UR QL STRIP.AUTO: NEGATIVE
PH UR STRIP: 6 [PH] (ref 5–8)
PLATELET # BLD AUTO: 365 K/UL (ref 135–420)
PMV BLD AUTO: 9.4 FL (ref 9.2–11.8)
POTASSIUM SERPL-SCNC: 4.7 MMOL/L (ref 3.5–5.5)
PROT SERPL-MCNC: 8 G/DL (ref 6.4–8.2)
PROT UR STRIP-MCNC: NEGATIVE MG/DL
RBC # BLD AUTO: 4.48 M/UL (ref 4.2–5.3)
SODIUM SERPL-SCNC: 139 MMOL/L (ref 136–145)
SP GR UR REFRACTOMETRY: 1.01 (ref 1–1.03)
T4 FREE SERPL-MCNC: 0.8 NG/DL (ref 0.7–1.5)
TRIGL SERPL-MCNC: 387 MG/DL (ref ?–150)
TSH SERPL DL<=0.05 MIU/L-ACNC: 1.82 UIU/ML (ref 0.36–3.74)
UROBILINOGEN UR QL STRIP.AUTO: 0.2 EU/DL (ref 0.2–1)
VLDLC SERPL CALC-MCNC: 77.4 MG/DL
WBC # BLD AUTO: 8.6 K/UL (ref 4.6–13.2)
WBC URNS QL MICRO: ABNORMAL /HPF (ref 0–4)
YEAST URNS QL MICRO: ABNORMAL

## 2021-02-20 PROCEDURE — 82043 UR ALBUMIN QUANTITATIVE: CPT

## 2021-02-20 PROCEDURE — 85025 COMPLETE CBC W/AUTO DIFF WBC: CPT

## 2021-02-20 PROCEDURE — 83036 HEMOGLOBIN GLYCOSYLATED A1C: CPT

## 2021-02-20 PROCEDURE — 82306 VITAMIN D 25 HYDROXY: CPT

## 2021-02-20 PROCEDURE — 36415 COLL VENOUS BLD VENIPUNCTURE: CPT

## 2021-02-20 PROCEDURE — 83735 ASSAY OF MAGNESIUM: CPT

## 2021-02-20 PROCEDURE — 80053 COMPREHEN METABOLIC PANEL: CPT

## 2021-02-20 PROCEDURE — 84439 ASSAY OF FREE THYROXINE: CPT

## 2021-02-20 PROCEDURE — 80061 LIPID PANEL: CPT

## 2021-02-20 PROCEDURE — 84443 ASSAY THYROID STIM HORMONE: CPT

## 2021-02-20 PROCEDURE — 81001 URINALYSIS AUTO W/SCOPE: CPT

## 2021-02-24 ENCOUNTER — OFFICE VISIT (OUTPATIENT)
Dept: INTERNAL MEDICINE CLINIC | Age: 66
End: 2021-02-24
Payer: COMMERCIAL

## 2021-02-24 VITALS
DIASTOLIC BLOOD PRESSURE: 78 MMHG | BODY MASS INDEX: 33.49 KG/M2 | OXYGEN SATURATION: 97 % | RESPIRATION RATE: 16 BRPM | HEIGHT: 63 IN | TEMPERATURE: 97.2 F | SYSTOLIC BLOOD PRESSURE: 138 MMHG | HEART RATE: 82 BPM | WEIGHT: 189 LBS

## 2021-02-24 DIAGNOSIS — F41.1 GAD (GENERALIZED ANXIETY DISORDER): ICD-10-CM

## 2021-02-24 DIAGNOSIS — R80.1 PERSISTENT PROTEINURIA: ICD-10-CM

## 2021-02-24 DIAGNOSIS — K21.9 GASTROESOPHAGEAL REFLUX DISEASE, UNSPECIFIED WHETHER ESOPHAGITIS PRESENT: ICD-10-CM

## 2021-02-24 DIAGNOSIS — Z00.00 ENCOUNTER FOR ROUTINE HISTORY AND PHYSICAL EXAM IN FEMALE: Primary | ICD-10-CM

## 2021-02-24 DIAGNOSIS — Z94.0 KIDNEY TRANSPLANT STATUS, LIVING RELATED DONOR: ICD-10-CM

## 2021-02-24 DIAGNOSIS — D84.9 IMMUNOSUPPRESSED STATUS (HCC): ICD-10-CM

## 2021-02-24 DIAGNOSIS — Z12.11 COLON CANCER SCREENING: ICD-10-CM

## 2021-02-24 DIAGNOSIS — F51.04 CHRONIC INSOMNIA: ICD-10-CM

## 2021-02-24 DIAGNOSIS — F33.0 MILD EPISODE OF RECURRENT MAJOR DEPRESSIVE DISORDER (HCC): ICD-10-CM

## 2021-02-24 DIAGNOSIS — I50.32 CHRONIC DIASTOLIC HEART FAILURE (HCC): ICD-10-CM

## 2021-02-24 DIAGNOSIS — Z91.14 NONCOMPLIANCE WITH MEDICATIONS: ICD-10-CM

## 2021-02-24 DIAGNOSIS — E55.9 VITAMIN D DEFICIENCY, UNSPECIFIED: ICD-10-CM

## 2021-02-24 DIAGNOSIS — I51.9 LEFT VENTRICULAR DIASTOLIC DYSFUNCTION: ICD-10-CM

## 2021-02-24 DIAGNOSIS — R80.9 TYPE 2 DIABETES MELLITUS WITH MICROALBUMINURIA, WITHOUT LONG-TERM CURRENT USE OF INSULIN (HCC): ICD-10-CM

## 2021-02-24 DIAGNOSIS — E78.5 HYPERLIPIDEMIA, UNSPECIFIED HYPERLIPIDEMIA TYPE: ICD-10-CM

## 2021-02-24 DIAGNOSIS — M15.9 PRIMARY OSTEOARTHRITIS INVOLVING MULTIPLE JOINTS: ICD-10-CM

## 2021-02-24 DIAGNOSIS — Z85.828 HISTORY OF SCC (SQUAMOUS CELL CARCINOMA) OF SKIN: ICD-10-CM

## 2021-02-24 DIAGNOSIS — I10 ESSENTIAL HYPERTENSION: ICD-10-CM

## 2021-02-24 DIAGNOSIS — L65.9 HAIR LOSS: ICD-10-CM

## 2021-02-24 DIAGNOSIS — E11.29 TYPE 2 DIABETES MELLITUS WITH MICROALBUMINURIA, WITHOUT LONG-TERM CURRENT USE OF INSULIN (HCC): ICD-10-CM

## 2021-02-24 PROCEDURE — G9231 DOC ESRD DIA TRANS PREG: HCPCS | Performed by: INTERNAL MEDICINE

## 2021-02-24 PROCEDURE — 3044F HG A1C LEVEL LT 7.0%: CPT | Performed by: INTERNAL MEDICINE

## 2021-02-24 PROCEDURE — G8417 CALC BMI ABV UP PARAM F/U: HCPCS | Performed by: INTERNAL MEDICINE

## 2021-02-24 PROCEDURE — 1090F PRES/ABSN URINE INCON ASSESS: CPT | Performed by: INTERNAL MEDICINE

## 2021-02-24 PROCEDURE — 1101F PT FALLS ASSESS-DOCD LE1/YR: CPT | Performed by: INTERNAL MEDICINE

## 2021-02-24 PROCEDURE — G9717 DOC PT DX DEP/BP F/U NT REQ: HCPCS | Performed by: INTERNAL MEDICINE

## 2021-02-24 PROCEDURE — 99397 PER PM REEVAL EST PAT 65+ YR: CPT | Performed by: INTERNAL MEDICINE

## 2021-02-24 PROCEDURE — 3017F COLORECTAL CA SCREEN DOC REV: CPT | Performed by: INTERNAL MEDICINE

## 2021-02-24 PROCEDURE — 2022F DILAT RTA XM EVC RTNOPTHY: CPT | Performed by: INTERNAL MEDICINE

## 2021-02-24 RX ORDER — ERGOCALCIFEROL 1.25 MG/1
50000 CAPSULE ORAL
Qty: 12 CAP | Refills: 1 | Status: SHIPPED | OUTPATIENT
Start: 2021-02-24 | End: 2021-09-08 | Stop reason: SDUPTHER

## 2021-02-24 RX ORDER — PAROXETINE HYDROCHLORIDE 20 MG/1
20 TABLET, FILM COATED ORAL DAILY
Qty: 90 TAB | Refills: 1 | Status: SHIPPED | OUTPATIENT
Start: 2021-02-24 | End: 2021-09-08 | Stop reason: SDUPTHER

## 2021-02-24 RX ORDER — SUCRALFATE 1 G/1
1 TABLET ORAL 4 TIMES DAILY
Qty: 120 TAB | Refills: 1 | Status: SHIPPED | OUTPATIENT
Start: 2021-02-24

## 2021-02-24 RX ORDER — EZETIMIBE 10 MG/1
10 TABLET ORAL DAILY
Qty: 90 TAB | Refills: 2 | Status: SHIPPED | OUTPATIENT
Start: 2021-02-24 | End: 2021-09-08 | Stop reason: SDUPTHER

## 2021-02-24 RX ORDER — SIMVASTATIN 40 MG/1
40 TABLET, FILM COATED ORAL
Qty: 90 TAB | Refills: 2 | Status: SHIPPED | OUTPATIENT
Start: 2021-02-24 | End: 2021-12-15

## 2021-02-24 NOTE — PROGRESS NOTES
1. Have you been to the ER, urgent care clinic or hospitalized since your last visit? YES on file in chart. 2. Have you seen or consulted any other health care providers outside of the [address] [place] [address] since your last visit (Include any pap smears or colon screening)?  NO

## 2021-02-24 NOTE — PATIENT INSTRUCTIONS
High Cholesterol: Care Instructions  Your Care Instructions     Cholesterol is a type of fat in your blood. It is needed for many body functions, such as making new cells. Cholesterol is made by your body. It also comes from food you eat. High cholesterol means that you have too much of the fat in your blood. This raises your risk of a heart attack and stroke. LDL and HDL are part of your total cholesterol. LDL is the \"bad\" cholesterol. High LDL can raise your risk for heart disease, heart attack, and stroke. HDL is the \"good\" cholesterol. It helps clear bad cholesterol from the body. High HDL is linked with a lower risk of heart disease, heart attack, and stroke. Your cholesterol levels help your doctor find out your risk for having a heart attack or stroke. You and your doctor can talk about whether you need to lower your risk and what treatment is best for you. A heart-healthy lifestyle along with medicines can help lower your cholesterol and your risk. The way you choose to lower your risk will depend on how high your risk is for heart attack and stroke. It will also depend on how you feel about taking medicines. Follow-up care is a key part of your treatment and safety. Be sure to make and go to all appointments, and call your doctor if you are having problems. It's also a good idea to know your test results and keep a list of the medicines you take. How can you care for yourself at home? · Eat a variety of foods every day. Good choices include fruits, vegetables, whole grains (like oatmeal), dried beans and peas, nuts and seeds, soy products (like tofu), and fat-free or low-fat dairy products. · Replace butter, margarine, and hydrogenated or partially hydrogenated oils with olive and canola oils. (Canola oil margarine without trans fat is fine.)  · Replace red meat with fish, poultry, and soy protein (like tofu). · Limit processed and packaged foods like chips, crackers, and cookies.   · Bake, broil, or steam foods. Don't pena them. · Be physically active. Get at least 30 minutes of exercise on most days of the week. Walking is a good choice. You also may want to do other activities, such as running, swimming, cycling, or playing tennis or team sports. · Stay at a healthy weight or lose weight by making the changes in eating and physical activity listed above. Losing just a small amount of weight, even 5 to 10 pounds, can reduce your risk for having a heart attack or stroke. · Do not smoke. When should you call for help? Watch closely for changes in your health, and be sure to contact your doctor if:    · You need help making lifestyle changes.     · You have questions about your medicine. Where can you learn more? Go to http://www.gray.com/  Enter J982 in the search box to learn more about \"High Cholesterol: Care Instructions. \"  Current as of: December 16, 2019               Content Version: 12.6  © 8104-5690 [employer]. Care instructions adapted under license by "[employer] (which disclaims liability or warranty for this information). If you have questions about a medical condition or this instruction, always ask your healthcare professional. [place] [address] any warranty or liability for your use of this information. Gastroesophageal Reflux Disease (GERD): Care Instructions  Overview     Gastroesophageal reflux disease (GERD) is the backward flow of stomach acid into the esophagus. The esophagus is the tube that leads from your throat to your stomach. A one-way valve prevents the stomach acid from backing up into this tube. But when you have GERD, this valve does not close tightly enough. This can also cause pain and swelling in your esophagus. (This is called esophagitis.)  If you have mild GERD symptoms including heartburn, you may be able to control the problem with antacids or over-the-counter medicine.  You can also make lifestyle changes to help reduce your symptoms. These include changing your diet and eating habits, such as not eating late at night and losing weight. Follow-up care is a key part of your treatment and safety. Be sure to make and go to all appointments, and call your doctor if you are having problems. It's also a good idea to know your test results and keep a list of the medicines you take. How can you care for yourself at home? · Take your medicines exactly as prescribed. Call your doctor if you think you are having a problem with your medicine. · Your doctor may recommend over-the-counter medicine. For mild or occasional indigestion, antacids, such as Tums, Gaviscon, Mylanta, or Maalox, may help. Your doctor also may recommend over-the-counter acid reducers, such as famotidine (Pepcid AC), cimetidine (Tagamet HB), or omeprazole (Prilosec). Read and follow all instructions on the label. If you use these medicines often, talk with your doctor. · Change your eating habits. ? It's best to eat several small meals instead of two or three large meals. ? After you eat, wait 2 to 3 hours before you lie down. ? Chocolate, mint, and alcohol can make GERD worse. ? Spicy foods, foods that have a lot of acid (like tomatoes and oranges), and coffee can make GERD symptoms worse in some people. If your symptoms are worse after you eat a certain food, you may want to stop eating that food to see if your symptoms get better. · Do not smoke or chew tobacco. Smoking can make GERD worse. If you need help quitting, talk to your doctor about stop-smoking programs and medicines. These can increase your chances of quitting for good. · If you have GERD symptoms at night, raise the head of your bed 6 to 8 inches by putting the frame on blocks or placing a foam wedge under the head of your mattress. (Adding extra pillows does not work.)  · Do not wear tight clothing around your middle. · Lose weight if you need to. Losing just 5 to 10 pounds can help. When should you call for help? Call your doctor now or seek immediate medical care if:    · You have new or different belly pain.     · Your stools are black and tarlike or have streaks of blood. Watch closely for changes in your health, and be sure to contact your doctor if:    · Your symptoms have not improved after 2 days.     · Food seems to catch in your throat or chest.   Where can you learn more? Go to http://www.gray.com/  Enter H137 in the search box to learn more about \"Gastroesophageal Reflux Disease (GERD): Care Instructions. \"  Current as of: April 15, 2020               Content Version: 12.6  © 2369-0278 [employer]. Care instructions adapted under license by [employer] (which disclaims liability or warranty for this information). If you have questions about a medical condition or this instruction, always ask your healthcare professional. [address] any warranty or liability for your use of this information.

## 2021-02-28 PROBLEM — E55.9 VITAMIN D DEFICIENCY, UNSPECIFIED: Status: ACTIVE | Noted: 2021-02-28

## 2021-02-28 PROBLEM — N94.9 ADNEXAL FULLNESS: Status: RESOLVED | Noted: 2019-05-19 | Resolved: 2021-02-28

## 2021-02-28 PROBLEM — Z85.828 HISTORY OF SCC (SQUAMOUS CELL CARCINOMA) OF SKIN: Status: ACTIVE | Noted: 2021-02-28

## 2021-02-28 PROBLEM — F33.0 MILD EPISODE OF RECURRENT MAJOR DEPRESSIVE DISORDER (HCC): Status: ACTIVE | Noted: 2017-12-12

## 2021-02-28 PROBLEM — M85.89 OSTEOPENIA OF MULTIPLE SITES: Status: RESOLVED | Noted: 2018-08-09 | Resolved: 2021-02-28

## 2021-02-28 NOTE — PROGRESS NOTES
HPI:   Leif Lanza is a 76y.o. year old female who presents today for follow-up. She has a history of hypertension, hyperlipidemia, chronic diastolic heart failure, diabetes mellitus, ESRD s/p living donor cadaveric renal transplant (4/1980), osteopenia, osteoarthritis, lumbar degenerative disease, chronic pain syndrome, squamous cell skin cancers, depression, anxiety, GERD, and noncompliance. She reports that she is doing reasonably well. She has had increased difficulty with reflux and presented to the [facility] ED on 12/1/2020 complaining of intermittent substernal chest pain for about 1 week. It was not related to exertion and she did not experience associated dyspnea at rest or with exertion, palpitations, or lightheadedness. Evaluation included WBC 9.0, Hb 12.3/ Hct 38.1, creatinine 1.0/ eGFR 53, K 4.9, NT pro-BNP 36, troponin x 2 negative, EKG x 2 negative, chest x-ray negative, and a pharmacologic nuclear stress test (12/2/2020) which was a normal, low risk study with EF 63% and no TID. She was advised that symptom were likely related to underlying GERD, and she was discharged. She reports continued intermittent symptoms, and taking omeprazole. She reports that she is also experiencing increased symptoms of sadness and depression, and feels that she needs to restart medication to manage. She is otherwise without new complaints. On 5/8/2019, she presented to [facility] ED complaining of thoracic back pain. She informed the ED physicians that she had weaned herself from her chronic pain medications and was requesting pain medicine since her pain had increased. She was given a single dose of gabapentin 300 mg, Zofran, and two Percocet, and she was discharged with a prescription for gabapentin 100 mg tid. Due to continued pain, she presented to the [facility] ED on 5/10/2019 and was complaining of sharp, stabbing mid abdominal pain.  She was found to be tender in the epigastric and right abdomen. Lab evaluation included Hb 11.8/ Hct 36.2, creatinine 1.04/ eGFR 53, lipase 197, troponin and urinalysis negative. and an abdominal/pelvic CT scan was obtained showing no acute abdominal pathology, but an incidental finding of a suggestion of bilateral adnexal cysts, right measuring 1.6 cm and the left measuring 1.3 cm. She was discharged with a prescription for famotidine. She was also referred to Dr. Jeremy Blankenship for follow-up. She expresses concern regarding the findings on the CT scan suggestive of bilateral adnexal cysts. She states that she had a complete hysterectomy many years ago and had her ovaries removed in 2002 due to endometriosis. Review of her record shows a pelvic ultrasound from 11/11/2002 showing bilateral pelvic masses in the area of the ovaries, the left appearing to represent the ovary with an adjacent fluid collection and the right being an indeterminate mass. The patient states that this is what prompted the removal of her ovaries. She had had a hysterectomy several years prior to this. She denies any lower abdominal pain or bloating. She underwent evaluation by Dr. Jeremy Blankenship on 6/18/2019, and recommendation was to proceed with an upper endoscopy, but the patient cancelled the appointment. She also underwent a pelvic ultrasound (5/23/2019) which did not confirm findings seen on CT scan, but recommendation was to proceed with a transvaginal ultrasound. However, the patient stated that she wished to follow-up with her gynecologist.     On 11/6/2018, she admitted that she was no longer taking her immunosuppressant medications for her kidney transplant. She stated that she had stopped taking prednisone for about a year, and then stopped tacrolimus for several months although is not specific as to when. However, review of record showed that her tacrolimus level was therapeutic at 5.2 on 4/27/2018 at her annual follow-up visit in the renal transplant clinic.  She stated that she was told by one of her kidney doctors that \"her brother must have been a twin for her transplanted kidney to have lasted this long\". She stated that she believed him even though her brother is 11years older, and she decided on her own that she no longer needed to take her immunosuppressants. She states that she also did not like the fact that she would \"get so many colds\" while taking the medications. She became concerned when she was informed of the development of increasing proteinuria on he labs. She was seen in follow-up on 11/21/2018 and reported that she had restarted her kidney transplant immunosuppressant medications, and was taking tacrolimus 2 mg bid and prednisone 5 mg every other day. Her tacrolimus level was measured at 1.7 and she continued to show proteinuria with urine microalbumin/ creatinine ratio 150 mg/g, and urinalysis measuring 30 mg/dl protein. Her creatinine remained relatively stable at 0.95/ eGFR 59. She subsequently followed up with Dr. Ziggy David, and repeat labs showed an increase in her creatinine to 1.22/ eGFR 44, urinalysis showed 100 mg/dl protein, random urine protein 61 mg/dl with urine creatinine 81.2. She stated that she was instructed to have follow-up labs in 4 weeks, and that she may require a renal biopsy if they continued to be abnormal. She also stated that her antihypertensive regimen was changed with increase in her dose of lisinopril to 40 mg bid, and amlodipine and Imdur were discontinued. However, she has not followed up with Dr. Ziggy David since her last visit in 1/2019. She has a history of hypertension, treated previously with lisinopril, Imdur, amlodipine, and lasix as needed. She has not been monitoring her blood pressure at home. She also has a history of hyperlipidemia, treated previously with high intensity dose simvastatin at 80 mg daily. She was changed to rosuvastatin in 8/2018, but noticed severe muscle aches and stopped taking it with improvement.  She was subsequently restarted on simvastatin at 40 mg dose. In 10/2017, she was admitted to [facility] with dyspnea on exertion, and chest xray revealed mild cardiac enlargement with pulmonary vascular congestion and diffusely increased interstitial markings. Echocardiogram showed normal LV size and function (EF 65%), mild concentric LVH, grade 2 diastolic dysfunction, and a pharmacologic nuclear stress test was a normal low risk study without evidence of ischemia or prior infarction, and calculated EF 70%. She was diagnosed with acute on chronic diastolic heart failure, and treated with lasix and diuresed approximately five liters with improvement. She was intolerant to beta blockers. She currently denies any chest pain, shortness of breath at rest or with exertion, palpitations, lightheadedness, PND, orthopnea, or edema. She is now being followed by Dr. TOMÁS Navarrete.     She has a history of diabetes mellitus, which had not been treated with medication until 12/2019 when she began experiencing polyuria, polydipsia, and fatigue for several weeks. She stated that on 12/16/2019, she checked her blood sugar at home and it measured 575. She states that she took two 500 mg metformin and presented to [facility] ED for evaluation. She stated that initial blood sugar was elevated at 343, and she was administered 8 units of regular insulin and IV fluids. Evaluation included Na 134, Cl 93, K 4.2, creatinine 1.1/ eGFR 47, Ca 10.1, urinalysis protein 100, glucose >500, chest x-ray negative, EKG sinus tachycardia 100. She was subsequently discharged on metformin 500 mg bid and reported that her blood sugars had been measuring between 400-550 since discharge. She developed GI distress and was instructed to discontinue metformin and begin Lantus 10 U qHS and titrated her dose according to her fasting morning blood sugars, increasing to 32 U qHS, and then switched to Levemir on 1/4/2020 due to formulary issues. Trulicity was added, and she  was weaned from insulin in 3/2020. She denies any polyuria, polydipsia, nocturia, or blurry vision, and has no history of retinopathy, neuropathy, or nephropathy. She has not been having regular eye exams. She has a history of renal failure and is s/p a living donor cadaveric renal transplant from her brother in 4/1980. She had been noncompliant with her regimen as discussed, but has resumed taking tacrolimus and prednisone one month ago. She is followed in the [facility] by Dr. Colonel Reed and Dr. Eddie English. She has secondary osteopenia due to chronic steroid use, and was being treated with alendronate. However, she states that she discontinued taking it. Her last bone density study was in 5/2017 showing T-scores:  femoral neck left -0.1, lumbar -0.4, and distal radius -1.2. She continues to take calcium and Vitamin D supplements. She has no history of pathologic fractures. She has a history of osteoarthritis and is s/p right hip replacement by Dr. Marium Patino in 2015. She also has chronic neck and low back pain secondary to degenerative disease. She had been followed previously in the [facility] pain management center, but has weaned herself off of pain medications. She has stopped taking Percocet and MS Contin. She states that she has not noticed a significant increase in her neck or low back pain. She denies any fevers, chills, weight change, saddle paresthesia, neurogenic bowel or bladder symptoms, or recent trauma. She has a history of GERD, treated with omeprazole. She had an upper endoscopy in 5/2014 by Dr. Jhony Burris which was normal. She also had difficulty with chronic constipation, secondary to narcotic use. She was being treated with Linzess, although discontinued after she weaned herself from taking narcotics. She had a screening colonoscopy in 6/2011 which showed evidence of diverticulosis. Follow-up recommended for 10 years.  She denies any abdominal pain, nausea, vomiting, melena, hematochezia, or change in bowel movements. She has a history of multiple squamous cell carcinomas of the skin. She is being followed closely by Dr. Gabe Rogers. She reports that she was treated with Efudex in 8/2018 with subsequent exfoliation. She does report that she was treated with doxycycline in 6/2018 for a possible MRSA infection on her wrist. She states that her grandson had a confirmed infection and she was taking care of him. She states that it completely resolved. She has a history of depression and anxiety, treated previously with Prozac. However, she states that she is no longer taking it. She does report increasing anxiety symptoms today. She also is using Ambien for insomnia. She states that she feels that her symptoms are currently well controlled. Past Medical History:   Diagnosis Date    Calculus of kidney     Chronic diastolic heart failure ([address] 8/13/2018    Chronic pain syndrome 7/16/2013    Constipation due to pain medication 4/8/2016    Degeneration of lumbar or lumbosacral intervertebral disc     Depression     Diabetes mellitus     Essential hypertension 8/13/2018    KIM (generalized anxiety disorder) 4/29/2015    H/O renal failure 8/13/2018    Secondary to glomerulonephritis.   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    Type 2 diabetes mellitus with microalbuminuria ([address] 11/10/2015     Past Surgical History:   Procedure Laterality Date    HX GYN      Ovary    HX HEENT      nose surgery    HX HYSTERECTOMY      HX ORTHOPAEDIC  [id number]    hip replacement    HX UROLOGICAL      Kidney Transplant    LA ABDOMEN SURGERY PROC UNLISTED      LA LAPAROSCOPIC NEPHRECTOMY      bilateral     Current Outpatient Medications   Medication Sig    ezetimibe (ZETIA) 10 mg tablet Take 1 Tab by mouth daily. Indications: high cholesterol    simvastatin (ZOCOR) 40 mg tablet Take 1 Tab by mouth nightly. Indications: high cholesterol    sucralfate (Carafate) 1 gram tablet Take 1 Tab by mouth four (4) times daily. Indications: heartburn    PARoxetine (PAXIL) 20 mg tablet Take 1 Tab by mouth daily.  ergocalciferol (ERGOCALCIFEROL) 1,250 mcg (50,000 unit) capsule Take 1 Cap by mouth every seven (7) days.  amLODIPine (NORVASC) 5 mg tablet Take 1 Tab by mouth daily.  lisinopriL (PRINIVIL, ZESTRIL) 40 mg tablet Take 1 Tab by mouth daily.  ondansetron (ZOFRAN ODT) 4 mg disintegrating tablet Take 1 Tab by mouth every eight (8) hours as needed for Nausea or Nausea or Vomiting.  omeprazole (PRILOSEC) 40 mg capsule TAKE 1 CAPSULE BY MOUTH DAILY    FreeStyle Alice 14 Day Sensor kit USE AT LEAST FOUR TIMES DAILY AS DIRECTED    dulaglutide (Trulicity) 1.5 QY/0.1 mL sub-q pen 0.5 mL by SubCUTAneous route every seven (7) days.  flash glucose scanning reader ([employer] TechnologiesSTYLE ALICE 14 DAY READER) misc Use to check blood sugar at least 4 times daily. Dx E11.9.    MELATONIN PO Take 6 mg by mouth daily.  diphenhydrAMINE-acetaminophen (TYLENOL PM EXTRA STRENGTH)  mg tab Take 2 Tabs by mouth daily as needed.  Blood-Glucose Meter monitoring kit Use to check blood sugar 4 times daily. Diagnosis code E11.9. Please dispense brand covered by her insurance.  glucose blood VI test strips (ASCENSIA AUTODISC VI, ONE TOUCH ULTRA TEST VI) strip Use to check blood sugar 4 times daily. Diagnosis code E11.9. Please dispense brand covered by her insurance.  lancets misc Use to check blood sugar 4 times daily. Diagnosis code E11.9. Please dispense brand covered by her insurance.  Insulin Needles, Disposable, 31 gauge x 5/16\" ndle Use with Lantus insulin qhs.     predniSONE (Beula Ashley) 5 mg tablet Take 2.5 mg by mouth every other day.  tacrolimus (PROGRAF) 1 mg capsule TAKE 2 CAPSULES BY MOUTH TWICE DAILY     No current facility-administered medications for this visit. Allergies and Intolerances: Allergies   Allergen Reactions    Asa-Acetaminophen-Caff-Potass Unknown (comments)    Aspirin Other (comments)    Beta-Blockers (Beta-Adrenergic Blocking Agts) Other (comments)     Sent into [employer].  Bystolic [Nebivolol] Other (comments)    Crestor [Rosuvastatin] Myalgia    Ibuprofen Other (comments)     Renal transplant patient    Lipitor [Atorvastatin] Myalgia     Had muscle aches and pains    Nsaids (Non-Steroidal Anti-Inflammatory Drug) Unknown (comments)    Sirolimus Other (comments)     pneumonitis    Toprol Xl [Metoprolol Succinate] Other (comments)     Asthma    Venlafaxine Other (comments)     Patient does not recall taking this medication     Family History: Her sister was diagnosed with breast cancer at age 61; her father had CAD and  during CABG  Family History   Problem Relation Age of Onset    Hypertension Father     Heart Disease Father     Alcohol abuse Father     Arthritis-osteo Father     Arthritis-osteo Brother     Cancer Maternal Grandmother         breast    Lung Disease Sister     High Cholesterol Mother     Arthritis-osteo Mother     Arthritis-osteo Sister     Liver Disease Sister      Social History:   She  reports that she quit smoking about 34 years ago. She has a 15.00 pack-year smoking history. She has never used smokeless tobacco. Smoked  2-3 ppd for 10 years, stopping 20 years ago. She is  with one adopted son. She is a retired .   Social History     Substance and Sexual Activity   Alcohol Use No     Immunization History:  Immunization History   Administered Date(s) Administered    Influenza Vaccine 2012, 2013    Influenza Vaccine (Quad) PF (>6 Mo Flulaval, Fluarix, and >3 Yrs [place]) 11/10/2015, 11/21/2018    Influenza Vaccine (Tri) Adjuvanted (>65 Yrs FLUAD TRI 27031) 12/19/2019    Pneumococcal Conjugate (PCV-13) 11/21/2018    Pneumococcal Polysaccharide (PPSV-23) 11/28/2014, 01/08/2020    Tdap 08/09/2018       Review of Systems:   As above included in HPI. Otherwise 11 point review of systems negative including constitutional, skin, HENT, eyes, respiratory, cardiovascular, gastrointestinal, genitourinary, musculoskeletal, endocrine, hematologic, allergy, and neurologic. Physical:   Vitals:   BP: 138/78  HR: 82  WT: 189 lb (85.7 kg)  BMI:  33.48 kg/m2    Exam:   Patient appears in no apparent distress. Affect is appropriate. HEENT: PERRLA, anicteric, oropharynx clear, no JVD, adenopathy or thyromegaly. No carotid bruits or radiated murmur. Lungs: clear to auscultation, no wheezes, rhonchi, or rales. Heart: regular rate and rhythm. No murmur, rubs, gallops  Abdomen: soft, nontender, nondistended, normal bowel sounds, no hepatosplenomegaly or masses. Extremities: without edema. Pulses 1-2+ bilaterally. Review of Data:  Labs:  Hospital Outpatient Visit on 02/20/2021   Component Date Value Ref Range Status    WBC 02/20/2021 8.6  4.6 - 13.2 K/uL Final    RBC 02/20/2021 4.48  4.20 - 5.30 M/uL Final    HGB 02/20/2021 13.5  12.0 - 16.0 g/dL Final    HCT 02/20/2021 41.4  35.0 - 45.0 % Final    MCV 02/20/2021 92.4  74.0 - 97.0 FL Final    MCH 02/20/2021 30.1  24.0 - 34.0 PG Final    MCHC 02/20/2021 32.6  31.0 - 37.0 g/dL Final    RDW 02/20/2021 13.7  11.6 - 14.5 % Final    PLATELET 46/92/8652 836  135 - 420 K/uL Final    MPV 02/20/2021 9.4  9.2 - 11.8 FL Final    NEUTROPHILS 02/20/2021 60  40 - 73 % Final    LYMPHOCYTES 02/20/2021 27  21 - 52 % Final    MONOCYTES 02/20/2021 9  3 - 10 % Final    EOSINOPHILS 02/20/2021 3  0 - 5 % Final    BASOPHILS 02/20/2021 1  0 - 2 % Final    ABS. NEUTROPHILS 02/20/2021 5.3  1.8 - 8.0 K/UL Final    ABS.  LYMPHOCYTES 02/20/2021 2.3  0.9 - 3.6 K/UL Final    ABS. MONOCYTES 02/20/2021 0.8  0.05 - 1.2 K/UL Final    ABS. EOSINOPHILS 02/20/2021 0.2  0.0 - 0.4 K/UL Final    ABS. BASOPHILS 02/20/2021 0.0  0.0 - 0.1 K/UL Final    DF 02/20/2021 AUTOMATED    Final    Hemoglobin A1c 02/20/2021 6.7* 4.2 - 5.6 % Final    Est. average glucose 02/20/2021 146  mg/dL Final    LIPID PROFILE 02/20/2021        Final    Cholesterol, total 02/20/2021 349* <200 MG/DL Final    Triglyceride 02/20/2021 387* <150 MG/DL Final    HDL Cholesterol 02/20/2021 47  40 - 60 MG/DL Final    LDL, calculated 02/20/2021 224.6* 0 - 100 MG/DL Final    VLDL, calculated 02/20/2021 77.4  MG/DL Final    CHOL/HDL Ratio 02/20/2021 7.4* 0 - 5.0   Final    Magnesium 02/20/2021 2.1  1.6 - 2.6 mg/dL Final    Sodium 02/20/2021 139  136 - 145 mmol/L Final    Potassium 02/20/2021 4.7  3.5 - 5.5 mmol/L Final    Chloride 02/20/2021 107  100 - 111 mmol/L Final    CO2 02/20/2021 24  21 - 32 mmol/L Final    Anion gap 02/20/2021 8  3.0 - 18 mmol/L Final    Glucose 02/20/2021 128* 74 - 99 mg/dL Final    BUN 02/20/2021 18  7.0 - 18 MG/DL Final    Creatinine 02/20/2021 0.95  0.6 - 1.3 MG/DL Final    BUN/Creatinine ratio 02/20/2021 19  12 - 20   Final    GFR est AA 02/20/2021 >60  >60 ml/min/1.73m2 Final    GFR est non-AA 02/20/2021 59* >60 ml/min/1.73m2 Final    Calcium 02/20/2021 8.7  8.5 - 10.1 MG/DL Final    Bilirubin, total 02/20/2021 0.3  0.2 - 1.0 MG/DL Final    ALT (SGPT) 02/20/2021 35  13 - 56 U/L Final    AST (SGOT) 02/20/2021 24  10 - 38 U/L Final    Alk.  phosphatase 02/20/2021 86  45 - 117 U/L Final    Protein, total 02/20/2021 8.0  6.4 - 8.2 g/dL Final    Albumin 02/20/2021 4.1  3.4 - 5.0 g/dL Final    Globulin 02/20/2021 3.9  2.0 - 4.0 g/dL Final    A-G Ratio 02/20/2021 1.1  0.8 - 1.7   Final    Microalbumin,urine random 02/20/2021 3.74* 0 - 3.0 MG/DL Final    Creatinine, urine 02/20/2021 40.00  30 - 125 mg/dL Final    Microalbumin/Creat ratio (mg/g cre* 2021 94* 0 - 30 mg/g Final    TSH 2021 1.82  0.36 - 3.74 uIU/mL Final    T4, Free 2021 0.8  0.7 - 1.5 NG/DL Final    Color 2021 YELLOW    Final    Appearance 2021 CLEAR    Final    Specific gravity 2021 1.009  1.005 - 1.030   Final    pH (UA) 2021 6.0  5.0 - 8.0   Final    Protein 2021 Negative  NEG mg/dL Final    Glucose 2021 Negative  NEG mg/dL Final    Ketone 2021 Negative  NEG mg/dL Final    Bilirubin 2021 Negative  NEG   Final    Blood 2021 Negative  NEG   Final    Urobilinogen 2021 0.2  0.2 - 1.0 EU/dL Final    Nitrites 2021 Negative  NEG   Final    Leukocyte Esterase 2021 TRACE* NEG   Final    WBC 2021 1 to 3  0 - 4 /hpf Final    Epithelial cells 2021 1+  0 - 5 /lpf Final    Bacteria 2021 FEW* NEG /hpf Final    Yeast 2021 FEW* NEG   Final    Vitamin D 25-Hydroxy 2021 28.1* 30 - 100 ng/mL Final         Health Maintenance:  Screening:    Mammogram: negative (2019) [facility]. Overdue   PAP smear: s/p CATY/BSO for endometriosis   Colorectal: colonoscopy (2011) divertculosis. Dr. Ocasio Bowels. Due 2021.    Depression: no current treatment   DM (HbA1c/FPG): HbA1c 6.7 (2021)   Hepatitis C: negative (2018)   Falls: none   DEXA: within normal limits (2019) [facility]   Glaucoma: regular eye exams with Dr. Loren Guerra. (last 2020)   Smokin-30 pack years, stopped 20 years ago   Vitamin D: 28.1 (2021)   Medicare Wellness: N/A      Impression:  Patient Active Problem List   Diagnosis Code    Lumbosacral radiculopathy M54.17    Degeneration of lumbar or lumbosacral intervertebral disc M51.37    Chronic insomnia F51.04    Kidney transplant status, living related donor Z80.0    KIM (generalized anxiety disorder) F41.1    Type 2 diabetes mellitus with [facility]albuminuria [facility] E11.29, R80.9    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    Mild episode of recurrent major depressive disorder ([facility] F33.0    Essential hypertension I10    Chronic diastolic heart failure (HCC) I50.32    Hyperlipidemia E78.5    H/O renal failure Z87.448    GERD (gastroesophageal reflux disease) K21.9    Primary osteoarthritis involving multiple joints M89.49    Immunosuppressed status ([facility]) D84.9    Left ventricular diastolic dysfunction X55.1    Noncompliance with medications Z91.14    Persistent proteinuria R80.1    Abnormal CT scan, pelvis R93.5    History of SCC (squamous cell carcinoma) of skin Z85.828    Vitamin D deficiency, unspecified  E55.9       Plan:  1. Diabetes mellitus. Now well controlled on Trulicity 1.5 mg weekly with HbA1c 6.7 today. On Ace-I and will restart simvastatin. Urine microalbumin/ creatinine ratio with evidence of microalbuminuria (94 mg/g). No evidence of retinopathy or neuropathy. Emphasized importance of lifestyle modifications, including diet, exercise, and weight loss. Will continue to monitor. 2. S/p renal transplant from living related donor. On tacrolimus 2 mg bid and prednisone 5 mg daily. Creatinine stable at 0.95/ eGFR 59 and urine microalbumin/creatinine ratio 94. Reports continuing to follow in transplant clinic. 3. Hypertension. Blood pressure well controlled today on lisinopril 40 mg daily and amlodipine 5 mg daily. She is not taking Imdur. Instructed to continue to monitor her blood pressure daily, but unclear if she will monitor. Renal function with creatinine 0.95/ eGFR 59. Will reassess at next visit. 4. Chronic diastolic heart failure. Grade 2 with normal systolic function on echocardiogram in 10/2017. Hemodynamically stable on current regimen as above. Does not tolerate beta blockers. Instructed to restrict sodium and follow weights. 5. Hyperlipidemia. On simvastatin 40 mg daily with  (5/2019), indicative of poor control. Did not tolerate change to rosuvastatin due to myalgias.  Not taking any statin currently since ran out, and  with HDL 47 today. Advised of need to restart treatment and wishing to restart simvastatin. Will begin 40 mg daily and add ezetimibe 10 mg daily to help with better control. Discussed importance of lifestyle modifications, including diet, exercise, and weight loss. Will reassess in 3 months. 6. Osteopenia. Last bone density scan 5/2017 . Using femoral neck T-scores, calculated FRAX score estimates her 10 year risk of a major osteoporetic fracture at 10 % and hip fracture at 0.3 % in context of chronic steroid use and secondary osteoporosis. Also, noted report of thoracic compression deformities on chest CT scan in 11/2017 at [place]. She admitted to no longer taking prednisone, will now resume therapy with 5 mg qod. She stopped taking alendronate although unclear when she discontinued. Continue calcium and Vitamin D. On 50,000 U dose weekly of ergocalciferol, although doubt compliance given low level. She states that she will resume today. Encouraged exercise, particularly weight bearing activities. Repeat bone density scan 6/2019 within normal limits for age. 7. Bilateral adnexal cysts. Patient is s/p BSO in 2002 and found to have endometriosis. Now with CT scan showing masses in the area of her prior ovaries. Pelvic ultrasound inadequate, and recommended transvaginal ultrasound. Discussed again today, and patient not wishing to pursue since does not feel to be a problem. 8. Osteoarthritis. Patient with multiple areas of arthritis contributing to her chronic pain. However, weaned from narcotics, and discontinued Percocet and MS Contin. Not experiencing increased pain currently, but will follow. Will use non-narcotic based regimen if needed. 9. GERD. Increasing symptoms noted in 5/2019, but did not pursue endoscopy as recommended by Dr. Shanita Dutton. Upper endoscopy in 5/2014 negative. Reports persistent symptoms despite taking omeprazole 40 mg and Carafate.  Presented to [facility] ED in 12/2020 with chest pain, and evaluation including stress test negative. Felt to likely represent exacerbation of GERD. Will prescribe Carafate tablets as patient having difficulty with compliance with liquid. Will place referral again for Dr. Josef Hanks. Patient also due for colorectal cancer screening in 6/2021. 10. Chronic constipation. Most likely related to prior use of narcotics. Improved with weaning. No longer taking Linzess. 11. History of squamous cell carcinoma of skin. Previously under care of Dr. Sneha Peraza. Being treated with Efudex. Related to chronic immunosuppression. Has not followed up, and now with issue of hair loss. Interested in reevaluation, and will place referral.   12. Depression. Previously treated with Prozac and Zoloft, but does not recall side effects or reason for discontinuing. Reports increasing sadness and depressed mood today with PHQ 2 score of 1. She states that she feels isolated due to the pandemic. Will restart treatment. Begin Paxil 20 mg daily. Discussed timing of side effects and therapeutic effect. Advised to use melatonin for insomnia. If not effective, will call and begin trazodone. Will reassess at next visit. 13. Obesity. Emphasized importance of lifestyle modifications, including diet, exercise, and weight loss. Will continue to address. 13. Health maintenance. Already received influenza vaccine. Given Tdap and Pneumovax. Will address Shingrix vaccine at next visit. Overdue for mammogram and urged to schedule. Performed at [facility]. Completed eye exam with Dr. Binta Torrez and urged to continue. Colonoscopy due 6/2021. Will place referral to Dr. Josef Hanks. Vitamin D level low, and advised to resume ergocalciferol 50,000 U weekly dose. Will reassess next visit. Counseled patient regarding strict mitigation measures for COVID-19, including wearing a mask, social distancing and diligent hand washing, as recommended by the CDC.  Discussed importance of proceeding with COVID 19 vaccine when available. Patient understands recommendations and agrees with plan. Follow-up in 3 months.

## 2021-03-08 RX ORDER — LISINOPRIL 40 MG/1
40 TABLET ORAL DAILY
Qty: 90 TAB | Refills: 1 | Status: SHIPPED | OUTPATIENT
Start: 2021-03-08 | End: 2021-09-19

## 2021-03-08 NOTE — TELEPHONE ENCOUNTER
Last Visit: 2/24/21 with MD Cristopher Sun  Next Appointment: 6/1/21 with MD Cristopher Sun  Previous Refill Encounter(s): 12/1/20 #90    Requested Prescriptions     Pending Prescriptions Disp Refills    lisinopriL (PRINIVIL, ZESTRIL) 40 mg tablet 90 Tab 1     Sig: Take 1 Tab by mouth daily.

## 2021-03-30 RX ORDER — FLASH GLUCOSE SCANNING READER
EACH MISCELLANEOUS
Qty: 2 EACH | Refills: 11 | Status: SHIPPED | OUTPATIENT
Start: 2021-03-30 | End: 2022-04-01

## 2021-03-30 RX ORDER — FLASH GLUCOSE SENSOR
KIT MISCELLANEOUS
Qty: 2 KIT | Refills: 11 | Status: SHIPPED | OUTPATIENT
Start: 2021-03-30 | End: 2022-04-06 | Stop reason: SDUPTHER

## 2021-03-30 NOTE — TELEPHONE ENCOUNTER
Last Visit: 2/24/21 with MD Shaquille Fonseca  Next Appointment: 6/1/21 with MD Navarrete  Previous Refill Encounter(s): 6/25/20 [place] #2 with 3 refills, 2/18/20 [place] #1     Requested Prescriptions     Pending Prescriptions Disp Refills    flash glucose sensor (FreeStyle Alice 14 Day Sensor) kit 2 Kit 11     Sig: Use to check blood sugar at least 4 times daily. Dx E11.9.  flash glucose scanning reader (FreeStyle Alice 14 Day [place]) misc 2 Each 11     Sig: Use to check blood sugar at least 4 times daily. Dx E11.9.

## 2021-05-18 RX ORDER — OMEPRAZOLE 40 MG/1
40 CAPSULE, DELAYED RELEASE ORAL DAILY
Qty: 90 CAP | Refills: 3 | Status: SHIPPED | OUTPATIENT
Start: 2021-05-18 | End: 2022-06-07 | Stop reason: SDUPTHER

## 2021-05-18 RX ORDER — AMLODIPINE BESYLATE 5 MG/1
5 TABLET ORAL DAILY
Qty: 90 TAB | Refills: 3 | Status: SHIPPED | OUTPATIENT
Start: 2021-05-18 | End: 2022-07-01

## 2021-05-18 NOTE — TELEPHONE ENCOUNTER
Last Visit: 2/24/21 with MD Arianna Mendez  Next Appointment: 6/1/21 with MD Navarrete  Previous Refill Encounter(s): 2/8/21 Norvasc #90, 7/25/20 Prilosec #90 with 2 refills    Requested Prescriptions     Pending Prescriptions Disp Refills    amLODIPine (NORVASC) 5 mg tablet 90 Tab 3     Sig: Take 1 Tab by mouth daily.  omeprazole (PRILOSEC) 40 mg capsule 90 Cap 3     Sig: Take 1 Cap by mouth daily.

## 2021-06-23 DIAGNOSIS — R80.9 TYPE 2 DIABETES MELLITUS WITH MICROALBUMINURIA, WITH LONG-TERM CURRENT USE OF INSULIN (HCC): ICD-10-CM

## 2021-06-23 DIAGNOSIS — Z79.4 TYPE 2 DIABETES MELLITUS WITH MICROALBUMINURIA, WITH LONG-TERM CURRENT USE OF INSULIN (HCC): ICD-10-CM

## 2021-06-23 DIAGNOSIS — E11.29 TYPE 2 DIABETES MELLITUS WITH MICROALBUMINURIA, WITH LONG-TERM CURRENT USE OF INSULIN (HCC): ICD-10-CM

## 2021-06-23 RX ORDER — DULAGLUTIDE 1.5 MG/.5ML
1.5 INJECTION, SOLUTION SUBCUTANEOUS
Qty: 12 EACH | Refills: 1 | Status: SHIPPED | OUTPATIENT
Start: 2021-06-23 | End: 2021-12-10

## 2021-06-23 NOTE — TELEPHONE ENCOUNTER
Last Visit: 21 with MD Victor Hugo King  Next Appointment: 21 pt cancelled appt  Previous Refill Encounter(s): 4/3/20 #12 with 3 refills    Requested Prescriptions     Pending Prescriptions Disp Refills    dulaglutide (Trulicity) 1.5 KD/4.3 mL sub-q pen 12 Each 3     Si.5 mL by SubCUTAneous route every seven (7) days.

## 2021-08-20 ENCOUNTER — HOSPITAL ENCOUNTER (EMERGENCY)
Age: 66
Discharge: HOME | End: 2021-08-20
Attending: EMERGENCY MEDICINE
Payer: COMMERCIAL

## 2021-08-20 VITALS
WEIGHT: 175 LBS | BODY MASS INDEX: 31.01 KG/M2 | HEART RATE: 84 BPM | DIASTOLIC BLOOD PRESSURE: 76 MMHG | TEMPERATURE: 99.7 F | HEIGHT: 63 IN | RESPIRATION RATE: 16 BRPM | OXYGEN SATURATION: 98 % | SYSTOLIC BLOOD PRESSURE: 164 MMHG

## 2021-08-20 DIAGNOSIS — L98.9 SKIN LESION: Primary | ICD-10-CM

## 2021-08-20 PROCEDURE — 74011250637 HC RX REV CODE- 250/637: Performed by: PHYSICIAN ASSISTANT

## 2021-08-20 PROCEDURE — 99283 EMERGENCY DEPT VISIT LOW MDM: CPT

## 2021-08-20 RX ORDER — HYDROCODONE BITARTRATE AND ACETAMINOPHEN 5; 325 MG/1; MG/1
1 TABLET ORAL
Status: COMPLETED | OUTPATIENT
Start: 2021-08-20 | End: 2021-08-20

## 2021-08-20 RX ORDER — HYDROCODONE BITARTRATE AND ACETAMINOPHEN 5; 325 MG/1; MG/1
1 TABLET ORAL
Qty: 10 TABLET | Refills: 0 | Status: SHIPPED | OUTPATIENT
Start: 2021-08-20 | End: 2021-08-23

## 2021-08-20 RX ADMIN — HYDROCODONE BITARTRATE AND ACETAMINOPHEN 1 TABLET: 5; 325 TABLET ORAL at 18:16

## 2021-08-20 NOTE — ED TRIAGE NOTES
Patient voices concerns for squamous cell carcinoma to right forearm. She states bumping affected area of arm. She states that lesion increased in size after bumping her arm.

## 2021-08-20 NOTE — ED PROVIDER NOTES
EMERGENCY DEPARTMENT HISTORY AND PHYSICAL EXAM    Date: 8/20/2021  Patient Name: Moreno Pedersen    History of Presenting Illness     Chief Complaint   Patient presents with    Skin Problem         History Provided By: Patient    Chief Complaint: Right forearm skin lesion  Duration: Days  Timing: Worse  Location: Right forearm  Quality: Burning  Severity: Moderate  Modifying Factors: Worse after accidentally bumping it  Associated Symptoms: none       Additional History (Context): Moreno Pedersen is a 76 y.o. female with an extensive medical history presents today for issues listed above. Patient reports she is a kidney transplant patient and is on a medication that is known for causing squamous cell carcinoma. Patient reports he has had multiple skin lesions that look exactly like this 1. Patient reports she has tried getting in with [facility] dermatology and the cancer center. Reports her appointment is not till October. Reports prior to arriving today she accidentally bumped it and has been in a lot of pain since. Denies any drainage. Denies any recent fevers or chills. PCP: Екатерина Lindquist MD    Current Outpatient Medications   Medication Sig Dispense Refill    HYDROcodone-acetaminophen (Norco) 5-325 mg per tablet Take 1 Tablet by mouth every six (6) hours as needed for Pain for up to 3 days. Max Daily Amount: 4 Tablets. 10 Tablet 0    ondansetron (ZOFRAN ODT) 4 mg disintegrating tablet DISSOLVE 1 TABLET ON THE TONGUE EVERY 8 HOURS AS NEEDED FOR NAUSEA OR VOMITING 40 Tablet 0    dulaglutide (Trulicity) 1.5 EP/0.9 mL sub-q pen 0.5 mL by SubCUTAneous route every seven (7) days. 12 Each 1    amLODIPine (NORVASC) 5 mg tablet Take 1 Tab by mouth daily. 90 Tab 3    omeprazole (PRILOSEC) 40 mg capsule Take 1 Cap by mouth daily. 90 Cap 3    flash glucose sensor (FreeStyle Alice 14 Day Sensor) kit Use to check blood sugar at least 4 times daily.  Dx E11.9. 2 Kit 11    flash glucose scanning reader (FreeStyle Alice 14 Day [place]) misc Use to check blood sugar at least 4 times daily. Dx E11.9. 2 Each 11    lisinopriL (PRINIVIL, ZESTRIL) 40 mg tablet Take 1 Tab by mouth daily. 90 Tab 1    ezetimibe (ZETIA) 10 mg tablet Take 1 Tab by mouth daily. Indications: high cholesterol 90 Tab 2    simvastatin (ZOCOR) 40 mg tablet Take 1 Tab by mouth nightly. Indications: high cholesterol 90 Tab 2    sucralfate (Carafate) 1 gram tablet Take 1 Tab by mouth four (4) times daily. Indications: heartburn 120 Tab 1    PARoxetine (PAXIL) 20 mg tablet Take 1 Tab by mouth daily. 90 Tab 1    ergocalciferol (ERGOCALCIFEROL) 1,250 mcg (50,000 unit) capsule Take 1 Cap by mouth every seven (7) days. 12 Cap 1    MELATONIN PO Take 6 mg by mouth daily.  diphenhydrAMINE-acetaminophen (TYLENOL PM EXTRA STRENGTH)  mg tab Take 2 Tabs by mouth daily as needed.  Blood-Glucose Meter monitoring kit Use to check blood sugar 4 times daily. Diagnosis code E11.9. Please dispense brand covered by her insurance. 1 Kit 0    glucose blood VI test strips (ASCENSIA AUTODISC VI, ONE TOUCH ULTRA TEST VI) strip Use to check blood sugar 4 times daily. Diagnosis code E11.9. Please dispense brand covered by her insurance. 200 Strip 11    lancets misc Use to check blood sugar 4 times daily. Diagnosis code E11.9. Please dispense brand covered by her insurance. 200 Each 11    Insulin Needles, Disposable, 31 gauge x 5/16\" ndle Use with Lantus insulin qhs. 100 Pen Needle 5    predniSONE (DELTASONE) 5 mg tablet Take 2.5 mg by mouth every other day.       tacrolimus (PROGRAF) 1 mg capsule TAKE 2 CAPSULES BY MOUTH TWICE DAILY  12       Past History     Past Medical History:  Past Medical History:   Diagnosis Date    Calculus of kidney     Chronic diastolic heart failure ([facility] [address] 8/13/2018    Chronic pain syndrome 7/16/2013    Constipation due to pain medication 4/8/2016    Degeneration of lumbar or lumbosacral intervertebral disc     Depression     Diabetes mellitus     Essential hypertension 2018    KIM (generalized anxiety disorder) 2015    H/O renal failure 2018    Secondary to glomerulonephritis. S/P living related donor transplant in 1980    History of depression 2017    Hypercholesterolemia     Hyperlipidemia 2018    Kidney transplant status, living related donor 10/24/2012    Lumbosacral radiculopathy     Osteopenia of multiple sites 2018    Primary iridocyclitis     Status post total replacement of right hip 2016    Type 2 diabetes mellitus with microalbuminuria ([facility] [address] 11/10/2015       Past Surgical History:  Past Surgical History:   Procedure Laterality Date    HX GYN      Ovary    HX HEENT      nose surgery    HX HYSTERECTOMY      HX ORTHOPAEDIC  [id number]    hip replacement    HX UROLOGICAL      Kidney Transplant    HI ABDOMEN SURGERY PROC UNLISTED      HI LAPAROSCOPIC NEPHRECTOMY      bilateral       Family History:  Family History   Problem Relation Age of Onset    Hypertension Father     Heart Disease Father     Alcohol abuse Father     Arthritis-osteo Father     Arthritis-osteo Brother     Cancer Maternal Grandmother         breast    Lung Disease Sister     High Cholesterol Mother     Arthritis-osteo Mother     Arthritis-osteo Sister     Liver Disease Sister        Social History:  Social History     Tobacco Use    Smoking status: Former Smoker     Packs/day: 1.00     Years: 15.00     Pack years: 15.00     Quit date:      Years since quittin.6    Smokeless tobacco: Never Used   Substance Use Topics    Alcohol use: No    Drug use: No       Allergies: Allergies   Allergen Reactions    Asa-Acetaminophen-Caff-Potass Unknown (comments)    Aspirin Other (comments)    Beta-Blockers (Beta-Adrenergic Blocking Agts) Other (comments)     Sent into [facility].     Bystolic [Nebivolol] Other (comments)    Crestor [Rosuvastatin] Myalgia    Ibuprofen Other (comments) Renal transplant patient    Lipitor [Atorvastatin] Myalgia     Had muscle aches and pains    Nsaids (Non-Steroidal Anti-Inflammatory Drug) Unknown (comments)    Sirolimus Other (comments)     pneumonitis    Toprol Xl [Metoprolol Succinate] Other (comments)     Asthma    Venlafaxine Other (comments)     Patient does not recall taking this medication         Review of Systems   Review of Systems   Constitutional: Negative for chills and fever. HENT: Negative for congestion, rhinorrhea and sore throat. Respiratory: Negative for cough and shortness of breath. Cardiovascular: Negative for chest pain. Gastrointestinal: Negative for abdominal pain, blood in stool, constipation, diarrhea, nausea and vomiting. Genitourinary: Negative for dysuria, frequency and hematuria. Musculoskeletal: Negative for back pain and myalgias. Skin: Negative for rash and wound. Lesion   Neurological: Negative for dizziness and headaches. All other systems reviewed and are negative. All Other Systems Negative  Physical Exam     Vitals:    08/20/21 1810   BP: (!) 164/76   Pulse: 84   Resp: 16   Temp: 99.7 °F (37.6 °C)   SpO2: 98%   Weight: 79.4 kg (175 lb)   Height: 5' 2.75\" (1.594 m)     Physical Exam  Vitals and nursing note reviewed. Constitutional:       General: She is not in acute distress. Appearance: She is well-developed. She is not diaphoretic. HENT:      Head: Normocephalic and atraumatic. Eyes:      Conjunctiva/sclera: Conjunctivae normal.   Cardiovascular:      Rate and Rhythm: Normal rate and regular rhythm. Heart sounds: Normal heart sounds. Pulmonary:      Effort: Pulmonary effort is normal. No respiratory distress. Breath sounds: Normal breath sounds. Chest:      Chest wall: No tenderness. Abdominal:      General: Bowel sounds are normal. There is no distension. Palpations: Abdomen is soft. Tenderness: There is no abdominal tenderness.  There is no guarding or rebound. Musculoskeletal:         General: No deformity. Cervical back: Normal range of motion and neck supple. Skin:     General: Skin is warm and dry. Findings: Lesion present. Comments: Small kidney bean sized skin lesion that is erythematous and raised. No induration or fluctuance. No surrounding erythema or cellulitis. No warmth. Freely movable   Neurological:      Mental Status: She is alert and oriented to person, place, and time. Deep Tendon Reflexes: Reflexes are normal and symmetric. Diagnostic Study Results     Labs -   No results found for this or any previous visit (from the past 12 hour(s)). Radiologic Studies -   No orders to display     CT Results  (Last 48 hours)    None        CXR Results  (Last 48 hours)    None            Medical Decision Making   I am the first provider for this patient. I reviewed the vital signs, available nursing notes, past medical history, past surgical history, family history and social history. Vital Signs-Reviewed the patient's vital signs. Records Reviewed: Nursing Notes and Old Medical Records     Procedures: None   Procedures    Provider Notes (Medical Decision Making):     Differential: Skin cancer, abscess      Plan: History and physical exam more consistent with some form of skin cancer. Patient reports he already has an appointment scheduled for the cancer [place]. Did discuss with patient there is not much I can do for her in the emergency department however will give dose of pain medication here and discharge home with some Norco as needed for intense pain. Patient agrees with this plan. MED RECONCILIATION:  No current facility-administered medications for this encounter. Current Outpatient Medications   Medication Sig    HYDROcodone-acetaminophen (Norco) 5-325 mg per tablet Take 1 Tablet by mouth every six (6) hours as needed for Pain for up to 3 days. Max Daily Amount: 4 Tablets.     ondansetron (ZOFRAN ODT) 4 mg disintegrating tablet DISSOLVE 1 TABLET ON THE TONGUE EVERY 8 HOURS AS NEEDED FOR NAUSEA OR VOMITING    dulaglutide (Trulicity) 1.5 PA/2.4 mL sub-q pen 0.5 mL by SubCUTAneous route every seven (7) days.  amLODIPine (NORVASC) 5 mg tablet Take 1 Tab by mouth daily.  omeprazole (PRILOSEC) 40 mg capsule Take 1 Cap by mouth daily.  flash glucose sensor (FreeStyle Alice 14 Day Sensor) kit Use to check blood sugar at least 4 times daily. Dx E11.9.  flash glucose scanning reader (FreeStyle Alice 14 Day [place]) misc Use to check blood sugar at least 4 times daily. Dx E11.9.    lisinopriL (PRINIVIL, ZESTRIL) 40 mg tablet Take 1 Tab by mouth daily.  ezetimibe (ZETIA) 10 mg tablet Take 1 Tab by mouth daily. Indications: high cholesterol    simvastatin (ZOCOR) 40 mg tablet Take 1 Tab by mouth nightly. Indications: high cholesterol    sucralfate (Carafate) 1 gram tablet Take 1 Tab by mouth four (4) times daily. Indications: heartburn    PARoxetine (PAXIL) 20 mg tablet Take 1 Tab by mouth daily.  ergocalciferol (ERGOCALCIFEROL) 1,250 mcg (50,000 unit) capsule Take 1 Cap by mouth every seven (7) days.  MELATONIN PO Take 6 mg by mouth daily.  diphenhydrAMINE-acetaminophen (TYLENOL PM EXTRA STRENGTH)  mg tab Take 2 Tabs by mouth daily as needed.  Blood-Glucose Meter monitoring kit Use to check blood sugar 4 times daily. Diagnosis code E11.9. Please dispense brand covered by her insurance.  glucose blood VI test strips (ASCENSIA AUTODISC VI, ONE TOUCH ULTRA TEST VI) strip Use to check blood sugar 4 times daily. Diagnosis code E11.9. Please dispense brand covered by her insurance.  lancets misc Use to check blood sugar 4 times daily. Diagnosis code E11.9. Please dispense brand covered by her insurance.  Insulin Needles, Disposable, 31 gauge x 5/16\" ndle Use with Lantus insulin qhs.  predniSONE (DELTASONE) 5 mg tablet Take 2.5 mg by mouth every other day.  tacrolimus (PROGRAF) 1 mg capsule TAKE 2 CAPSULES BY MOUTH TWICE DAILY       Disposition:  Home     DISCHARGE NOTE:   Pt has been reexamined. Patient has no new complaints, changes, or physical findings. Care plan outlined and precautions discussed. Results of workup were reviewed with the patient. All medications were reviewed with the patient. All of pt's questions and concerns were addressed. Patient was instructed and agrees to follow up with PCP as well as to return to the ED upon further deterioration. Patient is ready to go home. Follow-up Information     Follow up With Specialties Details Why Contact Info    [id number] [facility] EMERGENCY DEPT Emergency Medicine  As needed [address] Matilde Mueller MD Internal Medicine Schedule an appointment as soon as possible for a visit   [address] [phone]            Current Discharge Medication List      START taking these medications    Details   HYDROcodone-acetaminophen (Norco) 5-325 mg per tablet Take 1 Tablet by mouth every six (6) hours as needed for Pain for up to 3 days. Max Daily Amount: 4 Tablets. Qty: 10 Tablet, Refills: 0  Start date: 8/20/2021, End date: 8/23/2021    Associated Diagnoses: Skin lesion                 Diagnosis     Clinical Impression:   1. Skin lesion          \"Please note that this dictation was completed with [employer], the computer voice recognition software. Quite often unanticipated grammatical, syntax, homophones, and other interpretive errors are inadvertently transcribed by the computer software. Please disregard these errors. Please excuse any errors that have escaped final proofreading. \"

## 2021-09-08 ENCOUNTER — TELEPHONE (OUTPATIENT)
Dept: INTERNAL MEDICINE CLINIC | Age: 66
End: 2021-09-08

## 2021-09-08 ENCOUNTER — OFFICE VISIT (OUTPATIENT)
Dept: INTERNAL MEDICINE CLINIC | Age: 66
End: 2021-09-08
Payer: COMMERCIAL

## 2021-09-08 ENCOUNTER — HOSPITAL ENCOUNTER (OUTPATIENT)
Dept: LAB | Age: 66
Discharge: HOME | End: 2021-09-08
Payer: COMMERCIAL

## 2021-09-08 VITALS
BODY MASS INDEX: 32.78 KG/M2 | TEMPERATURE: 97.3 F | OXYGEN SATURATION: 95 % | SYSTOLIC BLOOD PRESSURE: 138 MMHG | WEIGHT: 185 LBS | HEART RATE: 78 BPM | RESPIRATION RATE: 16 BRPM | DIASTOLIC BLOOD PRESSURE: 84 MMHG | HEIGHT: 63 IN

## 2021-09-08 DIAGNOSIS — I50.32 CHRONIC DIASTOLIC HEART FAILURE (HCC): ICD-10-CM

## 2021-09-08 DIAGNOSIS — Z85.828 HISTORY OF SCC (SQUAMOUS CELL CARCINOMA) OF SKIN: ICD-10-CM

## 2021-09-08 DIAGNOSIS — Z12.11 COLON CANCER SCREENING: ICD-10-CM

## 2021-09-08 DIAGNOSIS — Z12.31 SCREENING MAMMOGRAM, ENCOUNTER FOR: ICD-10-CM

## 2021-09-08 DIAGNOSIS — R80.9 TYPE 2 DIABETES MELLITUS WITH MICROALBUMINURIA, WITH LONG-TERM CURRENT USE OF INSULIN (HCC): ICD-10-CM

## 2021-09-08 DIAGNOSIS — Z94.0 KIDNEY TRANSPLANT STATUS, LIVING RELATED DONOR: ICD-10-CM

## 2021-09-08 DIAGNOSIS — E55.9 VITAMIN D DEFICIENCY, UNSPECIFIED: ICD-10-CM

## 2021-09-08 DIAGNOSIS — Z79.4 TYPE 2 DIABETES MELLITUS WITH MICROALBUMINURIA, WITH LONG-TERM CURRENT USE OF INSULIN (HCC): Primary | ICD-10-CM

## 2021-09-08 DIAGNOSIS — F33.0 MILD EPISODE OF RECURRENT MAJOR DEPRESSIVE DISORDER (HCC): ICD-10-CM

## 2021-09-08 DIAGNOSIS — Z91.14 NONCOMPLIANCE WITH MEDICATIONS: ICD-10-CM

## 2021-09-08 DIAGNOSIS — R80.9 TYPE 2 DIABETES MELLITUS WITH MICROALBUMINURIA, WITH LONG-TERM CURRENT USE OF INSULIN (HCC): Primary | ICD-10-CM

## 2021-09-08 DIAGNOSIS — L98.9 SKIN LESION OF RIGHT ARM: ICD-10-CM

## 2021-09-08 DIAGNOSIS — D84.9 IMMUNOSUPPRESSED STATUS (HCC): ICD-10-CM

## 2021-09-08 DIAGNOSIS — E11.29 TYPE 2 DIABETES MELLITUS WITH MICROALBUMINURIA, WITH LONG-TERM CURRENT USE OF INSULIN (HCC): Primary | ICD-10-CM

## 2021-09-08 DIAGNOSIS — Z79.4 TYPE 2 DIABETES MELLITUS WITH MICROALBUMINURIA, WITH LONG-TERM CURRENT USE OF INSULIN (HCC): ICD-10-CM

## 2021-09-08 DIAGNOSIS — I10 ESSENTIAL HYPERTENSION: ICD-10-CM

## 2021-09-08 DIAGNOSIS — E78.5 HYPERLIPIDEMIA, UNSPECIFIED HYPERLIPIDEMIA TYPE: ICD-10-CM

## 2021-09-08 DIAGNOSIS — F41.1 GAD (GENERALIZED ANXIETY DISORDER): ICD-10-CM

## 2021-09-08 DIAGNOSIS — E11.29 TYPE 2 DIABETES MELLITUS WITH MICROALBUMINURIA, WITH LONG-TERM CURRENT USE OF INSULIN (HCC): ICD-10-CM

## 2021-09-08 DIAGNOSIS — M85.89 OSTEOPENIA OF MULTIPLE SITES: ICD-10-CM

## 2021-09-08 LAB
EST. AVERAGE GLUCOSE BLD GHB EST-MCNC: 148 MG/DL
HBA1C MFR BLD: 6.8 % (ref 4.2–5.6)

## 2021-09-08 PROCEDURE — G9899 SCRN MAM PERF RSLTS DOC: HCPCS | Performed by: INTERNAL MEDICINE

## 2021-09-08 PROCEDURE — 1090F PRES/ABSN URINE INCON ASSESS: CPT | Performed by: INTERNAL MEDICINE

## 2021-09-08 PROCEDURE — G9717 DOC PT DX DEP/BP F/U NT REQ: HCPCS | Performed by: INTERNAL MEDICINE

## 2021-09-08 PROCEDURE — G8536 NO DOC ELDER MAL SCRN: HCPCS | Performed by: INTERNAL MEDICINE

## 2021-09-08 PROCEDURE — G8427 DOCREV CUR MEDS BY ELIG CLIN: HCPCS | Performed by: INTERNAL MEDICINE

## 2021-09-08 PROCEDURE — 1101F PT FALLS ASSESS-DOCD LE1/YR: CPT | Performed by: INTERNAL MEDICINE

## 2021-09-08 PROCEDURE — 3044F HG A1C LEVEL LT 7.0%: CPT | Performed by: INTERNAL MEDICINE

## 2021-09-08 PROCEDURE — G9231 DOC ESRD DIA TRANS PREG: HCPCS | Performed by: INTERNAL MEDICINE

## 2021-09-08 PROCEDURE — 2022F DILAT RTA XM EVC RTNOPTHY: CPT | Performed by: INTERNAL MEDICINE

## 2021-09-08 PROCEDURE — 3017F COLORECTAL CA SCREEN DOC REV: CPT | Performed by: INTERNAL MEDICINE

## 2021-09-08 PROCEDURE — G8399 PT W/DXA RESULTS DOCUMENT: HCPCS | Performed by: INTERNAL MEDICINE

## 2021-09-08 PROCEDURE — 99214 OFFICE O/P EST MOD 30 MIN: CPT | Performed by: INTERNAL MEDICINE

## 2021-09-08 PROCEDURE — G8417 CALC BMI ABV UP PARAM F/U: HCPCS | Performed by: INTERNAL MEDICINE

## 2021-09-08 PROCEDURE — 83036 HEMOGLOBIN GLYCOSYLATED A1C: CPT

## 2021-09-08 RX ORDER — ERGOCALCIFEROL 1.25 MG/1
50000 CAPSULE ORAL
Qty: 12 CAPSULE | Refills: 1 | Status: SHIPPED | OUTPATIENT
Start: 2021-09-08 | End: 2022-04-01

## 2021-09-08 RX ORDER — EZETIMIBE 10 MG/1
10 TABLET ORAL DAILY
Qty: 90 TABLET | Refills: 2 | Status: SHIPPED | OUTPATIENT
Start: 2021-09-08 | End: 2022-06-11

## 2021-09-08 RX ORDER — PAROXETINE HYDROCHLORIDE 20 MG/1
20 TABLET, FILM COATED ORAL DAILY
Qty: 90 TABLET | Refills: 3 | Status: SHIPPED | OUTPATIENT
Start: 2021-09-08 | End: 2022-06-07 | Stop reason: ALTCHOICE

## 2021-09-08 NOTE — PATIENT INSTRUCTIONS
Heart-Healthy Diet: Care Instructions  Your Care Instructions     A heart-healthy diet has lots of vegetables, fruits, nuts, beans, and whole grains, and is low in salt. It limits foods that are high in saturated fat, such as meats, cheeses, and fried foods. It may be hard to change your diet, but even small changes can lower your risk of heart attack and heart disease. Follow-up care is a key part of your treatment and safety. Be sure to make and go to all appointments, and call your doctor if you are having problems. It's also a good idea to know your test results and keep a list of the medicines you take. How can you care for yourself at home? Watch your portions  · Learn what a serving is. A \"serving\" and a \"portion\" are not always the same thing. Make sure that you are not eating larger portions than are recommended. For example, a serving of pasta is ½ cup. A serving size of meat is 2 to 3 ounces. A 3-ounce serving is about the size of a deck of cards. Measure serving sizes until you are good at [place]" them. Keep in mind that restaurants often serve portions that are 2 or 3 times the size of one serving. · To keep your energy level up and keep you from feeling hungry, eat often but in smaller portions. · Eat only the number of calories you need to stay at a healthy weight. If you need to lose weight, eat fewer calories than your body burns (through exercise and other physical activity). Eat more fruits and vegetables  · Eat a variety of fruit and vegetables every day. Dark green, deep orange, red, or yellow fruits and vegetables are especially good for you. Examples include spinach, carrots, peaches, and berries. · Keep carrots, celery, and other veggies handy for snacks. Buy fruit that is in season and store it where you can see it so that you will be tempted to eat it. · Cook dishes that have a lot of veggies in them, such as stir-fries and soups.   Limit saturated and trans fat  · Read food labels, and try to avoid saturated and trans fats. They increase your risk of heart disease. · Use olive or canola oil when you cook. · Bake, broil, grill, or steam foods instead of frying them. · Choose lean meats instead of high-fat meats such as hot dogs and sausages. Cut off all visible fat when you prepare meat. · Eat fish, skinless poultry, and meat alternatives such as soy products instead of high-fat meats. Soy products, such as tofu, may be especially good for your heart. · Choose low-fat or fat-free milk and dairy products. Eat foods high in fiber  · Eat a variety of grain products every day. Include whole-grain foods that have lots of fiber and nutrients. Examples of whole-grain foods include oats, whole wheat bread, and brown rice. · Buy whole-grain breads and cereals, instead of white bread or pastries. Limit salt and sodium  · Limit how much salt and sodium you eat to help lower your blood pressure. · Taste food before you salt it. Add only a little salt when you think you need it. With time, your taste buds will adjust to less salt. · Eat fewer snack items, fast foods, and other high-salt, processed foods. Check food labels for the amount of sodium in packaged foods. · Choose low-sodium versions of canned goods (such as soups, vegetables, and beans). Limit sugar  · Limit drinks and foods with added sugar. These include candy, desserts, and soda pop. Limit alcohol  · Limit alcohol to no more than 2 drinks a day for men and 1 drink a day for women. Too much alcohol can cause health problems. When should you call for help? Watch closely for changes in your health, and be sure to contact your doctor if:    · You would like help planning heart-healthy meals. Where can you learn more? Go to http://www.BrandFiesta.com/  Enter V137 in the search box to learn more about \"Heart-Healthy Diet: Care Instructions. \"  Current as of: August 22, 2019               Content Version: 12.6  © 0685-1348 [employer]. Care instructions adapted under license by [employer] (which disclaims liability or warranty for this information). If you have questions about a medical condition or this instruction, always ask your healthcare professional. [address] any warranty or liability for your use of this information. Learning About Low-Sodium Foods  What foods are low in sodium? The foods you eat contain nutrients, such as vitamins and minerals. Sodium is a nutrient. Your body needs the right amount to stay healthy and work as it should. You can use the list below to help you make choices about which foods to eat. Fruits  · Fresh, frozen, canned, or dried fruit  Vegetables  · Fresh or frozen vegetables, with no added salt  · Canned vegetables, low-sodium or with no added salt  Grains  · Bagels without salted tops  · Cereal with no added salt  · Corn tortillas  · Crackers with no added salt  · Oatmeal, cooked without salt  · Popcorn with no salt  · Pasta and noodles, cooked without salt  · Rice, cooked without salt  · Unsalted pretzels  Dairy and dairy alternatives  · Butter, unsalted  · Cream cheese  · Ice cream  · Milk  · Soy milk  Meats and other protein foods  · Beans and peas, canned with no salt  · Eggs  · Fresh fish (not smoked)  · Fresh meats (not smoked or cured)  · Nuts and nut butter, prepared without salt  · Poultry, not packaged with sodium solution  · Tofu, unseasoned  · Tuna, canned without salt  Seasonings  · Garlic  · Herbs and spices  · Lemon juice  · Mustard  · Olive oil  · Salt-free seasoning mixes  · Vinegar  Work with your doctor to find out how much of this nutrient you need. Depending on your health, you may need more or less of it in your diet. Where can you learn more?   Go to http://www.gray.com/  Enter S460 in the search box to learn more about \"Learning About Low-Sodium Foods.\"  Current as of: August 22, 2019               Content Version: 12.6  © 8750-5630 [employer]. Care instructions adapted under license by [employer] (which disclaims liability or warranty for this information). If you have questions about a medical condition or this instruction, always ask your healthcare professional. [address] any warranty or liability for your use of this information. Low Sodium Diet (2,000 Milligram): Care Instructions  Your Care Instructions     Too much sodium causes your body to hold on to extra water. This can raise your blood pressure and force your heart and kidneys to work harder. In very serious cases, this could cause you to be put in the hospital. It might even be life-threatening. By limiting sodium, you will feel better and lower your risk of serious problems. The most common source of sodium is salt. People get most of the salt in their diet from canned, prepared, and packaged foods. Fast food and restaurant meals also are very high in sodium. Your doctor will probably limit your sodium to less than 2,000 milligrams (mg) a day. This limit counts all the sodium in prepared and packaged foods and any salt you add to your food. Follow-up care is a key part of your treatment and safety. Be sure to make and go to all appointments, and call your doctor if you are having problems. It's also a good idea to know your test results and keep a list of the medicines you take. How can you care for yourself at home? Read food labels  · Read labels on cans and food packages. The labels tell you how much sodium is in each serving. Make sure that you look at the serving size. If you eat more than the serving size, you have eaten more sodium. · Food labels also tell you the Percent Daily Value for sodium. Choose products with low Percent Daily Values for sodium.   · Be aware that sodium can come in forms other than salt, including monosodium glutamate (MSG), sodium citrate, and sodium bicarbonate (baking soda). MSG is often added to Asian food. When you eat out, you can sometimes ask for food without MSG or added salt. Buy low-sodium foods  · Buy foods that are labeled \"unsalted\" (no salt added), \"sodium-free\" (less than 5 mg of sodium per serving), or \"low-sodium\" (less than 140 mg of sodium per serving). Foods labeled \"reduced-sodium\" and \"light sodium\" may still have too much sodium. Be sure to read the label to see how much sodium you are getting. · Buy fresh vegetables, or frozen vegetables without added sauces. Buy low-sodium versions of canned vegetables, soups, and other canned goods. Prepare low-sodium meals  · Cut back on the amount of salt you use in cooking. This will help you adjust to the taste. Do not add salt after cooking. One teaspoon of salt has about 2,300 mg of sodium. · Take the salt shaker off the table. · Flavor your food with garlic, lemon juice, onion, vinegar, herbs, and spices. Do not use soy sauce, lite soy sauce, steak sauce, onion salt, garlic salt, celery salt, mustard, or ketchup on your food. · Use low-sodium salad dressings, sauces, and ketchup. Or make your own salad dressings and sauces without adding salt. · Use less salt (or none) when recipes call for it. You can often use half the salt a recipe calls for without losing flavor. Other foods such as rice, pasta, and grains do not need added salt. · Rinse canned vegetables, and cook them in fresh water. This removes some--but not all--of the salt. · Avoid water that is naturally high in sodium or that has been treated with water softeners, which add sodium. Call your local water company to find out the sodium content of your water supply. If you buy bottled water, read the label and choose a sodium-free brand. Avoid high-sodium foods  · Avoid eating:  ? Smoked, cured, salted, and canned meat, fish, and poultry.   ? Ham, haney, hot dogs, and luncheon meats. ? Regular, hard, and processed cheese and regular peanut butter. ? Crackers with salted tops, and other salted snack foods such as pretzels, chips, and salted popcorn. ? Frozen prepared meals, unless labeled low-sodium. ? Canned and dried soups, broths, and bouillon, unless labeled sodium-free or low-sodium. ? Canned vegetables, unless labeled sodium-free or low-sodium. ? Western [employer]i fries, pizza, tacos, and other fast foods. ? Pickles, olives, ketchup, and other condiments, especially soy sauce, unless labeled sodium-free or low-sodium. Where can you learn more? Go to http://www.gray.com/  Enter V843 in the search box to learn more about \"Low Sodium Diet (2,000 Milligram): Care Instructions. \"  Current as of: August 22, 2019               Content Version: 12.6  © 2561-7187 [employer]. Care instructions adapted under license by [employer] (which disclaims liability or warranty for this information). If you have questions about a medical condition or this instruction, always ask your healthcare professional. [place] [address] any warranty or liability for your use of this information.

## 2021-09-08 NOTE — PROGRESS NOTES
1. Have you been to the ER, urgent care clinic or hospitalized since your last visit? Yes 8/20/21 [facility] ED.     2. Have you seen or consulted any other health care providers outside of the [address] [place] [address] since your last visit (Include any pap smears or colon screening)?  NO

## 2021-09-12 PROBLEM — R80.1 PERSISTENT PROTEINURIA: Status: RESOLVED | Noted: 2018-11-25 | Resolved: 2021-09-12

## 2021-09-12 PROBLEM — F41.9 ANXIETY: Status: ACTIVE | Noted: 2021-09-12

## 2021-09-12 PROBLEM — F41.9 ANXIETY: Status: RESOLVED | Noted: 2021-09-12 | Resolved: 2021-09-12

## 2021-09-12 NOTE — PROGRESS NOTES
HPI:   Linnette Brush is a 76y.o. year old female who presents today for a routine visit. She has a history of hypertension, hyperlipidemia, chronic diastolic heart failure, diabetes mellitus, ESRD s/p living donor cadaveric renal transplant (4/1980), osteopenia, osteoarthritis, lumbar degenerative disease, chronic pain syndrome, squamous cell skin cancers, depression, anxiety, GERD, and noncompliance. She has completed the two dose Moderna COVID-19 vaccine series, but has not yet received the third dose given her immunosuppressed status. She states that she has been having increasing difficulty with anxiety and has scheduled counseling at [facility] psychiatry in 10/2021. She did not start Paxil as prescribed in 2/2021, but is interested in initiating medication today. She also reports that she has a large rapidly growing mass on her right forearm and is concerned that it is a recurrent squamous cell carcinoma. She has not followed up with dermatology as advised, but now is attempting to obtain an urgent appointment. She was evaluated by the kidney transplant clinic at [facility] yesterday and states that they are in the process of attempting to get her scheduled. She states that she has been monitoring her blood sugars and they remain well controlled. She is otherwise without new complaints. Summary of prior hospitalizations and medical history:  She has had increased difficulty with reflux and presented to the [facility] ED on 12/1/2020 complaining of intermittent substernal chest pain for about 1 week. It was not related to exertion and she did not experience associated dyspnea at rest or with exertion, palpitations, or lightheadedness.  Evaluation included WBC 9.0, Hb 12.3/ Hct 38.1, creatinine 1.0/ eGFR 53, K 4.9, NT pro-BNP 36, troponin x 2 negative, EKG x 2 negative, chest x-ray negative, and a pharmacologic nuclear stress test (12/2/2020) which was a normal, low risk study with EF 63% and no TID. She was advised that symptom were likely related to underlying GERD, and she was discharged. On 5/8/2019, she presented to [facility] ED complaining of thoracic back pain. She informed the ED physicians that she had weaned herself from her chronic pain medications and was requesting pain medicine since her pain had increased. She was given a single dose of gabapentin 300 mg, Zofran, and two Percocet, and she was discharged with a prescription for gabapentin 100 mg tid. Due to continued pain, she presented to the [facility] ED on 5/10/2019 and was complaining of sharp, stabbing mid abdominal pain. She was found to be tender in the epigastric and right abdomen. Lab evaluation included Hb 11.8/ Hct 36.2, creatinine 1.04/ eGFR 53, lipase 197, troponin and urinalysis negative. and an abdominal/pelvic CT scan was obtained showing no acute abdominal pathology, but an incidental finding of a suggestion of bilateral adnexal cysts, right measuring 1.6 cm and the left measuring 1.3 cm. She was discharged with a prescription for famotidine. She was also referred to Dr. Lucia Golden for follow-up. She expresses concern regarding the findings on the CT scan suggestive of bilateral adnexal cysts. She states that she had a complete hysterectomy many years ago and had her ovaries removed in 2002 due to endometriosis. Review of her record shows a pelvic ultrasound from 11/11/2002 showing bilateral pelvic masses in the area of the ovaries, the left appearing to represent the ovary with an adjacent fluid collection and the right being an indeterminate mass. The patient states that this is what prompted the removal of her ovaries. She had had a hysterectomy several years prior to this. She denies any lower abdominal pain or bloating. She underwent evaluation by Dr. Lucia Golden on 6/18/2019, and recommendation was to proceed with an upper endoscopy, but the patient cancelled the appointment.  She also underwent a pelvic ultrasound (5/23/2019) which did not confirm findings seen on CT scan, but recommendation was to proceed with a transvaginal ultrasound. However, the patient stated that she wished to follow-up with her gynecologist.     On 11/6/2018, she admitted that she was no longer taking her immunosuppressant medications for her kidney transplant. She stated that she had stopped taking prednisone for about a year, and then stopped tacrolimus for several months although is not specific as to when. However, review of record showed that her tacrolimus level was therapeutic at 5.2 on 4/27/2018 at her annual follow-up visit in the renal transplant clinic. She stated that she was told by one of her kidney doctors that \"her brother must have been a twin for her transplanted kidney to have lasted this long\". She stated that she believed him even though her brother is 11years older, and she decided on her own that she no longer needed to take her immunosuppressants. She states that she also did not like the fact that she would \"get so many colds\" while taking the medications. She became concerned when she was informed of the development of increasing proteinuria on he labs. She was seen in follow-up on 11/21/2018 and reported that she had restarted her kidney transplant immunosuppressant medications, and was taking tacrolimus 2 mg bid and prednisone 5 mg every other day. Her tacrolimus level was measured at 1.7 and she continued to show proteinuria with urine microalbumin/ creatinine ratio 150 mg/g, and urinalysis measuring 30 mg/dl protein. Her creatinine remained relatively stable at 0.95/ eGFR 59. She subsequently followed up with Dr. Elisha Poole, and repeat labs showed an increase in her creatinine to 1.22/ eGFR 44, urinalysis showed 100 mg/dl protein, random urine protein 61 mg/dl with urine creatinine 81.2.  She stated that she was instructed to have follow-up labs in 4 weeks, and that she may require a renal biopsy if they continued to be abnormal. She also stated that her antihypertensive regimen was changed with increase in her dose of lisinopril to 40 mg bid, and amlodipine and Imdur were discontinued. However, she has not followed up with Dr. William Herron since her last visit in 1/2019. She has a history of hypertension, treated with lisinopril and amlodipine. She has not been monitoring her blood pressure at home. She also has a history of hyperlipidemia, treated previously with high intensity dose simvastatin at 80 mg daily. She was changed to rosuvastatin in 8/2018, but noticed severe muscle aches and stopped taking it with improvement. She was subsequently restarted on simvastatin at 40 mg dose. In 10/2017, she was admitted to [facility] with dyspnea on exertion, and chest xray revealed mild cardiac enlargement with pulmonary vascular congestion and diffusely increased interstitial markings. Echocardiogram showed normal LV size and function (EF 65%), mild concentric LVH, grade 2 diastolic dysfunction, and a pharmacologic nuclear stress test was a normal low risk study without evidence of ischemia or prior infarction, and calculated EF 70%. She was diagnosed with acute on chronic diastolic heart failure, and treated with lasix and diuresed approximately five liters with improvement. She was intolerant to beta blockers. She currently denies any chest pain, shortness of breath at rest or with exertion, palpitations, lightheadedness, PND, orthopnea, or edema. She is now being followed by Dr. Oscar Allred. She has a history of diabetes mellitus, which had not been treated with medication until 12/2019 when she began experiencing polyuria, polydipsia, and fatigue for several weeks. She stated that on 12/16/2019, she checked her blood sugar at home and it measured 575. She states that she took two 500 mg metformin and presented to [facility] ED for evaluation.  She stated that initial blood sugar was elevated at 343, and she was administered 8 units of regular insulin and IV fluids. Evaluation included Na 134, Cl 93, K 4.2, creatinine 1.1/ eGFR 47, Ca 10.1, urinalysis protein 100, glucose >500, chest x-ray negative, EKG sinus tachycardia 100. She was subsequently discharged on metformin 500 mg bid and reported that her blood sugars had been measuring between 400-550 since discharge. She developed GI distress and was instructed to discontinue metformin and begin Lantus 10 U qHS and titrated her dose according to her fasting morning blood sugars, increasing to 32 U qHS, and then switched to Levemir on 1/4/2020 due to formulary issues. Trulicity was added, and she was weaned from insulin in 3/2020. She denies any polyuria, polydipsia, nocturia, or blurry vision, and has no history of retinopathy, neuropathy, or nephropathy. She has not been having regular eye exams. She has a history of renal failure and is s/p a living donor cadaveric renal transplant from her brother in 4/1980. She had been noncompliant with her regimen as discussed, but has resumed taking tacrolimus and prednisone one month ago. She is followed in the [facility] by Dr. Yesy Winkler and Dr. Javier Ramos. She has secondary osteopenia due to chronic steroid use, and was being treated with alendronate. However, she states that she discontinued taking it. Her last bone density study was in 9/2021 showing T-scores:  femoral neck left -0.7  and distal radius -1.0. She continues to take calcium and Vitamin D supplements. She has no history of pathologic fractures. She has a history of osteoarthritis and is s/p right hip replacement by Dr. Manuel Shah in 2015. She also has chronic neck and low back pain secondary to degenerative disease. She had been followed previously in the [address] [place] [address] pain management center, but has weaned herself off of pain medications. She has stopped taking Percocet and MS Contin.  She states that she has not noticed a significant increase in her neck or low back pain. She denies any fevers, chills, weight change, saddle paresthesia, neurogenic bowel or bladder symptoms, or recent trauma. She has a history of GERD, treated with omeprazole. She had an upper endoscopy in 5/2014 by Dr. Ming Jung which was normal. She also had difficulty with chronic constipation, secondary to narcotic use. She was being treated with Linzess, although discontinued after she weaned herself from taking narcotics. She had a screening colonoscopy in 6/2011 which showed evidence of diverticulosis. Follow-up recommended for 10 years. She denies any abdominal pain, nausea, vomiting, melena, hematochezia, or change in bowel movements. She has a history of multiple squamous cell carcinomas of the skin. She is being followed closely by Dr. Maine Gardner. She reports that she was treated with Efudex in 8/2018 with subsequent exfoliation. She does report that she was treated with doxycycline in 6/2018 for a possible MRSA infection on her wrist. She states that her grandson had a confirmed infection and she was taking care of him. She states that it completely resolved. She has a history of depression and anxiety, treated previously with Prozac. However, she states that she is no longer taking it. She also had previously been using Ambien for insomnia. Past Medical History:   Diagnosis Date    Calculus of kidney     Chronic diastolic heart failure ([facility] [address] 8/13/2018    Chronic pain syndrome 7/16/2013    Constipation due to pain medication 4/8/2016    Degeneration of lumbar or lumbosacral intervertebral disc     Depression     Diabetes mellitus     Essential hypertension 8/13/2018    KIM (generalized anxiety disorder) 4/29/2015    H/O renal failure 8/13/2018    Secondary to glomerulonephritis.   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    Type 2 diabetes mellitus with microalbuminuria ([facility] [address] 11/10/2015     Past Surgical History:   Procedure Laterality Date    HX GYN      Ovary    HX HEENT      nose surgery    HX HYSTERECTOMY      HX ORTHOPAEDIC  [id number]    hip replacement    HX UROLOGICAL      Kidney Transplant    LA ABDOMEN SURGERY PROC UNLISTED      LA LAPAROSCOPIC NEPHRECTOMY      bilateral     Current Outpatient Medications   Medication Sig    ergocalciferol (ERGOCALCIFEROL) 1,250 mcg (50,000 unit) capsule Take 1 Capsule by mouth every seven (7) days. Indications: vitamin D deficiency (high dose therapy)    ezetimibe (ZETIA) 10 mg tablet Take 1 Tablet by mouth daily. Indications: high cholesterol    PARoxetine (PAXIL) 20 mg tablet Take 1 Tablet by mouth daily. Indications: anxiety    ondansetron (ZOFRAN ODT) 4 mg disintegrating tablet DISSOLVE 1 TABLET ON THE TONGUE EVERY 8 HOURS AS NEEDED FOR NAUSEA OR VOMITING    dulaglutide (Trulicity) 1.5 XB/1.3 mL sub-q pen 0.5 mL by SubCUTAneous route every seven (7) days.  amLODIPine (NORVASC) 5 mg tablet Take 1 Tab by mouth daily.  omeprazole (PRILOSEC) 40 mg capsule Take 1 Cap by mouth daily.  flash glucose sensor (FreeStyle Alice 14 Day Sensor) kit Use to check blood sugar at least 4 times daily. Dx E11.9.  flash glucose scanning reader (FreeStyle Alice 14 Day Kermit) misc Use to check blood sugar at least 4 times daily. Dx E11.9.    lisinopriL (PRINIVIL, ZESTRIL) 40 mg tablet Take 1 Tab by mouth daily.  simvastatin (ZOCOR) 40 mg tablet Take 1 Tab by mouth nightly. Indications: high cholesterol    sucralfate (Carafate) 1 gram tablet Take 1 Tab by mouth four (4) times daily. Indications: heartburn    MELATONIN PO Take 6 mg by mouth daily.     diphenhydrAMINE-acetaminophen (TYLENOL PM EXTRA STRENGTH)  mg tab Take 2 Tabs by mouth daily as needed.  Blood-Glucose Meter monitoring kit Use to check blood sugar 4 times daily. Diagnosis code E11.9. Please dispense brand covered by her insurance.  glucose blood VI test strips (ASCENSIA AUTODISC VI, ONE TOUCH ULTRA TEST VI) strip Use to check blood sugar 4 times daily. Diagnosis code E11.9. Please dispense brand covered by her insurance.  lancets misc Use to check blood sugar 4 times daily. Diagnosis code E11.9. Please dispense brand covered by her insurance.  Insulin Needles, Disposable, 31 gauge x 5/16\" ndle Use with Lantus insulin qhs.  predniSONE (DELTASONE) 5 mg tablet Take 2.5 mg by mouth every other day.  tacrolimus (PROGRAF) 1 mg capsule TAKE 2 CAPSULES BY MOUTH TWICE DAILY     No current facility-administered medications for this visit. Allergies and Intolerances: Allergies   Allergen Reactions    Asa-Acetaminophen-Caff-Potass Unknown (comments)    Aspirin Other (comments)    Beta-Blockers (Beta-Adrenergic Blocking Agts) Other (comments)     [facility].     Bystolic [Nebivolol] Other (comments)    Crestor [Rosuvastatin] Myalgia    Ibuprofen Other (comments)     Renal transplant patient    Lipitor [Atorvastatin] Myalgia     Had muscle aches and pains    Nsaids (Non-Steroidal Anti-Inflammatory Drug) Unknown (comments)    Sirolimus Other (comments)     pneumonitis    Toprol Xl [Metoprolol Succinate] Other (comments)     Asthma    Venlafaxine Other (comments)     Patient does not recall taking this medication     Family History: Her sister was diagnosed with breast cancer at age 61; her father had CAD and  during CABG  Family History   Problem Relation Age of Onset    Hypertension Father     Heart Disease Father     Alcohol abuse Father     Arthritis-osteo Father     Arthritis-osteo Brother     Cancer Maternal Grandmother         breast    Lung Disease Sister     High Cholesterol Mother     Heather Rodriguez Mother     Arthritis-osteo Sister     Liver Disease Sister      Social History:   She  reports that she quit smoking about 34 years ago. She has a 15.00 pack-year smoking history. She has never used smokeless tobacco. Smoked  2-3 ppd for 10 years, stopping 20 years ago. She is  with one adopted son. She is a retired . Social History     Substance and Sexual Activity   Alcohol Use No     Immunization History:  Immunization History   Administered Date(s) Administered    Influenza Vaccine 12/18/2012, 11/11/2013    Influenza Vaccine (Quad) PF (>6 Mo Flulaval, Fluarix, and >3 Yrs Afluria, Fluzone 55252) 11/10/2015, 11/21/2018    Influenza Vaccine (Tri) Adjuvanted (>65 Yrs FLUAD TRI 78996) 12/19/2019    Pneumococcal Conjugate (PCV-13) 11/21/2018    Pneumococcal Polysaccharide (PPSV-23) 11/28/2014, 01/08/2020    Tdap 08/09/2018       Review of Systems:   As above included in HPI. Otherwise 11 point review of systems negative including constitutional, skin, HENT, eyes, respiratory, cardiovascular, gastrointestinal, genitourinary, musculoskeletal, endocrine, hematologic, allergy, and neurologic. Physical:   Visit Vitals  /84 (BP 1 Location: Left arm, BP Patient Position: Sitting)   Pulse 78   Temp 97.3 °F (36.3 °C) (Temporal)   Resp 16   Ht 5' 3\" (1.6 m)   Wt 185 lb (83.9 kg)   SpO2 95%   BMI 32.77 kg/m²         Exam:   Patient appears in no apparent distress. Affect is appropriate. HEENT: PERRLA, anicteric, oropharynx clear, no JVD, adenopathy or thyromegaly. No carotid bruits or radiated murmur. Lungs: clear to auscultation, no wheezes, rhonchi, or rales. Heart: regular rate and rhythm. No murmur, rubs, gallops  Abdomen: soft, nontender, nondistended, normal bowel sounds, no hepatosplenomegaly or masses. Extremities: without edema. Derm: 2-3 cm firm circular mass on right forearm      Review of Data:  Labs:  No visits with results within 1 Month(s) from this visit. Latest known visit with results is:   Hospital Outpatient Visit on 02/20/2021   Component Date Value Ref Range Status    WBC 02/20/2021 8.6  4.6 - 13.2 K/uL Final    RBC 02/20/2021 4.48  4.20 - 5.30 M/uL Final    HGB 02/20/2021 13.5  12.0 - 16.0 g/dL Final    HCT 02/20/2021 41.4  35.0 - 45.0 % Final    MCV 02/20/2021 92.4  74.0 - 97.0 FL Final    MCH 02/20/2021 30.1  24.0 - 34.0 PG Final    MCHC 02/20/2021 32.6  31.0 - 37.0 g/dL Final    RDW 02/20/2021 13.7  11.6 - 14.5 % Final    PLATELET 64/12/6558 509  135 - 420 K/uL Final    MPV 02/20/2021 9.4  9.2 - 11.8 FL Final    NEUTROPHILS 02/20/2021 60  40 - 73 % Final    LYMPHOCYTES 02/20/2021 27  21 - 52 % Final    MONOCYTES 02/20/2021 9  3 - 10 % Final    EOSINOPHILS 02/20/2021 3  0 - 5 % Final    BASOPHILS 02/20/2021 1  0 - 2 % Final    ABS. NEUTROPHILS 02/20/2021 5.3  1.8 - 8.0 K/UL Final    ABS. LYMPHOCYTES 02/20/2021 2.3  0.9 - 3.6 K/UL Final    ABS. MONOCYTES 02/20/2021 0.8  0.05 - 1.2 K/UL Final    ABS. EOSINOPHILS 02/20/2021 0.2  0.0 - 0.4 K/UL Final    ABS.  BASOPHILS 02/20/2021 0.0  0.0 - 0.1 K/UL Final    DF 02/20/2021 AUTOMATED    Final    Hemoglobin A1c 02/20/2021 6.7* 4.2 - 5.6 % Final    Est. average glucose 02/20/2021 146  mg/dL Final    LIPID PROFILE 02/20/2021        Final    Cholesterol, total 02/20/2021 349* <200 MG/DL Final    Triglyceride 02/20/2021 387* <150 MG/DL Final    HDL Cholesterol 02/20/2021 47  40 - 60 MG/DL Final    LDL, calculated 02/20/2021 224.6* 0 - 100 MG/DL Final    VLDL, calculated 02/20/2021 77.4  MG/DL Final    CHOL/HDL Ratio 02/20/2021 7.4* 0 - 5.0   Final    Magnesium 02/20/2021 2.1  1.6 - 2.6 mg/dL Final    Sodium 02/20/2021 139  136 - 145 mmol/L Final    Potassium 02/20/2021 4.7  3.5 - 5.5 mmol/L Final    Chloride 02/20/2021 107  100 - 111 mmol/L Final    CO2 02/20/2021 24  21 - 32 mmol/L Final    Anion gap 02/20/2021 8  3.0 - 18 mmol/L Final    Glucose 02/20/2021 128* 74 - 99 mg/dL Final    BUN 02/20/2021 18  7.0 - 18 MG/DL Final    Creatinine 02/20/2021 0.95  0.6 - 1.3 MG/DL Final    BUN/Creatinine ratio 02/20/2021 19  12 - 20   Final    GFR est AA 02/20/2021 >60  >60 ml/min/1.73m2 Final    GFR est non-AA 02/20/2021 59* >60 ml/min/1.73m2 Final    Calcium 02/20/2021 8.7  8.5 - 10.1 MG/DL Final    Bilirubin, total 02/20/2021 0.3  0.2 - 1.0 MG/DL Final    ALT (SGPT) 02/20/2021 35  13 - 56 U/L Final    AST (SGOT) 02/20/2021 24  10 - 38 U/L Final    Alk. phosphatase 02/20/2021 86  45 - 117 U/L Final    Protein, total 02/20/2021 8.0  6.4 - 8.2 g/dL Final    Albumin 02/20/2021 4.1  3.4 - 5.0 g/dL Final    Globulin 02/20/2021 3.9  2.0 - 4.0 g/dL Final    A-G Ratio 02/20/2021 1.1  0.8 - 1.7   Final    Microalbumin,urine random 02/20/2021 3.74* 0 - 3.0 MG/DL Final    Creatinine, urine 02/20/2021 40.00  30 - 125 mg/dL Final    Microalbumin/Creat ratio (mg/g cre* 02/20/2021 94* 0 - 30 mg/g Final    TSH 02/20/2021 1.82  0.36 - 3.74 uIU/mL Final    T4, Free 02/20/2021 0.8  0.7 - 1.5 NG/DL Final    Color 02/20/2021 YELLOW    Final    Appearance 02/20/2021 CLEAR    Final    Specific gravity 02/20/2021 1.009  1.005 - 1.030   Final    pH (UA) 02/20/2021 6.0  5.0 - 8.0   Final    Protein 02/20/2021 Negative  NEG mg/dL Final    Glucose 02/20/2021 Negative  NEG mg/dL Final    Ketone 02/20/2021 Negative  NEG mg/dL Final    Bilirubin 02/20/2021 Negative  NEG   Final    Blood 02/20/2021 Negative  NEG   Final    Urobilinogen 02/20/2021 0.2  0.2 - 1.0 EU/dL Final    Nitrites 02/20/2021 Negative  NEG   Final    Leukocyte Esterase 02/20/2021 TRACE* NEG   Final    WBC 02/20/2021 1 to 3  0 - 4 /hpf Final    Epithelial cells 02/20/2021 1+  0 - 5 /lpf Final    Bacteria 02/20/2021 FEW* NEG /hpf Final    Yeast 02/20/2021 FEW* NEG   Final    Vitamin D 25-Hydroxy 02/20/2021 28.1* 30 - 100 ng/mL Final     Reviewed labs performed at [facility] on 9/4/2021 in [facility].   Significant findings include:  Normal CBC  Lipid panel with total cholesterol 207//HDL 42/  Creatinine 0.9/eGFR 58.9  Urine protein/creatinine ratio negative  Glucose 146  Normal LFTs  Intact PTH 52   Vitamin D 27.3  Tacrolimus level normal/CMV DNA PCR negative/BK virus DNA PCR negative        Health Maintenance:  Screening:    Mammogram: negative (2019) Overdue   PAP smear: s/p CATY/BSO for endometriosis   Colorectal: colonoscopy (2011) divertculosis. Dr. Charito Rosas. Due 2021. Depression: no current treatment   DM (HbA1c/FPG): HbA1c 6.8 (2021)   Hepatitis C: negative (2018)   Falls: none   DEXA: Osteopenia (2021) [facility]   Glaucoma: regular eye exams with Dr. Belen Samuels. (last 2020)   Smokin-30 pack years, stopped 20 years ago   Vitamin D: 27.3 (2021)   Medicare Wellness: N/A        Impression:  Patient Active Problem List   Diagnosis Code    Lumbosacral radiculopathy M54.17    Degeneration of lumbar or lumbosacral intervertebral disc M51.37    Chronic insomnia F51.04    Kidney transplant status, living related donor Z80.0    KIM (generalized anxiety disorder) F41.1    Type 2 diabetes mellitus with microalbuminuria ([address] E11.29, R80.9    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    Mild episode of recurrent major depressive disorder ([facility] [address] F33.0    Essential hypertension I10    Chronic diastolic heart failure (HCC) I50.32    Hyperlipidemia E78.5    H/O renal failure Z87.448    GERD (gastroesophageal reflux disease) K21.9    Primary osteoarthritis involving multiple joints M89.49    Immunosuppressed status ([facility] [address] D84.9    Left ventricular diastolic dysfunction S84.6    Noncompliance with medications Z91.14    Persistent proteinuria R80.1    Abnormal CT scan, pelvis R93.5    History of SCC (squamous cell carcinoma) of skin Z85.828    Vitamin D deficiency, unspecified  E55.9       Plan:  1. Diabetes mellitus. Now well controlled on Trulicity 1.5 mg weekly with HbA1c 6.8.  On Ace-I and simvastatin. Urine protein/ creatinine ratio negative on 9/7/2021 [facility]). No evidence of retinopathy or neuropathy. Emphasized importance of lifestyle modifications, including diet, exercise, and weight loss. Will continue to monitor. 2. S/p renal transplant from living related donor. On tacrolimus 2 mg bid and prednisone 5 mg daily. Creatinine stable at 0.9/ eGFR 58.9 and urine protein/creatinine ratio negative. Last visit transplant clinic on 9/7/2021. 3. Hypertension. Blood pressure appears well controlled today on lisinopril 40 mg daily and amlodipine 5 mg daily. She is not taking Imdur. Instructed to continue to monitor her blood pressure. Renal function with creatinine 0.9/ eGFR 58.9. Will reassess at next visit. 4. Chronic diastolic heart failure. Grade 2 with normal systolic function on echocardiogram in 10/2017. Hemodynamically stable on current regimen as above. Does not tolerate beta blockers. Instructed to restrict sodium and follow weights. 5. Hyperlipidemia. On simvastatin 40 mg daily with  at [facility] (9/4/2021), indicative of fair control although significant improvement from when not taking statin with  with HDL 47 (2/2021). Prescribed ezetimibe in 2/2021 to help with better control, but patient never filled. Agreeable to start today and will again prescribe ezetimibe 10 mg daily. Discussed importance of compliance and lifestyle modifications, including diet, exercise, and weight loss. Will reassess in 3 months. 6. History of squamous cell carcinoma of skin. Previously under care of Dr. Renee Torres. Being treated with Efudex. Related to chronic immunosuppression. However, has not followed up, and now with large rapidly growing lesion on her right forearm. Likely recurrent SCC. Patient states [facility] her an appointment with Dr. Renee Torres for later this week for evaluation. Stressed importance of follow-up.   7. Depression/generalized anxiety disorder. Previously treated with Prozac and Zoloft, and did not recall side effects or reason for discontinuing. Reporting increasing increasing difficulty with anxiety today and PHQ 2 score of 2. She was prescribed Paxil previously, but patient states that she did not begin. Patient expresses willingness today and will prescribe Paxil 20 mg daily and advised to begin. Also reports that scheduled to begin counseling at [address] [place] [address] in 10/2021.  8. Osteopenia. Last bone density scan in 9/2021. Using femoral neck T-scores, calculated FRAX score estimates her 10 year risk of a major osteoporetic fracture at 12 % and hip fracture at 0.8 % in context of chronic steroid use. Also, noted report of thoracic compression deformities on chest CT scan in 11/2017 at [facility]. Currently taking prednisone 2.5 mg daily. Continue calcium and Vitamin D. Advised to restart 50,000 U dose weekly of ergocalciferol. She states that she will resume today. Encouraged exercise, particularly weight bearing activities. 9. GERD. Increasing symptoms noted in 5/2019, but did not pursue endoscopy as recommended by Dr. Lisa Madrigal. Upper endoscopy in 5/2014 negative. Reported persistent symptoms despite taking omeprazole 40 mg and Carafate, and presented to [facility] ED in 12/2020 with chest pain, and evaluation including stress test negative. Felt to likely represent exacerbation of GERD. Prescribed Carafate tablets and patient stating that taking only as needed. Did not follow-up with Dr. Lisa Madrigal as recommended. 10. Chronic constipation. Most likely related to prior use of narcotics. Improved with weaning. No longer taking Linzess. 11. Bilateral adnexal cysts. Patient is s/p BSO in 2002 and found to have endometriosis. Now with CT scan (5/2019) showing masses in the area of her prior ovaries. Pelvic ultrasound inadequate, and recommended transvaginal ultrasound.   However, patient not wishing to pursue since does not feel to be a problem. 12. Osteoarthritis. Patient with multiple areas of arthritis contributing to her chronic pain. However, weaned from narcotics, and discontinued Percocet and MS Contin. Not experiencing increased pain currently, but will follow. Will use non-narcotic based regimen if needed. 13. Obesity. Emphasized importance of lifestyle modifications, including diet, exercise, and weight loss. Will continue to address. 14. Health maintenance. Completed the Moderna COVID-19 vaccine series. Advised to proceed with the third dose of Moderna vaccine given immunosuppressed status due to renal transplant. Will address Shingrix vaccine at next visit. Overdue for mammogram and still has not scheduled. Will reorder today. Completed eye exam with Dr. Edson Pete and urged to continue. Colonoscopy overdue in 6/2021. Will again place referral to Dr. Ming Jung. Vitamin D level low, and advised to resume ergocalciferol 50,000 U weekly dose. Will send in new prescription today. Patient understands recommendations and agrees with plan. Follow-up in 3 months.

## 2021-10-26 ENCOUNTER — HOSPITAL ENCOUNTER (OUTPATIENT)
Dept: MAMMOGRAPHY | Age: 66
Discharge: HOME | End: 2021-10-26
Attending: INTERNAL MEDICINE
Payer: COMMERCIAL

## 2021-10-26 DIAGNOSIS — Z12.31 SCREENING MAMMOGRAM, ENCOUNTER FOR: ICD-10-CM

## 2021-10-26 PROCEDURE — 77063 BREAST TOMOSYNTHESIS BI: CPT

## 2021-11-18 NOTE — PATIENT INSTRUCTIONS
Plan:  Continue same medications as prescribed for chronic pain  Follow up in 3 months or sooner if needed  Regular exercise and attention to emotional health and diet remain the most effective ways to treat chronic pain of all kinds  You may contact me with questions or concerns through [address] [FreeTextEntry1] : 15 year girl who follows up for concussion. She is currently experiencing only infrequent and mild headaches. Cognition and memory are normal. Mood is good. No sleep concerns.

## 2021-12-10 DIAGNOSIS — E11.29 TYPE 2 DIABETES MELLITUS WITH MICROALBUMINURIA, WITH LONG-TERM CURRENT USE OF INSULIN (HCC): ICD-10-CM

## 2021-12-10 DIAGNOSIS — Z79.4 TYPE 2 DIABETES MELLITUS WITH MICROALBUMINURIA, WITH LONG-TERM CURRENT USE OF INSULIN (HCC): ICD-10-CM

## 2021-12-10 DIAGNOSIS — R80.9 TYPE 2 DIABETES MELLITUS WITH MICROALBUMINURIA, WITH LONG-TERM CURRENT USE OF INSULIN (HCC): ICD-10-CM

## 2021-12-10 RX ORDER — DULAGLUTIDE 1.5 MG/.5ML
INJECTION, SOLUTION SUBCUTANEOUS
Qty: 6 ML | Refills: 1 | Status: SHIPPED | OUTPATIENT
Start: 2021-12-10 | End: 2022-05-23 | Stop reason: SDUPTHER

## 2021-12-10 RX ORDER — ONDANSETRON 4 MG/1
TABLET, ORALLY DISINTEGRATING ORAL
Qty: 40 TABLET | Refills: 0 | Status: SHIPPED | OUTPATIENT
Start: 2021-12-10 | End: 2022-05-31

## 2021-12-15 RX ORDER — SIMVASTATIN 40 MG/1
TABLET, FILM COATED ORAL
Qty: 90 TABLET | Refills: 2 | Status: SHIPPED | OUTPATIENT
Start: 2021-12-15

## 2021-12-22 NOTE — PERIOP NOTES
PRE-SURGICAL INSTRUCTIONS        Patient's Name:  Capri Vickers      Today's Date:  12/22/2021            Covid Testing Date and Time:    Surgery Date:  12/30/2021                1. Do NOT eat or drink anything, including candy, gum, or ice chips after midnight on 12/29/2021, unless you have specific instructions from your surgeon or anesthesia provider to do so.  2. You may brush your teeth before coming to the hospital.  3. No smoking 24 hours prior to the day of surgery. 4. No alcohol 24 hours prior to the day of surgery. 5. No recreational drugs for one week prior to the day of surgery. 6. Leave all valuables, including money/purse, at home. 7. Remove all jewelry, nail polish, acrylic nails, and makeup (including mascara); no lotions powders, deodorant, or perfume/cologne/after shave on the skin. 8. Follow instruction for Hibiclens washes and CHG wipes from surgeon's office. 9. Glasses/contact lenses and dentures may be worn to the hospital.  They will be removed prior to surgery. 10. Call your doctor if symptoms of a cold or illness develop within 24-48 hours prior to your surgery. 11.  If you are having an outpatient procedure, please make arrangements for a responsible ADULT TO [address] [place] [address] and stay with you for 24 hours after your surgery. 12. ONE VISITOR in the hospital at this time for outpatient procedures. Exceptions may be made for surgical admissions, per nursing unit guidelines      Special Instructions:      Bring list of CURRENT medications. Bring any pertinent legal medical records. Take these medications the morning of surgery with a sip of water:medications as directed by physician. Follow physician instructions about insulin. Complete bowel prep per MD instructions. On the day of surgery, come in the main entrance of [facility]. Let the  at the desk know you are there for surgery.   A staff member will come escort you to the surgical area on the second floor. If you have any questions or concerns, please do not hesitate to call:     (Prior to the day of surgery) PAT department:  [phone]   (Day of surgery) Pre-Op department:  [phone]    These surgical instructions were reviewed with patient during the PAT phone call.

## 2021-12-29 ENCOUNTER — ANESTHESIA EVENT (OUTPATIENT)
Dept: ENDOSCOPY | Age: 66
End: 2021-12-29
Payer: COMMERCIAL

## 2021-12-30 ENCOUNTER — ANESTHESIA (OUTPATIENT)
Dept: ENDOSCOPY | Age: 66
End: 2021-12-30
Payer: COMMERCIAL

## 2021-12-30 ENCOUNTER — HOSPITAL ENCOUNTER (OUTPATIENT)
Age: 66
Setting detail: OUTPATIENT SURGERY
Discharge: HOME | End: 2021-12-30
Attending: STUDENT IN AN ORGANIZED HEALTH CARE EDUCATION/TRAINING PROGRAM | Admitting: STUDENT IN AN ORGANIZED HEALTH CARE EDUCATION/TRAINING PROGRAM
Payer: COMMERCIAL

## 2021-12-30 VITALS
BODY MASS INDEX: 32.78 KG/M2 | HEIGHT: 63 IN | HEART RATE: 80 BPM | TEMPERATURE: 98.2 F | OXYGEN SATURATION: 97 % | RESPIRATION RATE: 14 BRPM | DIASTOLIC BLOOD PRESSURE: 67 MMHG | SYSTOLIC BLOOD PRESSURE: 121 MMHG | WEIGHT: 185 LBS

## 2021-12-30 LAB
GLUCOSE BLD STRIP.AUTO-MCNC: 126 MG/DL (ref 70–110)
GLUCOSE BLD STRIP.AUTO-MCNC: 157 MG/DL (ref 70–110)

## 2021-12-30 PROCEDURE — 88305 TISSUE EXAM BY PATHOLOGIST: CPT

## 2021-12-30 PROCEDURE — 76040000019: Performed by: STUDENT IN AN ORGANIZED HEALTH CARE EDUCATION/TRAINING PROGRAM

## 2021-12-30 PROCEDURE — 77030008565 HC TBNG SUC IRR ERBE -B: Performed by: STUDENT IN AN ORGANIZED HEALTH CARE EDUCATION/TRAINING PROGRAM

## 2021-12-30 PROCEDURE — 74011000250 HC RX REV CODE- 250: Performed by: NURSE ANESTHETIST, CERTIFIED REGISTERED

## 2021-12-30 PROCEDURE — 00813 ANES UPR LWR GI NDSC PX: CPT | Performed by: ANESTHESIOLOGY

## 2021-12-30 PROCEDURE — 00813 ANES UPR LWR GI NDSC PX: CPT | Performed by: NURSE ANESTHETIST, CERTIFIED REGISTERED

## 2021-12-30 PROCEDURE — 76060000031 HC ANESTHESIA FIRST 0.5 HR: Performed by: STUDENT IN AN ORGANIZED HEALTH CARE EDUCATION/TRAINING PROGRAM

## 2021-12-30 PROCEDURE — 74011250636 HC RX REV CODE- 250/636: Performed by: NURSE ANESTHETIST, CERTIFIED REGISTERED

## 2021-12-30 PROCEDURE — 2709999900 HC NON-CHARGEABLE SUPPLY: Performed by: STUDENT IN AN ORGANIZED HEALTH CARE EDUCATION/TRAINING PROGRAM

## 2021-12-30 PROCEDURE — 82962 GLUCOSE BLOOD TEST: CPT

## 2021-12-30 RX ORDER — SODIUM CHLORIDE, SODIUM LACTATE, POTASSIUM CHLORIDE, CALCIUM CHLORIDE 600; 310; 30; 20 MG/100ML; MG/100ML; MG/100ML; MG/100ML
25 INJECTION, SOLUTION INTRAVENOUS CONTINUOUS
Status: DISCONTINUED | OUTPATIENT
Start: 2021-12-30 | End: 2021-12-30 | Stop reason: HOSPADM

## 2021-12-30 RX ORDER — LIDOCAINE HYDROCHLORIDE 20 MG/ML
INJECTION, SOLUTION EPIDURAL; INFILTRATION; INTRACAUDAL; PERINEURAL AS NEEDED
Status: DISCONTINUED | OUTPATIENT
Start: 2021-12-30 | End: 2021-12-30 | Stop reason: HOSPADM

## 2021-12-30 RX ORDER — INSULIN LISPRO 100 [IU]/ML
INJECTION, SOLUTION INTRAVENOUS; SUBCUTANEOUS ONCE
Status: DISCONTINUED | OUTPATIENT
Start: 2021-12-30 | End: 2021-12-30 | Stop reason: HOSPADM

## 2021-12-30 RX ORDER — PROPOFOL 10 MG/ML
INJECTION, EMULSION INTRAVENOUS AS NEEDED
Status: DISCONTINUED | OUTPATIENT
Start: 2021-12-30 | End: 2021-12-30 | Stop reason: HOSPADM

## 2021-12-30 RX ADMIN — FAMOTIDINE 20 MG: 10 INJECTION INTRAVENOUS at 09:39

## 2021-12-30 RX ADMIN — PROPOFOL 20 MG: 10 INJECTION, EMULSION INTRAVENOUS at 10:17

## 2021-12-30 RX ADMIN — SODIUM CHLORIDE, SODIUM LACTATE, POTASSIUM CHLORIDE, AND CALCIUM CHLORIDE 25 ML/HR: 600; 310; 30; 20 INJECTION, SOLUTION INTRAVENOUS at 09:39

## 2021-12-30 RX ADMIN — PROPOFOL 20 MG: 10 INJECTION, EMULSION INTRAVENOUS at 10:16

## 2021-12-30 RX ADMIN — PROPOFOL 100 MG: 10 INJECTION, EMULSION INTRAVENOUS at 10:12

## 2021-12-30 RX ADMIN — LIDOCAINE HYDROCHLORIDE 60 MG: 20 INJECTION, SOLUTION EPIDURAL; INFILTRATION; INTRACAUDAL; PERINEURAL at 10:12

## 2021-12-30 RX ADMIN — PROPOFOL 20 MG: 10 INJECTION, EMULSION INTRAVENOUS at 10:14

## 2021-12-30 NOTE — ANESTHESIA POSTPROCEDURE EVALUATION
Procedure(s):  UPPER ENDOSCOPY/ BIOPSIES  COLONOSCOPY/INCOMPLETE.     MAC    Anesthesia Post Evaluation      Multimodal analgesia: multimodal analgesia used between 6 hours prior to anesthesia start to PACU discharge  Patient location during evaluation: bedside  Patient participation: complete - patient participated  Level of consciousness: awake  Pain management: adequate  Airway patency: patent  Anesthetic complications: no  Cardiovascular status: stable  Respiratory status: acceptable  Hydration status: acceptable  Post anesthesia nausea and vomiting:  controlled      INITIAL Post-op Vital signs:   Vitals Value Taken Time   /68 12/30/21 1101   Temp 37.2 °C (98.9 °F) 12/30/21 1033   Pulse 78 12/30/21 1101   Resp 10 12/30/21 1101   SpO2 97 % 12/30/21 1101

## 2021-12-30 NOTE — DISCHARGE INSTRUCTIONS
DISCHARGE SUMMARY from Nurse    PATIENT INSTRUCTIONS:    After general anesthesia or intravenous sedation, for 24 hours or while taking prescription Narcotics:  · Limit your activities  · Do not drive and operate hazardous machinery  · Do not make important personal or business decisions  · Do  not drink alcoholic beverages  · If you have not urinated within 8 hours after discharge, please contact your surgeon on call. Report the following to your surgeon:  · Excessive pain, swelling, redness or odor of or around the surgical area  · Temperature over 100.5  · Nausea and vomiting lasting longer than 4 hours or if unable to take medications  · Any signs of decreased circulation or nerve impairment to extremity: change in color, persistent  numbness, tingling, coldness or increase pain  · Any questions    What to do at Home:  Recommended activity: Activity as tolerated and no driving for today. *  Please give a list of your current medications to your Primary Care Provider. *  Please update this list whenever your medications are discontinued, doses are      changed, or new medications (including over-the-counter products) are added. *  Please carry medication information at all times in case of emergency situations. These are general instructions for a healthy lifestyle:    No smoking/ No tobacco products/ Avoid exposure to second hand smoke  Surgeon General's Warning:  Quitting smoking now greatly reduces serious risk to your health.     Obesity, smoking, and sedentary lifestyle greatly increases your risk for illness    A healthy diet, regular physical exercise & weight monitoring are important for maintaining a healthy lifestyle    You may be retaining fluid if you have a history of heart failure or if you experience any of the following symptoms:  Weight gain of 3 pounds or more overnight or 5 pounds in a week, increased swelling in our hands or feet or shortness of breath while lying flat in bed.  Please call your doctor as soon as you notice any of these symptoms; do not wait until your next office visit. The discharge information has been reviewed with the patient and spouse. The patient and spouse verbalized understanding. Discharge medications reviewed with the patient and spouse and appropriate educational materials and side effects teaching were provided. Patient Education        Upper GI Endoscopy: What to Expect at [address] had an upper GI endoscopy. Your doctor used a thin, lighted tube that bends to look at the inside of your esophagus, your stomach, and the first part of the small intestine, called the duodenum. After you have an endoscopy, you will stay at the hospital or clinic for 1 to 2 hours. This will allow the medicine to wear off. You will be able to go home after your doctor or nurse checks to make sure that you're not having any problems. You may have to stay overnight if you had treatment during the test. You may have a sore throat for a day or two after the test.  This care sheet gives you a general idea about what to expect after the test.  How can you care for yourself at home? Activity   · Rest as much as you need to after you go home. · You should be able to go back to your usual activities the day after the test.  Diet   · Follow your doctor's directions for eating after the test.  · Drink plenty of fluids (unless your doctor has told you not to). Medications   · If you have a sore throat the day after the test, use an over-the-counter spray to numb your throat. Follow-up care is a key part of your treatment and safety. Be sure to make and go to all appointments, and call your doctor if you are having problems. It's also a good idea to know your test results and keep a list of the medicines you take. When should you call for help? Call 911 anytime you think you may need emergency care.  For example, call if:    · You passed out (lost consciousness).     · You have trouble breathing.     · You pass maroon or bloody stools. Call your doctor now or seek immediate medical care if:    · You have pain that does not get better after your take pain medicine.     · You have new or worse belly pain.     · You have blood in your stools.     · You are sick to your stomach and cannot keep fluids down.     · You have a fever.     · You cannot pass stools or gas. Watch closely for changes in your health, and be sure to contact your doctor if:    · Your throat still hurts after a day or two.     · You do not get better as expected. Where can you learn more? Go to http://www.shaffer.com/  Enter J454 in the search box to learn more about \"Upper GI Endoscopy: What to Expect at Home. \"  Current as of: February 10, 2021               Content Version: 13.0  © 2006-2021 [employer]. Care instructions adapted under license by [employer] (which disclaims liability or warranty for this information). If you have questions about a medical condition or this instruction, always ask your healthcare professional. [address] any warranty or liability for your use of this information. Patient Education        Colonoscopy: What to Expect at Home  Your Recovery  After a colonoscopy, you'll stay at the clinic until you wake up. Then you can go home. But you'll need to arrange for a ride. Your doctor will tell you when you can eat and do your other usual activities. Your doctor will talk to you about when you'll need your next colonoscopy. Your doctor can help you decide how often you need to be checked. This will depend on the results of your test and your risk for colorectal cancer. After the test, you may be bloated or have gas pains. You may need to pass gas. If a biopsy was done or a polyp was removed, you may have streaks of blood in your stool (feces) for a few days.  Problems such as heavy rectal bleeding may not occur until several weeks after the test. This isn't common. But it can happen after polyps are removed. This care sheet gives you a general idea about how long it will take for you to recover. But each person recovers at a different pace. Follow the steps below to get better as quickly as possible. How can you care for yourself at home? Activity    · Rest when you feel tired.     · You can do your normal activities when it feels okay to do so. Diet    · Follow your doctor's directions for eating.     · Unless your doctor has told you not to, drink plenty of fluids. This helps to replace the fluids that were lost during the colon prep.     · Do not drink alcohol. Medicines    · Your doctor will tell you if and when you can restart your medicines. You will also be given instructions about taking any new medicines.     · If you take aspirin or some other blood thinner, ask your doctor if and when to start taking it again. Make sure that you understand exactly what your doctor wants you to do.     · If polyps were removed or a biopsy was done during the test, your doctor may tell you not to take aspirin or other anti-inflammatory medicines for a few days. These include ibuprofen (Advil, Motrin) and naproxen (Aleve). Other instructions    · For your safety, do not drive or operate machinery until the medicine wears off and you can think clearly. Your doctor may tell you not to drive or operate machinery until the day after your test.     · Do not sign legal documents or make major decisions until the medicine wears off and you can think clearly. The anesthesia can make it hard for you to fully understand what you are agreeing to. Follow-up care is a key part of your treatment and safety. Be sure to make and go to all appointments, and call your doctor if you are having problems. It's also a good idea to know your test results and keep a list of the medicines you take.   When should you call for help? Call 911 anytime you think you may need emergency care. For example, call if:    · You passed out (lost consciousness).     · You pass maroon or bloody stools.     · You have trouble breathing. Call your doctor now or seek immediate medical care if:    · You have pain that does not get better after you take pain medicine.     · You are sick to your stomach or cannot drink fluids.     · You have new or worse belly pain.     · You have blood in your stools.     · You have a fever.     · You cannot pass stools or gas. Watch closely for changes in your health, and be sure to contact your doctor if you have any problems. Where can you learn more? Go to http://www.gray.com/  Enter E264 in the search box to learn more about \"Colonoscopy: What to Expect at Home. \"  Current as of: December 17, 2020               Content Version: 13.0  © 2131-8244 [employer]. Care instructions adapted under license by [employer] (which disclaims liability or warranty for this information). If you have questions about a medical condition or this instruction, always ask your healthcare professional. [place] [address] any warranty or liability for your use of this information.

## 2021-12-30 NOTE — ANESTHESIA PREPROCEDURE EVALUATION
Relevant Problems   No relevant active problems       Anesthetic History   No history of anesthetic complications            Review of Systems / Medical History  Patient summary reviewed and pertinent labs reviewed    Pulmonary  Within defined limits                 Neuro/Psych         Psychiatric history     Cardiovascular    Hypertension: well controlled              Exercise tolerance: >4 METS     GI/Hepatic/Renal     GERD    Renal disease       Endo/Other    Diabetes: well controlled, type 2    Arthritis     Other Findings              Physical Exam    Airway  Mallampati: III  TM Distance: 4 - 6 cm  Neck ROM: decreased range of motion   Mouth opening: Normal     Cardiovascular    Rhythm: regular  Rate: normal         Dental    Dentition: Full lower dentures and Full upper dentures     Pulmonary  Breath sounds clear to auscultation               Abdominal  GI exam deferred       Other Findings            Anesthetic Plan    ASA: 3  Anesthesia type: MAC          Induction: Intravenous  Anesthetic plan and risks discussed with: Patient

## 2021-12-30 NOTE — H&P
Date of Surgery Update:  Varinder Hilton was seen and examined. History and physical has been reviewed. The patient has been examined.  There have been no significant clinical changes since the completion of the originally dated History and Physical.    Signed By: Gisela Cortez MD     December 30, 2021 9:53 AM

## 2022-01-03 ENCOUNTER — TELEPHONE (OUTPATIENT)
Dept: INTERNAL MEDICINE CLINIC | Age: 67
End: 2022-01-03

## 2022-01-03 NOTE — TELEPHONE ENCOUNTER
Pt's  calling for results of of upper GI testing, biopsy, etc.     Per pt's wife Dr Samantha Curtis advised her she would hear back on these results from Dr Suzanne Peña office

## 2022-01-04 NOTE — TELEPHONE ENCOUNTER
Patient is aware she will have to contact Dr. Vernon Henriquez for results. Phone number was provided to patient.

## 2022-03-18 PROBLEM — E55.9 VITAMIN D DEFICIENCY, UNSPECIFIED: Status: ACTIVE | Noted: 2021-02-28

## 2022-03-18 PROBLEM — Z85.828 HISTORY OF SCC (SQUAMOUS CELL CARCINOMA) OF SKIN: Status: ACTIVE | Noted: 2021-02-28

## 2022-03-18 PROBLEM — R93.5 ABNORMAL CT SCAN, PELVIS: Status: ACTIVE | Noted: 2019-05-19

## 2022-03-19 PROBLEM — I50.32 CHRONIC DIASTOLIC HEART FAILURE (HCC): Status: ACTIVE | Noted: 2018-08-13

## 2022-03-19 PROBLEM — D84.9 IMMUNOSUPPRESSED STATUS (HCC): Status: ACTIVE | Noted: 2018-09-09

## 2022-03-19 PROBLEM — Z91.14 NONCOMPLIANCE WITH MEDICATIONS: Status: ACTIVE | Noted: 2018-11-10

## 2022-03-19 PROBLEM — K21.9 GERD (GASTROESOPHAGEAL REFLUX DISEASE): Status: ACTIVE | Noted: 2018-08-13

## 2022-03-19 PROBLEM — I51.9 LEFT VENTRICULAR DIASTOLIC DYSFUNCTION: Status: ACTIVE | Noted: 2018-09-23

## 2022-03-19 PROBLEM — Z87.448 H/O RENAL FAILURE: Status: ACTIVE | Noted: 2018-08-13

## 2022-03-19 PROBLEM — F33.0 MILD EPISODE OF RECURRENT MAJOR DEPRESSIVE DISORDER (HCC): Status: ACTIVE | Noted: 2017-12-12

## 2022-03-19 PROBLEM — Z91.148 NONCOMPLIANCE WITH MEDICATIONS: Status: ACTIVE | Noted: 2018-11-10

## 2022-03-19 PROBLEM — M85.89 OSTEOPENIA OF MULTIPLE SITES: Status: ACTIVE | Noted: 2018-08-09

## 2022-03-20 PROBLEM — M15.0 PRIMARY OSTEOARTHRITIS INVOLVING MULTIPLE JOINTS: Status: ACTIVE | Noted: 2018-08-13

## 2022-03-20 PROBLEM — M15.9 PRIMARY OSTEOARTHRITIS INVOLVING MULTIPLE JOINTS: Status: ACTIVE | Noted: 2018-08-13

## 2022-03-20 PROBLEM — E78.5 HYPERLIPIDEMIA: Status: ACTIVE | Noted: 2018-08-13

## 2022-03-20 PROBLEM — I10 ESSENTIAL HYPERTENSION: Status: ACTIVE | Noted: 2018-08-13

## 2022-04-01 RX ORDER — FLASH GLUCOSE SCANNING READER
EACH MISCELLANEOUS
Qty: 2 EACH | Refills: 11 | Status: SHIPPED | OUTPATIENT
Start: 2022-04-01

## 2022-04-01 RX ORDER — ERGOCALCIFEROL 1.25 MG/1
CAPSULE ORAL
Qty: 12 CAPSULE | Refills: 0 | Status: SHIPPED | OUTPATIENT
Start: 2022-04-01 | End: 2022-07-01

## 2022-04-04 NOTE — TELEPHONE ENCOUNTER
Reviewed labs obtained by renal transplant clinic at [facility]. Please advise the patient that she will need to get additional labs since the transplant clinic does not monitor her HbA1c, liver function, or lipid panel. Lab orders have been updated so that they will not be duplicated. Please schedule her for a lab appointment.

## 2022-04-04 NOTE — TELEPHONE ENCOUNTER
Appt scheduled. Pt wants to know if you have seen the labs from Dr. Tyrone Dorsey? Says he just did some and she is hoping you can use those labs. Says she gets a lot done at [facility] being a transplant patient. Please advise if you would need any other labs before her appt.

## 2022-04-06 RX ORDER — ERGOCALCIFEROL 1.25 MG/1
CAPSULE ORAL
Qty: 12 CAPSULE | Refills: 0 | OUTPATIENT
Start: 2022-04-06

## 2022-04-06 RX ORDER — FLASH GLUCOSE SENSOR
KIT MISCELLANEOUS
Qty: 2 KIT | Refills: 11 | Status: SHIPPED | OUTPATIENT
Start: 2022-04-06

## 2022-04-06 NOTE — TELEPHONE ENCOUNTER
Please send in refill for sensors. Patient does not need a new reader. Pharmacy received RX for vitamin d but it is requiring a prior [place]. Patient does not need another RX for vitamin D- disregard.

## 2022-04-06 NOTE — TELEPHONE ENCOUNTER
Pt called said as of this morning her Pharmacy still does not have Rx request from DR Celso Figueroa for both her Free style Alice 14 day and vitamin D

## 2022-04-07 ENCOUNTER — TELEPHONE (OUTPATIENT)
Dept: INTERNAL MEDICINE CLINIC | Age: 67
End: 2022-04-07

## 2022-04-07 NOTE — TELEPHONE ENCOUNTER
----- Message from Lilliana  sent at 4/7/2022  4:56 PM EDT -----  Subject: Message to Provider    QUESTIONS  Information for Provider? Patient does not have her FreeStyle meter and   has been expecting this since Friday. Please call to discuss/confirm. ---------------------------------------------------------------------------  --------------  Maikel WALKER  What is the best way for the office to contact you? OK to leave message on   voicemail  Preferred Call Back Phone Number? [phone]  ---------------------------------------------------------------------------  --------------  SCRIPT ANSWERS  Relationship to Patient? Other  Representative Name? , Demetricexi Eusebia  Is the Representative on the appropriate HIPAA document in Epic?  Yes

## 2022-05-23 DIAGNOSIS — R80.9 TYPE 2 DIABETES MELLITUS WITH MICROALBUMINURIA, WITH LONG-TERM CURRENT USE OF INSULIN (HCC): ICD-10-CM

## 2022-05-23 DIAGNOSIS — Z79.4 TYPE 2 DIABETES MELLITUS WITH MICROALBUMINURIA, WITH LONG-TERM CURRENT USE OF INSULIN (HCC): ICD-10-CM

## 2022-05-23 DIAGNOSIS — E11.29 TYPE 2 DIABETES MELLITUS WITH MICROALBUMINURIA, WITH LONG-TERM CURRENT USE OF INSULIN (HCC): ICD-10-CM

## 2022-05-23 RX ORDER — DULAGLUTIDE 1.5 MG/.5ML
INJECTION, SOLUTION SUBCUTANEOUS
Qty: 4 EACH | Refills: 0 | Status: SHIPPED | OUTPATIENT
Start: 2022-05-23 | End: 2022-05-31

## 2022-05-23 NOTE — TELEPHONE ENCOUNTER
Last Visit: 9/8/21 with MD Fernie Galvan  Next Appointment: 6/7/22 with MD Navarrete  Previous Refill Encounter(s): 12/10/21 #6 with 1 refill    Requested Prescriptions     Pending Prescriptions Disp Refills    dulaglutide (Trulicity) 1.5 ZD/9.2 mL sub-q pen 6 mL 1     Sig: ADMINISTER 0.5 MG UNDER THE SKIN EVERY 7 DAYS

## 2022-05-23 NOTE — TELEPHONE ENCOUNTER
----- Message from Moira Walker sent at 5/23/2022 12:02 PM EDT -----  Subject: Refill Request    QUESTIONS  Name of Medication? Trulicity 1.5 YH/0.4 mL sub-q pen  Patient-reported dosage and instructions? once weekly  How many days do you have left? 0  Preferred Pharmacy? [id number]  Pharmacy phone number (if available)? [phone]  ---------------------------------------------------------------------------  --------------  CALL BACK INFO  What is the best way for the office to contact you? OK to leave message on   voicemail  Preferred Call Back Phone Number? [phone]  ---------------------------------------------------------------------------  --------------  SCRIPT ANSWERS  Relationship to Patient? Other  Representative Name? Viry Hong  Is the Representative on the appropriate HIPAA document in Epic?  Yes

## 2022-05-26 ENCOUNTER — TELEPHONE (OUTPATIENT)
Dept: INTERNAL MEDICINE CLINIC | Age: 67
End: 2022-05-26

## 2022-05-26 DIAGNOSIS — E11.29 TYPE 2 DIABETES MELLITUS WITH MICROALBUMINURIA, WITH LONG-TERM CURRENT USE OF INSULIN (HCC): ICD-10-CM

## 2022-05-26 DIAGNOSIS — Z79.4 TYPE 2 DIABETES MELLITUS WITH MICROALBUMINURIA, WITH LONG-TERM CURRENT USE OF INSULIN (HCC): ICD-10-CM

## 2022-05-26 DIAGNOSIS — R80.9 TYPE 2 DIABETES MELLITUS WITH MICROALBUMINURIA, WITH LONG-TERM CURRENT USE OF INSULIN (HCC): ICD-10-CM

## 2022-05-26 NOTE — TELEPHONE ENCOUNTER
Lamar Apley is calling from [facility] to get clarification on the Trulicity. Directions say to administer 0.5 mg.  Should be mL's

## 2022-05-31 RX ORDER — DULAGLUTIDE 1.5 MG/.5ML
1.5 INJECTION, SOLUTION SUBCUTANEOUS
Qty: 12 EACH | Refills: 1 | Status: SHIPPED | OUTPATIENT
Start: 2022-05-31 | End: 2022-06-07 | Stop reason: DRUGHIGH

## 2022-05-31 RX ORDER — ONDANSETRON 4 MG/1
TABLET, ORALLY DISINTEGRATING ORAL
Qty: 40 TABLET | Refills: 0 | Status: SHIPPED | OUTPATIENT
Start: 2022-05-31 | End: 2022-06-10 | Stop reason: SDUPTHER

## 2022-06-01 ENCOUNTER — TELEPHONE (OUTPATIENT)
Dept: INTERNAL MEDICINE CLINIC | Age: 67
End: 2022-06-01

## 2022-06-01 DIAGNOSIS — I50.32 CHRONIC DIASTOLIC HEART FAILURE (HCC): ICD-10-CM

## 2022-06-01 DIAGNOSIS — D84.9 IMMUNOSUPPRESSED STATUS (HCC): ICD-10-CM

## 2022-06-01 DIAGNOSIS — I10 ESSENTIAL HYPERTENSION: ICD-10-CM

## 2022-06-01 DIAGNOSIS — R80.9 TYPE 2 DIABETES MELLITUS WITH MICROALBUMINURIA, WITH LONG-TERM CURRENT USE OF INSULIN (HCC): Primary | ICD-10-CM

## 2022-06-01 DIAGNOSIS — E11.29 TYPE 2 DIABETES MELLITUS WITH MICROALBUMINURIA, WITH LONG-TERM CURRENT USE OF INSULIN (HCC): Primary | ICD-10-CM

## 2022-06-01 DIAGNOSIS — E78.5 HYPERLIPIDEMIA, UNSPECIFIED HYPERLIPIDEMIA TYPE: ICD-10-CM

## 2022-06-01 DIAGNOSIS — Z94.0 KIDNEY TRANSPLANT STATUS, LIVING RELATED DONOR: ICD-10-CM

## 2022-06-01 DIAGNOSIS — E55.9 VITAMIN D DEFICIENCY, UNSPECIFIED: ICD-10-CM

## 2022-06-01 DIAGNOSIS — Z79.4 TYPE 2 DIABETES MELLITUS WITH MICROALBUMINURIA, WITH LONG-TERM CURRENT USE OF INSULIN (HCC): Primary | ICD-10-CM

## 2022-06-07 ENCOUNTER — APPOINTMENT (OUTPATIENT)
Dept: INTERNAL MEDICINE CLINIC | Age: 67
End: 2022-06-07

## 2022-06-07 ENCOUNTER — OFFICE VISIT (OUTPATIENT)
Dept: INTERNAL MEDICINE CLINIC | Age: 67
End: 2022-06-07
Payer: COMMERCIAL

## 2022-06-07 ENCOUNTER — HOSPITAL ENCOUNTER (OUTPATIENT)
Dept: LAB | Age: 67
Discharge: HOME | End: 2022-06-07
Payer: COMMERCIAL

## 2022-06-07 ENCOUNTER — TELEPHONE (OUTPATIENT)
Dept: INTERNAL MEDICINE CLINIC | Age: 67
End: 2022-06-07

## 2022-06-07 VITALS
RESPIRATION RATE: 16 BRPM | TEMPERATURE: 97.3 F | OXYGEN SATURATION: 96 % | HEIGHT: 63 IN | BODY MASS INDEX: 32.32 KG/M2 | WEIGHT: 182.4 LBS | HEART RATE: 74 BPM | SYSTOLIC BLOOD PRESSURE: 124 MMHG | DIASTOLIC BLOOD PRESSURE: 76 MMHG

## 2022-06-07 DIAGNOSIS — R80.9 TYPE 2 DIABETES MELLITUS WITH MICROALBUMINURIA, WITH LONG-TERM CURRENT USE OF INSULIN (HCC): ICD-10-CM

## 2022-06-07 DIAGNOSIS — I10 ESSENTIAL HYPERTENSION: ICD-10-CM

## 2022-06-07 DIAGNOSIS — E11.29 TYPE 2 DIABETES MELLITUS WITH MICROALBUMINURIA, WITH LONG-TERM CURRENT USE OF INSULIN (HCC): Primary | ICD-10-CM

## 2022-06-07 DIAGNOSIS — Z79.4 TYPE 2 DIABETES MELLITUS WITH MICROALBUMINURIA, WITH LONG-TERM CURRENT USE OF INSULIN (HCC): ICD-10-CM

## 2022-06-07 DIAGNOSIS — F41.1 GAD (GENERALIZED ANXIETY DISORDER): ICD-10-CM

## 2022-06-07 DIAGNOSIS — R80.9 TYPE 2 DIABETES MELLITUS WITH MICROALBUMINURIA, WITH LONG-TERM CURRENT USE OF INSULIN (HCC): Primary | ICD-10-CM

## 2022-06-07 DIAGNOSIS — I50.32 CHRONIC DIASTOLIC HEART FAILURE (HCC): ICD-10-CM

## 2022-06-07 DIAGNOSIS — Z94.0 KIDNEY TRANSPLANT STATUS, LIVING RELATED DONOR: ICD-10-CM

## 2022-06-07 DIAGNOSIS — E78.5 HYPERLIPIDEMIA, UNSPECIFIED HYPERLIPIDEMIA TYPE: ICD-10-CM

## 2022-06-07 DIAGNOSIS — E55.9 VITAMIN D DEFICIENCY, UNSPECIFIED: ICD-10-CM

## 2022-06-07 DIAGNOSIS — F33.42 RECURRENT MAJOR DEPRESSIVE DISORDER, IN FULL REMISSION (HCC): ICD-10-CM

## 2022-06-07 DIAGNOSIS — K21.9 GASTROESOPHAGEAL REFLUX DISEASE WITHOUT ESOPHAGITIS: ICD-10-CM

## 2022-06-07 DIAGNOSIS — E11.29 TYPE 2 DIABETES MELLITUS WITH MICROALBUMINURIA, WITH LONG-TERM CURRENT USE OF INSULIN (HCC): ICD-10-CM

## 2022-06-07 DIAGNOSIS — M85.89 OSTEOPENIA OF MULTIPLE SITES: ICD-10-CM

## 2022-06-07 DIAGNOSIS — Z85.828 HISTORY OF SCC (SQUAMOUS CELL CARCINOMA) OF SKIN: ICD-10-CM

## 2022-06-07 DIAGNOSIS — Z79.4 TYPE 2 DIABETES MELLITUS WITH MICROALBUMINURIA, WITH LONG-TERM CURRENT USE OF INSULIN (HCC): Primary | ICD-10-CM

## 2022-06-07 DIAGNOSIS — D84.9 IMMUNOSUPPRESSED STATUS (HCC): ICD-10-CM

## 2022-06-07 LAB
25(OH)D3 SERPL-MCNC: 47.7 NG/ML (ref 30–100)
ALBUMIN SERPL-MCNC: 4.4 G/DL (ref 3.4–5)
ALBUMIN/GLOB SERPL: 1.3 {RATIO} (ref 0.8–1.7)
ALP SERPL-CCNC: 82 U/L (ref 45–117)
ALT SERPL-CCNC: 41 U/L (ref 13–56)
ANION GAP SERPL CALC-SCNC: 8 MMOL/L (ref 3–18)
AST SERPL-CCNC: 25 U/L (ref 10–38)
BASOPHILS # BLD: 0.1 K/UL (ref 0–0.1)
BASOPHILS NFR BLD: 1 % (ref 0–2)
BILIRUB SERPL-MCNC: 0.4 MG/DL (ref 0.2–1)
BUN SERPL-MCNC: 13 MG/DL (ref 7–18)
BUN/CREAT SERPL: 14 (ref 12–20)
CALCIUM SERPL-MCNC: 9.3 MG/DL (ref 8.5–10.1)
CHLORIDE SERPL-SCNC: 108 MMOL/L (ref 100–111)
CHOLEST SERPL-MCNC: 213 MG/DL
CO2 SERPL-SCNC: 23 MMOL/L (ref 21–32)
CREAT SERPL-MCNC: 0.9 MG/DL (ref 0.6–1.3)
CREAT UR-MCNC: 26 MG/DL (ref 30–125)
DIFFERENTIAL METHOD BLD: ABNORMAL
EOSINOPHIL # BLD: 0.9 K/UL (ref 0–0.4)
EOSINOPHIL NFR BLD: 10 % (ref 0–5)
ERYTHROCYTE [DISTWIDTH] IN BLOOD BY AUTOMATED COUNT: 14.2 % (ref 11.6–14.5)
EST. AVERAGE GLUCOSE BLD GHB EST-MCNC: 177 MG/DL
GLOBULIN SER CALC-MCNC: 3.4 G/DL (ref 2–4)
GLUCOSE SERPL-MCNC: 141 MG/DL (ref 74–99)
HBA1C MFR BLD: 7.8 % (ref 4.2–5.6)
HCT VFR BLD AUTO: 39.4 % (ref 35–45)
HDLC SERPL-MCNC: 42 MG/DL (ref 40–60)
HDLC SERPL: 5.1 {RATIO} (ref 0–5)
HGB BLD-MCNC: 12.4 G/DL (ref 12–16)
IMM GRANULOCYTES # BLD AUTO: 0 K/UL (ref 0–0.04)
IMM GRANULOCYTES NFR BLD AUTO: 0 % (ref 0–0.5)
LDLC SERPL CALC-MCNC: 115 MG/DL (ref 0–100)
LIPID PROFILE,FLP: ABNORMAL
LYMPHOCYTES # BLD: 2.7 K/UL (ref 0.9–3.6)
LYMPHOCYTES NFR BLD: 32 % (ref 21–52)
MAGNESIUM SERPL-MCNC: 2.2 MG/DL (ref 1.6–2.6)
MCH RBC QN AUTO: 29 PG (ref 24–34)
MCHC RBC AUTO-ENTMCNC: 31.5 G/DL (ref 31–37)
MCV RBC AUTO: 92.3 FL (ref 78–100)
MICROALBUMIN UR-MCNC: 6.98 MG/DL (ref 0–3)
MICROALBUMIN/CREAT UR-RTO: 268 MG/G (ref 0–30)
MONOCYTES # BLD: 0.8 K/UL (ref 0.05–1.2)
MONOCYTES NFR BLD: 9 % (ref 3–10)
NEUTS SEG # BLD: 4 K/UL (ref 1.8–8)
NEUTS SEG NFR BLD: 47 % (ref 40–73)
NRBC # BLD: 0 K/UL (ref 0–0.01)
NRBC BLD-RTO: 0 PER 100 WBC
PLATELET # BLD AUTO: 324 K/UL (ref 135–420)
PMV BLD AUTO: 9.3 FL (ref 9.2–11.8)
POTASSIUM SERPL-SCNC: 4.4 MMOL/L (ref 3.5–5.5)
PROT SERPL-MCNC: 7.8 G/DL (ref 6.4–8.2)
RBC # BLD AUTO: 4.27 M/UL (ref 4.2–5.3)
SODIUM SERPL-SCNC: 139 MMOL/L (ref 136–145)
TRIGL SERPL-MCNC: 280 MG/DL (ref ?–150)
VLDLC SERPL CALC-MCNC: 56 MG/DL
WBC # BLD AUTO: 8.5 K/UL (ref 4.6–13.2)

## 2022-06-07 PROCEDURE — 1101F PT FALLS ASSESS-DOCD LE1/YR: CPT | Performed by: INTERNAL MEDICINE

## 2022-06-07 PROCEDURE — G9231 DOC ESRD DIA TRANS PREG: HCPCS | Performed by: INTERNAL MEDICINE

## 2022-06-07 PROCEDURE — 2022F DILAT RTA XM EVC RTNOPTHY: CPT | Performed by: INTERNAL MEDICINE

## 2022-06-07 PROCEDURE — 83735 ASSAY OF MAGNESIUM: CPT

## 2022-06-07 PROCEDURE — 80053 COMPREHEN METABOLIC PANEL: CPT

## 2022-06-07 PROCEDURE — G9717 DOC PT DX DEP/BP F/U NT REQ: HCPCS | Performed by: INTERNAL MEDICINE

## 2022-06-07 PROCEDURE — G9899 SCRN MAM PERF RSLTS DOC: HCPCS | Performed by: INTERNAL MEDICINE

## 2022-06-07 PROCEDURE — 1090F PRES/ABSN URINE INCON ASSESS: CPT | Performed by: INTERNAL MEDICINE

## 2022-06-07 PROCEDURE — 1123F ACP DISCUSS/DSCN MKR DOCD: CPT | Performed by: INTERNAL MEDICINE

## 2022-06-07 PROCEDURE — 80061 LIPID PANEL: CPT

## 2022-06-07 PROCEDURE — G8536 NO DOC ELDER MAL SCRN: HCPCS | Performed by: INTERNAL MEDICINE

## 2022-06-07 PROCEDURE — 85025 COMPLETE CBC W/AUTO DIFF WBC: CPT

## 2022-06-07 PROCEDURE — 82306 VITAMIN D 25 HYDROXY: CPT

## 2022-06-07 PROCEDURE — G8417 CALC BMI ABV UP PARAM F/U: HCPCS | Performed by: INTERNAL MEDICINE

## 2022-06-07 PROCEDURE — G8427 DOCREV CUR MEDS BY ELIG CLIN: HCPCS | Performed by: INTERNAL MEDICINE

## 2022-06-07 PROCEDURE — 83036 HEMOGLOBIN GLYCOSYLATED A1C: CPT

## 2022-06-07 PROCEDURE — 3017F COLORECTAL CA SCREEN DOC REV: CPT | Performed by: INTERNAL MEDICINE

## 2022-06-07 PROCEDURE — 3051F HG A1C>EQUAL 7.0%<8.0%: CPT | Performed by: INTERNAL MEDICINE

## 2022-06-07 PROCEDURE — 82043 UR ALBUMIN QUANTITATIVE: CPT

## 2022-06-07 PROCEDURE — 99215 OFFICE O/P EST HI 40 MIN: CPT | Performed by: INTERNAL MEDICINE

## 2022-06-07 PROCEDURE — G8399 PT W/DXA RESULTS DOCUMENT: HCPCS | Performed by: INTERNAL MEDICINE

## 2022-06-07 PROCEDURE — 36415 COLL VENOUS BLD VENIPUNCTURE: CPT

## 2022-06-07 RX ORDER — PREDNISONE 5 MG/1
2.5 TABLET ORAL EVERY OTHER DAY
Qty: 25 TABLET | Refills: 2 | Status: SHIPPED | OUTPATIENT
Start: 2022-06-07

## 2022-06-07 RX ORDER — OMEPRAZOLE 40 MG/1
40 CAPSULE, DELAYED RELEASE ORAL DAILY
Qty: 90 CAPSULE | Refills: 3 | Status: SHIPPED | OUTPATIENT
Start: 2022-06-07

## 2022-06-07 RX ORDER — DULAGLUTIDE 3 MG/.5ML
3 INJECTION, SOLUTION SUBCUTANEOUS
Qty: 12 EACH | Refills: 2 | Status: SHIPPED | OUTPATIENT
Start: 2022-06-07

## 2022-06-07 NOTE — PATIENT INSTRUCTIONS
Heart-Healthy Diet: Care Instructions  Your Care Instructions     A heart-healthy diet has lots of vegetables, fruits, nuts, beans, and whole grains, and is low in salt. It limits foods that are high in saturated fat, such as meats, cheeses, and fried foods. It may be hard to change your diet, but even small changes can lower your risk of heart attack and heart disease. Follow-up care is a key part of your treatment and safety. Be sure to make and go to all appointments, and call your doctor if you are having problems. It's also a good idea to know your test results and keep a list of the medicines you take. How can you care for yourself at home? Watch your portions  · Learn what a serving is. A \"serving\" and a \"portion\" are not always the same thing. Make sure that you are not eating larger portions than are recommended. For example, a serving of pasta is ½ cup. A serving size of meat is 2 to 3 ounces. A 3-ounce serving is about the size of a deck of cards. Measure serving sizes until you are good at Elliott" them. Keep in mind that restaurants often serve portions that are 2 or 3 times the size of one serving. · To keep your energy level up and keep you from feeling hungry, eat often but in smaller portions. · Eat only the number of calories you need to stay at a healthy weight. If you need to lose weight, eat fewer calories than your body burns (through exercise and other physical activity). Eat more fruits and vegetables  · Eat a variety of fruit and vegetables every day. Dark green, deep orange, red, or yellow fruits and vegetables are especially good for you. Examples include spinach, carrots, peaches, and berries. · Keep carrots, celery, and other veggies handy for snacks. Buy fruit that is in season and store it where you can see it so that you will be tempted to eat it. · Cook dishes that have a lot of veggies in them, such as stir-fries and soups.   Limit saturated and trans fat  · Read food labels, and try to avoid saturated and trans fats. They increase your risk of heart disease. · Use olive or canola oil when you cook. · Bake, broil, grill, or steam foods instead of frying them. · Choose lean meats instead of high-fat meats such as hot dogs and sausages. Cut off all visible fat when you prepare meat. · Eat fish, skinless poultry, and meat alternatives such as soy products instead of high-fat meats. Soy products, such as tofu, may be especially good for your heart. · Choose low-fat or fat-free milk and dairy products. Eat foods high in fiber  · Eat a variety of grain products every day. Include whole-grain foods that have lots of fiber and nutrients. Examples of whole-grain foods include oats, whole wheat bread, and brown rice. · Buy whole-grain breads and cereals, instead of white bread or pastries. Limit salt and sodium  · Limit how much salt and sodium you eat to help lower your blood pressure. · Taste food before you salt it. Add only a little salt when you think you need it. With time, your taste buds will adjust to less salt. · Eat fewer snack items, fast foods, and other high-salt, processed foods. Check food labels for the amount of sodium in packaged foods. · Choose low-sodium versions of canned goods (such as soups, vegetables, and beans). Limit sugar  · Limit drinks and foods with added sugar. These include candy, desserts, and soda pop. Limit alcohol  · Limit alcohol to no more than 2 drinks a day for men and 1 drink a day for women. Too much alcohol can cause health problems. When should you call for help? Watch closely for changes in your health, and be sure to contact your doctor if:    · You would like help planning heart-healthy meals. Where can you learn more? Go to http://www.Ayasdi.com/  Enter V137 in the search box to learn more about \"Heart-Healthy Diet: Care Instructions. \"  Current as of: August 22, 2019               Content Version: 12.6  © 8805-4401 [employer]. Care instructions adapted under license by [employer] (which disclaims liability or warranty for this information). If you have questions about a medical condition or this instruction, always ask your healthcare professional. [address] any warranty or liability for your use of this information. Learning About Diabetes Food Guidelines  Your Care Instructions     Meal planning is important to manage diabetes. It helps keep your blood sugar at a target level (which you set with your doctor). You don't have to eat special foods. You can eat what your family eats, including sweets once in a while. But you do have to pay attention to how often you eat and how much you eat of certain foods. You may want to work with a dietitian or a certified diabetes educator (CDE) to help you plan meals and snacks. A dietitian or CDE can also help you lose weight if that is one of your goals. What should you know about eating carbs? Managing the amount of carbohydrate (carbs) you eat is an important part of healthy meals when you have diabetes. Carbohydrate is found in many foods. · Learn which foods have carbs. And learn the amounts of carbs in different foods. ? Bread, cereal, pasta, and rice have about 15 grams of carbs in a serving. A serving is 1 slice of bread (1 ounce), ½ cup of cooked cereal, or 1/3 cup of cooked pasta or rice. ? Fruits have 15 grams of carbs in a serving. A serving is 1 small fresh fruit, such as an apple or orange; ½ of a banana; ½ cup of cooked or canned fruit; ½ cup of fruit juice; 1 cup of melon or raspberries; or 2 tablespoons of dried fruit. ? Milk and no-sugar-added yogurt have 15 grams of carbs in a serving. A serving is 1 cup of milk or 2/3 cup of no-sugar-added yogurt. ? Starchy vegetables have 15 grams of carbs in a serving.  A serving is ½ cup of mashed potatoes or sweet potato; 1 cup winter squash; ½ of a small baked potato; ½ cup of cooked beans; or ½ cup cooked corn or green peas. · Learn how much carbs to eat each day and at each meal. A dietitian or CDE can teach you how to keep track of the amount of carbs you eat. This is called carbohydrate counting. · If you are not sure how to count carbohydrate grams, use the Plate Method to plan meals. It is a good, quick way to make sure that you have a balanced meal. It also helps you spread carbs throughout the day. ? Divide your plate by types of foods. Put non-starchy vegetables on half the plate, meat or other protein food on one-quarter of the plate, and a grain or starchy vegetable in the final quarter of the plate. To this you can add a small piece of fruit and 1 cup of milk or yogurt, depending on how many carbs you are supposed to eat at a meal.  · Try to eat about the same amount of carbs at each meal. Do not \"save up\" your daily allowance of carbs to eat at one meal.  · Proteins have very little or no carbs per serving. Examples of proteins are beef, chicken, turkey, fish, eggs, tofu, cheese, cottage cheese, and peanut butter. A serving size of meat is 3 ounces, which is about the size of a deck of cards. Examples of meat substitute serving sizes (equal to 1 ounce of meat) are 1/4 cup of cottage cheese, 1 egg, 1 tablespoon of peanut butter, and ½ cup of tofu. How can you eat out and still eat healthy? · Learn to estimate the serving sizes of foods that have carbohydrate. If you measure food at home, it will be easier to estimate the amount in a serving of restaurant food. · If the meal you order has too much carbohydrate (such as potatoes, corn, or baked beans), ask to have a low-carbohydrate food instead. Ask for a salad or green vegetables. · If you use insulin, check your blood sugar before and after eating out to help you plan how much to eat in the future.   · If you eat more carbohydrate at a meal than you had planned, take a walk or do other exercise. This will help lower your blood sugar. What else should you know? · Limit saturated fat, such as the fat from meat and dairy products. This is a healthy choice because people who have diabetes are at higher risk of heart disease. So choose lean cuts of meat and nonfat or low-fat dairy products. Use olive or canola oil instead of butter or shortening when cooking. · Don't skip meals. Your blood sugar may drop too low if you skip meals and take insulin or certain medicines for diabetes. · Check with your doctor before you drink alcohol. Alcohol can cause your blood sugar to drop too low. Alcohol can also cause a bad reaction if you take certain diabetes medicines. Follow-up care is a key part of your treatment and safety. Be sure to make and go to all appointments, and call your doctor if you are having problems. It's also a good idea to know your test results and keep a list of the medicines you take. Where can you learn more? Go to http://www.shaffer.com/  Enter I147 in the search box to learn more about \"Learning About Diabetes Food Guidelines. \"  Current as of: December 20, 2019               Content Version: 12.6  © 6427-1633 [employer]. Care instructions adapted under license by [employer] (which disclaims liability or warranty for this information). If you have questions about a medical condition or this instruction, always ask your healthcare professional. [address] any warranty or liability for your use of this information. Learning About Low-Sodium Foods  What foods are low in sodium? The foods you eat contain nutrients, such as vitamins and minerals. Sodium is a nutrient. Your body needs the right amount to stay healthy and work as it should. You can use the list below to help you make choices about which foods to eat.   Fruits  · Fresh, frozen, canned, or dried fruit  Vegetables  · Fresh or frozen vegetables, with no added salt  · Canned vegetables, low-sodium or with no added salt  Grains  · Bagels without salted tops  · Cereal with no added salt  · Corn tortillas  · Crackers with no added salt  · Oatmeal, cooked without salt  · Popcorn with no salt  · Pasta and noodles, cooked without salt  · Rice, cooked without salt  · Unsalted pretzels  Dairy and dairy alternatives  · Butter, unsalted  · Cream cheese  · Ice cream  · Milk  · Soy milk  Meats and other protein foods  · Beans and peas, canned with no salt  · Eggs  · Fresh fish (not smoked)  · Fresh meats (not smoked or cured)  · Nuts and nut butter, prepared without salt  · Poultry, not packaged with sodium solution  · Tofu, unseasoned  · Tuna, canned without salt  Seasonings  · Garlic  · Herbs and spices  · Lemon juice  · Mustard  · Olive oil  · Salt-free seasoning mixes  · Vinegar  Work with your doctor to find out how much of this nutrient you need. Depending on your health, you may need more or less of it in your diet. Where can you learn more? Go to http://www.gray.com/  Enter S460 in the search box to learn more about \"Learning About Low-Sodium Foods. \"  Current as of: August 22, 2019               Content Version: 12.6  © 5299-9595 [employer]. Care instructions adapted under license by [employer] (which disclaims liability or warranty for this information). If you have questions about a medical condition or this instruction, always ask your healthcare professional. [place] [address] any warranty or liability for your use of this information. Low Sodium Diet (2,000 Milligram): Care Instructions  Your Care Instructions     Too much sodium causes your body to hold on to extra water. This can raise your blood pressure and force your heart and kidneys to work harder. In very serious cases, this could cause you to be put in the hospital. It might even be life-threatening.  By limiting sodium, you will feel better and lower your risk of serious problems. The most common source of sodium is salt. People get most of the salt in their diet from canned, prepared, and packaged foods. Fast food and restaurant meals also are very high in sodium. Your doctor will probably limit your sodium to less than 2,000 milligrams (mg) a day. This limit counts all the sodium in prepared and packaged foods and any salt you add to your food. Follow-up care is a key part of your treatment and safety. Be sure to make and go to all appointments, and call your doctor if you are having problems. It's also a good idea to know your test results and keep a list of the medicines you take. How can you care for yourself at home? Read food labels  · Read labels on cans and food packages. The labels tell you how much sodium is in each serving. Make sure that you look at the serving size. If you eat more than the serving size, you have eaten more sodium. · Food labels also tell you the Percent Daily Value for sodium. Choose products with low Percent Daily Values for sodium. · Be aware that sodium can come in forms other than salt, including monosodium glutamate (MSG), sodium citrate, and sodium bicarbonate (baking soda). MSG is often added to Asian food. When you eat out, you can sometimes ask for food without MSG or added salt. Buy low-sodium foods  · Buy foods that are labeled \"unsalted\" (no salt added), \"sodium-free\" (less than 5 mg of sodium per serving), or \"low-sodium\" (less than 140 mg of sodium per serving). Foods labeled \"reduced-sodium\" and \"light sodium\" may still have too much sodium. Be sure to read the label to see how much sodium you are getting. · Buy fresh vegetables, or frozen vegetables without added sauces. Buy low-sodium versions of canned vegetables, soups, and other canned goods. Prepare low-sodium meals  · Cut back on the amount of salt you use in cooking.  This will help you adjust to the taste. Do not add salt after cooking. One teaspoon of salt has about 2,300 mg of sodium. · Take the salt shaker off the table. · Flavor your food with garlic, lemon juice, onion, vinegar, herbs, and spices. Do not use soy sauce, lite soy sauce, steak sauce, onion salt, garlic salt, celery salt, mustard, or ketchup on your food. · Use low-sodium salad dressings, sauces, and ketchup. Or make your own salad dressings and sauces without adding salt. · Use less salt (or none) when recipes call for it. You can often use half the salt a recipe calls for without losing flavor. Other foods such as rice, pasta, and grains do not need added salt. · Rinse canned vegetables, and cook them in fresh water. This removes some--but not all--of the salt. · Avoid water that is naturally high in sodium or that has been treated with water softeners, which add sodium. Call your local water company to find out the sodium content of your water supply. If you buy bottled water, read the label and choose a sodium-free brand. Avoid high-sodium foods  · Avoid eating:  ? Smoked, cured, salted, and canned meat, fish, and poultry. ? Ham, haney, hot dogs, and luncheon meats. ? Regular, hard, and processed cheese and regular peanut butter. ? Crackers with salted tops, and other salted snack foods such as pretzels, chips, and salted popcorn. ? Frozen prepared meals, unless labeled low-sodium. ? Canned and dried soups, broths, and bouillon, unless labeled sodium-free or low-sodium. ? Canned vegetables, unless labeled sodium-free or low-sodium. ? Western Gabi fries, pizza, tacos, and other fast foods. ? Pickles, olives, ketchup, and other condiments, especially soy sauce, unless labeled sodium-free or low-sodium. Where can you learn more? Go to http://www.gray.com/  Enter V513 in the search box to learn more about \"Low Sodium Diet (2,000 Milligram): Care Instructions. \"  Current as of: August 22, 2019 Content Version: 12.6  © 3611-6719 [employer]. Care instructions adapted under license by [email or web address] (which disclaims liability or warranty for this information). If you have questions about a medical condition or this instruction, always ask your healthcare professional. [address] any warranty or liability for your use of this information. High Cholesterol: Care Instructions  Your Care Instructions     Cholesterol is a type of fat in your blood. It is needed for many body functions, such as making new cells. Cholesterol is made by your body. It also comes from food you eat. High cholesterol means that you have too much of the fat in your blood. This raises your risk of a heart attack and stroke. LDL and HDL are part of your total cholesterol. LDL is the \"bad\" cholesterol. High LDL can raise your risk for heart disease, heart attack, and stroke. HDL is the \"good\" cholesterol. It helps clear bad cholesterol from the body. High HDL is linked with a lower risk of heart disease, heart attack, and stroke. Your cholesterol levels help your doctor find out your risk for having a heart attack or stroke. You and your doctor can talk about whether you need to lower your risk and what treatment is best for you. A heart-healthy lifestyle along with medicines can help lower your cholesterol and your risk. The way you choose to lower your risk will depend on how high your risk is for heart attack and stroke. It will also depend on how you feel about taking medicines. Follow-up care is a key part of your treatment and safety. Be sure to make and go to all appointments, and call your doctor if you are having problems. It's also a good idea to know your test results and keep a list of the medicines you take. How can you care for yourself at home? · Eat a variety of foods every day.  Good choices include fruits, vegetables, whole grains (like oatmeal), dried beans and peas, nuts and seeds, soy products (like tofu), and fat-free or low-fat dairy products. · Replace butter, margarine, and hydrogenated or partially hydrogenated oils with olive and canola oils. (Canola oil margarine without trans fat is fine.)  · Replace red meat with fish, poultry, and soy protein (like tofu). · Limit processed and packaged foods like chips, crackers, and cookies. · Bake, broil, or steam foods. Don't pena them. · Be physically active. Get at least 30 minutes of exercise on most days of the week. Walking is a good choice. You also may want to do other activities, such as running, swimming, cycling, or playing tennis or team sports. · Stay at a healthy weight or lose weight by making the changes in eating and physical activity listed above. Losing just a small amount of weight, even 5 to 10 pounds, can reduce your risk for having a heart attack or stroke. · Do not smoke. When should you call for help? Watch closely for changes in your health, and be sure to contact your doctor if:    · You need help making lifestyle changes. · You have questions about your medicine. Where can you learn more? Go to http://www.gray.com/  Enter P738 in the search box to learn more about \"High Cholesterol: Care Instructions. \"  Current as of: December 16, 2019               Content Version: 12.6  © 3106-5869 [employer]. Care instructions adapted under license by [employer] (which disclaims liability or warranty for this information). If you have questions about a medical condition or this instruction, always ask your healthcare professional. [address] any warranty or liability for your use of this information.

## 2022-06-07 NOTE — TELEPHONE ENCOUNTER
Pt call, returning our call. Stated someone from the office called her and wanted information re her covid vaccines. Pt stated she has had 3 vaccines. Stated she has had a kidney transplant.      She has not had a booster

## 2022-06-07 NOTE — PROGRESS NOTES
1. \"Have you been to the ER, urgent care clinic since your last visit? Hospitalized since your last visit? \" No    2. \"Have you seen or consulted any other health care providers outside of the [address] [place] [address] since your last visit? \" No     3. For patients aged 39-70: Has the patient had a colonoscopy / FIT/ Cologuard? Yes - no Care Gap present      If the patient is female:    4. For patients aged 41-77: Has the patient had a mammogram within the past 2 years? Yes - no Care Gap present      5. For patients aged 21-65: Has the patient had a pap smear?  NA - based on age or sex

## 2022-06-10 RX ORDER — ONDANSETRON 4 MG/1
4 TABLET, ORALLY DISINTEGRATING ORAL
Qty: 40 TABLET | Refills: 1 | Status: SHIPPED | OUTPATIENT
Start: 2022-06-10

## 2022-06-10 NOTE — TELEPHONE ENCOUNTER
Last Visit: 6/7/22 with MD Taz Black  Next Appointment: 9/15/22 with MD Taz Black  Previous Refill Encounter(s): 5/31/22 #40    Requested Prescriptions     Pending Prescriptions Disp Refills    ondansetron (ZOFRAN ODT) 4 mg disintegrating tablet 40 Tablet 0     Sig: Take 1 Tablet by mouth every eight (8) hours as needed for Nausea or Vomiting.          For Jefferson Galvan in place:    Recommendation Provided To:    Intervention Detail: New Rx: 1, reason: Patient Preference   Gap Closed?:    Intervention Accepted By:   [address] [place] [address] Chris Time Spent (min): 5
none

## 2022-06-11 PROBLEM — F33.42 RECURRENT MAJOR DEPRESSIVE DISORDER, IN FULL REMISSION (HCC): Status: ACTIVE | Noted: 2017-12-12

## 2022-06-11 PROBLEM — I50.32 CHRONIC DIASTOLIC HEART FAILURE (HCC): Status: RESOLVED | Noted: 2018-08-13 | Resolved: 2022-06-11

## 2022-06-11 PROBLEM — I51.9 LEFT VENTRICULAR DIASTOLIC DYSFUNCTION: Status: RESOLVED | Noted: 2018-09-23 | Resolved: 2022-06-11

## 2022-06-11 PROBLEM — Z87.448 H/O RENAL FAILURE: Status: RESOLVED | Noted: 2018-08-13 | Resolved: 2022-06-11

## 2022-06-11 NOTE — PROGRESS NOTES
HPI:   Alise Mcmullen is a 78y.o. year old female who presents today for a routine visit. She has a history of hypertension, hyperlipidemia, chronic diastolic heart failure, diabetes mellitus, ESRD s/p living donor cadaveric renal transplant (4/1980), osteopenia, osteoarthritis, lumbar degenerative disease, chronic pain syndrome, squamous cell skin cancers, depression, anxiety, GERD, and noncompliance. She has completed the two dose Moderna COVID-19 vaccine series and received a third dose given her immunosuppressed status. She reports today that she is doing reasonably well. She did undergo Mohs surgery on 9/17/2021 by Dr. Dilcia Blanco for removal of a rapidly growing mass on her right forearm. Pathology showed squamous cell carcinoma. She states that she is now following up regularly with dermatology for surveillance exams. She also underwent an upper endoscopy and colonoscopy on 12/30/2021 with Dr. Noe Coffey. Upper endoscopy revealed salmon-colored mucosa at the GE junction and multiple nonbleeding benign fundic gastric polyps (pathology: squamous and gastric mucosa with no pathologic abnormality). Colonoscopy was not completed due to poor prep and recommendation for repeat in 1 year. She also states that she has noted her blood sugars have been elevated over the last 6 weeks and she states that she has begun taking Trulicity 1.5 mg twice weekly on her own instead of the prescribed weekly dose. She is otherwise without new complaints. Summary of prior hospitalizations and medical history:  She has had increased difficulty with reflux and presented to the [facility] ED on 12/1/2020 complaining of intermittent substernal chest pain for about 1 week. It was not related to exertion and she did not experience associated dyspnea at rest or with exertion, palpitations, or lightheadedness.  Evaluation included WBC 9.0, Hb 12.3/ Hct 38.1, creatinine 1.0/ eGFR 53, K 4.9, NT pro-BNP 36, troponin x 2 negative, EKG x 2 negative, chest x-ray negative, and a pharmacologic nuclear stress test (12/2/2020) which was a normal, low risk study with EF 63% and no TID. She was advised that symptom were likely related to underlying GERD, and she was discharged. On 5/8/2019, she presented to [facility] ED complaining of thoracic back pain. She informed the ED physicians that she had weaned herself from her chronic pain medications and was requesting pain medicine since her pain had increased. She was given a single dose of gabapentin 300 mg, Zofran, and two Percocet, and she was discharged with a prescription for gabapentin 100 mg tid. Due to continued pain, she presented to the [facility] ED on 5/10/2019 and was complaining of sharp, stabbing mid abdominal pain. She was found to be tender in the epigastric and right abdomen. Lab evaluation included Hb 11.8/ Hct 36.2, creatinine 1.04/ eGFR 53, lipase 197, troponin and urinalysis negative. and an abdominal/pelvic CT scan was obtained showing no acute abdominal pathology, but an incidental finding of a suggestion of bilateral adnexal cysts, right measuring 1.6 cm and the left measuring 1.3 cm. She was discharged with a prescription for famotidine. She was also referred to Dr. Noreen Humphrey for follow-up. She expresses concern regarding the findings on the CT scan suggestive of bilateral adnexal cysts. She states that she had a complete hysterectomy many years ago and had her ovaries removed in 2002 due to endometriosis. Review of her record shows a pelvic ultrasound from 11/11/2002 showing bilateral pelvic masses in the area of the ovaries, the left appearing to represent the ovary with an adjacent fluid collection and the right being an indeterminate mass. The patient states that this is what prompted the removal of her ovaries. She had had a hysterectomy several years prior to this. She denies any lower abdominal pain or bloating.  She underwent evaluation by Dr. Noreen Humphrey on 6/18/2019, and recommendation was to proceed with an upper endoscopy, but the patient cancelled the appointment. She also underwent a pelvic ultrasound (5/23/2019) which did not confirm findings seen on CT scan, but recommendation was to proceed with a transvaginal ultrasound. However, the patient stated that she wished to follow-up with her gynecologist.     On 11/6/2018, she admitted that she was no longer taking her immunosuppressant medications for her kidney transplant. She stated that she had stopped taking prednisone for about a year, and then stopped tacrolimus for several months although is not specific as to when. However, review of record showed that her tacrolimus level was therapeutic at 5.2 on 4/27/2018 at her annual follow-up visit in the renal transplant clinic. She stated that she was told by one of her kidney doctors that \"her brother must have been a twin for her transplanted kidney to have lasted this long\". She stated that she believed him even though her brother is 11years older, and she decided on her own that she no longer needed to take her immunosuppressants. She states that she also did not like the fact that she would \"get so many colds\" while taking the medications. She became concerned when she was informed of the development of increasing proteinuria on he labs. She was seen in follow-up on 11/21/2018 and reported that she had restarted her kidney transplant immunosuppressant medications, and was taking tacrolimus 2 mg bid and prednisone 5 mg every other day. Her tacrolimus level was measured at 1.7 and she continued to show proteinuria with urine microalbumin/ creatinine ratio 150 mg/g, and urinalysis measuring 30 mg/dl protein. Her creatinine remained relatively stable at 0.95/ eGFR 59.  She subsequently followed up with Dr. Poly Mckeon, and repeat labs showed an increase in her creatinine to 1.22/ eGFR 44, urinalysis showed 100 mg/dl protein, random urine protein 61 mg/dl with urine creatinine 81.2. She stated that she was instructed to have follow-up labs in 4 weeks, and that she may require a renal biopsy if they continued to be abnormal. She also stated that her antihypertensive regimen was changed with increase in her dose of lisinopril to 40 mg bid, and amlodipine and Imdur were discontinued. However, she has not followed up with Dr. Angela Mckeon since her last visit in 1/2019. She has a history of hypertension, treated with lisinopril and amlodipine. She has not been monitoring her blood pressure at home. She also has a history of hyperlipidemia, treated previously with high intensity dose simvastatin at 80 mg daily. She was changed to rosuvastatin in 8/2018, but noticed severe muscle aches and stopped taking it with improvement. She was subsequently restarted on simvastatin at 40 mg dose. In 10/2017, she was admitted to [facility] with dyspnea on exertion, and chest xray revealed mild cardiac enlargement with pulmonary vascular congestion and diffusely increased interstitial markings. Echocardiogram showed normal LV size and function (EF 65%), mild concentric LVH, grade 2 diastolic dysfunction, and a pharmacologic nuclear stress test was a normal low risk study without evidence of ischemia or prior infarction, and calculated EF 70%. She was diagnosed with acute on chronic diastolic heart failure, and treated with lasix and diuresed approximately five liters with improvement. She was intolerant to beta blockers. She currently denies any chest pain, shortness of breath at rest or with exertion, palpitations, lightheadedness, PND, orthopnea, or edema. She is now being followed by Dr. Live Casas. She has a history of diabetes mellitus, which had not been treated with medication until 12/2019 when she began experiencing polyuria, polydipsia, and fatigue for several weeks. She stated that on 12/16/2019, she checked her blood sugar at home and it measured 575.  She states that she took two 500 mg metformin and presented to [facility] ED for evaluation. She stated that initial blood sugar was elevated at 343, and she was administered 8 units of regular insulin and IV fluids. Evaluation included Na 134, Cl 93, K 4.2, creatinine 1.1/ eGFR 47, Ca 10.1, urinalysis protein 100, glucose >500, chest x-ray negative, EKG sinus tachycardia 100. She was subsequently discharged on metformin 500 mg bid and reported that her blood sugars had been measuring between 400-550 since discharge. She developed GI distress and was instructed to discontinue metformin and begin Lantus 10 U qHS and titrated her dose according to her fasting morning blood sugars, increasing to 32 U qHS, and then switched to Levemir on 1/4/2020 due to formulary issues. Trulicity was added, and she was weaned from insulin in 3/2020. She denies any polyuria, polydipsia, nocturia, or blurry vision, and has no history of retinopathy, neuropathy, or nephropathy. She has not been having regular eye exams. She has a history of renal failure and is s/p a living donor cadaveric renal transplant from her brother in 4/1980. She had been noncompliant with her regimen as discussed, but has resumed taking tacrolimus and prednisone one month ago. She is followed in the [facility] by Dr. Ruthann Bruno and Dr. Lorenza Choi. She has secondary osteopenia due to chronic steroid use, and was being treated with alendronate. However, she states that she discontinued taking it. Her last bone density study was in 9/2021 showing T-scores:  femoral neck left -0.7  and distal radius -1.0. She continues to take calcium and Vitamin D supplements. She has no history of pathologic fractures. She has a history of osteoarthritis and is s/p right hip replacement by Dr. Lizzy Rowland in 2015. She also has chronic neck and low back pain secondary to degenerative disease.  She had been followed previously in the [facility] pain management center, but has weaned herself off of pain medications. She has stopped taking Percocet and MS Contin. She states that she has not noticed a significant increase in her neck or low back pain. She denies any fevers, chills, weight change, saddle paresthesia, neurogenic bowel or bladder symptoms, or recent trauma. She has a history of GERD, treated with omeprazole. She had an upper endoscopy in 5/2014 by Dr. Jazzy Parikh which was normal. She also had difficulty with chronic constipation, secondary to narcotic use. She was being treated with Linzess, although discontinued after she weaned herself from taking narcotics. She had a screening colonoscopy in 6/2011 which showed evidence of diverticulosis. Follow-up recommended for 10 years. She denies any abdominal pain, nausea, vomiting, melena, hematochezia, or change in bowel movements. She has a history of multiple squamous cell carcinomas of the skin. She is being followed closely by Dr. Karrie Gonzalez . She reports that she was treated with Efudex in 8/2018 with subsequent exfoliation. She does report that she was treated with doxycycline in 6/2018 for a possible MRSA infection on her wrist. She states that her grandson had a confirmed infection and she was taking care of him. She states that it completely resolved. She has a history of depression and anxiety, treated previously with Prozac. However, she states that she is no longer taking it. She also had previously been using Ambien for insomnia.        Past Medical History:   Diagnosis Date    Adverse effect of anesthesia     pulmonary issues during surgery    Calculus of kidney     Cancer ([facility] [address]     skin-LT leg    Chronic diastolic heart failure ([facility] [address] 8/13/2018    Chronic pain syndrome 7/16/2013    Constipation due to pain medication 4/8/2016    Degeneration of lumbar or lumbosacral intervertebral disc     Depression     Diabetes mellitus     Essential hypertension 8/13/2018    KIM (generalized anxiety disorder) 4/29/2015  H/O renal failure 8/13/2018    Secondary to glomerulonephritis. S/P living related donor transplant in 4/1980    Heart failure ([facility] [address]     x 3    History of depression 12/12/2017    Hypercholesterolemia     Hyperlipidemia 8/13/2018    Kidney transplant status, living related donor 10/24/2012    Lumbosacral radiculopathy     Osteopenia of multiple sites 8/9/2018    Primary iridocyclitis     Status post total replacement of right hip 4/8/2016    Type 2 diabetes mellitus with microalbuminuria ([facility] [address] 11/10/2015     Past Surgical History:   Procedure Laterality Date    COLONOSCOPY N/A 12/30/2021    COLONOSCOPY/INCOMPLETE performed by Sara Robins MD at [address] HX GYN      Ovary    HX HEENT      nose surgery    HX HYSTERECTOMY      HX OOPHORECTOMY      HX ORTHOPAEDIC Right [id number]    hip replacement    HX UROLOGICAL      Kidney Transplant    GA ABDOMEN SURGERY PROC UNLISTED      GA BREAST SURGERY PROCEDURE UNLISTED      GA LAPAROSCOPIC NEPHRECTOMY      bilateral     Current Outpatient Medications   Medication Sig    omeprazole (PRILOSEC) 40 mg capsule Take 1 Capsule by mouth daily.  predniSONE (DELTASONE) 5 mg tablet Take 0.5 Tablets by mouth every other day. Indications: prevent kidney transplant rejection    dulaglutide (Trulicity) 3 GI/6.4 mL pnij 3 mg by SubCUTAneous route every seven (7) days.  flash glucose sensor (FreeStyle Alice 14 Day Sensor) kit Use to check blood sugar at least 4 times daily. Dx E11.9.  flash glucose scanning reader (FreeStyle Alice 14 Day [place]) misc USE TO CHECK BLOOD SUGAR AT LEAST 4 TIMES DAILY    ergocalciferol (ERGOCALCIFEROL) 1,250 mcg (50,000 unit) capsule TAKE 1 CAPSULE BY MOUTH EVERY 7 DAYS    simvastatin (ZOCOR) 40 mg tablet TAKE 1 TABLET BY MOUTH EVERY NIGHT FOR HIGH CHOLESTEROL    lisinopriL (PRINIVIL, ZESTRIL) 40 mg tablet TAKE 1 TABLET BY MOUTH DAILY    amLODIPine (NORVASC) 5 mg tablet Take 1 Tab by mouth daily.     sucralfate (Carafate) 1 gram tablet Take 1 Tab by mouth four (4) times daily. Indications: heartburn    MELATONIN PO Take 6 mg by mouth daily.  diphenhydrAMINE-acetaminophen (TYLENOL PM EXTRA STRENGTH)  mg tab Take 2 Tabs by mouth daily as needed.  Insulin Needles, Disposable, 31 gauge x 5/16\" ndle Use with Lantus insulin qhs.  tacrolimus (PROGRAF) 1 mg capsule 2 mg twice daily and 1 mg nightly    ondansetron (ZOFRAN ODT) 4 mg disintegrating tablet Take 1 Tablet by mouth every eight (8) hours as needed for Nausea or Vomiting. No current facility-administered medications for this visit. Allergies and Intolerances: Allergies   Allergen Reactions    Asa-Acetaminophen-Caff-Potass Unknown (comments)    Aspirin Other (comments)    Beta-Blockers (Beta-Adrenergic Blocking Agts) Other (comments)     Sent into [facility].  Bystolic [Nebivolol] Other (comments)    Crestor [Rosuvastatin] Myalgia    Ibuprofen Other (comments)     Renal transplant patient    Iodinated Contrast Media Other (comments)     Unable to take due to Kidney Transplant    Lipitor [Atorvastatin] Myalgia     Had muscle aches and pains    Nsaids (Non-Steroidal Anti-Inflammatory Drug) Unknown (comments)    Sirolimus Other (comments)     pneumonitis    Toprol Xl [Metoprolol Succinate] Other (comments)     Asthma    Venlafaxine Other (comments)     Patient does not recall taking this medication     Family History: Her sister was diagnosed with breast cancer at age 61; her father had CAD and  during CABG  Family History   Problem Relation Age of Onset    Hypertension Father     Heart Disease Father     Alcohol abuse Father     OSTEOARTHRITIS Father     OSTEOARTHRITIS Brother     Cancer Maternal Grandmother         breast    Lung Disease Sister     High Cholesterol Mother     OSTEOARTHRITIS Mother     OSTEOARTHRITIS Sister     Liver Disease Sister      Social History:   She  reports that she quit smoking about 35 years ago. She has a 15.00 pack-year smoking history. She has never used smokeless tobacco. Smoked  2-3 ppd for 10 years, stopping 20 years ago. She is  with one adopted son. She is a retired . Social History     Substance and Sexual Activity   Alcohol Use No     Immunization History:  Immunization History   Administered Date(s) Administered    COVID-19, Moderna, Primary or Immunocompromised Series, MRNA, PF, 100mcg/0.5mL 03/05/2021, 04/02/2021, 10/17/2021    Influenza Vaccine 12/18/2012, 11/11/2013    Influenza Vaccine (Quad) PF (>6 Mo Flulaval, Fluarix, and >3 Yrs Afluria, Fluzone [id number]) 11/10/2015, 11/21/2018    Influenza Vaccine (Tri) Adjuvanted (>65 Yrs FLUAD TRI 53457) 12/19/2019    Pneumococcal Conjugate (PCV-13) 11/21/2018    Pneumococcal Polysaccharide (PPSV-23) 11/28/2014, 01/08/2020    Tdap 08/09/2018       Review of Systems:   As above included in HPI. Otherwise 11 point review of systems negative including constitutional, skin, HENT, eyes, respiratory, cardiovascular, gastrointestinal, genitourinary, musculoskeletal, endocrine, hematologic, allergy, and neurologic. Physical:   Visit Vitals  /76 (BP 1 Location: Left arm, BP Patient Position: Sitting)   Pulse 74   Temp 97.3 °F (36.3 °C) (Temporal)   Resp 16   Ht 5' 3\" (1.6 m)   Wt 182 lb 6.4 oz (82.7 kg)   SpO2 96%   BMI 32.31 kg/m²         Exam:   Patient appears in no apparent distress. Affect is appropriate. HEENT: PERRLA, anicteric, oropharynx clear, no JVD, adenopathy or thyromegaly. No carotid bruits or radiated murmur. Lungs: clear to auscultation, no wheezes, rhonchi, or rales. Heart: regular rate and rhythm. No murmur, rubs, gallops  Abdomen: soft, nontender, nondistended, normal bowel sounds, no hepatosplenomegaly or masses. Extremities: without edema.       Diabetic foot exam:     Left Foot:   Visual Exam: normal    Pulse DP: 2+ (normal)   Filament test: normal sensation    Vibratory sensation: normal Right Foot:   Visual Exam: normal    Pulse DP: 2+ (normal)   Filament test: normal sensation    Vibratory sensation: normal         Review of Data:  Labs:  Hospital Outpatient Visit on 06/07/2022   Component Date Value Ref Range Status    WBC 06/07/2022 8.5  4.6 - 13.2 K/uL Final    RBC 06/07/2022 4.27  4.20 - 5.30 M/uL Final    HGB 06/07/2022 12.4  12.0 - 16.0 g/dL Final    HCT 06/07/2022 39.4  35.0 - 45.0 % Final    MCV 06/07/2022 92.3  78.0 - 100.0 FL Final    MCH 06/07/2022 29.0  24.0 - 34.0 PG Final    MCHC 06/07/2022 31.5  31.0 - 37.0 g/dL Final    RDW 06/07/2022 14.2  11.6 - 14.5 % Final    PLATELET 22/57/0400 476  135 - 420 K/uL Final    MPV 06/07/2022 9.3  9.2 - 11.8 FL Final    NRBC 06/07/2022 0.0  0  WBC Final    ABSOLUTE NRBC 06/07/2022 0.00  0.00 - 0.01 K/uL Final    NEUTROPHILS 06/07/2022 47  40 - 73 % Final    LYMPHOCYTES 06/07/2022 32  21 - 52 % Final    MONOCYTES 06/07/2022 9  3 - 10 % Final    EOSINOPHILS 06/07/2022 10* 0 - 5 % Final    BASOPHILS 06/07/2022 1  0 - 2 % Final    IMMATURE GRANULOCYTES 06/07/2022 0  0.0 - 0.5 % Final    ABS. NEUTROPHILS 06/07/2022 4.0  1.8 - 8.0 K/UL Final    ABS. LYMPHOCYTES 06/07/2022 2.7  0.9 - 3.6 K/UL Final    ABS. MONOCYTES 06/07/2022 0.8  0.05 - 1.2 K/UL Final    ABS. EOSINOPHILS 06/07/2022 0.9* 0.0 - 0.4 K/UL Final    ABS. BASOPHILS 06/07/2022 0.1  0.0 - 0.1 K/UL Final    ABS. IMM.  GRANS. 06/07/2022 0.0  0.00 - 0.04 K/UL Final    DF 06/07/2022 AUTOMATED    Final    Hemoglobin A1c 06/07/2022 7.8* 4.2 - 5.6 % Final    Est. average glucose 06/07/2022 177  mg/dL Final    LIPID PROFILE 06/07/2022        Final    Cholesterol, total 06/07/2022 213* <200 MG/DL Final    Triglyceride 06/07/2022 280* <150 MG/DL Final    HDL Cholesterol 06/07/2022 42  40 - 60 MG/DL Final    LDL, calculated 06/07/2022 115* 0 - 100 MG/DL Final    VLDL, calculated 06/07/2022 56  MG/DL Final    CHOL/HDL Ratio 06/07/2022 5.1* 0 - 5.0   Final    Magnesium 2022 2.2  1.6 - 2.6 mg/dL Final    Sodium 2022 139  136 - 145 mmol/L Final    Potassium 2022 4.4  3.5 - 5.5 mmol/L Final    Chloride 2022 108  100 - 111 mmol/L Final    CO2 2022 23  21 - 32 mmol/L Final    Anion gap 2022 8  3.0 - 18 mmol/L Final    Glucose 2022 141* 74 - 99 mg/dL Final    BUN 2022 13  7.0 - 18 MG/DL Final    Creatinine 2022 0.90  0.6 - 1.3 MG/DL Final    BUN/Creatinine ratio 2022 14  12 - 20   Final    GFR est AA 2022 >60  >60 ml/min/1.73m2 Final    GFR est non-AA 2022 >60  >60 ml/min/1.73m2 Final    Calcium 2022 9.3  8.5 - 10.1 MG/DL Final    Bilirubin, total 2022 0.4  0.2 - 1.0 MG/DL Final    ALT (SGPT) 2022 41  13 - 56 U/L Final    AST (SGOT) 2022 25  10 - 38 U/L Final    Alk. phosphatase 2022 82  45 - 117 U/L Final    Protein, total 2022 7.8  6.4 - 8.2 g/dL Final    Albumin 2022 4.4  3.4 - 5.0 g/dL Final    Globulin 2022 3.4  2.0 - 4.0 g/dL Final    A-G Ratio 2022 1.3  0.8 - 1.7   Final    Microalbumin,urine random 2022 6.98* 0 - 3.0 MG/DL Final    Creatinine, urine 2022 26.00* 30 - 125 mg/dL Final    Microalbumin/Creat ratio (mg/g cre* 2022 268* 0 - 30 mg/g Final    Vitamin D 25-Hydroxy 2022 47.7  30 - 100 ng/mL Final           Health Maintenance:  Screening:    Mammogram: negative (10/2021)    PAP smear: s/p CATY/BSO for endometriosis   Colorectal: colonoscopy (2021) poor prep. Dr. Omar Morgan. Due 2022.    Depression: no current treatment   DM (HbA1c/FPG): HbA1c 7.8 (2022)   Hepatitis C: negative (2018)   Falls: none   DEXA: osteopenia (2021) [facility]   Glaucoma: regular eye exams with Dr. Karlo Dorado. (last 2020)   Smokin-30 pack years, stopped 20 years ago   Vitamin D: 47.7 (2022)   Medicare Wellness: N/A        Impression:  Patient Active Problem List   Diagnosis Code    Lumbosacral radiculopathy M54.17    Degeneration of lumbar or lumbosacral intervertebral disc M51.37    Chronic insomnia F51.04    Kidney transplant status, living related donor Z80.0    KIM (generalized anxiety disorder) F41.1    Type 2 diabetes mellitus with microalbuminuria ([facility]) E11.29, R80.9    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    Recurrent major depressive disorder, in full remission ([facility] [address] F33.42    Osteopenia of multiple sites M85.89    Essential hypertension I10    Chronic diastolic heart failure ([facility]) I50.32    Hyperlipidemia E78.5    GERD (gastroesophageal reflux disease) K21.9    Primary osteoarthritis involving multiple joints M15.9    Immunosuppressed status ([facility]) D84.9    Noncompliance with medications Z91.14    Abnormal CT scan, pelvis R93.5    History of SCC (squamous cell carcinoma) of skin Z85.828    Vitamin D deficiency, unspecified  E55.9       Plan:  1. Diabetes mellitus. On Trulicity 1.5 mg weekly with worsening HbA1c today to 7.8. Instructed to increase Trulicity to 3.0 mg weekly and will reassess HbA1c in 3 months. On Ace-I and simvastatin. Urine protein/ creatinine ratio negative on 9/7/2021 in transplant clinic, but urine microalbumin/creatinine ratio elevated today at 268 mg/g. No evidence of retinopathy or neuropathy. Foot exam negative today. Emphasized importance of lifestyle modifications, including heart healthy low carbohydrate diet, regular exercise, and weight loss. Patient requesting referral to diabetes education to help improve diet. Referral placed for [facility]. Will continue to monitor. 2. S/p renal transplant from living related donor. On tacrolimus 2 mg bid/1 mg nightly and prednisone 2.5 mg every other day. Creatinine stable at 0.9/ eGFR >60 and urine microalbumin/creatinine ratio at 268 mg/g today. Last visit transplant clinic on 9/7/2021 and following annually. 3. Hypertension.  Blood pressure appears well controlled today on lisinopril 40 mg daily and amlodipine 5 mg daily. She is not taking Imdur. Instructed to continue to monitor her blood pressure. Renal function with creatinine 0.9/ eGFR 58.9. Will reassess at next visit. 4. Chronic diastolic heart failure. Grade 2 with normal systolic function on echocardiogram in 10/2017. Hemodynamically stable on current regimen as above. Does not tolerate beta blockers. Instructed to restrict sodium and follow weights. 5. Hyperlipidemia. On simvastatin 40 mg daily with  and HDL 42 indicative of fair control but significant improvement from when not taking statin with  with HDL 47 (2/2021). Prescribed ezetimibe in 2/2021 and 9/2021 to help with better control, but patient not currently taking. Discussed importance of compliance and lifestyle modifications, including diet, exercise, and weight loss. Will reassess in 3 months. 6. History of squamous cell carcinoma. Had been under care of Dr. Yayo Wick and treated with Efudex. Related to chronic immunosuppression. However, had not followed up until developed a large rapidly growing lesion on her right forearm in 9/2021 and underwent Mohs surgery by Dr. Amalia Ashraf with pathology showing squamous carcinoma. Advised to continue close follow-up with [facility] given increased risk due to immunosuppression from renal transplant. 7. Depression/generalized anxiety disorder. Previously treated with Prozac and Zoloft, and did not recall side effects or reason for discontinuing. Reported increasing difficulty with anxiety at last visit in 9/2021 and PHQ 2 score of 2. She was prescribed Paxil 20 mg daily and reports today that she did take it for approximately 2 months and then weaned since she felt she had improved. No increase in anxiety reported today. Will continue to monitor. 8. Osteopenia. Last bone density scan in 9/2021.  Using femoral neck T-scores, calculated FRAX score estimates her 10 year risk of a major osteoporetic fracture at 12 % and hip fracture at 0.8 % in context of chronic steroid use. Also, noted report of thoracic compression deformities on chest CT scan in 11/2017 [facility]. Currently taking prednisone 2.5 mg daily. Continue calcium and Vitamin D. Advised to continue 50,000 U dose weekly of ergocalciferol. Encouraged exercise, particularly weight bearing activities. 9. GERD. Increasing symptoms noted in 5/2019, but did not pursue endoscopy as recommended by Dr. Hi Rodgers. Upper endoscopy in 5/2014 negative. Reported persistent symptoms despite taking omeprazole 40 mg and Carafate, and presented to [facility] ED in 12/2020 with chest pain, and evaluation including stress test negative. Felt to likely represent exacerbation of GERD. Prescribed Carafate tablets and patient stating that taking only as needed. Underwent upper endoscopy by Dr. Yanira Rico on 12/30/2021 showing possible salmon-colored mucosa at the GE junction, and several nonbleeding benign fundic gastric polyps (pathology: squamous and gastric mucosa with no pathologic abnormality). Continues on omeprazole 40 mg daily and will use Carafate as needed. 10. Chronic constipation. Most likely related to prior use of narcotics. Improved with weaning. No longer taking Linzess and appears to be doing well. Not a complaint today. 11. Bilateral adnexal cysts. Patient is s/p BSO in 2002 and found to have endometriosis. Now with CT scan (5/2019) showing masses in the area of her prior ovaries. Pelvic ultrasound inadequate, and recommended transvaginal ultrasound. However, patient not wishing to pursue since does not feel to be a problem. 12. Osteoarthritis. Patient with multiple areas of arthritis contributing to her chronic pain. However, weaned from narcotics, and discontinued Percocet and MS Contin. Not experiencing increased pain currently. Appears to be doing well will use non-narcotic based regimen if needed. 13. Obesity.  Emphasized importance of lifestyle modifications, including diet, exercise, and weight loss. Will continue to address. 14. Health maintenance. Completed the Moderna COVID-19 vaccine series and received a third dose of Moderna vaccine given immunosuppressed status due to renal transplant. Has not yet received a Moderna booster dose and advised to proceed. Discussed preexposure prophylaxis with [facility] and patient agreeable to proceed. Orders completed today and faxed to [facility] center. Discussed Shingrix vaccine and urged to proceed. Mammogram up-to-date. Completed eye exam with Dr. oJel Garces and urged to continue. Colonoscopy attempted in 12/2021 but due to poor prep, will require repeat in 1 year (due 12/2022). Vitamin D level now normalized on ergocalciferol 50,000 U weekly. Advised to continue. Not eligible for Medicare wellness visit given only part A. Patient understands recommendations and agrees with plan. Follow-up in 3 months. This visit required high complexity medically necessary decision making and management plans. Time spent in preparing for the visit, including review of history, tests done prior to arrival, additional time reviewing clinical data, imaging, outside records and test results: 10 minutes. Time spent in counseling with patient and/or family members regarding care plan: 30 minutes. Time spent in ordering tests, treatments, and referring patient for further care: 15 minutes. Reviewed labs obtained at visit and called patient that evening to discuss results and make medication adjustments  Time spent on visit does not include time for documentation.

## 2022-06-14 RX ORDER — CILGAVIMAB 150 MG/1.5
300 VIAL (ML) INTRAMUSCULAR ONCE
Status: DISPENSED | OUTPATIENT
Start: 2022-06-21 | End: 2022-06-21

## 2022-06-14 RX ORDER — TIXAGEVIMAB 150 MG/1.5
300 VIAL (ML) INTRAMUSCULAR ONCE
Status: DISPENSED | OUTPATIENT
Start: 2022-06-21 | End: 2022-06-21

## 2022-06-17 ENCOUNTER — TELEPHONE (OUTPATIENT)
Dept: INTERNAL MEDICINE CLINIC | Age: 67
End: 2022-06-17

## 2022-06-17 ENCOUNTER — HOSPITAL ENCOUNTER (EMERGENCY)
Age: 67
Discharge: HOME | End: 2022-06-17
Attending: EMERGENCY MEDICINE
Payer: COMMERCIAL

## 2022-06-17 VITALS
HEIGHT: 63 IN | TEMPERATURE: 98 F | DIASTOLIC BLOOD PRESSURE: 72 MMHG | RESPIRATION RATE: 20 BRPM | WEIGHT: 175 LBS | HEART RATE: 89 BPM | OXYGEN SATURATION: 97 % | SYSTOLIC BLOOD PRESSURE: 136 MMHG | BODY MASS INDEX: 31.01 KG/M2

## 2022-06-17 DIAGNOSIS — U07.1 COVID-19: Primary | ICD-10-CM

## 2022-06-17 DIAGNOSIS — Z94.0 HISTORY OF RENAL TRANSPLANTATION: ICD-10-CM

## 2022-06-17 PROCEDURE — 99284 EMERGENCY DEPT VISIT MOD MDM: CPT

## 2022-06-17 PROCEDURE — 74011250636 HC RX REV CODE- 250/636: Performed by: EMERGENCY MEDICINE

## 2022-06-17 PROCEDURE — M0222 HC BEBTELOVIMAB INJECTION: HCPCS

## 2022-06-17 RX ORDER — BEBTELOVIMAB 87.5 MG/ML
175 INJECTION, SOLUTION INTRAVENOUS ONCE
Status: COMPLETED | OUTPATIENT
Start: 2022-06-17 | End: 2022-06-17

## 2022-06-17 RX ADMIN — BEBTELOVIMAB 175 MG: 87.5 INJECTION, SOLUTION INTRAVENOUS at 18:23

## 2022-06-17 NOTE — TELEPHONE ENCOUNTER
Called and spoke with patient's . Reports that patient's home rapid antigen COVID-19 test was positive. Current symptoms of nasal congestion and sore throat. Denies any dyspnea. Given that she is high risk of severe disease due to immunosuppression from renal transplant, recommended treatment with monoclonal antibody. Advised to proceed to the [facility] ED for monoclonal antibody therapy. Also instructed to cancel appointment for [facility] which was scheduled for 6/21/2022 given active COVID-19 infection. Called and spoke with [facility] ED attending, Dr. Halie Parson. Discussed patient and reports that monoclonal antibody is available.

## 2022-06-17 NOTE — ED PROVIDER NOTES
EMERGENCY DEPARTMENT HISTORY AND PHYSICAL EXAM      Date: 6/17/2022  Patient Name: Dakotah Taylor      History of Presenting Illness     Chief Complaint   Patient presents with    Positive For Covid-19       History Provided By: Patient  Location/Duration/Severity/Modifying factors   69-year-old female who presents to the emergency room with a chief complaint of wanting a monoclonal antibody infusion. Patient reports she began getting symptoms yesterday. The patient took a positive home COVID test, spoke to her PCP today who referred her here for monoclonal antibody infusion. She has a history of a renal transplant 41 years ago. Reports that her kidney function has been in good shape since then. She complains of nasal congestion and some drainage in her throat but no shortness of breath or chest pain. There are no other complaints, changes, or physical findings at this time. PCP: Derek Connors MD    Current Outpatient Medications   Medication Sig Dispense Refill    ondansetron (ZOFRAN ODT) 4 mg disintegrating tablet Take 1 Tablet by mouth every eight (8) hours as needed for Nausea or Vomiting. 40 Tablet 1    omeprazole (PRILOSEC) 40 mg capsule Take 1 Capsule by mouth daily. 90 Capsule 3    predniSONE (DELTASONE) 5 mg tablet Take 0.5 Tablets by mouth every other day. Indications: prevent kidney transplant rejection 25 Tablet 2    dulaglutide (Trulicity) 3 OC/7.6 mL pnij 3 mg by SubCUTAneous route every seven (7) days. 12 Each 2    flash glucose sensor (FreeStyle Alice 14 Day Sensor) kit Use to check blood sugar at least 4 times daily.  Dx E11.9. 2 Kit 11    flash glucose scanning reader (FreeStyle Alice 14 Day [place]) misc USE TO CHECK BLOOD SUGAR AT LEAST 4 TIMES DAILY 2 Each 11    ergocalciferol (ERGOCALCIFEROL) 1,250 mcg (50,000 unit) capsule TAKE 1 CAPSULE BY MOUTH EVERY 7 DAYS 12 Capsule 0    simvastatin (ZOCOR) 40 mg tablet TAKE 1 TABLET BY MOUTH EVERY NIGHT FOR HIGH CHOLESTEROL 90 Tablet 2    lisinopriL (PRINIVIL, ZESTRIL) 40 mg tablet TAKE 1 TABLET BY MOUTH DAILY 90 Tablet 2    amLODIPine (NORVASC) 5 mg tablet Take 1 Tab by mouth daily. 90 Tab 3    sucralfate (Carafate) 1 gram tablet Take 1 Tab by mouth four (4) times daily. Indications: heartburn 120 Tab 1    MELATONIN PO Take 6 mg by mouth daily.  diphenhydrAMINE-acetaminophen (TYLENOL PM EXTRA STRENGTH)  mg tab Take 2 Tabs by mouth daily as needed.  Insulin Needles, Disposable, 31 gauge x 5/16\" ndle Use with Lantus insulin qhs. 100 Pen Needle 5    tacrolimus (PROGRAF) 1 mg capsule 2 mg twice daily and 1 mg nightly  12     Facility-Administered Medications Ordered in Other Encounters   Medication Dose Route Frequency Provider Last Rate Last Admin    [START ON 6/21/2022] tixagevimab IM injection 300 mg  300 mg IntraMUSCular MD Aviva Mcfarlandy Moh ON 6/21/2022] cilgavimab IM injection 300 mg  300 mg IntraMUSCular Luna Morgan MD           Past History     Past Medical History:  Past Medical History:   Diagnosis Date    Adverse effect of anesthesia     pulmonary issues during surgery    Calculus of kidney     Cancer ([address]     skin-LT leg    Chronic diastolic heart failure ([address] 8/13/2018    Chronic pain syndrome 7/16/2013    Constipation due to pain medication 4/8/2016    Degeneration of lumbar or lumbosacral intervertebral disc     Depression     Diabetes mellitus     Essential hypertension 8/13/2018    KIM (generalized anxiety disorder) 4/29/2015    H/O renal failure 8/13/2018    Secondary to glomerulonephritis.   S/P living related donor transplant in 4/1980    Heart failure ([address]     x 3    History of depression 12/12/2017    Hypercholesterolemia     Hyperlipidemia 8/13/2018    Kidney transplant status, living related donor 10/24/2012    Lumbosacral radiculopathy     Osteopenia of multiple sites 8/9/2018    Primary iridocyclitis     Status post total replacement of right hip 2016    Type 2 diabetes mellitus with microalbuminuria [address] [address] 11/10/2015       Past Surgical History:  Past Surgical History:   Procedure Laterality Date    COLONOSCOPY N/A 2021    COLONOSCOPY/INCOMPLETE performed by Ashley Link MD at [address] HX GYN      Ovary    HX HEENT      nose surgery    HX HYSTERECTOMY      HX OOPHORECTOMY      HX ORTHOPAEDIC Right [id number]    hip replacement    HX UROLOGICAL      Kidney Transplant    RI ABDOMEN SURGERY PROC UNLISTED      RI BREAST SURGERY PROCEDURE UNLISTED      RI LAPAROSCOPIC NEPHRECTOMY      bilateral       Family History:  Family History   Problem Relation Age of Onset    Hypertension Father     Heart Disease Father     Alcohol abuse Father     OSTEOARTHRITIS Father     OSTEOARTHRITIS Brother     Cancer Maternal Grandmother         breast    Lung Disease Sister     High Cholesterol Mother     OSTEOARTHRITIS Mother     OSTEOARTHRITIS Sister     Liver Disease Sister        Social History:  Social History     Tobacco Use    Smoking status: Former Smoker     Packs/day: 1.00     Years: 15.00     Pack years: 15.00     Quit date:      Years since quittin.4    Smokeless tobacco: Never Used   Vaping Use    Vaping Use: Never used   Substance Use Topics    Alcohol use: No    Drug use: Not Currently     Types: Marijuana       Allergies: Allergies   Allergen Reactions    Asa-Acetaminophen-Caff-Potass Unknown (comments)    Aspirin Other (comments)    Beta-Blockers (Beta-Adrenergic Blocking Agts) Other (comments)     Sent into [employer].     Bystolic [Nebivolol] Other (comments)    Crestor [Rosuvastatin] Myalgia    Ibuprofen Other (comments)     Renal transplant patient    Iodinated Contrast Media Other (comments)     Unable to take due to Kidney Transplant    Lipitor [Atorvastatin] Myalgia     Had muscle aches and pains    Nsaids (Non-Steroidal Anti-Inflammatory Drug) Unknown (comments)    Sirolimus Other (comments) pneumonitis    Toprol Xl [Metoprolol Succinate] Other (comments)     Asthma    Venlafaxine Other (comments)     Patient does not recall taking this medication         Review of Systems     Review of Systems   Constitutional: Negative for chills and fever. HENT: Positive for congestion and rhinorrhea. Negative for sinus pressure and sneezing. Eyes: Negative for visual disturbance. Respiratory: Negative for cough and shortness of breath. Cardiovascular: Negative for chest pain. Gastrointestinal: Negative for abdominal pain, diarrhea, nausea and vomiting. Genitourinary: Negative for dysuria, frequency and urgency. Musculoskeletal: Negative for back pain and neck pain. Skin: Negative for rash. Neurological: Negative for syncope, numbness and headaches. Physical Exam     Physical Exam  Vitals and nursing note reviewed. Constitutional:       General: She is not in acute distress. Appearance: Normal appearance. She is obese. She is not ill-appearing or toxic-appearing. HENT:      Head: Normocephalic and atraumatic. Right Ear: External ear normal.      Left Ear: External ear normal.      Nose: Congestion present. Mouth/Throat:      Mouth: Mucous membranes are moist.      Pharynx: Oropharynx is clear. Eyes:      Conjunctiva/sclera: Conjunctivae normal.   Cardiovascular:      Rate and Rhythm: Normal rate and regular rhythm. Pulses: Normal pulses. Heart sounds: Normal heart sounds. No murmur heard. Pulmonary:      Effort: Pulmonary effort is normal.      Breath sounds: Normal breath sounds. No wheezing, rhonchi or rales. Abdominal:      General: Abdomen is flat. Tenderness: There is no abdominal tenderness. There is no guarding or rebound. Musculoskeletal:         General: No swelling or tenderness. Normal range of motion. Cervical back: Normal range of motion and neck supple. Right lower leg: No edema. Left lower leg: No edema. Skin:     General: Skin is warm and dry. Capillary Refill: Capillary refill takes less than 2 seconds. Findings: No rash. Neurological:      General: No focal deficit present. Mental Status: She is alert. Lab and Diagnostic Study Results     Labs -  No results found for this or any previous visit (from the past 24 hour(s)). Radiologic Studies -   No orders to display         Medical Decision Making and ED Course   - I am the first and primary provider for this patient AND AM THE PRIMARY PROVIDER OF RECORD. - I reviewed the vital signs, available nursing notes, past medical history, past surgical history, family history and social history. - Initial assessment performed. The patients presenting problems have been discussed, and the staff are in agreement with the care plan formulated and outlined with them. I have encouraged them to ask questions as they arise throughout their visit. Vital Signs-Reviewed the patient's vital signs. Patient Vitals for the past 24 hrs:   Temp Pulse Resp BP SpO2   06/17/22 1900 -- -- -- 136/72 --   06/17/22 1830 -- -- -- (!) 154/71 --   06/17/22 1814 -- -- -- (!) 152/82 --   06/17/22 1614 98 °F (36.7 °C) 89 20 (!) 146/71 97 %         Nursing notes and pertinent past medical history including imaging and labs have been reviewed (if available)    ED Course:          Provider Notes (Medical Decision Making):     MDM  Number of Diagnoses or Management Options  COVID-19  History of renal transplantation  Diagnosis management comments: 28-year-old patient presenting for concern for desiring monoclonal antibody infusion. Will order Bebtelovimab given criteria met for infusion. Patient completed infusion without difficulty. Will discharge home as she is very anxious to leave.   Advised to return if worse in the meantime follow-up with her primary care provider.       _________________________________________________________________    At this time, patient is stable and appropriate for discharge home. Patient demonstrates understanding of current diagnoses and is in agreement with the treatment plan. They are advised that while the likelihood of serious underlying condition is low at this point given the evaluation performed today, we cannot fully rule it out. They are advised to immediately return with any new symptoms or worsening of current condition. Any Incidental findings were noted on the patient's discharge paperwork as well as verbally directly to the patient, and the appropriate follow-up was given to the patient as far as instructions on testing needed as well as the timeframe. All questions have been answered. Patient is given educational material regarding their diagnoses, including danger symptoms and when to return to the ED. This note was dictated utilizing Dragon voice recognition software. Unfortunately this leads to occasional typographical errors. I apologize in advance if the situation occurs. If questions occur please do not hesitate to contact me directly. Iona Leal DO          Procedures and Critical Care   Performed by: Iona Leal DO  Procedures         Iona Leal DO      Diagnosis:   1. COVID-19    2.  History of renal transplantation          Disposition: Discharge    Follow-up Information     Follow up With Specialties Details Why Contact Info    Donna Lee MD Internal Medicine Physician Call today  [address] [address]  [phone] [address] [facility] EMERGENCY DEPT Emergency Medicine  As needed, If symptoms worsen; or [facility] Emergency Department [address] [place] [address]  [phone]          Discharge Medication List as of 6/17/2022  6:40 PM      CONTINUE these medications which have NOT CHANGED    Details   ondansetron (ZOFRAN ODT) 4 mg disintegrating tablet Take 1 Tablet by mouth every eight (8) hours as needed for Nausea or Vomiting., Normal, Disp-40 Tablet, R-1      omeprazole (PRILOSEC) 40 mg capsule Take 1 Capsule by mouth daily. , Normal, Disp-90 Capsule, R-3      predniSONE (DELTASONE) 5 mg tablet Take 0.5 Tablets by mouth every other day. Indications: prevent kidney transplant rejection, Normal, Disp-25 Tablet, R-2      dulaglutide (Trulicity) 3 QY/4.7 mL pnij 3 mg by SubCUTAneous route every seven (7) days. , Normal, Disp-12 Each, R-2      flash glucose sensor (FreeStyle Alice 14 Day Sensor) kit Use to check blood sugar at least 4 times daily. Dx E11.9., Normal, Disp-2 Kit, R-11      flash glucose scanning reader (FreeStyle Alice 14 Day [place]) misc USE TO CHECK BLOOD SUGAR AT LEAST 4 TIMES DAILY, Normal, Disp-2 Each, R-11      ergocalciferol (ERGOCALCIFEROL) 1,250 mcg (50,000 unit) capsule TAKE 1 CAPSULE BY MOUTH EVERY 7 DAYS, Normal, Disp-12 Capsule, R-0      simvastatin (ZOCOR) 40 mg tablet TAKE 1 TABLET BY MOUTH EVERY NIGHT FOR HIGH CHOLESTEROL, Normal, Disp-90 Tablet, R-2      lisinopriL (PRINIVIL, ZESTRIL) 40 mg tablet TAKE 1 TABLET BY MOUTH DAILY, Normal, Disp-90 Tablet, R-2      amLODIPine (NORVASC) 5 mg tablet Take 1 Tab by mouth daily. , Normal, Disp-90 Tab, R-3      sucralfate (Carafate) 1 gram tablet Take 1 Tab by mouth four (4) times daily. Indications: heartburn, Normal, Disp-120 Tab, R-1      MELATONIN PO Take 6 mg by mouth daily. , Historical Med      diphenhydrAMINE-acetaminophen (TYLENOL PM EXTRA STRENGTH)  mg tab Take 2 Tabs by mouth daily as needed., Historical Med      Insulin Needles, Disposable, 31 gauge x 5/16\" ndle Use with Lantus insulin qhs., Normal, Disp-100 Pen Needle, R-5      tacrolimus (PROGRAF) 1 mg capsule 2 mg twice daily and 1 mg nightly, Historical Med, R-12             Patient seen in the context of the Novel Coronavirus (COVID19) pandemic, utilizing contemporary protocols and evidence based on the most up to date available evidence, understanding that the current evidence has the potential to change as additional information becomes available. This note is dictated utilizing Dragon voice recognition software. Unfortunately this leads to occasional typographical errors using the voice recognition. I apologize in advance if the situation occurs. If questions occur please do not hesitate to contact me directly.     Pamela Higgins, DO

## 2022-06-17 NOTE — TELEPHONE ENCOUNTER
Called and spoke with patient's . Advised that patient would not be a candidate for Paxlovid given treatment with tacrolimus for her renal transplant. Advised that if she is positive for COVID-19, would recommend monoclonal antibody infusion. She currently has symptoms of sore throat and nasal congestion. Advised to perform a home rapid antigen COVID-19 test and if positive to call back. Answered all questions.

## 2022-06-17 NOTE — TELEPHONE ENCOUNTER
Pt  calling says he had just talked to the doctor about having covid. Says now wife is having symptoms. [place] congestion and sore throat. She has not tested yet. They are giving her the test now. Wants to know know if she can get the same medication you called in for him? Says she has infusion next week.

## 2022-06-17 NOTE — DISCHARGE INSTRUCTIONS
Red Flag Symptoms/Reasons to return: Although your vital signs and oxygen levels are normal today and you may have mild or moderate symptoms, the course of COVID illness and illness in general can be variable and may progress. If you experience worsening including significant shortness of breath, passing out, severe chest pain beyond merely pain with coughing, changes in mental status, confusion, uncontrolled fever, inability to keep liquids down, severe abdominal pain or any worsening you should return. Interim Guidance for Preventing the Spread of Coronavirus Disease 2019 (COVID-19) in Homes and Residential Communities  Prevention steps for:  People with confirmed or suspected COVID-19 (including persons under investigation) who do not need to be hospitalized  and  People with confirmed COVID-19 who were hospitalized and determined to be medically stable to go home  Your healthcare provider and public health staff will evaluate whether you can be cared for at home. If it is determined that you do not need to be hospitalized and can be isolated at home, you will be monitored by staff from your local or state health department. You should follow the prevention steps below until a healthcare provider or local or state health department says you can return to your normal activities. Stay home except to get medical care  You should restrict activities outside your home, except for getting medical care. Do not go to work, school, or public areas. Avoid using public transportation, ride-sharing, or taxis. Separate yourself from other people and animals in your home  People: As much as possible, you should stay in a specific room and away from other people in your home. Also, you should use a separate bathroom, if available. Animals: You should restrict contact with pets and other animals while you are sick with COVID-19, just like you would around other people.  Although there have not been reports of pets or other animals becoming sick with COVID-19, it is still recommended that people sick with COVID-19 limit contact with animals until more information is known about the virus. When possible, have another member of your household care for your animals while you are sick. If you are sick with COVID-19, avoid contact with your pet, including petting, snuggling, being kissed or licked, and sharing food. If you must care for your pet or be around animals while you are sick, wash your hands before and after you interact with pets and wear a facemask. Call ahead before visiting your doctor  If you have a medical appointment, call the healthcare provider and tell them that you have or may have COVID-19. This will help the healthcare provider's office take steps to keep other people from getting infected or exposed. Wear a facemask  You should wear a facemask when you are around other people (e.g., sharing a room or vehicle) or pets and before you enter a healthcare provider's office. If you are not able to wear a facemask (for example, because it causes trouble breathing), then people who live with you should not stay in the same room with you, or they should wear a facemask if they enter your room. Cover your coughs and sneezes  Cover your mouth and nose with a tissue when you cough or sneeze. Throw used tissues in a lined trash can; immediately wash your hands with soap and water for at least 20 seconds or clean your hands with an alcohol-based hand  that contains 60 to 95% alcohol, covering all surfaces of your hands and rubbing them together until they feel dry. Soap and water should be used preferentially if hands are visibly dirty. Clean your hands often  Wash your hands often with soap and water for at least 20 seconds or clean your hands with an alcohol-based hand  that contains 60 to 95% alcohol, covering all surfaces of your hands and rubbing them together until they feel dry.  Soap and water should be used preferentially if hands are visibly dirty. Avoid touching your eyes, nose, and mouth with unwashed hands. Avoid sharing personal household items  You should not share dishes, drinking glasses, cups, eating utensils, towels, or bedding with other people or pets in your home. After using these items, they should be washed thoroughly with soap and water. Clean all high-touch surfaces everyday  High touch surfaces include counters, tabletops, doorknobs, bathroom fixtures, toilets, phones, keyboards, tablets, and bedside tables. Also, clean any surfaces that may have blood, stool, or body fluids on them. Use a household cleaning spray or wipe, according to the label instructions. Labels contain instructions for safe and effective use of the cleaning product including precautions you should take when applying the product, such as wearing gloves and making sure you have good ventilation during use of the product. Monitor your symptoms  Seek prompt medical attention if your illness is worsening (e.g., difficulty breathing). Before seeking care, call your healthcare provider and tell them that you have, or are being evaluated for, COVID-19. Put on a facemask before you enter the facility. These steps will help the healthcare provider's office to keep other people in the office or waiting room from getting infected or exposed. Ask your healthcare provider to call the local or state health department. Persons who are placed under active monitoring or facilitated self-monitoring should follow instructions provided by their local health department or occupational health professionals, as appropriate. If you have a medical emergency and need to call 911, notify the dispatch personnel that you have, or are being evaluated for COVID-19. If possible, put on a facemask before emergency medical services arrive.   Discontinuing home isolation  Patients with confirmed COVID-19 should remain under home isolation precautions until the risk of secondary transmission to others is thought to be low. The decision to discontinue home isolation precautions should be made on a case-by-case basis, in consultation with healthcare providers and state and local health departments. Recommended precautions for household members, intimate partners, and caregivers in a nonhealthcare setting of  A patient with symptomatic laboratory-confirmed COVID-19  or  A patient under investigation  Household members, intimate partners, and caregivers in a nonhealthcare setting may have close contact2 with a person with symptomatic, laboratory-confirmed COVID-19 or a person under investigation. Close contacts should monitor their health; they should call their healthcare provider right away if they develop symptoms suggestive of COVID-19 (e.g., fever, cough, shortness of breath)   Close contacts should also follow these recommendations:  Make sure that you understand and can help the patient follow their healthcare provider's instructions for medication(s) and care. You should help the patient with basic needs in the home and provide support for getting groceries, prescriptions, and other personal needs. Monitor the patient's symptoms. If the patient is getting sicker, call his or her healthcare provider and tell them that the patient has laboratory-confirmed COVID-19. This will help the healthcare provider's office take steps to keep other people in the office or waiting room from getting infected. Ask the healthcare provider to call the local or state health department for additional guidance. If the patient has a medical emergency and you need to call 911, notify the dispatch personnel that the patient has, or is being evaluated for COVID-19. Household members should stay in another room or be  from the patient as much as possible. Household members should use a separate bedroom and bathroom, if available.   Prohibit visitors who do not have an essential need to be in the home. Household members should care for any pets in the home. Do not handle pets or other animals while sick. Make sure that shared spaces in the home have good air flow, such as by an air conditioner or an opened window, weather permitting. Perform hand hygiene frequently. Wash your hands often with soap and water for at least 20 seconds or use an alcohol-based hand  that contains 60 to 95% alcohol, covering all surfaces of your hands and rubbing them together until they feel dry. Soap and water should be used preferentially if hands are visibly dirty. Avoid touching your eyes, nose, and mouth with unwashed hands. You and the patient should wear a facemask if you are in the same room. Wear a disposable facemask and gloves when you touch or have contact with the patient's blood, stool, or body fluids, such as saliva, sputum, nasal mucus, vomit, urine. Throw out disposable facemasks and gloves after using them. Do not reuse. When removing personal protective equipment, first remove and dispose of gloves. Then, immediately clean your hands with soap and water or alcohol-based hand . Next, remove and dispose of facemask, and immediately clean your hands again with soap and water or alcohol-based hand . Avoid sharing household items with the patient. You should not share dishes, drinking glasses, cups, eating utensils, towels, bedding, or other items. After the patient uses these items, you should wash them thoroughly (see below [employer]). Clean all high-touch surfaces, such as counters, tabletops, doorknobs, bathroom fixtures, toilets, phones, keyboards, tablets, and bedside tables, every day. Also, clean any surfaces that may have blood, stool, or body fluids on them. Use a household cleaning spray or wipe, according to the label instructions.  Labels contain instructions for safe and effective use of the cleaning product including precautions you should take when applying the product, such as wearing gloves and making sure you have good ventilation during use of the product. [address] thoroughly. Immediately remove and wash clothes or bedding that have blood, stool, or body fluids on them. Wear disposable gloves while handling soiled items and keep soiled items away from your body. Clean your hands (with soap and water or an alcohol-based hand ) immediately after removing your gloves. Read and follow directions on labels of laundry or clothing items and detergent. In general, using a normal laundry detergent according to washing machine instructions and dry thoroughly using the warmest temperatures recommended on the clothing label. Place all used disposable gloves, facemasks, and other contaminated items in a lined container before disposing of them with other household waste. Clean your hands (with soap and water or an alcohol-based hand ) immediately after handling these items. Soap and water should be used preferentially if hands are visibly dirty. Discuss any additional questions with your state or local health department or healthcare provider. Footnotes  2Close contact is defined as--  a) being within approximately 6 feet (2 meters) of a COVID-19 case for a prolonged period of time; close contact can occur while caring for, living with, visiting, or sharing a health care waiting area or room with a COVID-19 case  - or -  b) having direct contact with infectious secretions of a COVID-19 case (e.g., being coughed on).   Page last reviewed: February 18, 2020   Content source: [facility] ([facility]), Division of Viral Diseases

## 2022-06-20 ENCOUNTER — PATIENT OUTREACH (OUTPATIENT)
Dept: CASE MANAGEMENT | Age: 67
End: 2022-06-20

## 2022-06-20 NOTE — PROGRESS NOTES
Date/Time:  6/20/2022 12:23 PM   Call within 2 business days of discharge: Yes   Attempted to reach patient by telephone. Left HIPPA compliant message requesting a return call. Will attempt to reach patient again.

## 2022-06-21 ENCOUNTER — PATIENT OUTREACH (OUTPATIENT)
Dept: CASE MANAGEMENT | Age: 67
End: 2022-06-21

## 2022-06-21 ENCOUNTER — HOSPITAL ENCOUNTER (OUTPATIENT)
Dept: INFUSION THERAPY | Age: 67
Discharge: HOME | End: 2022-06-21

## 2022-06-21 NOTE — PROGRESS NOTES
Ambulatory Care Coordination ED COVID Follow up Call    Challenges to be reviewed by the provider   Additional needs identified to be addressed with provider no  none           Encounter was not routed to provider for escalation. Method of communication with provider : none    Discussed COVID-19 related testing which was not done at this time. Test results were not done. Patient informed of results, if available? n/a. Current Symptoms: no new symptoms and no worsening symptoms    Reviewed New or Changed Meds: n/a    Do you have what you need at home?  Durable Medical Equipment ordered at discharge: None   Home Health/Outpatient orders at discharge: none    Pulse oximeter? no Discussed and confirmed pulse oximeter discharge instructions and when to notify provider or seek emergency care. Patient education provided: Reviewed appropriate site of care based on symptoms and resources available to patient including: when to seek medical attention. Follow up appointment recommended: no. If no appointment scheduled, scheduling offered: no.  Future Appointments   Date Time Provider [place] Monetjo   7/27/2022 10:30 AM Suanne Anna [facility]   9/6/2022  9:40 AM [facility] LAB VISIT [facility] BS AMB   9/15/2022  8:30 AM George Ferguson MD [facility] AMB       Interventions: LPN CC available if assistance is needed  Reviewed discharge instructions, medical action plan and red flags with patient who verbalized understanding. Provided contact information for future needs. Plan for follow-up call in 5-7 days based on severity of symptoms and risk factors.   Plan for next call: follow up     Naty Saunders LPN

## 2022-06-28 ENCOUNTER — PATIENT OUTREACH (OUTPATIENT)
Dept: CASE MANAGEMENT | Age: 67
End: 2022-06-28

## 2022-06-28 NOTE — PROGRESS NOTES
Patient resolved from [address] Transitions episode on 6/28/2022. Patient currently reports that the following symptoms have improved:  no new symptoms and no worsening symptoms. No further outreach scheduled with this LPN CC. Episode of Care resolved. Patient has this LPN CC contact information if future needs arise.

## 2022-07-27 ENCOUNTER — HOSPITAL ENCOUNTER (OUTPATIENT)
Dept: NUTRITION | Age: 67
End: 2022-07-27

## 2022-07-27 ENCOUNTER — TELEPHONE (OUTPATIENT)
Dept: PHYSICAL THERAPY | Age: 67
End: 2022-07-27

## 2022-07-28 ENCOUNTER — APPOINTMENT (OUTPATIENT)
Dept: NUTRITION | Age: 67
End: 2022-07-28

## 2022-09-06 ENCOUNTER — HOSPITAL ENCOUNTER (OUTPATIENT)
Dept: LAB | Age: 67
Discharge: HOME | End: 2022-09-06
Payer: COMMERCIAL

## 2022-09-06 ENCOUNTER — APPOINTMENT (OUTPATIENT)
Dept: INTERNAL MEDICINE CLINIC | Age: 67
End: 2022-09-06

## 2022-09-06 DIAGNOSIS — E11.29 TYPE 2 DIABETES MELLITUS WITH MICROALBUMINURIA, WITH LONG-TERM CURRENT USE OF INSULIN (HCC): ICD-10-CM

## 2022-09-06 DIAGNOSIS — Z94.0 KIDNEY TRANSPLANT STATUS, LIVING RELATED DONOR: ICD-10-CM

## 2022-09-06 DIAGNOSIS — Z79.4 TYPE 2 DIABETES MELLITUS WITH MICROALBUMINURIA, WITH LONG-TERM CURRENT USE OF INSULIN (HCC): ICD-10-CM

## 2022-09-06 DIAGNOSIS — D84.9 IMMUNOSUPPRESSED STATUS (HCC): ICD-10-CM

## 2022-09-06 DIAGNOSIS — R80.9 TYPE 2 DIABETES MELLITUS WITH MICROALBUMINURIA, WITH LONG-TERM CURRENT USE OF INSULIN (HCC): ICD-10-CM

## 2022-09-06 DIAGNOSIS — E55.9 VITAMIN D DEFICIENCY, UNSPECIFIED: ICD-10-CM

## 2022-09-06 DIAGNOSIS — I10 ESSENTIAL HYPERTENSION: ICD-10-CM

## 2022-09-06 DIAGNOSIS — E78.5 HYPERLIPIDEMIA, UNSPECIFIED HYPERLIPIDEMIA TYPE: ICD-10-CM

## 2022-09-06 LAB
25(OH)D3 SERPL-MCNC: 41 NG/ML (ref 30–100)
ALBUMIN SERPL-MCNC: 4.1 G/DL (ref 3.4–5)
ALBUMIN/GLOB SERPL: 1.1 {RATIO} (ref 0.8–1.7)
ALP SERPL-CCNC: 77 U/L (ref 45–117)
ALT SERPL-CCNC: 37 U/L (ref 13–56)
ANION GAP SERPL CALC-SCNC: 8 MMOL/L (ref 3–18)
AST SERPL-CCNC: 19 U/L (ref 10–38)
BASOPHILS # BLD: 0.1 K/UL (ref 0–0.1)
BASOPHILS NFR BLD: 1 % (ref 0–2)
BILIRUB SERPL-MCNC: 1.4 MG/DL (ref 0.2–1)
BUN SERPL-MCNC: 15 MG/DL (ref 7–18)
BUN/CREAT SERPL: 15 (ref 12–20)
CALCIUM SERPL-MCNC: 9.5 MG/DL (ref 8.5–10.1)
CHLORIDE SERPL-SCNC: 106 MMOL/L (ref 100–111)
CHOLEST SERPL-MCNC: 193 MG/DL
CO2 SERPL-SCNC: 25 MMOL/L (ref 21–32)
CREAT SERPL-MCNC: 1.02 MG/DL (ref 0.6–1.3)
DIFFERENTIAL METHOD BLD: ABNORMAL
EOSINOPHIL # BLD: 0.7 K/UL (ref 0–0.4)
EOSINOPHIL NFR BLD: 7 % (ref 0–5)
ERYTHROCYTE [DISTWIDTH] IN BLOOD BY AUTOMATED COUNT: 13.2 % (ref 11.6–14.5)
EST. AVERAGE GLUCOSE BLD GHB EST-MCNC: 154 MG/DL
GLOBULIN SER CALC-MCNC: 3.6 G/DL (ref 2–4)
GLUCOSE SERPL-MCNC: 127 MG/DL (ref 74–99)
HBA1C MFR BLD: 7 % (ref 4.2–5.6)
HCT VFR BLD AUTO: 39.7 % (ref 35–45)
HDLC SERPL-MCNC: 47 MG/DL (ref 40–60)
HDLC SERPL: 4.1 {RATIO} (ref 0–5)
HGB BLD-MCNC: 12.9 G/DL (ref 12–16)
IMM GRANULOCYTES # BLD AUTO: 0 K/UL (ref 0–0.04)
IMM GRANULOCYTES NFR BLD AUTO: 0 % (ref 0–0.5)
LDLC SERPL CALC-MCNC: 94.4 MG/DL (ref 0–100)
LIPID PROFILE,FLP: ABNORMAL
LYMPHOCYTES # BLD: 2.7 K/UL (ref 0.9–3.6)
LYMPHOCYTES NFR BLD: 29 % (ref 21–52)
MAGNESIUM SERPL-MCNC: 2.1 MG/DL (ref 1.6–2.6)
MCH RBC QN AUTO: 29.6 PG (ref 24–34)
MCHC RBC AUTO-ENTMCNC: 32.5 G/DL (ref 31–37)
MCV RBC AUTO: 91.1 FL (ref 78–100)
MONOCYTES # BLD: 0.8 K/UL (ref 0.05–1.2)
MONOCYTES NFR BLD: 8 % (ref 3–10)
NEUTS SEG # BLD: 5 K/UL (ref 1.8–8)
NEUTS SEG NFR BLD: 54 % (ref 40–73)
NRBC # BLD: 0 K/UL (ref 0–0.01)
NRBC BLD-RTO: 0 PER 100 WBC
PLATELET # BLD AUTO: 318 K/UL (ref 135–420)
PMV BLD AUTO: 9 FL (ref 9.2–11.8)
POTASSIUM SERPL-SCNC: 4.2 MMOL/L (ref 3.5–5.5)
PROT SERPL-MCNC: 7.7 G/DL (ref 6.4–8.2)
RBC # BLD AUTO: 4.36 M/UL (ref 4.2–5.3)
SODIUM SERPL-SCNC: 139 MMOL/L (ref 136–145)
TRIGL SERPL-MCNC: 258 MG/DL (ref ?–150)
VLDLC SERPL CALC-MCNC: 51.6 MG/DL
WBC # BLD AUTO: 9.2 K/UL (ref 4.6–13.2)

## 2022-09-06 PROCEDURE — 85025 COMPLETE CBC W/AUTO DIFF WBC: CPT

## 2022-09-06 PROCEDURE — 80061 LIPID PANEL: CPT

## 2022-09-06 PROCEDURE — 36415 COLL VENOUS BLD VENIPUNCTURE: CPT

## 2022-09-06 PROCEDURE — 82306 VITAMIN D 25 HYDROXY: CPT

## 2022-09-06 PROCEDURE — 83036 HEMOGLOBIN GLYCOSYLATED A1C: CPT

## 2022-09-06 PROCEDURE — 80053 COMPREHEN METABOLIC PANEL: CPT

## 2022-09-06 PROCEDURE — 83735 ASSAY OF MAGNESIUM: CPT

## 2022-09-15 ENCOUNTER — OFFICE VISIT (OUTPATIENT)
Dept: INTERNAL MEDICINE CLINIC | Age: 67
End: 2022-09-15
Payer: COMMERCIAL

## 2022-09-15 ENCOUNTER — TELEPHONE (OUTPATIENT)
Dept: INTERNAL MEDICINE CLINIC | Age: 67
End: 2022-09-15

## 2022-09-15 VITALS
SYSTOLIC BLOOD PRESSURE: 138 MMHG | DIASTOLIC BLOOD PRESSURE: 82 MMHG | TEMPERATURE: 97 F | OXYGEN SATURATION: 96 % | BODY MASS INDEX: 31.82 KG/M2 | HEIGHT: 63 IN | WEIGHT: 179.6 LBS | HEART RATE: 73 BPM | RESPIRATION RATE: 16 BRPM

## 2022-09-15 DIAGNOSIS — E11.29 TYPE 2 DIABETES MELLITUS WITH MICROALBUMINURIA, WITH LONG-TERM CURRENT USE OF INSULIN (HCC): Primary | ICD-10-CM

## 2022-09-15 DIAGNOSIS — Z86.16 HISTORY OF 2019 NOVEL CORONAVIRUS DISEASE (COVID-19): ICD-10-CM

## 2022-09-15 DIAGNOSIS — Z94.0 KIDNEY TRANSPLANT STATUS, LIVING RELATED DONOR: ICD-10-CM

## 2022-09-15 DIAGNOSIS — Z12.11 COLON CANCER SCREENING: ICD-10-CM

## 2022-09-15 DIAGNOSIS — Z85.828 HISTORY OF SCC (SQUAMOUS CELL CARCINOMA) OF SKIN: ICD-10-CM

## 2022-09-15 DIAGNOSIS — Z23 NEEDS FLU SHOT: ICD-10-CM

## 2022-09-15 DIAGNOSIS — Z13.31 SCREENING FOR DEPRESSION: ICD-10-CM

## 2022-09-15 DIAGNOSIS — I10 ESSENTIAL HYPERTENSION: ICD-10-CM

## 2022-09-15 DIAGNOSIS — K21.9 GASTROESOPHAGEAL REFLUX DISEASE WITHOUT ESOPHAGITIS: ICD-10-CM

## 2022-09-15 DIAGNOSIS — D84.9 IMMUNOSUPPRESSED STATUS (HCC): ICD-10-CM

## 2022-09-15 DIAGNOSIS — Z12.31 ENCOUNTER FOR SCREENING MAMMOGRAM FOR MALIGNANT NEOPLASM OF BREAST: ICD-10-CM

## 2022-09-15 DIAGNOSIS — R80.9 TYPE 2 DIABETES MELLITUS WITH MICROALBUMINURIA, WITH LONG-TERM CURRENT USE OF INSULIN (HCC): Primary | ICD-10-CM

## 2022-09-15 DIAGNOSIS — Z00.00 MEDICARE ANNUAL WELLNESS VISIT, INITIAL: ICD-10-CM

## 2022-09-15 DIAGNOSIS — Z71.89 ADVANCED DIRECTIVES, COUNSELING/DISCUSSION: ICD-10-CM

## 2022-09-15 DIAGNOSIS — Z79.4 TYPE 2 DIABETES MELLITUS WITH MICROALBUMINURIA, WITH LONG-TERM CURRENT USE OF INSULIN (HCC): Primary | ICD-10-CM

## 2022-09-15 DIAGNOSIS — E78.5 HYPERLIPIDEMIA, UNSPECIFIED HYPERLIPIDEMIA TYPE: ICD-10-CM

## 2022-09-15 DIAGNOSIS — E55.9 VITAMIN D DEFICIENCY, UNSPECIFIED: ICD-10-CM

## 2022-09-15 PROCEDURE — 1101F PT FALLS ASSESS-DOCD LE1/YR: CPT | Performed by: INTERNAL MEDICINE

## 2022-09-15 PROCEDURE — 1090F PRES/ABSN URINE INCON ASSESS: CPT | Performed by: INTERNAL MEDICINE

## 2022-09-15 PROCEDURE — 99497 ADVNCD CARE PLAN 30 MIN: CPT | Performed by: INTERNAL MEDICINE

## 2022-09-15 PROCEDURE — 99214 OFFICE O/P EST MOD 30 MIN: CPT | Performed by: INTERNAL MEDICINE

## 2022-09-15 PROCEDURE — G8536 NO DOC ELDER MAL SCRN: HCPCS | Performed by: INTERNAL MEDICINE

## 2022-09-15 PROCEDURE — G9899 SCRN MAM PERF RSLTS DOC: HCPCS | Performed by: INTERNAL MEDICINE

## 2022-09-15 PROCEDURE — 2022F DILAT RTA XM EVC RTNOPTHY: CPT | Performed by: INTERNAL MEDICINE

## 2022-09-15 PROCEDURE — 90694 VACC AIIV4 NO PRSRV 0.5ML IM: CPT | Performed by: INTERNAL MEDICINE

## 2022-09-15 PROCEDURE — 90471 IMMUNIZATION ADMIN: CPT | Performed by: INTERNAL MEDICINE

## 2022-09-15 PROCEDURE — G8399 PT W/DXA RESULTS DOCUMENT: HCPCS | Performed by: INTERNAL MEDICINE

## 2022-09-15 PROCEDURE — G9231 DOC ESRD DIA TRANS PREG: HCPCS | Performed by: INTERNAL MEDICINE

## 2022-09-15 PROCEDURE — G9717 DOC PT DX DEP/BP F/U NT REQ: HCPCS | Performed by: INTERNAL MEDICINE

## 2022-09-15 PROCEDURE — G0438 PPPS, INITIAL VISIT: HCPCS | Performed by: INTERNAL MEDICINE

## 2022-09-15 PROCEDURE — G8427 DOCREV CUR MEDS BY ELIG CLIN: HCPCS | Performed by: INTERNAL MEDICINE

## 2022-09-15 PROCEDURE — 3051F HG A1C>EQUAL 7.0%<8.0%: CPT | Performed by: INTERNAL MEDICINE

## 2022-09-15 PROCEDURE — 3017F COLORECTAL CA SCREEN DOC REV: CPT | Performed by: INTERNAL MEDICINE

## 2022-09-15 PROCEDURE — G8417 CALC BMI ABV UP PARAM F/U: HCPCS | Performed by: INTERNAL MEDICINE

## 2022-09-15 PROCEDURE — 1123F ACP DISCUSS/DSCN MKR DOCD: CPT | Performed by: INTERNAL MEDICINE

## 2022-09-15 RX ORDER — EZETIMIBE 10 MG/1
TABLET ORAL
COMMUNITY
End: 2022-09-17

## 2022-09-15 NOTE — PROGRESS NOTES
1. \"Have you been to the ER, urgent care clinic since your last visit? Hospitalized since your last visit? \" No    2. \"Have you seen or consulted any other health care providers outside of the [address] [place] [address] since your last visit? \" No     3. For patients aged 39-70: Has the patient had a colonoscopy / FIT/ Cologuard? Yes - no Care Gap present      If the patient is female:    4. For patients aged 41-77: Has the patient had a mammogram within the past 2 years? Yes - no Care Gap present      5. For patients aged 21-65: Has the patient had a pap smear?  NA - based on age or sex

## 2022-09-15 NOTE — TELEPHONE ENCOUNTER
Called and spoke with Noel Kiran at [facility] and they stated that orders are still active. They will contact the patient to proceed with scheduling.

## 2022-09-15 NOTE — PROGRESS NOTES
This is the Initial Medicare Annual Wellness Exam (Performed 12 months after IPPE or effective date of Medicare Part B enrollment, Once in a lifetime)    I have reviewed the patient's medical history in detail and updated the computerized patient record. Assessment/Plan   Education and counseling provided:  Are appropriate based on today's review and evaluation  End-of-Life planning (with patient's consent)  Influenza Vaccine  Screening Mammography  Colorectal cancer screening tests  Cardiovascular screening blood test  Bone mass measurement (DEXA)  Screening for glaucoma  Medical nutrition therapy for individuals with diabetes or renal disease  Updated COVID-19 booster dose  Yumiko    1. Type 2 diabetes mellitus with microalbuminuria, with long-term current use of insulin (HCC)  -     HEMOGLOBIN A1C WITH EAG; Future  -     MAGNESIUM; Future  -     METABOLIC PANEL, COMPREHENSIVE; Future  -     MICROALBUMIN, UR, RAND W/ MICROALB/CREAT RATIO; Future  2. Essential hypertension  -     CBC WITH AUTOMATED DIFF; Future  -     MAGNESIUM; Future  -     METABOLIC PANEL, COMPREHENSIVE; Future  3. Kidney transplant status, living related donor  -     MAGNESIUM; Future  -     METABOLIC PANEL, COMPREHENSIVE; Future  -     MICROALBUMIN, UR, RAND W/ MICROALB/CREAT RATIO; Future  4. Immunosuppressed status ([address]  -     MAGNESIUM; Future  -     METABOLIC PANEL, COMPREHENSIVE; Future  -     MICROALBUMIN, UR, RAND W/ MICROALB/CREAT RATIO; Future  5. Hyperlipidemia, unspecified hyperlipidemia type  -     LIPID PANEL; Future  6. History of SCC (squamous cell carcinoma) of skin  7. Gastroesophageal reflux disease without esophagitis  8. History of 2019 novel coronavirus disease (COVID-19)  9. Medicare annual wellness visit, initial  -     [address] 30 MINS  10. Advanced directives, counseling/discussion  -     ADVANCE CARE PLANNING FIRST 27 MINS  11. Screening for depression  12.  Encounter for screening mammogram for malignant neoplasm of breast  -     TODD MAMMO BI SCREENING INCL CAD; Future  13. Colon cancer screening  -     REFERRAL TO GASTROENTEROLOGY  14. Needs flu shot  -     INFLUENZA, FLUAD, (AGE 65 Y+), IM, PF, 0.5 ML  15. Vitamin D deficiency, unspecified  -     VITAMIN D, 25 HYDROXY; Future       Depression Risk Factor Screening     3 most recent PHQ Screens 9/15/2022   PHQ Not Done -   Little interest or pleasure in doing things Not at all   Feeling down, depressed, irritable, or hopeless Not at all   Total Score PHQ 2 0       Alcohol & Drug Abuse Risk Screen    Do you average more than 1 drink per night or more than 7 drinks a week:  No    On any one occasion in the past three months have you have had more than 3 drinks containing alcohol:  No          Functional Ability and Level of Safety    Hearing: Hearing is good. Activities of Daily Living: The home contains: no safety equipment. Patient does total self care      Ambulation: with mild difficulty     Fall Risk:  Fall Risk Assessment, last 12 mths 9/15/2022   Able to walk? Yes   Fall in past 12 months? 0   Do you feel unsteady?  0   Are you worried about falling 0      Abuse Screen:  Patient is not abused       Cognitive Screening    Has your family/caregiver stated any concerns about your memory: no     Cognitive Screening: None performed today    Health Maintenance Due     Health Maintenance Due   Topic Date Due    Shingrix Vaccine Age 49> (1 of 2) Never done    COVID-19 Vaccine (4 - Booster for Elsa Ok series) 01/09/2022       Patient Care Team   Patient Care Team:  Yadira Abrams MD as PCP - General (Internal Medicine Physician)  Yadira Abrams MD as PCP - [facility] Empaneled Provider  Wyatt Copeland MD (Pain Management)  Raymond Alonso MD (Nephrology)  Tessie Stiles MD (Obstetrics & Gynecology)  Barbie Arnold MD (Gastroenterology)  Dyeanira Pringle MD (Ophthalmology)  Malinda Laura as Physician Assistant (Psychology)    History     Patient Active Problem List   Diagnosis Code    Lumbosacral radiculopathy M54.17    Degeneration of lumbar or lumbosacral intervertebral disc M51.37    Chronic insomnia F51.04    Kidney transplant status, living related donor Z94.0    KIM (generalized anxiety disorder) F41.1    Type 2 diabetes mellitus with microalbuminuria ([facility]) E11.29, R80.9    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    Recurrent major depressive disorder, in full remission ([address] F33.42    Osteopenia of multiple sites M85.89    Essential hypertension I10    Chronic diastolic heart failure ([facility]) I50.32    Hyperlipidemia E78.5    GERD (gastroesophageal reflux disease) K21.9    Primary osteoarthritis involving multiple joints M15.9    Immunosuppressed status ([facility]) D84.9    Noncompliance with medications Z91.14    History of SCC (squamous cell carcinoma) of skin Z85.828    Vitamin D deficiency, unspecified  E55.9    History of 2019 novel coronavirus disease (COVID-19) Z86.16     Past Medical History:   Diagnosis Date    Adverse effect of anesthesia     pulmonary issues during surgery    Calculus of kidney     Cancer ([address]     skin-LT leg    Chronic diastolic heart failure ([address] 8/13/2018    Chronic pain syndrome 7/16/2013    Constipation due to pain medication 4/8/2016    Degeneration of lumbar or lumbosacral intervertebral disc     Depression     Diabetes mellitus     Essential hypertension 8/13/2018    KIM (generalized anxiety disorder) 4/29/2015    H/O renal failure 8/13/2018    Secondary to glomerulonephritis.   S/P living related donor transplant in 4/1980    Heart failure ([facility] [address]     x 3    History of depression 12/12/2017    Hypercholesterolemia     Hyperlipidemia 8/13/2018    Kidney transplant status, living related donor 10/24/2012    Lumbosacral radiculopathy     Osteopenia of multiple sites 8/9/2018    Primary iridocyclitis     Status post total replacement of right hip 4/8/2016    Type 2 diabetes mellitus with microalbuminuria ([facility] [address] 11/10/2015      Past Surgical History:   Procedure Laterality Date    COLONOSCOPY N/A 12/30/2021    COLONOSCOPY/INCOMPLETE performed by Oscar Packer MD at [facility]COPY    HX GYN      Ovary    HX HEENT      nose surgery    HX HYSTERECTOMY      HX OOPHORECTOMY      HX ORTHOPAEDIC Right [id number]    hip replacement    HX UROLOGICAL      Kidney Transplant    NC ABDOMEN SURGERY PROC UNLISTED      NC BREAST SURGERY PROCEDURE UNLISTED      NC LAPAROSCOPIC NEPHRECTOMY      bilateral     Current Outpatient Medications   Medication Sig Dispense Refill    empagliflozin (JARDIANCE) 10 mg tablet Take 1 Tablet by mouth daily. Indications: type 2 diabetes mellitus 90 Tablet 3    ergocalciferol (ERGOCALCIFEROL) 1,250 mcg (50,000 unit) capsule TAKE 1 CAPSULE BY MOUTH EVERY 7 DAYS 12 Capsule 1    amLODIPine (NORVASC) 5 mg tablet TAKE 1 TABLET BY MOUTH DAILY 90 Tablet 3    ondansetron (ZOFRAN ODT) 4 mg disintegrating tablet Take 1 Tablet by mouth every eight (8) hours as needed for Nausea or Vomiting. 40 Tablet 1    omeprazole (PRILOSEC) 40 mg capsule Take 1 Capsule by mouth daily. 90 Capsule 3    predniSONE (DELTASONE) 5 mg tablet Take 0.5 Tablets by mouth every other day. Indications: prevent kidney transplant rejection 25 Tablet 2    dulaglutide (Trulicity) 3 ZH/8.6 mL pnij 3 mg by SubCUTAneous route every seven (7) days. 12 Each 2    flash glucose sensor (FreeStyle Alice 14 Day Sensor) kit Use to check blood sugar at least 4 times daily. Dx E11.9. 2 Kit 11    flash glucose scanning reader (FreeStyle Alice 14 Day [place]) misc USE TO CHECK BLOOD SUGAR AT LEAST 4 TIMES DAILY 2 Each 11    simvastatin (ZOCOR) 40 mg tablet TAKE 1 TABLET BY MOUTH EVERY NIGHT FOR HIGH CHOLESTEROL 90 Tablet 2    lisinopriL (PRINIVIL, ZESTRIL) 40 mg tablet TAKE 1 TABLET BY MOUTH DAILY 90 Tablet 2    sucralfate (Carafate) 1 gram tablet Take 1 Tab by mouth four (4) times daily.  Indications: heartburn 120 Tab 1    MELATONIN PO Take 6 mg by mouth daily. diphenhydrAMINE-acetaminophen  mg tab Take 2 Tabs by mouth daily as needed. Insulin Needles, Disposable, 31 gauge x 5/16\" ndle Use with Lantus insulin qhs. 100 Pen Needle 5    tacrolimus (PROGRAF) 1 mg capsule 2 mg twice daily and 1 mg nightly  12     Allergies   Allergen Reactions    Asa-Acetaminophen-Caff-Potass Unknown (comments)    Aspirin Other (comments)    Beta-Blockers (Beta-Adrenergic Blocking Agts) Other (comments)     Sent into [facility].     Bystolic [Nebivolol] Other (comments)    Crestor [Rosuvastatin] Myalgia    Ibuprofen Other (comments)     Renal transplant patient    Iodinated Contrast Media Other (comments)     Unable to take due to Kidney Transplant    Lipitor [Atorvastatin] Myalgia     Had muscle aches and pains    Nsaids (Non-Steroidal Anti-Inflammatory Drug) Unknown (comments)    Sirolimus Other (comments)     pneumonitis    Toprol Xl [Metoprolol Succinate] Other (comments)     Asthma    Venlafaxine Other (comments)     Patient does not recall taking this medication       Family History   Problem Relation Age of Onset    Hypertension Father     Heart Disease Father     Alcohol abuse Father     OSTEOARTHRITIS Father     OSTEOARTHRITIS Brother     Cancer Maternal Grandmother         breast    Lung Disease Sister     High Cholesterol Mother     OSTEOARTHRITIS Mother     OSTEOARTHRITIS Sister     Liver Disease Sister      Social History     Tobacco Use    Smoking status: Former     Packs/day: 1.00     Years: 15.00     Pack years: 15.00     Types: Cigarettes     Quit date:      Years since quittin.7    Smokeless tobacco: Never   Substance Use Topics    Alcohol use: No         Moni Hope MD

## 2022-09-15 NOTE — PATIENT INSTRUCTIONS
Vaccine Information Statement    Influenza (Flu) Vaccine (Inactivated or Recombinant): What You Need to Know    Many vaccine information statements are available in Persian and other languages. See www.immunize.org/vis. Hojas de información sobre vacunas están disponibles en español y en muchos otros idiomas. Visite www.immunize.org/vis. 1. Why get vaccinated? Influenza vaccine can prevent influenza (flu). Flu is a contagious disease that spreads around the [facility] every year, usually between October and May. Anyone can get the flu, but it is more dangerous for some people. Infants and young children, people 72 years and older, pregnant people, and people with certain health conditions or a weakened immune system are at greatest risk of flu complications. Pneumonia, bronchitis, sinus infections, and ear infections are examples of flu-related complications. If you have a medical condition, such as heart disease, cancer, or diabetes, flu can make it worse. Flu can cause fever and chills, sore throat, muscle aches, fatigue, cough, headache, and runny or stuffy nose. Some people may have vomiting and diarrhea, though this is more common in children than adults. In an average year, thousands of people in the [facility] die from flu, and many more are hospitalized. Flu vaccine prevents millions of illnesses and flu-related visits to the doctor each year. 2. Influenza vaccines     CDC recommends everyone 6 months and older get vaccinated every flu season. Children 6 months through 6years of age may need 2 doses during a single flu season. Everyone else needs only 1 dose each flu season. It takes about 2 weeks for protection to develop after vaccination. There are many flu viruses, and they are always changing. Each year a new flu vaccine is made to protect against the influenza viruses believed to be likely to cause disease in the upcoming flu season.  Even when the vaccine doesnt exactly match these viruses, it may still provide some protection. Influenza vaccine does not cause flu. Influenza vaccine may be given at the same time as other vaccines. 3. Talk with your health care provider    Tell your vaccination provider if the person getting the vaccine:  Has had an allergic reaction after a previous dose of influenza vaccine, or has any severe, life-threatening allergies   Has ever had Guillain-Barré Syndrome (also called GBS)    In some cases, your health care provider may decide to postpone influenza vaccination until a future visit. Influenza vaccine can be administered at any time during pregnancy. People who are or will be pregnant during influenza season should receive inactivated influenza vaccine. People with minor illnesses, such as a cold, may be vaccinated. People who are moderately or severely ill should usually wait until they recover before getting influenza vaccine. Your health care provider can give you more information. 4. Risks of a vaccine reaction    Soreness, redness, and swelling where the shot is given, fever, muscle aches, and headache can happen after influenza vaccination. There may be a very small increased risk of Guillain-Barré Syndrome (GBS) after inactivated influenza vaccine (the flu shot). Dinh Pinto children who get the flu shot along with pneumococcal vaccine (PCV13) and/or DTaP vaccine at the same time might be slightly more likely to have a seizure caused by fever. Tell your health care provider if a child who is getting flu vaccine has ever had a seizure. People sometimes faint after medical procedures, including vaccination. Tell your provider if you feel dizzy or have vision changes or ringing in the ears. As with any medicine, there is a very remote chance of a vaccine causing a severe allergic reaction, other serious injury, or death. 5. What if there is a serious problem?     An allergic reaction could occur after the vaccinated person leaves the clinic. If you see signs of a severe allergic reaction (hives, swelling of the face and throat, difficulty breathing, a fast heartbeat, dizziness, or weakness), call 9-1-1 and get the person to the nearest hospital.    For other signs that concern you, call your health care provider. Adverse reactions should be reported to the Vaccine Adverse Event Reporting System (VAERS). Your health care provider will usually file this report, or you can do it yourself. Visit the VAERS website at www.vaers. [facility].gov or call 7-517.717.8123. VAERS is only for reporting reactions, and VAERS staff members do not give medical advice. 6. The National Vaccine Injury Compensation Program    The [facility] Vaccine Injury Compensation Program (VICP) is a federal program that was created to compensate people who may have been injured by certain vaccines. Claims regarding alleged injury or death due to vaccination have a time limit for filing, which may be as short as two years. Visit the VICP website at www.[facility].gov/vaccinecompensation or call 8-764.494.9499 to learn about the program and about filing a claim. 7. How can I learn more? Ask your health care provider. Call your local or state health department. Visit the website of the Food and Drug Administration (FDA) for vaccine package inserts and additional information at www.fda.gov/vaccines-blood-biologics/vaccines. Contact the Centers for Disease Control and Prevention (CDC): Call 4-493.767.2222 (1-800-CDC-INFO) or  Visit CDCs influenza website at www.cdc.gov/flu. Vaccine Information Statement   Inactivated Influenza Vaccine   8/6/2021  42 KAEL Jackson [id number]     Department of Health and Human Services  Centers for Disease Control and Prevention    Office Use Only         Heart-Healthy Diet: Care Instructions  Your Care Instructions     A heart-healthy diet has lots of vegetables, fruits, nuts, beans, and whole grains, and is low in salt. It limits foods that are high in saturated fat, such as meats, cheeses, and fried foods. It may be hard to change your diet, but even small changes can lower your risk of heart attack and heart disease. Follow-up care is a key part of your treatment and safety. Be sure to make and go to all appointments, and call your doctor if you are having problems. It's also a good idea to know your test results and keep a list of the medicines you take. How can you care for yourself at home? Watch your portions  Learn what a serving is. A \"serving\" and a \"portion\" are not always the same thing. Make sure that you are not eating larger portions than are recommended. For example, a serving of pasta is ½ cup. A serving size of meat is 2 to 3 ounces. A 3-ounce serving is about the size of a deck of cards. Measure serving sizes until you are good at [place]" them. Keep in mind that restaurants often serve portions that are 2 or 3 times the size of one serving. To keep your energy level up and keep you from feeling hungry, eat often but in smaller portions. Eat only the number of calories you need to stay at a healthy weight. If you need to lose weight, eat fewer calories than your body burns (through exercise and other physical activity). Eat more fruits and vegetables  Eat a variety of fruit and vegetables every day. Dark green, deep orange, red, or yellow fruits and vegetables are especially good for you. Examples include spinach, carrots, peaches, and berries. Keep carrots, celery, and other veggies handy for snacks. Buy fruit that is in season and store it where you can see it so that you will be tempted to eat it. Cook dishes that have a lot of veggies in them, such as stir-fries and soups. Limit saturated and trans fat  Read food labels, and try to avoid saturated and trans fats. They increase your risk of heart disease. Use olive or canola oil when you cook.   Bake, broil, grill, or steam foods instead of frying them.  Choose lean meats instead of high-fat meats such as hot dogs and sausages. Cut off all visible fat when you prepare meat. Eat fish, skinless poultry, and meat alternatives such as soy products instead of high-fat meats. Soy products, such as tofu, may be especially good for your heart. Choose low-fat or fat-free milk and dairy products. Eat foods high in fiber  Eat a variety of grain products every day. Include whole-grain foods that have lots of fiber and nutrients. Examples of whole-grain foods include oats, whole wheat bread, and brown rice. Buy whole-grain breads and cereals, instead of white bread or pastries. Limit salt and sodium  Limit how much salt and sodium you eat to help lower your blood pressure. Taste food before you salt it. Add only a little salt when you think you need it. With time, your taste buds will adjust to less salt. Eat fewer snack items, fast foods, and other high-salt, processed foods. Check food labels for the amount of sodium in packaged foods. Choose low-sodium versions of canned goods (such as soups, vegetables, and beans). Limit sugar  Limit drinks and foods with added sugar. These include candy, desserts, and soda pop. Limit alcohol  Limit alcohol to no more than 2 drinks a day for men and 1 drink a day for women. Too much alcohol can cause health problems. When should you call for help? Watch closely for changes in your health, and be sure to contact your doctor if:    You would like help planning heart-healthy meals. Where can you learn more? Go to http://www.shaffer.com/  Enter V137 in the search box to learn more about \"Heart-Healthy Diet: Care Instructions. \"  Current as of: August 22, 2019               Content Version: 12.6  © 5440-0469 [employer], [employer]. Care instructions adapted under license by [employer] (which disclaims liability or warranty for this information).  If you have questions about a medical condition or this instruction, always ask your healthcare professional. [address] any warranty or liability for your use of this information. Learning About Diabetes Food Guidelines  Your Care Instructions     Meal planning is important to manage diabetes. It helps keep your blood sugar at a target level (which you set with your doctor). You don't have to eat special foods. You can eat what your family eats, including sweets once in a while. But you do have to pay attention to how often you eat and how much you eat of certain foods. You may want to work with a dietitian or a certified diabetes educator (CDE) to help you plan meals and snacks. A dietitian or CDE can also help you lose weight if that is one of your goals. What should you know about eating carbs? Managing the amount of carbohydrate (carbs) you eat is an important part of healthy meals when you have diabetes. Carbohydrate is found in many foods. Learn which foods have carbs. And learn the amounts of carbs in different foods. Bread, cereal, pasta, and rice have about 15 grams of carbs in a serving. A serving is 1 slice of bread (1 ounce), ½ cup of cooked cereal, or 1/3 cup of cooked pasta or rice. Fruits have 15 grams of carbs in a serving. A serving is 1 small fresh fruit, such as an apple or orange; ½ of a banana; ½ cup of cooked or canned fruit; ½ cup of fruit juice; 1 cup of melon or raspberries; or 2 tablespoons of dried fruit. Milk and no-sugar-added yogurt have 15 grams of carbs in a serving. A serving is 1 cup of milk or 2/3 cup of no-sugar-added yogurt. Starchy vegetables have 15 grams of carbs in a serving. A serving is ½ cup of mashed potatoes or sweet potato; 1 cup winter squash; ½ of a small baked potato; ½ cup of cooked beans; or ½ cup cooked corn or green peas. Learn how much carbs to eat each day and at each meal. A dietitian or CDE can teach you how to keep track of the amount of carbs you eat. This is called carbohydrate counting. If you are not sure how to count carbohydrate grams, use the Plate Method to plan meals. It is a good, quick way to make sure that you have a balanced meal. It also helps you spread carbs throughout the day. Divide your plate by types of foods. Put non-starchy vegetables on half the plate, meat or other protein food on one-quarter of the plate, and a grain or starchy vegetable in the final quarter of the plate. To this you can add a small piece of fruit and 1 cup of milk or yogurt, depending on how many carbs you are supposed to eat at a meal.  Try to eat about the same amount of carbs at each meal. Do not \"save up\" your daily allowance of carbs to eat at one meal.  Proteins have very little or no carbs per serving. Examples of proteins are beef, chicken, turkey, fish, eggs, tofu, cheese, cottage cheese, and peanut butter. A serving size of meat is 3 ounces, which is about the size of a deck of cards. Examples of meat substitute serving sizes (equal to 1 ounce of meat) are 1/4 cup of cottage cheese, 1 egg, 1 tablespoon of peanut butter, and ½ cup of tofu. How can you eat out and still eat healthy? Learn to estimate the serving sizes of foods that have carbohydrate. If you measure food at home, it will be easier to estimate the amount in a serving of restaurant food. If the meal you order has too much carbohydrate (such as potatoes, corn, or baked beans), ask to have a low-carbohydrate food instead. Ask for a salad or green vegetables. If you use insulin, check your blood sugar before and after eating out to help you plan how much to eat in the future. If you eat more carbohydrate at a meal than you had planned, take a walk or do other exercise. This will help lower your blood sugar. What else should you know? Limit saturated fat, such as the fat from meat and dairy products. This is a healthy choice because people who have diabetes are at higher risk of heart disease. So choose lean cuts of meat and nonfat or low-fat dairy products. Use olive or canola oil instead of butter or shortening when cooking. Don't skip meals. Your blood sugar may drop too low if you skip meals and take insulin or certain medicines for diabetes. Check with your doctor before you drink alcohol. Alcohol can cause your blood sugar to drop too low. Alcohol can also cause a bad reaction if you take certain diabetes medicines. Follow-up care is a key part of your treatment and safety. Be sure to make and go to all appointments, and call your doctor if you are having problems. It's also a good idea to know your test results and keep a list of the medicines you take. Where can you learn more? Go to http://www.shaffer.com/  Enter I147 in the search box to learn more about \"Learning About Diabetes Food Guidelines. \"  Current as of: December 20, 2019               Content Version: 12.6  © 1096-9039 [employer]. Care instructions adapted under license by [employer] (which disclaims liability or warranty for this information). If you have questions about a medical condition or this instruction, always ask your healthcare professional. [place] [address] any warranty or liability for your use of this information. Learning About Low-Sodium Foods  What foods are low in sodium? The foods you eat contain nutrients, such as vitamins and minerals. Sodium is a nutrient. Your body needs the right amount to stay healthy and work as it should. You can use the list below to help you make choices about which foods to eat.   Fruits  Fresh, frozen, canned, or dried fruit  Vegetables  Fresh or frozen vegetables, with no added salt  Canned vegetables, low-sodium or with no added salt  Grains  Bagels without salted tops  Cereal with no added salt  Corn tortillas  Crackers with no added salt  Oatmeal, cooked without salt  Popcorn with no salt  Pasta and noodles, cooked without salt  Rice, cooked without salt  Unsalted pretzels  Dairy and dairy alternatives  Butter, unsalted  Cream cheese  Ice cream  Milk  Soy milk  Meats and other protein foods  Beans and peas, canned with no salt  Eggs  Fresh fish (not smoked)  Fresh meats (not smoked or cured)  Nuts and nut butter, prepared without salt  Poultry, not packaged with sodium solution  Tofu, unseasoned  Tuna, canned without salt  Seasonings  Garlic  Herbs and spices  Lemon juice  Mustard  Olive oil  Salt-free seasoning mixes  Vinegar  Work with your doctor to find out how much of this nutrient you need. Depending on your health, you may need more or less of it in your diet. Where can you learn more? Go to http://www.gray.com/  Enter S460 in the search box to learn more about \"Learning About Low-Sodium Foods. \"  Current as of: August 22, 2019               Content Version: 12.6  © 4669-6735 [employer]. Care instructions adapted under license by [employer] (which disclaims liability or warranty for this information). If you have questions about a medical condition or this instruction, always ask your healthcare professional. [place] [address] any warranty or liability for your use of this information. Low Sodium Diet (2,000 Milligram): Care Instructions  Your Care Instructions     Too much sodium causes your body to hold on to extra water. This can raise your blood pressure and force your heart and kidneys to work harder. In very serious cases, this could cause you to be put in the hospital. It might even be life-threatening. By limiting sodium, you will feel better and lower your risk of serious problems. The most common source of sodium is salt. People get most of the salt in their diet from canned, prepared, and packaged foods. Fast food and restaurant meals also are very high in sodium.  Your doctor will probably limit your sodium to less than 2,000 milligrams (mg) a day. This limit counts all the sodium in prepared and packaged foods and any salt you add to your food. Follow-up care is a key part of your treatment and safety. Be sure to make and go to all appointments, and call your doctor if you are having problems. It's also a good idea to know your test results and keep a list of the medicines you take. How can you care for yourself at home? Read food labels  Read labels on cans and food packages. The labels tell you how much sodium is in each serving. Make sure that you look at the serving size. If you eat more than the serving size, you have eaten more sodium. Food labels also tell you the Percent Daily Value for sodium. Choose products with low Percent Daily Values for sodium. Be aware that sodium can come in forms other than salt, including monosodium glutamate (MSG), sodium citrate, and sodium bicarbonate (baking soda). MSG is often added to Asian food. When you eat out, you can sometimes ask for food without MSG or added salt. Buy low-sodium foods  Buy foods that are labeled \"unsalted\" (no salt added), \"sodium-free\" (less than 5 mg of sodium per serving), or \"low-sodium\" (less than 140 mg of sodium per serving). Foods labeled \"reduced-sodium\" and \"light sodium\" may still have too much sodium. Be sure to read the label to see how much sodium you are getting. Buy fresh vegetables, or frozen vegetables without added sauces. Buy low-sodium versions of canned vegetables, soups, and other canned goods. Prepare low-sodium meals  Cut back on the amount of salt you use in cooking. This will help you adjust to the taste. Do not add salt after cooking. One teaspoon of salt has about 2,300 mg of sodium. Take the salt shaker off the table. Flavor your food with garlic, lemon juice, onion, vinegar, herbs, and spices. Do not use soy sauce, lite soy sauce, steak sauce, onion salt, garlic salt, celery salt, mustard, or ketchup on your food.   Use low-sodium salad dressings, sauces, and ketchup. Or make your own salad dressings and sauces without adding salt. Use less salt (or none) when recipes call for it. You can often use half the salt a recipe calls for without losing flavor. Other foods such as rice, pasta, and grains do not need added salt. Rinse canned vegetables, and cook them in fresh water. This removes some--but not all--of the salt. Avoid water that is naturally high in sodium or that has been treated with water softeners, which add sodium. Call your local water company to find out the sodium content of your water supply. If you buy bottled water, read the label and choose a sodium-free brand. Avoid high-sodium foods  Avoid eating:  Smoked, cured, salted, and canned meat, fish, and poultry. Ham, haney, hot dogs, and luncheon meats. Regular, hard, and processed cheese and regular peanut butter. Crackers with salted tops, and other salted snack foods such as pretzels, chips, and salted popcorn. Frozen prepared meals, unless labeled low-sodium. Canned and dried soups, broths, and bouillon, unless labeled sodium-free or low-sodium. Canned vegetables, unless labeled sodium-free or low-sodium. [place] fries, pizza, tacos, and other fast foods. Pickles, olives, ketchup, and other condiments, especially soy sauce, unless labeled sodium-free or low-sodium. Where can you learn more? Go to http://www.gray.com/  Enter V843 in the search box to learn more about \"Low Sodium Diet (2,000 Milligram): Care Instructions. \"  Current as of: August 22, 2019               Content Version: 12.6  © 1286-6695 [employer]. Care instructions adapted under license by [employer] (which disclaims liability or warranty for this information).  If you have questions about a medical condition or this instruction, always ask your healthcare professional. [address] any warranty or liability for your use of this information.

## 2022-09-15 NOTE — ACP (ADVANCE CARE PLANNING)
Advance Care Planning     Advance Care Planning (ACP) Physician/NP/PA Conversation      Date of Conversation: 9/15/2022  Conducted with: Patient with Decision Making Capacity    Healthcare Decision Maker:     Primary Decision Maker: 85Aditya Del Rio - Spouse - [phone]  Click here to complete [address] including selection of the Healthcare Decision Maker Relationship (ie \"Primary\")      Today we documented Decision Maker(s) consistent with Legal Next of Kin hierarchy. Care Preferences:    Hospitalization: \"If your health worsens and it becomes clear that your chance of recovery is unlikely, what would be your preference regarding hospitalization? \"  The patient would prefer hospitalization. Ventilation: \"If you were unable to breathe on your own and your chance of recovery was unlikely, what would be your preference about the use of a ventilator (breathing machine) if it was available to you? \"   The patient would desire the use of a ventilator. Resuscitation: \"In the event your heart stopped as a result of an underlying serious health condition, would you want attempts to be made to restart your heart, or would you prefer a natural death? \"   Yes, attempt to resuscitate.     Additional topics discussed: treatment goals, benefit/burden of treatment options, ventilation preferences, hospitalization preferences, resuscitation preferences, and end of life care preferences (vegetative state/imminent death)    Conversation Outcomes / Follow-Up Plan:   ACP in process - information provided, considering goals and options  Reviewed DNR/DNI and patient elects Full Code (Attempt Resuscitation)     Length of Voluntary ACP Conversation in minutes:  16 minutes    Prakash Salcedo MD

## 2022-09-17 PROBLEM — Z86.16 HISTORY OF 2019 NOVEL CORONAVIRUS DISEASE (COVID-19): Status: ACTIVE | Noted: 2022-09-17

## 2022-09-17 PROBLEM — R93.5 ABNORMAL CT SCAN, PELVIS: Status: RESOLVED | Noted: 2019-05-19 | Resolved: 2022-09-17

## 2022-09-17 NOTE — PROGRESS NOTES
HPI:   Venkatesh Iraheta is a 78y.o. year old female who presents today for a routine visit. She has a history of hypertension, hyperlipidemia, chronic diastolic heart failure, diabetes mellitus, ESRD s/p living donor cadaveric renal transplant (4/1980), osteopenia, osteoarthritis, lumbar degenerative disease, chronic pain syndrome, squamous cell skin cancers, depression, anxiety, GERD, and noncompliance. She has completed the three doses of the Moderna COVID-19 vaccine series but has not received any booster doses. She reports today that she is doing reasonably well. On 6/17/2022, she contacted the office and reported symptoms of nasal congestion and sore throat, and tested positive for COVID-19 by home rapid antigen testing. She was referred to [facility] ED given her immunosuppressed status due to renal transplant and received bebtelovimab infusion without complications. She reports gradual improvement in symptoms and denies any sequela today. She was scheduled for Evusheld therapy at the infusion center but had to cancel her appointment when she developed COVID-19. She states that she is interested in rescheduling. She is otherwise without new complaints. Summary of prior hospitalizations and medical history:  She has had increased difficulty with reflux and presented to the [facility] ED on 12/1/2020 complaining of intermittent substernal chest pain for about 1 week. It was not related to exertion and she did not experience associated dyspnea at rest or with exertion, palpitations, or lightheadedness. Evaluation included WBC 9.0, Hb 12.3/ Hct 38.1, creatinine 1.0/ eGFR 53, K 4.9, NT pro-BNP 36, troponin x 2 negative, EKG x 2 negative, chest x-ray negative, and a pharmacologic nuclear stress test (12/2/2020) which was a normal, low risk study with EF 63% and no TID. She was advised that symptom were likely related to underlying GERD, and she was discharged.      On 5/8/2019, she presented to [facility] ED complaining of thoracic back pain. She informed the ED physicians that she had weaned herself from her chronic pain medications and was requesting pain medicine since her pain had increased. She was given a single dose of gabapentin 300 mg, Zofran, and two Percocet, and she was discharged with a prescription for gabapentin 100 mg tid. Due to continued pain, she presented to the [facility] ED on 5/10/2019 and was complaining of sharp, stabbing mid abdominal pain. She was found to be tender in the epigastric and right abdomen. Lab evaluation included Hb 11.8/ Hct 36.2, creatinine 1.04/ eGFR 53, lipase 197, troponin and urinalysis negative. and an abdominal/pelvic CT scan was obtained showing no acute abdominal pathology, but an incidental finding of a suggestion of bilateral adnexal cysts, right measuring 1.6 cm and the left measuring 1.3 cm. She was discharged with a prescription for famotidine. She was also referred to Dr. Jaison Prescott for follow-up. She expresses concern regarding the findings on the CT scan suggestive of bilateral adnexal cysts. She states that she had a complete hysterectomy many years ago and had her ovaries removed in 2002 due to endometriosis. Review of her record shows a pelvic ultrasound from 11/11/2002 showing bilateral pelvic masses in the area of the ovaries, the left appearing to represent the ovary with an adjacent fluid collection and the right being an indeterminate mass. The patient states that this is what prompted the removal of her ovaries. She had had a hysterectomy several years prior to this. She denies any lower abdominal pain or bloating. She underwent evaluation by Dr. Jaison Prescott on 6/18/2019, and recommendation was to proceed with an upper endoscopy, but the patient cancelled the appointment. She also underwent a pelvic ultrasound (5/23/2019) which did not confirm findings seen on CT scan, but recommendation was to proceed with a transvaginal ultrasound.  However, the patient stated that she wished to follow-up with her gynecologist.     On 11/6/2018, she admitted that she was no longer taking her immunosuppressant medications for her kidney transplant. She stated that she had stopped taking prednisone for about a year, and then stopped tacrolimus for several months although is not specific as to when. However, review of record showed that her tacrolimus level was therapeutic at 5.2 on 4/27/2018 at her annual follow-up visit in the renal transplant clinic. She stated that she was told by one of her kidney doctors that \"her brother must have been a twin for her transplanted kidney to have lasted this long\". She stated that she believed him even though her brother is 11years older, and she decided on her own that she no longer needed to take her immunosuppressants. She states that she also did not like the fact that she would \"get so many colds\" while taking the medications. She became concerned when she was informed of the development of increasing proteinuria on he labs. She was seen in follow-up on 11/21/2018 and reported that she had restarted her kidney transplant immunosuppressant medications, and was taking tacrolimus 2 mg bid and prednisone 5 mg every other day. Her tacrolimus level was measured at 1.7 and she continued to show proteinuria with urine microalbumin/ creatinine ratio 150 mg/g, and urinalysis measuring 30 mg/dl protein. Her creatinine remained relatively stable at 0.95/ eGFR 59. She subsequently followed up with Dr. Davis Allred, and repeat labs showed an increase in her creatinine to 1.22/ eGFR 44, urinalysis showed 100 mg/dl protein, random urine protein 61 mg/dl with urine creatinine 81.2.  She stated that she was instructed to have follow-up labs in 4 weeks, and that she may require a renal biopsy if they continued to be abnormal. She also stated that her antihypertensive regimen was changed with increase in her dose of lisinopril to 40 mg bid, and amlodipine and Imdur were discontinued. However, she has not followed up with Dr. Kayleen Stafford since her last visit in 1/2019. She has a history of hypertension, treated with lisinopril and amlodipine. She has not been monitoring her blood pressure at home. She also has a history of hyperlipidemia, treated previously with high intensity dose simvastatin at 80 mg daily. She was changed to rosuvastatin in 8/2018, but noticed severe muscle aches and stopped taking it with improvement. She was subsequently restarted on simvastatin at 40 mg dose. In 10/2017, she was admitted to [facility] with dyspnea on exertion, and chest xray revealed mild cardiac enlargement with pulmonary vascular congestion and diffusely increased interstitial markings. Echocardiogram showed normal LV size and function (EF 65%), mild concentric LVH, grade 2 diastolic dysfunction, and a pharmacologic nuclear stress test was a normal low risk study without evidence of ischemia or prior infarction, and calculated EF 70%. She was diagnosed with acute on chronic diastolic heart failure, and treated with lasix and diuresed approximately five liters with improvement. She was intolerant to beta blockers. She currently denies any chest pain, shortness of breath at rest or with exertion, palpitations, lightheadedness, PND, orthopnea, or edema. She is now being followed by Dr. Lenny Hickman. She has a history of diabetes mellitus, which had not been treated with medication until 12/2019 when she began experiencing polyuria, polydipsia, and fatigue for several weeks. She stated that on 12/16/2019, she checked her blood sugar at home and it measured 575. She states that she took two 500 mg metformin and presented to [facility] ED for evaluation. She stated that initial blood sugar was elevated at 343, and she was administered 8 units of regular insulin and IV fluids.  Evaluation included Na 134, Cl 93, K 4.2, creatinine 1.1/ eGFR 47, Ca 10.1, urinalysis protein 100, glucose >500, chest x-ray negative, EKG sinus tachycardia 100. She was subsequently discharged on metformin 500 mg bid and reported that her blood sugars had been measuring between 400-550 since discharge. She developed GI distress and was instructed to discontinue metformin and begin Lantus 10 U qHS and titrated her dose according to her fasting morning blood sugars, increasing to 32 U qHS, and then switched to Levemir on 1/4/2020 due to formulary issues. Trulicity was added, and she was weaned from insulin in 3/2020. She denies any polyuria, polydipsia, nocturia, or blurry vision, and has no history of retinopathy, neuropathy, or nephropathy. She has not been having regular eye exams. She has a history of renal failure and is s/p a living donor cadaveric renal transplant from her brother in 4/1980. She had been noncompliant with her regimen as discussed, but has resumed taking tacrolimus and prednisone one month ago. She is followed in the [facility] by Dr. Odilon Lewis and Dr. Ozzy Calderon. She has secondary osteopenia due to chronic steroid use, and was being treated with alendronate. However, she states that she discontinued taking it. Her last bone density study was in 9/2021 showing T-scores:  femoral neck left -0.7  and distal radius -1.0. She continues to take calcium and Vitamin D supplements. She has no history of pathologic fractures. She has a history of osteoarthritis and is s/p right hip replacement by Dr. Mikhail Black in 2015. She also has chronic neck and low back pain secondary to degenerative disease. She had been followed previously in the [facility] pain management center, but has weaned herself off of pain medications. She has stopped taking Percocet and MS Contin. She states that she has not noticed a significant increase in her neck or low back pain.  She denies any fevers, chills, weight change, saddle paresthesia, neurogenic bowel or bladder symptoms, or recent trauma. She has a history of GERD, treated with omeprazole. She had an upper endoscopy in 5/2014 by Dr. Marvin Guzmán which was normal. She also had difficulty with chronic constipation, secondary to narcotic use. She was being treated with Linzess, although discontinued after she weaned herself from taking narcotics. She had a screening colonoscopy in 6/2011 which showed evidence of diverticulosis. She underwent an upper endoscopy and colonoscopy on 12/30/2021 with Dr. Roland Huertas. Upper endoscopy revealed salmon-colored mucosa at the GE junction and multiple nonbleeding benign fundic gastric polyps (pathology: squamous and gastric mucosa with no pathologic abnormality). Colonoscopy was not completed due to poor prep and recommendation for repeat in 1 year. She denies any abdominal pain, nausea, vomiting, melena, hematochezia, or change in bowel movements. She has a history of multiple squamous cell carcinomas of the skin. She is being followed closely by Dr. Reynaldo Costa. She reports that she was treated with Efudex in 8/2018 with subsequent exfoliation. She underwent Mohs surgery on 9/17/2021 by Dr. José Miguel Chester for removal of a rapidly growing mass on her right forearm. Pathology showed squamous cell carcinoma. She states that she is now following up regularly with dermatology for surveillance exams. She does report that she was treated with doxycycline in 6/2018 for a possible MRSA infection on her wrist. She states that her grandson had a confirmed infection and she was taking care of him. She states that it completely resolved. She has a history of depression and anxiety, treated previously with Prozac. However, she states that she is no longer taking it. She also had previously been using Ambien for insomnia.        Past Medical History:   Diagnosis Date    Adverse effect of anesthesia     pulmonary issues during surgery    Calculus of kidney     Cancer (HCC)     skin-LT leg    Chronic diastolic heart failure ([facility] [address] 8/13/2018    Chronic pain syndrome 7/16/2013    Constipation due to pain medication 4/8/2016    Degeneration of lumbar or lumbosacral intervertebral disc     Depression     Diabetes mellitus     Essential hypertension 8/13/2018    KIM (generalized anxiety disorder) 4/29/2015    H/O renal failure 8/13/2018    Secondary to glomerulonephritis. S/P living related donor transplant in 4/1980    Heart failure ([facility] [address]     x 3    History of depression 12/12/2017    Hypercholesterolemia     Hyperlipidemia 8/13/2018    Kidney transplant status, living related donor 10/24/2012    Lumbosacral radiculopathy     Osteopenia of multiple sites 8/9/2018    Primary iridocyclitis     Status post total replacement of right hip 4/8/2016    Type 2 diabetes mellitus with microalbuminuria ([facility] [address] 11/10/2015     Past Surgical History:   Procedure Laterality Date    COLONOSCOPY N/A 12/30/2021    COLONOSCOPY/INCOMPLETE performed by Mati Moore MD at [facility]    HX HEENT      nose surgery    HX HYSTERECTOMY      HX OOPHORECTOMY      HX ORTHOPAEDIC Right [id number]    hip replacement    HX UROLOGICAL      Kidney Transplant    NE ABDOMEN SURGERY PROC UNLISTED      NE BREAST SURGERY PROCEDURE UNLISTED      NE LAPAROSCOPIC NEPHRECTOMY      bilateral     Current Outpatient Medications   Medication Sig    ezetimibe (ZETIA) 10 mg tablet Take  by mouth.    empagliflozin (JARDIANCE) 10 mg tablet Take 1 Tablet by mouth daily. Indications: type 2 diabetes mellitus    ergocalciferol (ERGOCALCIFEROL) 1,250 mcg (50,000 unit) capsule TAKE 1 CAPSULE BY MOUTH EVERY 7 DAYS    amLODIPine (NORVASC) 5 mg tablet TAKE 1 TABLET BY MOUTH DAILY    ondansetron (ZOFRAN ODT) 4 mg disintegrating tablet Take 1 Tablet by mouth every eight (8) hours as needed for Nausea or Vomiting. omeprazole (PRILOSEC) 40 mg capsule Take 1 Capsule by mouth daily. predniSONE (DELTASONE) 5 mg tablet Take 0.5 Tablets by mouth every other day.  Indications: prevent kidney transplant rejection    dulaglutide (Trulicity) 3 DL/6.6 mL pnij 3 mg by SubCUTAneous route every seven (7) days. flash glucose sensor (FreeStyle Alice 14 Day Sensor) kit Use to check blood sugar at least 4 times daily. Dx E11.9.    flash glucose scanning reader (FreeStyle Alice 14 Day [place]) misc USE TO CHECK BLOOD SUGAR AT LEAST 4 TIMES DAILY    simvastatin (ZOCOR) 40 mg tablet TAKE 1 TABLET BY MOUTH EVERY NIGHT FOR HIGH CHOLESTEROL    lisinopriL (PRINIVIL, ZESTRIL) 40 mg tablet TAKE 1 TABLET BY MOUTH DAILY    sucralfate (Carafate) 1 gram tablet Take 1 Tab by mouth four (4) times daily. Indications: heartburn    MELATONIN PO Take 6 mg by mouth daily. diphenhydrAMINE-acetaminophen  mg tab Take 2 Tabs by mouth daily as needed. Insulin Needles, Disposable, 31 gauge x 5/16\" ndle Use with Lantus insulin qhs.    tacrolimus (PROGRAF) 1 mg capsule 2 mg twice daily and 1 mg nightly     No current facility-administered medications for this visit. Allergies and Intolerances: Allergies   Allergen Reactions    Asa-Acetaminophen-Caff-Potass Unknown (comments)    Aspirin Other (comments)    Beta-Blockers (Beta-Adrenergic Blocking Agts) Other (comments)     Sent into CHF.     Bystolic [Nebivolol] Other (comments)    Crestor [Rosuvastatin] Myalgia    Ibuprofen Other (comments)     Renal transplant patient    Iodinated Contrast Media Other (comments)     Unable to take due to Kidney Transplant    Lipitor [Atorvastatin] Myalgia     Had muscle aches and pains    Nsaids (Non-Steroidal Anti-Inflammatory Drug) Unknown (comments)    Sirolimus Other (comments)     pneumonitis    Toprol Xl [Metoprolol Succinate] Other (comments)     Asthma    Venlafaxine Other (comments)     Patient does not recall taking this medication     Family History: Her sister was diagnosed with breast cancer at age 61; her father had CAD and  during CABG  Family History   Problem Relation Age of Onset    Hypertension Father Heart Disease Father     Alcohol abuse Father     OSTEOARTHRITIS Father     OSTEOARTHRITIS Brother     Cancer Maternal Grandmother         breast    Lung Disease Sister     High Cholesterol Mother     OSTEOARTHRITIS Mother     OSTEOARTHRITIS Sister     Liver Disease Sister      Social History:   She  reports that she quit smoking about 35 years ago. Her smoking use included cigarettes. She has a 15.00 pack-year smoking history. She has never used smokeless tobacco. Smoked  2-3 ppd for 10 years, stopping 20 years ago. She is  with one adopted son. She is a retired . Social History     Substance and Sexual Activity   Alcohol Use No     Immunization History:  Immunization History   Administered Date(s) Administered    COVID-19, MODERNA BLUE border, Primary or Immunocompromised, (age 18y+), IM, 100 mcg/0.5mL 03/05/2021, 04/02/2021, 10/17/2021    Influenza Vaccine 12/18/2012, 11/11/2013    Influenza Vaccine (Tri) Adjuvanted (>65 Yrs FLUAD TRI 54189) 12/19/2019    Influenza, FLUAD, (age 72 y+), Adjuvanted 09/15/2022    Influenza, FLUARIX, FLULAVAL, FLUZONE (age 10 mo+) AND AFLURIA, (age 1 y+), PF, 0.5mL 11/10/2015, 11/21/2018    Pneumococcal Conjugate (PCV-13) 11/21/2018    Pneumococcal Polysaccharide (PPSV-23) 11/28/2014, 01/08/2020    Tdap 08/09/2018       Review of Systems:   As above included in HPI. Otherwise 11 point review of systems negative including constitutional, skin, HENT, eyes, respiratory, cardiovascular, gastrointestinal, genitourinary, musculoskeletal, endocrine, hematologic, allergy, and neurologic. Physical:   Visit Vitals  /82   Pulse 73   Temp 97 °F (36.1 °C) (Temporal)   Resp 16   Ht 5' 3\" (1.6 m)   Wt 179 lb 9.6 oz (81.5 kg)   SpO2 96%   BMI 31.81 kg/m²         Exam:   Patient appears in no apparent distress. Affect is appropriate. HEENT: PERRLA, anicteric, no JVD, adenopathy or thyromegaly. No carotid bruits or radiated murmur.   Lungs: clear to auscultation, no wheezes, rhonchi, or rales. Heart: regular rate and rhythm. No murmur, rubs, gallops  Abdomen: soft, nontender, nondistended, normal bowel sounds, no hepatosplenomegaly or masses. Extremities: without edema. Review of Data:  Labs:  No visits with results within 2 Day(s) from this visit. Latest known visit with results is:   Hospital Outpatient Visit on 09/06/2022   Component Date Value Ref Range Status    WBC 09/06/2022 9.2  4.6 - 13.2 K/uL Final    RBC 09/06/2022 4.36  4.20 - 5.30 M/uL Final    HGB 09/06/2022 12.9  12.0 - 16.0 g/dL Final    HCT 09/06/2022 39.7  35.0 - 45.0 % Final    MCV 09/06/2022 91.1  78.0 - 100.0 FL Final    MCH 09/06/2022 29.6  24.0 - 34.0 PG Final    MCHC 09/06/2022 32.5  31.0 - 37.0 g/dL Final    RDW 09/06/2022 13.2  11.6 - 14.5 % Final    PLATELET 27/39/1108 091  135 - 420 K/uL Final    MPV 09/06/2022 9.0 (A) 9.2 - 11.8 FL Final    NRBC 09/06/2022 0.0  0  WBC Final    ABSOLUTE NRBC 09/06/2022 0.00  0.00 - 0.01 K/uL Final    NEUTROPHILS 09/06/2022 54  40 - 73 % Final    LYMPHOCYTES 09/06/2022 29  21 - 52 % Final    MONOCYTES 09/06/2022 8  3 - 10 % Final    EOSINOPHILS 09/06/2022 7 (A) 0 - 5 % Final    BASOPHILS 09/06/2022 1  0 - 2 % Final    IMMATURE GRANULOCYTES 09/06/2022 0  0.0 - 0.5 % Final    ABS. NEUTROPHILS 09/06/2022 5.0  1.8 - 8.0 K/UL Final    ABS. LYMPHOCYTES 09/06/2022 2.7  0.9 - 3.6 K/UL Final    ABS. MONOCYTES 09/06/2022 0.8  0.05 - 1.2 K/UL Final    ABS. EOSINOPHILS 09/06/2022 0.7 (A) 0.0 - 0.4 K/UL Final    ABS. BASOPHILS 09/06/2022 0.1  0.0 - 0.1 K/UL Final    ABS. IMM.  GRANS. 09/06/2022 0.0  0.00 - 0.04 K/UL Final    DF 09/06/2022 AUTOMATED    Final    Hemoglobin A1c 09/06/2022 7.0 (A) 4.2 - 5.6 % Final    Est. average glucose 09/06/2022 154  mg/dL Final    LIPID PROFILE 09/06/2022        Final    Cholesterol, total 09/06/2022 193  <200 MG/DL Final    Triglyceride 09/06/2022 258 (A) <150 MG/DL Final    HDL Cholesterol 09/06/2022 47  40 - 60 MG/DL Final LDL, calculated 2022 94.4  0 - 100 MG/DL Final    VLDL, calculated 2022 51.6  MG/DL Final    CHOL/HDL Ratio 2022 4.1  0 - 5.0   Final    Magnesium 2022 2.1  1.6 - 2.6 mg/dL Final    Sodium 2022 139  136 - 145 mmol/L Final    Potassium 2022 4.2  3.5 - 5.5 mmol/L Final    Chloride 2022 106  100 - 111 mmol/L Final    CO2 2022 25  21 - 32 mmol/L Final    Anion gap 2022 8  3.0 - 18 mmol/L Final    Glucose 2022 127 (A) 74 - 99 mg/dL Final    BUN 2022 15  7.0 - 18 MG/DL Final    Creatinine 2022 1.02  0.6 - 1.3 MG/DL Final    BUN/Creatinine ratio 2022 15  12 - 20   Final    GFR est AA 2022 >60  >60 ml/min/1.73m2 Final    GFR est non-AA 2022 54 (A) >60 ml/min/1.73m2 Final    Calcium 2022 9.5  8.5 - 10.1 MG/DL Final    Bilirubin, total 2022 1.4 (A) 0.2 - 1.0 MG/DL Final    ALT (SGPT) 2022 37  13 - 56 U/L Final    AST (SGOT) 2022 19  10 - 38 U/L Final    Alk. phosphatase 2022 77  45 - 117 U/L Final    Protein, total 2022 7.7  6.4 - 8.2 g/dL Final    Albumin 2022 4.1  3.4 - 5.0 g/dL Final    Globulin 2022 3.6  2.0 - 4.0 g/dL Final    A-G Ratio 2022 1.1  0.8 - 1.7   Final    Vitamin D 25-Hydroxy 2022 41.0  30 - 100 ng/mL Final           Health Maintenance:  Screening:    Mammogram: negative (10/2021)    PAP smear: s/p CATY/BSO for endometriosis   Colorectal: colonoscopy (2021) poor prep. Dr. Martha Muller. Due 2022.    Depression: no current treatment   DM (HbA1c/FPG): HbA1c 7.0 (2022)   Hepatitis C: negative (2018)   Falls: none   DEXA: osteopenia (2021) [facility]   Glaucoma: regular eye exams with Dr. Mariluz Wood. (last 2020) Overdue   Smokin-30 pack years, stopped 20 years ago   Vitamin D: 41.0 (2022)   Medicare Wellness: today (Initial)        Impression:  Patient Active Problem List   Diagnosis Code    Lumbosacral radiculopathy M54.17    Degeneration of lumbar or lumbosacral intervertebral disc M51.37    Chronic insomnia F51.04    Kidney transplant status, living related donor Z94.0    KIM (generalized anxiety disorder) F41.1    Type 2 diabetes mellitus with microalbuminuria ([facility]) E11.29, R80.9    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    Recurrent major depressive disorder, in full remission ([facility] [address] F33.42    Osteopenia of multiple sites M85.89    Essential hypertension I10    Chronic diastolic heart failure ([facility]) I50.32    Hyperlipidemia E78.5    GERD (gastroesophageal reflux disease) K21.9    Primary osteoarthritis involving multiple joints M15.9    Immunosuppressed status ([facility]) D84.9    Noncompliance with medications Z91.14    History of SCC (squamous cell carcinoma) of skin Z85.828    Vitamin D deficiency, unspecified  E55.9    History of 2019 novel coronavirus disease (COVID-19) Z86.16       Plan:  1. Diabetes mellitus. On Trulicity 3.0 mg weekly with improvement in HbA1c today from 7.8 to 7.0. Will add Jardiance 10 mg daily for better blood sugar control and reassess HbA1c in 3 months. On Ace-I and simvastatin. Urine protein/ creatinine ratio negative on 9/7/2021 in transplant clinic, but urine microalbumin/creatinine ratio elevated at 268 mg/g in 6/2022. No evidence of retinopathy or neuropathy. Foot exam negative (6/2022. Emphasized importance of lifestyle modifications, including heart healthy low carbohydrate diet, regular exercise, and weight loss. Patient scheduled with dietitian to help improve her diet. Will continue to monitor. 2. S/p renal transplant from living related donor. On tacrolimus 2 mg bid/1 mg nightly and prednisone 2.5 mg every other day. Creatinine stable at 0.9/ eGFR >60 and urine microalbumin/creatinine ratio at 268 mg/g (6/2022). Last visit transplant clinic on 3/28/2022 and following annually. 3. Hypertension. Blood pressure appears reasonably well controlled today on lisinopril 40 mg daily and amlodipine 5 mg daily. She is not taking Imdur. Instructed to continue to monitor her blood pressure. Renal function with creatinine 1.02/ eGFR 54. Will reassess at next visit. 4. Chronic diastolic heart failure. Grade 2 with normal systolic function on echocardiogram in 10/2017. Hemodynamically stable on current regimen as above. Does not tolerate beta blockers. Instructed to restrict sodium and follow weights. 5. Hyperlipidemia. On simvastatin 40 mg daily with LDL 94 and HDL 47 today, indicative of good control and significant improvement from baseline  with HDL 47 (2/2021) without statin treatment. Prescribed ezetimibe in 2/2021 and 9/2021 to help with better control, but patient not currently taking. Discussed importance of compliance and lifestyle modifications, including heart healthy diet, regular exercise, and weight loss. Will continue to monitor. 6. History of COVID-19. Patient developed symptoms of nasal congestion and sore throat on 6/17/2022 and tested positive for COVID-19 by home rapid antigen testing. Referred to [facility] and received bebtelovimab infusion given renal transplant status. Gradually improved and denies any sequela today. Completed only three doses of the Moderna COVID 19 vaccine series and advised to proceed with updated booster dose. Also will attempt to reschedule preexposure prophylaxis treatment with [facility] as previous appointment canceled due to COVID infection. 7. History of squamous cell carcinoma. Had been under care of Dr. García Chacon and treated with Efudex. Related to chronic immunosuppression. However, had not followed up until developed a large rapidly growing lesion on her right forearm in 9/2021 and underwent Mohs surgery by Dr. Garo Whitman with pathology showing squamous carcinoma. Reports continuing close follow-up with [facility] given increased risk due to immunosuppression from renal transplant. 8. Depression/generalized anxiety disorder.  Previously treated with Prozac and Zoloft, and did not recall side effects or reason for discontinuing. Reported increasing difficulty with anxiety at last visit in 9/2021 and PHQ 2 score of 2. She was prescribed Paxil 20 mg daily and reported that she did take it for approximately 2 months and then weaned since she felt she had improved. Prescribed sertraline and hydroxyzine in 4/2022 by FARA Silveira, and unclear if continuing treatment or still following with her. No increase in anxiety reported today. Will readdress at next visit. 9. Osteopenia. Last bone density scan in 9/2021. Using femoral neck T-scores, calculated FRAX score estimates her 10 year risk of a major osteoporetic fracture at 12 % and hip fracture at 0.8 % in context of chronic steroid use. Also, noted report of thoracic compression deformities on chest CT scan in 11/2017 at [facility]. Currently taking prednisone 2.5 mg every other day. Continue calcium and Vitamin D. Advised to continue 50,000 U dose weekly of ergocalciferol. Encouraged exercise, particularly weight bearing activities. Will consider repeat bone density study in 2023. 10. GERD. Increasing symptoms noted in 5/2019, but did not pursue endoscopy as recommended by Dr. Julia Rivera. Upper endoscopy in 5/2014 negative. Reported persistent symptoms despite taking omeprazole 40 mg and Carafate, and presented to [facility] ED in 12/2020 with chest pain, and evaluation including stress test negative. Felt to likely represent exacerbation of GERD. Prescribed Carafate tablets and patient stating that taking only as needed. Underwent upper endoscopy by Dr. Arlette Cagle on 12/30/2021 showing possible salmon-colored mucosa at the GE junction, and several nonbleeding benign fundic gastric polyps (pathology: squamous and gastric mucosa with no pathologic abnormality). Continues on omeprazole 40 mg daily and will use Carafate as needed. Reports good control of symptoms today. 11. Chronic constipation.  Most likely related to prior use of narcotics. Improved with weaning. No longer taking Linzess and appears to be doing well. 12. Bilateral adnexal cysts. Patient is s/p BSO in 2002 and found to have endometriosis. Now with CT scan (5/2019) showing masses in the area of her prior ovaries. Pelvic ultrasound inadequate, and recommended transvaginal ultrasound. However, patient not wishing to pursue since does not feel to be a problem. 13. Osteoarthritis. Patient with multiple areas of arthritis contributing to her chronic pain. However, weaned from narcotics, and discontinued Percocet and MS Contin. Not experiencing increased pain currently and no longer requiring any narcotics. 14. Obesity. Emphasized importance of lifestyle modifications, including heart healthy diet, regular exercise, and weight loss. Patient now scheduled with a dietitian to help with diet and weight loss. Will continue to address. 15. Health maintenance. Completed three doses of the Moderna COVID-19 vaccine series but has not received a fourth dose given immunosuppressed status due to renal transplant. Developed COVID-19 on 6/17/2022 and received bebtelovimab so advised to delay Moderna booster for 90 days. Advised to proceed with updated booster dose. Has been scheduled for preexposure prophylaxis with [facility] but developed COVID so canceled. Wishing again to proceed and will contact infusion center to schedule. Discussed Shingrix vaccine and urged to proceed. Mammogram due next month and will place order. Completed eye exam with Dr. Rosie White and urged to follow-up as overdue. Colonoscopy attempted in 12/2021 but due to poor prep recommended to repeat in 1 year (due 12/2022). Will place referral to Dr. Boni Delgadillo to begin scheduling. Vitamin D level remains normal on ergocalciferol 50,000 U weekly. Advised to continue. In addition, an initial Medicare wellness visit was done today.      Patient understands recommendations and agrees with plan. Follow-up in 3 months. Future orders:    ICD-10-CM ICD-9-CM    1. Type 2 diabetes mellitus with microalbuminuria, with long-term current use of insulin (HCC)  E11.29 250.40 HEMOGLOBIN A1C WITH EAG    R80.9 791.0 MAGNESIUM    O61.2 I15.06 METABOLIC PANEL, COMPREHENSIVE      MICROALBUMIN, UR, RAND W/ MICROALB/CREAT RATIO      2. Essential hypertension  I10 401.9 CBC WITH AUTOMATED DIFF      MAGNESIUM      METABOLIC PANEL, COMPREHENSIVE      3. Kidney transplant status, living related donor  Z94.0 V42.0 MAGNESIUM      METABOLIC PANEL, COMPREHENSIVE      MICROALBUMIN, UR, RAND W/ MICROALB/CREAT RATIO      4. Immunosuppressed status ([facility] [address]  D84.9 279.9 MAGNESIUM      METABOLIC PANEL, COMPREHENSIVE      MICROALBUMIN, UR, RAND W/ MICROALB/CREAT RATIO      5. Hyperlipidemia, unspecified hyperlipidemia type  E78.5 272.4 LIPID PANEL      6. History of SCC (squamous cell carcinoma) of skin  Z85.828 V10.83       7. Gastroesophageal reflux disease without esophagitis  K21.9 530.81       8. History of 2019 novel coronavirus disease (COVID-19)  Z86.16 V12.09       9. Medicare annual wellness visit, initial  Z00.00 V70.0 ADVANCE CARE PLANNING FIRST 30 MINS      10. Advanced directives, counseling/discussion  Z71.89 V65.49 ADVANCE CARE PLANNING FIRST 30 MINS      11. Screening for depression  Z13.31 V79.0       12. Encounter for screening mammogram for malignant neoplasm of breast  Z12.31 V76.12 TODD MAMMO BI SCREENING INCL CAD      15. Colon cancer screening  Z12.11 V76.51 REFERRAL TO GASTROENTEROLOGY      14. Needs flu shot  Z23 V04.81 INFLUENZA, FLUAD, (AGE 65 Y+), IM, PF, 0.5 ML      15.  Vitamin D deficiency, unspecified  E55.9 268.9 VITAMIN D, 25 HYDROXY

## 2022-09-18 DIAGNOSIS — Z12.31 ENCOUNTER FOR SCREENING MAMMOGRAM FOR MALIGNANT NEOPLASM OF BREAST: ICD-10-CM

## 2022-10-06 ENCOUNTER — TELEPHONE (OUTPATIENT)
Dept: INTERNAL MEDICINE CLINIC | Age: 67
End: 2022-10-06

## 2022-10-06 NOTE — TELEPHONE ENCOUNTER
Jardiance needs a prior auth per . Says they pharmacy is telling him a couple different things so he isn't sure if that is all they need. Asking nurse call pharmacy to be sure.

## 2022-11-19 RX ORDER — SIMVASTATIN 40 MG/1
TABLET, FILM COATED ORAL
Qty: 90 TABLET | Refills: 2 | Status: SHIPPED | OUTPATIENT
Start: 2022-11-19

## 2022-11-29 ENCOUNTER — TELEPHONE (OUTPATIENT)
Dept: INTERNAL MEDICINE CLINIC | Age: 67
End: 2022-11-29

## 2022-11-29 NOTE — TELEPHONE ENCOUNTER
Please advise that unable to prescribe fluconazole due to high risk interaction with tacrolimus. Would recommend that she obtain over the counter vaginal antifungal cream (eg: Monistat) from pharmacy and follow directions for vaginal treatment.

## 2022-11-29 NOTE — TELEPHONE ENCOUNTER
Pt calling for medication for yeast infection. Symptoms of discharge and burning x 2 days. Stated she recently started taking the medication Jardiance and Dr Jason Sheth advised it may cause a yeast infection. [facility] [address]

## 2022-12-15 RX ORDER — LISINOPRIL 40 MG/1
TABLET ORAL
Qty: 90 TABLET | Refills: 2 | Status: SHIPPED | OUTPATIENT
Start: 2022-12-15

## 2022-12-30 ENCOUNTER — HOSPITAL ENCOUNTER (OUTPATIENT)
Dept: LAB | Age: 67
End: 2022-12-30
Payer: COMMERCIAL

## 2022-12-30 ENCOUNTER — APPOINTMENT (OUTPATIENT)
Dept: INTERNAL MEDICINE CLINIC | Age: 67
End: 2022-12-30

## 2022-12-30 DIAGNOSIS — Z94.0 KIDNEY TRANSPLANT STATUS, LIVING RELATED DONOR: ICD-10-CM

## 2022-12-30 DIAGNOSIS — I10 ESSENTIAL HYPERTENSION: ICD-10-CM

## 2022-12-30 DIAGNOSIS — R80.9 TYPE 2 DIABETES MELLITUS WITH MICROALBUMINURIA, WITH LONG-TERM CURRENT USE OF INSULIN (HCC): ICD-10-CM

## 2022-12-30 DIAGNOSIS — E55.9 VITAMIN D DEFICIENCY, UNSPECIFIED: ICD-10-CM

## 2022-12-30 DIAGNOSIS — D84.9 IMMUNOSUPPRESSED STATUS (HCC): ICD-10-CM

## 2022-12-30 DIAGNOSIS — E11.29 TYPE 2 DIABETES MELLITUS WITH MICROALBUMINURIA, WITH LONG-TERM CURRENT USE OF INSULIN (HCC): ICD-10-CM

## 2022-12-30 DIAGNOSIS — Z79.4 TYPE 2 DIABETES MELLITUS WITH MICROALBUMINURIA, WITH LONG-TERM CURRENT USE OF INSULIN (HCC): ICD-10-CM

## 2022-12-30 DIAGNOSIS — E78.5 HYPERLIPIDEMIA, UNSPECIFIED HYPERLIPIDEMIA TYPE: ICD-10-CM

## 2022-12-30 LAB
25(OH)D3 SERPL-MCNC: 25.2 NG/ML (ref 30–100)
ALBUMIN SERPL-MCNC: 4.1 G/DL (ref 3.4–5)
ALBUMIN/GLOB SERPL: 1.1 {RATIO} (ref 0.8–1.7)
ALP SERPL-CCNC: 83 U/L (ref 45–117)
ALT SERPL-CCNC: 31 U/L (ref 13–56)
ANION GAP SERPL CALC-SCNC: 7 MMOL/L (ref 3–18)
AST SERPL-CCNC: 17 U/L (ref 10–38)
BASOPHILS # BLD: 0.1 K/UL (ref 0–0.1)
BASOPHILS NFR BLD: 1 % (ref 0–2)
BILIRUB SERPL-MCNC: 0.3 MG/DL (ref 0.2–1)
BUN SERPL-MCNC: 17 MG/DL (ref 7–18)
BUN/CREAT SERPL: 18 (ref 12–20)
CALCIUM SERPL-MCNC: 9.7 MG/DL (ref 8.5–10.1)
CHLORIDE SERPL-SCNC: 107 MMOL/L (ref 100–111)
CHOLEST SERPL-MCNC: 230 MG/DL
CO2 SERPL-SCNC: 23 MMOL/L (ref 21–32)
CREAT SERPL-MCNC: 0.97 MG/DL (ref 0.6–1.3)
CREAT UR-MCNC: 38 MG/DL (ref 30–125)
DIFFERENTIAL METHOD BLD: ABNORMAL
EOSINOPHIL # BLD: 0.5 K/UL (ref 0–0.4)
EOSINOPHIL NFR BLD: 6 % (ref 0–5)
ERYTHROCYTE [DISTWIDTH] IN BLOOD BY AUTOMATED COUNT: 13.8 % (ref 11.6–14.5)
EST. AVERAGE GLUCOSE BLD GHB EST-MCNC: 148 MG/DL
GLOBULIN SER CALC-MCNC: 3.6 G/DL (ref 2–4)
GLUCOSE SERPL-MCNC: 131 MG/DL (ref 74–99)
HBA1C MFR BLD: 6.8 % (ref 4.2–5.6)
HCT VFR BLD AUTO: 39.9 % (ref 35–45)
HDLC SERPL-MCNC: 48 MG/DL (ref 40–60)
HDLC SERPL: 4.8 {RATIO} (ref 0–5)
HGB BLD-MCNC: 12.7 G/DL (ref 12–16)
IMM GRANULOCYTES # BLD AUTO: 0 K/UL (ref 0–0.04)
IMM GRANULOCYTES NFR BLD AUTO: 0 % (ref 0–0.5)
LDLC SERPL CALC-MCNC: ABNORMAL MG/DL (ref 0–100)
LIPID PROFILE,FLP: ABNORMAL
LYMPHOCYTES # BLD: 2.8 K/UL (ref 0.9–3.6)
LYMPHOCYTES NFR BLD: 30 % (ref 21–52)
MAGNESIUM SERPL-MCNC: 1.9 MG/DL (ref 1.6–2.6)
MCH RBC QN AUTO: 29.3 PG (ref 24–34)
MCHC RBC AUTO-ENTMCNC: 31.8 G/DL (ref 31–37)
MCV RBC AUTO: 91.9 FL (ref 78–100)
MICROALBUMIN UR-MCNC: 4.15 MG/DL (ref 0–3)
MICROALBUMIN/CREAT UR-RTO: 109 MG/G (ref 0–30)
MONOCYTES # BLD: 0.8 K/UL (ref 0.05–1.2)
MONOCYTES NFR BLD: 8 % (ref 3–10)
NEUTS SEG # BLD: 5.2 K/UL (ref 1.8–8)
NEUTS SEG NFR BLD: 55 % (ref 40–73)
NRBC # BLD: 0 K/UL (ref 0–0.01)
NRBC BLD-RTO: 0 PER 100 WBC
PLATELET # BLD AUTO: 316 K/UL (ref 135–420)
PMV BLD AUTO: 9.2 FL (ref 9.2–11.8)
POTASSIUM SERPL-SCNC: 4.4 MMOL/L (ref 3.5–5.5)
PROT SERPL-MCNC: 7.7 G/DL (ref 6.4–8.2)
RBC # BLD AUTO: 4.34 M/UL (ref 4.2–5.3)
SODIUM SERPL-SCNC: 137 MMOL/L (ref 136–145)
TRIGL SERPL-MCNC: 409 MG/DL (ref ?–150)
VLDLC SERPL CALC-MCNC: ABNORMAL MG/DL
WBC # BLD AUTO: 9.4 K/UL (ref 4.6–13.2)

## 2022-12-30 PROCEDURE — 82306 VITAMIN D 25 HYDROXY: CPT

## 2022-12-30 PROCEDURE — 80061 LIPID PANEL: CPT

## 2022-12-30 PROCEDURE — 80053 COMPREHEN METABOLIC PANEL: CPT

## 2022-12-30 PROCEDURE — 82043 UR ALBUMIN QUANTITATIVE: CPT

## 2022-12-30 PROCEDURE — 85025 COMPLETE CBC W/AUTO DIFF WBC: CPT

## 2022-12-30 PROCEDURE — 36415 COLL VENOUS BLD VENIPUNCTURE: CPT

## 2022-12-30 PROCEDURE — 83036 HEMOGLOBIN GLYCOSYLATED A1C: CPT

## 2022-12-30 PROCEDURE — 83735 ASSAY OF MAGNESIUM: CPT

## 2023-01-03 ENCOUNTER — OFFICE VISIT (OUTPATIENT)
Dept: INTERNAL MEDICINE CLINIC | Age: 68
End: 2023-01-03
Payer: COMMERCIAL

## 2023-01-03 VITALS
HEART RATE: 72 BPM | HEIGHT: 63 IN | DIASTOLIC BLOOD PRESSURE: 78 MMHG | WEIGHT: 181.2 LBS | OXYGEN SATURATION: 96 % | SYSTOLIC BLOOD PRESSURE: 138 MMHG | BODY MASS INDEX: 32.11 KG/M2 | RESPIRATION RATE: 16 BRPM | TEMPERATURE: 97.3 F

## 2023-01-03 DIAGNOSIS — D84.9 IMMUNOSUPPRESSED STATUS (HCC): ICD-10-CM

## 2023-01-03 DIAGNOSIS — Z85.828 HISTORY OF SCC (SQUAMOUS CELL CARCINOMA) OF SKIN: ICD-10-CM

## 2023-01-03 DIAGNOSIS — I10 ESSENTIAL HYPERTENSION: ICD-10-CM

## 2023-01-03 DIAGNOSIS — E55.9 VITAMIN D DEFICIENCY, UNSPECIFIED: ICD-10-CM

## 2023-01-03 DIAGNOSIS — Z12.31 SCREENING MAMMOGRAM, ENCOUNTER FOR: ICD-10-CM

## 2023-01-03 DIAGNOSIS — Z12.11 COLON CANCER SCREENING: ICD-10-CM

## 2023-01-03 DIAGNOSIS — Z86.16 HISTORY OF 2019 NOVEL CORONAVIRUS DISEASE (COVID-19): ICD-10-CM

## 2023-01-03 DIAGNOSIS — E11.29 TYPE 2 DIABETES MELLITUS WITH MICROALBUMINURIA, WITH LONG-TERM CURRENT USE OF INSULIN (HCC): Primary | ICD-10-CM

## 2023-01-03 DIAGNOSIS — R80.9 TYPE 2 DIABETES MELLITUS WITH MICROALBUMINURIA, WITH LONG-TERM CURRENT USE OF INSULIN (HCC): Primary | ICD-10-CM

## 2023-01-03 DIAGNOSIS — Z79.4 TYPE 2 DIABETES MELLITUS WITH MICROALBUMINURIA, WITH LONG-TERM CURRENT USE OF INSULIN (HCC): Primary | ICD-10-CM

## 2023-01-03 DIAGNOSIS — Z94.0 KIDNEY TRANSPLANT STATUS, LIVING RELATED DONOR: ICD-10-CM

## 2023-01-03 DIAGNOSIS — E78.5 HYPERLIPIDEMIA, UNSPECIFIED HYPERLIPIDEMIA TYPE: ICD-10-CM

## 2023-01-03 DIAGNOSIS — K21.9 GASTROESOPHAGEAL REFLUX DISEASE WITHOUT ESOPHAGITIS: ICD-10-CM

## 2023-01-03 RX ORDER — DULAGLUTIDE 3 MG/.5ML
3 INJECTION, SOLUTION SUBCUTANEOUS
Qty: 12 EACH | Refills: 3 | Status: SHIPPED | OUTPATIENT
Start: 2023-01-03

## 2023-01-03 RX ORDER — PRAVASTATIN SODIUM 80 MG/1
80 TABLET ORAL
Qty: 90 TABLET | Refills: 3 | Status: SHIPPED | OUTPATIENT
Start: 2023-01-03

## 2023-01-03 RX ORDER — ERGOCALCIFEROL 1.25 MG/1
50000 CAPSULE ORAL
Qty: 12 CAPSULE | Refills: 3 | Status: SHIPPED | OUTPATIENT
Start: 2023-01-03

## 2023-01-03 NOTE — PATIENT INSTRUCTIONS
Please schedule eye exam.    Schedule mammogram at [phone]. Heart-Healthy Diet: Care Instructions  Your Care Instructions     A heart-healthy diet has lots of vegetables, fruits, nuts, beans, and whole grains, and is low in salt. It limits foods that are high in saturated fat, such as meats, cheeses, and fried foods. It may be hard to change your diet, but even small changes can lower your risk of heart attack and heart disease. Follow-up care is a key part of your treatment and safety. Be sure to make and go to all appointments, and call your doctor if you are having problems. It's also a good idea to know your test results and keep a list of the medicines you take. How can you care for yourself at home? Watch your portions  Learn what a serving is. A \"serving\" and a \"portion\" are not always the same thing. Make sure that you are not eating larger portions than are recommended. For example, a serving of pasta is ½ cup. A serving size of meat is 2 to 3 ounces. A 3-ounce serving is about the size of a deck of cards. Measure serving sizes until you are good at [place]" them. Keep in mind that restaurants often serve portions that are 2 or 3 times the size of one serving. To keep your energy level up and keep you from feeling hungry, eat often but in smaller portions. Eat only the number of calories you need to stay at a healthy weight. If you need to lose weight, eat fewer calories than your body burns (through exercise and other physical activity). Eat more fruits and vegetables  Eat a variety of fruit and vegetables every day. Dark green, deep orange, red, or yellow fruits and vegetables are especially good for you. Examples include spinach, carrots, peaches, and berries. Keep carrots, celery, and other veggies handy for snacks. Buy fruit that is in season and store it where you can see it so that you will be tempted to eat it.   Cook dishes that have a lot of veggies in them, such as stir-fries and soups. Limit saturated and trans fat  Read food labels, and try to avoid saturated and trans fats. They increase your risk of heart disease. Use olive or canola oil when you cook. Bake, broil, grill, or steam foods instead of frying them. Choose lean meats instead of high-fat meats such as hot dogs and sausages. Cut off all visible fat when you prepare meat. Eat fish, skinless poultry, and meat alternatives such as soy products instead of high-fat meats. Soy products, such as tofu, may be especially good for your heart. Choose low-fat or fat-free milk and dairy products. Eat foods high in fiber  Eat a variety of grain products every day. Include whole-grain foods that have lots of fiber and nutrients. Examples of whole-grain foods include oats, whole wheat bread, and brown rice. Buy whole-grain breads and cereals, instead of white bread or pastries. Limit salt and sodium  Limit how much salt and sodium you eat to help lower your blood pressure. Taste food before you salt it. Add only a little salt when you think you need it. With time, your taste buds will adjust to less salt. Eat fewer snack items, fast foods, and other high-salt, processed foods. Check food labels for the amount of sodium in packaged foods. Choose low-sodium versions of canned goods (such as soups, vegetables, and beans). Limit sugar  Limit drinks and foods with added sugar. These include candy, desserts, and soda pop. Limit alcohol  Limit alcohol to no more than 2 drinks a day for men and 1 drink a day for women. Too much alcohol can cause health problems. When should you call for help? Watch closely for changes in your health, and be sure to contact your doctor if:    You would like help planning heart-healthy meals. Where can you learn more? Go to http://www.Fara.com/  Enter V137 in the search box to learn more about \"Heart-Healthy Diet: Care Instructions. \"  Current as of: August 22, 2019 Content Version: 12.6  © 0427-1088 [employer]. Care instructions adapted under license by [employer] (which disclaims liability or warranty for this information). If you have questions about a medical condition or this instruction, always ask your healthcare professional. [address] any warranty or liability for your use of this information. Learning About Diabetes Food Guidelines  Your Care Instructions     Meal planning is important to manage diabetes. It helps keep your blood sugar at a target level (which you set with your doctor). You don't have to eat special foods. You can eat what your family eats, including sweets once in a while. But you do have to pay attention to how often you eat and how much you eat of certain foods. You may want to work with a dietitian or a certified diabetes educator (CDE) to help you plan meals and snacks. A dietitian or CDE can also help you lose weight if that is one of your goals. What should you know about eating carbs? Managing the amount of carbohydrate (carbs) you eat is an important part of healthy meals when you have diabetes. Carbohydrate is found in many foods. Learn which foods have carbs. And learn the amounts of carbs in different foods. Bread, cereal, pasta, and rice have about 15 grams of carbs in a serving. A serving is 1 slice of bread (1 ounce), ½ cup of cooked cereal, or 1/3 cup of cooked pasta or rice. Fruits have 15 grams of carbs in a serving. A serving is 1 small fresh fruit, such as an apple or orange; ½ of a banana; ½ cup of cooked or canned fruit; ½ cup of fruit juice; 1 cup of melon or raspberries; or 2 tablespoons of dried fruit. Milk and no-sugar-added yogurt have 15 grams of carbs in a serving. A serving is 1 cup of milk or 2/3 cup of no-sugar-added yogurt. Starchy vegetables have 15 grams of carbs in a serving.  A serving is ½ cup of mashed potatoes or sweet potato; 1 cup winter squash; ½ of a small baked potato; ½ cup of cooked beans; or ½ cup cooked corn or green peas. Learn how much carbs to eat each day and at each meal. A dietitian or CDE can teach you how to keep track of the amount of carbs you eat. This is called carbohydrate counting. If you are not sure how to count carbohydrate grams, use the Plate Method to plan meals. It is a good, quick way to make sure that you have a balanced meal. It also helps you spread carbs throughout the day. Divide your plate by types of foods. Put non-starchy vegetables on half the plate, meat or other protein food on one-quarter of the plate, and a grain or starchy vegetable in the final quarter of the plate. To this you can add a small piece of fruit and 1 cup of milk or yogurt, depending on how many carbs you are supposed to eat at a meal.  Try to eat about the same amount of carbs at each meal. Do not \"save up\" your daily allowance of carbs to eat at one meal.  Proteins have very little or no carbs per serving. Examples of proteins are beef, chicken, turkey, fish, eggs, tofu, cheese, cottage cheese, and peanut butter. A serving size of meat is 3 ounces, which is about the size of a deck of cards. Examples of meat substitute serving sizes (equal to 1 ounce of meat) are 1/4 cup of cottage cheese, 1 egg, 1 tablespoon of peanut butter, and ½ cup of tofu. How can you eat out and still eat healthy? Learn to estimate the serving sizes of foods that have carbohydrate. If you measure food at home, it will be easier to estimate the amount in a serving of restaurant food. If the meal you order has too much carbohydrate (such as potatoes, corn, or baked beans), ask to have a low-carbohydrate food instead. Ask for a salad or green vegetables. If you use insulin, check your blood sugar before and after eating out to help you plan how much to eat in the future.   If you eat more carbohydrate at a meal than you had planned, take a walk or do other exercise. This will help lower your blood sugar. What else should you know? Limit saturated fat, such as the fat from meat and dairy products. This is a healthy choice because people who have diabetes are at higher risk of heart disease. So choose lean cuts of meat and nonfat or low-fat dairy products. Use olive or canola oil instead of butter or shortening when cooking. Don't skip meals. Your blood sugar may drop too low if you skip meals and take insulin or certain medicines for diabetes. Check with your doctor before you drink alcohol. Alcohol can cause your blood sugar to drop too low. Alcohol can also cause a bad reaction if you take certain diabetes medicines. Follow-up care is a key part of your treatment and safety. Be sure to make and go to all appointments, and call your doctor if you are having problems. It's also a good idea to know your test results and keep a list of the medicines you take. Where can you learn more? Go to http://kolby-yuli.info/  Enter I147 in the search box to learn more about \"Learning About Diabetes Food Guidelines. \"  Current as of: December 20, 2019               Content Version: 12.6  © 2625-4358 [employer]. Care instructions adapted under license by [employer] (which disclaims liability or warranty for this information). If you have questions about a medical condition or this instruction, always ask your healthcare professional. [address] any warranty or liability for your use of this information. High Cholesterol: Care Instructions  Your Care Instructions     Cholesterol is a type of fat in your blood. It is needed for many body functions, such as making new cells. Cholesterol is made by your body. It also comes from food you eat. High cholesterol means that you have too much of the fat in your blood. This raises your risk of a heart attack and stroke.   LDL and HDL are part of your total cholesterol. LDL is the \"bad\" cholesterol. High LDL can raise your risk for heart disease, heart attack, and stroke. HDL is the \"good\" cholesterol. It helps clear bad cholesterol from the body. High HDL is linked with a lower risk of heart disease, heart attack, and stroke. Your cholesterol levels help your doctor find out your risk for having a heart attack or stroke. You and your doctor can talk about whether you need to lower your risk and what treatment is best for you. A heart-healthy lifestyle along with medicines can help lower your cholesterol and your risk. The way you choose to lower your risk will depend on how high your risk is for heart attack and stroke. It will also depend on how you feel about taking medicines. Follow-up care is a key part of your treatment and safety. Be sure to make and go to all appointments, and call your doctor if you are having problems. It's also a good idea to know your test results and keep a list of the medicines you take. How can you care for yourself at home? Eat a variety of foods every day. Good choices include fruits, vegetables, whole grains (like oatmeal), dried beans and peas, nuts and seeds, soy products (like tofu), and fat-free or low-fat dairy products. Replace butter, margarine, and hydrogenated or partially hydrogenated oils with olive and canola oils. (Canola oil margarine without trans fat is fine.)  Replace red meat with fish, poultry, and soy protein (like tofu). Limit processed and packaged foods like chips, crackers, and cookies. Bake, broil, or steam foods. Don't pena them. Be physically active. Get at least 30 minutes of exercise on most days of the week. Walking is a good choice. You also may want to do other activities, such as running, swimming, cycling, or playing tennis or team sports. Stay at a healthy weight or lose weight by making the changes in eating and physical activity listed above.  Losing just a small amount of weight, even 5 to 10 pounds, can reduce your risk for having a heart attack or stroke. Do not smoke. When should you call for help? Watch closely for changes in your health, and be sure to contact your doctor if:    You need help making lifestyle changes. You have questions about your medicine. Where can you learn more? Go to http://www.gray.com/  Enter B118 in the search box to learn more about \"High Cholesterol: Care Instructions. \"  Current as of: December 16, 2019               Content Version: 12.6  © 2979-7175 [employer]. Care instructions adapted under license by [employer] (which disclaims liability or warranty for this information). If you have questions about a medical condition or this instruction, always ask your healthcare professional. [address] any warranty or liability for your use of this information. Learning About Low-Sodium Foods  What foods are low in sodium? The foods you eat contain nutrients, such as vitamins and minerals. Sodium is a nutrient. Your body needs the right amount to stay healthy and work as it should. You can use the list below to help you make choices about which foods to eat.   Fruits  Fresh, frozen, canned, or dried fruit  Vegetables  Fresh or frozen vegetables, with no added salt  Canned vegetables, low-sodium or with no added salt  Grains  Bagels without salted tops  Cereal with no added salt  Corn tortillas  Crackers with no added salt  Oatmeal, cooked without salt  Popcorn with no salt  Pasta and noodles, cooked without salt  Rice, cooked without salt  Unsalted pretzels  Dairy and dairy alternatives  Butter, unsalted  Cream cheese  Ice cream  Milk  Soy milk  Meats and other protein foods  Beans and peas, canned with no salt  Eggs  Fresh fish (not smoked)  Fresh meats (not smoked or cured)  Nuts and nut butter, prepared without salt  Poultry, not packaged with sodium solution  Tofu, unseasoned  Tuna, canned without salt  Seasonings  Garlic  Herbs and spices  Lemon juice  Mustard  Olive oil  Salt-free seasoning mixes  Vinegar  Work with your doctor to find out how much of this nutrient you need. Depending on your health, you may need more or less of it in your diet. Where can you learn more? Go to http://www.gray.com/  Enter S460 in the search box to learn more about \"Learning About Low-Sodium Foods. \"  Current as of: August 22, 2019               Content Version: 12.6  © 7460-7012 [employer]. Care instructions adapted under license by [employer] (which disclaims liability or warranty for this information). If you have questions about a medical condition or this instruction, always ask your healthcare professional. [address] any warranty or liability for your use of this information. Low Sodium Diet (2,000 Milligram): Care Instructions  Your Care Instructions     Too much sodium causes your body to hold on to extra water. This can raise your blood pressure and force your heart and kidneys to work harder. In very serious cases, this could cause you to be put in the hospital. It might even be life-threatening. By limiting sodium, you will feel better and lower your risk of serious problems. The most common source of sodium is salt. People get most of the salt in their diet from canned, prepared, and packaged foods. Fast food and restaurant meals also are very high in sodium. Your doctor will probably limit your sodium to less than 2,000 milligrams (mg) a day. This limit counts all the sodium in prepared and packaged foods and any salt you add to your food. Follow-up care is a key part of your treatment and safety. Be sure to make and go to all appointments, and call your doctor if you are having problems. It's also a good idea to know your test results and keep a list of the medicines you take.   How can you care for yourself at home? Read food labels  Read labels on cans and food packages. The labels tell you how much sodium is in each serving. Make sure that you look at the serving size. If you eat more than the serving size, you have eaten more sodium. Food labels also tell you the Percent Daily Value for sodium. Choose products with low Percent Daily Values for sodium. Be aware that sodium can come in forms other than salt, including monosodium glutamate (MSG), sodium citrate, and sodium bicarbonate (baking soda). MSG is often added to Asian food. When you eat out, you can sometimes ask for food without MSG or added salt. Buy low-sodium foods  Buy foods that are labeled \"unsalted\" (no salt added), \"sodium-free\" (less than 5 mg of sodium per serving), or \"low-sodium\" (less than 140 mg of sodium per serving). Foods labeled \"reduced-sodium\" and \"light sodium\" may still have too much sodium. Be sure to read the label to see how much sodium you are getting. Buy fresh vegetables, or frozen vegetables without added sauces. Buy low-sodium versions of canned vegetables, soups, and other canned goods. Prepare low-sodium meals  Cut back on the amount of salt you use in cooking. This will help you adjust to the taste. Do not add salt after cooking. One teaspoon of salt has about 2,300 mg of sodium. Take the salt shaker off the table. Flavor your food with garlic, lemon juice, onion, vinegar, herbs, and spices. Do not use soy sauce, lite soy sauce, steak sauce, onion salt, garlic salt, celery salt, mustard, or ketchup on your food. Use low-sodium salad dressings, sauces, and ketchup. Or make your own salad dressings and sauces without adding salt. Use less salt (or none) when recipes call for it. You can often use half the salt a recipe calls for without losing flavor. Other foods such as rice, pasta, and grains do not need added salt. Rinse canned vegetables, and cook them in fresh water.  This removes some--but not all--of the salt. Avoid water that is naturally high in sodium or that has been treated with water softeners, which add sodium. Call your local water company to find out the sodium content of your water supply. If you buy bottled water, read the label and choose a sodium-free brand. Avoid high-sodium foods  Avoid eating:  Smoked, cured, salted, and canned meat, fish, and poultry. Ham, haney, hot dogs, and luncheon meats. Regular, hard, and processed cheese and regular peanut butter. Crackers with salted tops, and other salted snack foods such as pretzels, chips, and salted popcorn. Frozen prepared meals, unless labeled low-sodium. Canned and dried soups, broths, and bouillon, unless labeled sodium-free or low-sodium. Canned vegetables, unless labeled sodium-free or low-sodium. Western Gabi fries, pizza, tacos, and other fast foods. Pickles, olives, ketchup, and other condiments, especially soy sauce, unless labeled sodium-free or low-sodium. Where can you learn more? Go to http://www.gray.com/  Enter V843 in the search box to learn more about \"Low Sodium Diet (2,000 Milligram): Care Instructions. \"  Current as of: August 22, 2019               Content Version: 12.6  © 3803-9457 [employer]. Care instructions adapted under license by [employer] (which disclaims liability or warranty for this information). If you have questions about a medical condition or this instruction, always ask your healthcare professional. [place] [address] any warranty or liability for your use of this information.

## 2023-01-03 NOTE — PROGRESS NOTES
1. \"Have you been to the ER, urgent care clinic since your last visit? Hospitalized since your last visit? \" No    2. \"Have you seen or consulted any other health care providers outside of the [address] [place] [address] since your last visit? \" No     3. For patients aged 39-70: Has the patient had a colonoscopy / FIT/ Cologuard? Yes - no Care Gap present      If the patient is female:    4. For patients aged 41-77: Has the patient had a mammogram within the past 2 years? Yes - no Care Gap present      5. For patients aged 21-65: Has the patient had a pap smear?  NA - based on age or sex

## 2023-01-08 NOTE — PROGRESS NOTES
HPI:   Maryjo Davis is a 75y.o. year old female who presents today for a routine visit. She has a history of hypertension, hyperlipidemia, chronic diastolic heart failure, diabetes mellitus, ESRD s/p living donor cadaveric renal transplant (4/1980), osteopenia, osteoarthritis, lumbar degenerative disease, chronic pain syndrome, squamous cell skin cancers, depression, anxiety, GERD, and noncompliance. She has completed the three doses of the Moderna COVID-19 vaccine series and a fourth Pfizer booster dose. She also has completed the updated bivalent Pfizer booster dose. She reports today that she is doing reasonably well. She was scheduled for Evusheld therapy at the infusion center but had to cancel her appointment in 6/2022 when she developed COVID-19. She also had to cancel her appointment in 10/2022 due to developing cold symptoms. She expresses today that she would like to proceed with Evicel therapy and requests a new order. She states that she is interested in rescheduling. She is otherwise without new complaints. Summary of prior hospitalizations and medical history:  On 6/17/2022, she contacted the office and reported symptoms of nasal congestion and sore throat, and tested positive for COVID-19 by home rapid antigen testing. She was referred to [facility] given her immunosuppressed status due to renal transplant and received bebtelovimab infusion without complications. She reports gradual improvement in symptoms and denies any sequela today. She has had increased difficulty with reflux and presented to the [facility] ED on 12/1/2020 complaining of intermittent substernal chest pain for about 1 week. It was not related to exertion and she did not experience associated dyspnea at rest or with exertion, palpitations, or lightheadedness.  Evaluation included WBC 9.0, Hb 12.3/ Hct 38.1, creatinine 1.0/ eGFR 53, K 4.9, NT pro-BNP 36, troponin x 2 negative, EKG x 2 negative, chest x-ray negative, and a pharmacologic nuclear stress test (12/2/2020) which was a normal, low risk study with EF 63% and no TID. She was advised that symptom were likely related to underlying GERD, and she was discharged. On 5/8/2019, she presented to [facility] ED complaining of thoracic back pain. She informed the ED physicians that she had weaned herself from her chronic pain medications and was requesting pain medicine since her pain had increased. She was given a single dose of gabapentin 300 mg, Zofran, and two Percocet, and she was discharged with a prescription for gabapentin 100 mg tid. Due to continued pain, she presented to the [facility] ED on 5/10/2019 and was complaining of sharp, stabbing mid abdominal pain. She was found to be tender in the epigastric and right abdomen. Lab evaluation included Hb 11.8/ Hct 36.2, creatinine 1.04/ eGFR 53, lipase 197, troponin and urinalysis negative. and an abdominal/pelvic CT scan was obtained showing no acute abdominal pathology, but an incidental finding of a suggestion of bilateral adnexal cysts, right measuring 1.6 cm and the left measuring 1.3 cm. She was discharged with a prescription for famotidine. She was also referred to Dr. Ruperto Ugalde for follow-up. She expresses concern regarding the findings on the CT scan suggestive of bilateral adnexal cysts. She states that she had a complete hysterectomy many years ago and had her ovaries removed in 2002 due to endometriosis. Review of her record shows a pelvic ultrasound from 11/11/2002 showing bilateral pelvic masses in the area of the ovaries, the left appearing to represent the ovary with an adjacent fluid collection and the right being an indeterminate mass. The patient states that this is what prompted the removal of her ovaries. She had had a hysterectomy several years prior to this. She denies any lower abdominal pain or bloating.  She underwent evaluation by Dr. Ruperto Ugalde on 6/18/2019, and recommendation was to proceed with an upper endoscopy, but the patient cancelled the appointment. She also underwent a pelvic ultrasound (5/23/2019) which did not confirm findings seen on CT scan, but recommendation was to proceed with a transvaginal ultrasound. However, the patient stated that she wished to follow-up with her gynecologist.     On 11/6/2018, she admitted that she was no longer taking her immunosuppressant medications for her kidney transplant. She stated that she had stopped taking prednisone for about a year, and then stopped tacrolimus for several months although is not specific as to when. However, review of record showed that her tacrolimus level was therapeutic at 5.2 on 4/27/2018 at her annual follow-up visit in the renal transplant clinic. She stated that she was told by one of her kidney doctors that \"her brother must have been a twin for her transplanted kidney to have lasted this long\". She stated that she believed him even though her brother is 11years older, and she decided on her own that she no longer needed to take her immunosuppressants. She states that she also did not like the fact that she would \"get so many colds\" while taking the medications. She became concerned when she was informed of the development of increasing proteinuria on he labs. She was seen in follow-up on 11/21/2018 and reported that she had restarted her kidney transplant immunosuppressant medications, and was taking tacrolimus 2 mg bid and prednisone 5 mg every other day. Her tacrolimus level was measured at 1.7 and she continued to show proteinuria with urine microalbumin/ creatinine ratio 150 mg/g, and urinalysis measuring 30 mg/dl protein. Her creatinine remained relatively stable at 0.95/ eGFR 59. She subsequently followed up with Dr. Joan Ko, and repeat labs showed an increase in her creatinine to 1.22/ eGFR 44, urinalysis showed 100 mg/dl protein, random urine protein 61 mg/dl with urine creatinine 81.2.  She stated that she was instructed to have follow-up labs in 4 weeks, and that she may require a renal biopsy if they continued to be abnormal. She also stated that her antihypertensive regimen was changed with increase in her dose of lisinopril to 40 mg bid, and amlodipine and Imdur were discontinued. However, she has not followed up with Dr. Shalini Agosto since her last visit in 1/2019. She has a history of hypertension, treated with lisinopril and amlodipine. She has not been monitoring her blood pressure at home. She also has a history of hyperlipidemia, treated previously with high intensity dose simvastatin at 80 mg daily. She was changed to rosuvastatin in 8/2018, but noticed severe muscle aches and stopped taking it with improvement. She was subsequently restarted on simvastatin at 40 mg dose. In 10/2017, she was admitted to [facility] with dyspnea on exertion, and chest xray revealed mild cardiac enlargement with pulmonary vascular congestion and diffusely increased interstitial markings. Echocardiogram showed normal LV size and function (EF 65%), mild concentric LVH, grade 2 diastolic dysfunction, and a pharmacologic nuclear stress test was a normal low risk study without evidence of ischemia or prior infarction, and calculated EF 70%. She was diagnosed with acute on chronic diastolic heart failure, and treated with lasix and diuresed approximately five liters with improvement. She was intolerant to beta blockers. She currently denies any chest pain, shortness of breath at rest or with exertion, palpitations, lightheadedness, PND, orthopnea, or edema. She is now being followed by Dr. Elva Cagle. She has a history of diabetes mellitus, which had not been treated with medication until 12/2019 when she began experiencing polyuria, polydipsia, and fatigue for several weeks. She stated that on 12/16/2019, she checked her blood sugar at home and it measured 575.  She states that she took two 500 mg metformin and presented to [facility] ED for evaluation. She stated that initial blood sugar was elevated at 343, and she was administered 8 units of regular insulin and IV fluids. Evaluation included Na 134, Cl 93, K 4.2, creatinine 1.1/ eGFR 47, Ca 10.1, urinalysis protein 100, glucose >500, chest x-ray negative, EKG sinus tachycardia 100. She was subsequently discharged on metformin 500 mg bid and reported that her blood sugars had been measuring between 400-550 since discharge. She developed GI distress and was instructed to discontinue metformin and begin Lantus 10 U qHS and titrated her dose according to her fasting morning blood sugars, increasing to 32 U qHS, and then switched to Levemir on 1/4/2020 due to formulary issues. Trulicity was added, and she was weaned from insulin in 3/2020. She denies any polyuria, polydipsia, nocturia, or blurry vision, and has no history of retinopathy, neuropathy, or nephropathy. She has not been having regular eye exams. She has a history of renal failure and is s/p a living donor cadaveric renal transplant from her brother in 4/1980. She had been noncompliant with her regimen as discussed, but has resumed taking tacrolimus and prednisone one month ago. She is followed in the [facility] by Dr. Sandy Castillo and Dr. Yun Saavedra. She has secondary osteopenia due to chronic steroid use, and was being treated with alendronate. However, she states that she discontinued taking it. Her last bone density study was in 9/2021 showing T-scores:  femoral neck left -0.7  and distal radius -1.0. She continues to take calcium and Vitamin D supplements. She has no history of pathologic fractures. She has a history of osteoarthritis and is s/p right hip replacement by Dr. Radha Bautista in 2015. She also has chronic neck and low back pain secondary to degenerative disease.  She had been followed previously in the [facility] pain management center, but has weaned herself off of pain medications. She has stopped taking Percocet and MS Contin. She states that she has not noticed a significant increase in her neck or low back pain. She denies any fevers, chills, weight change, saddle paresthesia, neurogenic bowel or bladder symptoms, or recent trauma. She has a history of GERD, treated with omeprazole. She had an upper endoscopy in 5/2014 by Dr. Flor Brown which was normal. She also had difficulty with chronic constipation, secondary to narcotic use. She was being treated with Linzess, although discontinued after she weaned herself from taking narcotics. She had a screening colonoscopy in 6/2011 which showed evidence of diverticulosis. She underwent an upper endoscopy and colonoscopy on 12/30/2021 with Dr. Raisa Bo. Upper endoscopy revealed salmon-colored mucosa at the GE junction and multiple nonbleeding benign fundic gastric polyps (pathology: squamous and gastric mucosa with no pathologic abnormality). Colonoscopy was not completed due to poor prep and recommendation for repeat in 1 year. She denies any abdominal pain, nausea, vomiting, melena, hematochezia, or change in bowel movements. She has a history of multiple squamous cell carcinomas of the skin. She is being followed closely by Dr. Chandan Vee. She reports that she was treated with Efudex in 8/2018 with subsequent exfoliation. She underwent Mohs surgery on 9/17/2021 by Dr. Garo Whitman for removal of a rapidly growing mass on her right forearm. Pathology showed squamous cell carcinoma. She states that she is now following up regularly with dermatology for surveillance exams. She does report that she was treated with doxycycline in 6/2018 for a possible MRSA infection on her wrist. She states that her grandson had a confirmed infection and she was taking care of him. She states that it completely resolved. She has a history of depression and anxiety, treated previously with Prozac. However, she states that she is no longer taking it.    She also had previously been using Ambien for insomnia. Past Medical History:   Diagnosis Date    Adverse effect of anesthesia     pulmonary issues during surgery    Calculus of kidney     Cancer ([facility] [address]     skin-LT leg    Chronic diastolic heart failure ([facility] [address] 8/13/2018    Chronic pain syndrome 7/16/2013    Constipation due to pain medication 4/8/2016    Degeneration of lumbar or lumbosacral intervertebral disc     Depression     Diabetes mellitus     Essential hypertension 8/13/2018    KIM (generalized anxiety disorder) 4/29/2015    H/O renal failure 8/13/2018    Secondary to glomerulonephritis. S/P living related donor transplant in 4/1980    Heart failure ([facility] [address]     x 3    History of depression 12/12/2017    Hypercholesterolemia     Hyperlipidemia 8/13/2018    Kidney transplant status, living related donor 10/24/2012    Lumbosacral radiculopathy     Osteopenia of multiple sites 8/9/2018    Primary iridocyclitis     Status post total replacement of right hip 4/8/2016    Type 2 diabetes mellitus with microalbuminuria ([facility] [address] 11/10/2015     Past Surgical History:   Procedure Laterality Date    COLONOSCOPY N/A 12/30/2021    COLONOSCOPY/INCOMPLETE performed by Dez Conde MD at [facility]    HX HEENT      nose surgery    HX HYSTERECTOMY      HX OOPHORECTOMY      HX ORTHOPAEDIC Right [id number]    hip replacement    HX UROLOGICAL      Kidney Transplant    ID LAPAROSCOPY NEPHRECTOMY W/PARTIAL URETERECT      bilateral    ID UNLISTED PROCEDURE ABDOMEN PERITONEUM & OMENTUM      ID UNLISTED PROCEDURE BREAST       Current Outpatient Medications   Medication Sig    empagliflozin (JARDIANCE) 10 mg tablet Take 1 Tablet by mouth daily. Indications: type 2 diabetes mellitus    ergocalciferol (ERGOCALCIFEROL) 1,250 mcg (50,000 unit) capsule Take 1 Capsule by mouth every seven (7) days. dulaglutide (Trulicity) 3 BD/4.7 mL pnij 3 mg by SubCUTAneous route every seven (7) days.     pravastatin (PRAVACHOL) 80 mg tablet Take 1 Tablet by mouth nightly. lisinopriL (PRINIVIL, ZESTRIL) 40 mg tablet TAKE 1 TABLET BY MOUTH DAILY    amLODIPine (NORVASC) 5 mg tablet TAKE 1 TABLET BY MOUTH DAILY    ondansetron (ZOFRAN ODT) 4 mg disintegrating tablet Take 1 Tablet by mouth every eight (8) hours as needed for Nausea or Vomiting. omeprazole (PRILOSEC) 40 mg capsule Take 1 Capsule by mouth daily. predniSONE (DELTASONE) 5 mg tablet Take 0.5 Tablets by mouth every other day. Indications: prevent kidney transplant rejection    flash glucose sensor (FreeStyle Alice 14 Day Sensor) kit Use to check blood sugar at least 4 times daily. Dx E11.9.    flash glucose scanning reader (FreeStyle Alice 14 Day [place]) misc USE TO CHECK BLOOD SUGAR AT LEAST 4 TIMES DAILY    sucralfate (Carafate) 1 gram tablet Take 1 Tab by mouth four (4) times daily. Indications: heartburn    MELATONIN PO Take 6 mg by mouth daily. diphenhydrAMINE-acetaminophen  mg tab Take 2 Tabs by mouth daily as needed. Insulin Needles, Disposable, 31 gauge x 5/16\" ndle Use with Lantus insulin qhs.    tacrolimus (PROGRAF) 1 mg capsule 2 mg twice daily      No current facility-administered medications for this visit. Allergies and Intolerances: Allergies   Allergen Reactions    Asa-Acetaminophen-Caff-Potass Unknown (comments)    Aspirin Other (comments)    Beta-Blockers (Beta-Adrenergic Blocking Agts) Other (comments)     Sent into [facility].     Bystolic [Nebivolol] Other (comments)    Crestor [Rosuvastatin] Myalgia    Ibuprofen Other (comments)     Renal transplant patient    Iodinated Contrast Media Other (comments)     Unable to take due to Kidney Transplant    Lipitor [Atorvastatin] Myalgia     Had muscle aches and pains    Nsaids (Non-Steroidal Anti-Inflammatory Drug) Unknown (comments)    Sirolimus Other (comments)     pneumonitis    Toprol Xl [Metoprolol Succinate] Other (comments)     Asthma    Venlafaxine Other (comments)     Patient does not recall taking this medication     Family History: Her sister was diagnosed with breast cancer at age 61; her father had CAD and  during CABG  Family History   Problem Relation Age of Onset    Hypertension Father     Heart Disease Father     Alcohol abuse Father     OSTEOARTHRITIS Father     OSTEOARTHRITIS Brother     Cancer Maternal Grandmother         breast    Lung Disease Sister     High Cholesterol Mother     OSTEOARTHRITIS Mother     OSTEOARTHRITIS Sister     Liver Disease Sister      Social History:   She  reports that she quit smoking about 36 years ago. Her smoking use included cigarettes. She has a 15.00 pack-year smoking history. She has never used smokeless tobacco. Smoked  2-3 ppd for 10 years, stopping 20 years ago. She is  with one adopted son. She is a retired . Social History     Substance and Sexual Activity   Alcohol Use No     Immunization History:  Immunization History   Administered Date(s) Administered    COVID-19, MODERNA BLUE border, Primary or Immunocompromised, (age 18y+), IM, 100 mcg/0.5mL 2021, 2021, 10/17/2021    COVID-19, PFIZER Bivalent BOOSTER, (age 12y+), IM, 30 mcg/0.3 mL dose 10/02/2022    COVID-19, PFIZER PURPLE top, DILUTE for use, (age 15 y+), IM, 30mcg/0.3mL 2022    Influenza Vaccine 2012, 2013    Influenza Vaccine (Tri) Adjuvanted (>65 Yrs FLUAD TRI 07750) 2019    Influenza, FLUAD, (age 72 y+), Adjuvanted 09/15/2022    Influenza, FLUARIX, FLULAVAL, FLUZONE (age 10 mo+) AND AFLURIA, (age 1 y+), PF, 0.5mL 11/10/2015, 2018    Pneumococcal Conjugate (PCV-13) 2018    Pneumococcal Polysaccharide (PPSV-23) 2014, 2020    Tdap 2018       Review of Systems:   As above included in HPI.   Otherwise 11 point review of systems negative including constitutional, skin, HENT, eyes, respiratory, cardiovascular, gastrointestinal, genitourinary, musculoskeletal, endocrine, hematologic, allergy, and neurologic. Physical:   Visit Vitals  /78 (BP 1 Location: Left upper arm, BP Patient Position: Sitting)   Pulse 72   Temp 97.3 °F (36.3 °C) (Temporal)   Resp 16   Ht 5' 3\" (1.6 m)   Wt 181 lb 3.2 oz (82.2 kg)   SpO2 96%   BMI 32.10 kg/m²         Exam:   Patient appears in no apparent distress. Affect is appropriate. HEENT: PERRLA, anicteric, no JVD, adenopathy or thyromegaly. No carotid bruits or radiated murmur. Lungs: clear to auscultation, no wheezes, rhonchi, or rales. Heart: regular rate and rhythm. No murmur, rubs, gallops  Abdomen: soft, nontender, nondistended, normal bowel sounds, no hepatosplenomegaly or masses. Extremities: without edema. Review of Data:  Labs:  No visits with results within 2 Day(s) from this visit. Latest known visit with results is:   Hospital Outpatient Visit on 12/30/2022   Component Date Value Ref Range Status    WBC 12/30/2022 9.4  4.6 - 13.2 K/uL Final    RBC 12/30/2022 4.34  4.20 - 5.30 M/uL Final    HGB 12/30/2022 12.7  12.0 - 16.0 g/dL Final    HCT 12/30/2022 39.9  35.0 - 45.0 % Final    MCV 12/30/2022 91.9  78.0 - 100.0 FL Final    MCH 12/30/2022 29.3  24.0 - 34.0 PG Final    MCHC 12/30/2022 31.8  31.0 - 37.0 g/dL Final    RDW 12/30/2022 13.8  11.6 - 14.5 % Final    PLATELET 52/81/7435 109  135 - 420 K/uL Final    MPV 12/30/2022 9.2  9.2 - 11.8 FL Final    NRBC 12/30/2022 0.0  0  WBC Final    ABSOLUTE NRBC 12/30/2022 0.00  0.00 - 0.01 K/uL Final    NEUTROPHILS 12/30/2022 55  40 - 73 % Final    LYMPHOCYTES 12/30/2022 30  21 - 52 % Final    MONOCYTES 12/30/2022 8  3 - 10 % Final    EOSINOPHILS 12/30/2022 6 (A)  0 - 5 % Final    BASOPHILS 12/30/2022 1  0 - 2 % Final    IMMATURE GRANULOCYTES 12/30/2022 0  0.0 - 0.5 % Final    ABS. NEUTROPHILS 12/30/2022 5.2  1.8 - 8.0 K/UL Final    ABS. LYMPHOCYTES 12/30/2022 2.8  0.9 - 3.6 K/UL Final    ABS. MONOCYTES 12/30/2022 0.8  0.05 - 1.2 K/UL Final    ABS.  EOSINOPHILS 12/30/2022 0.5 (A)  0.0 - 0.4 K/UL Final ABS. BASOPHILS 12/30/2022 0.1  0.0 - 0.1 K/UL Final    ABS. IMM. GRANS. 12/30/2022 0.0  0.00 - 0.04 K/UL Final    DF 12/30/2022 AUTOMATED    Final    Hemoglobin A1c 12/30/2022 6.8 (A)  4.2 - 5.6 % Final    Est. average glucose 12/30/2022 148  mg/dL Final    LIPID PROFILE 12/30/2022        Final    Cholesterol, total 12/30/2022 230 (A)  <200 MG/DL Final    Triglyceride 12/30/2022 409 (A)  <150 MG/DL Final    HDL Cholesterol 12/30/2022 48  40 - 60 MG/DL Final    LDL, calculated 12/30/2022 LDL AND VLDL CHOLESTEROL NOT CALCULATED WHEN TRIGLYCERIDES >400 MG/DL OR HDL CHOLESTEROL <20 MG/DL  0 - 100 MG/DL Final    VLDL, calculated 12/30/2022 Calculation not valid with this patient's other Lipid values. MG/DL Final    CHOL/HDL Ratio 12/30/2022 4.8  0 - 5.0   Final    Magnesium 12/30/2022 1.9  1.6 - 2.6 mg/dL Final    Sodium 12/30/2022 137  136 - 145 mmol/L Final    Potassium 12/30/2022 4.4  3.5 - 5.5 mmol/L Final    Chloride 12/30/2022 107  100 - 111 mmol/L Final    CO2 12/30/2022 23  21 - 32 mmol/L Final    Anion gap 12/30/2022 7  3.0 - 18 mmol/L Final    Glucose 12/30/2022 131 (A)  74 - 99 mg/dL Final    BUN 12/30/2022 17  7.0 - 18 MG/DL Final    Creatinine 12/30/2022 0.97  0.6 - 1.3 MG/DL Final    BUN/Creatinine ratio 12/30/2022 18  12 - 20   Final    eGFR 12/30/2022 >60  >60 ml/min/1.73m2 Final    Calcium 12/30/2022 9.7  8.5 - 10.1 MG/DL Final    Bilirubin, total 12/30/2022 0.3  0.2 - 1.0 MG/DL Final    ALT (SGPT) 12/30/2022 31  13 - 56 U/L Final    AST (SGOT) 12/30/2022 17  10 - 38 U/L Final    Alk.  phosphatase 12/30/2022 83  45 - 117 U/L Final    Protein, total 12/30/2022 7.7  6.4 - 8.2 g/dL Final    Albumin 12/30/2022 4.1  3.4 - 5.0 g/dL Final    Globulin 12/30/2022 3.6  2.0 - 4.0 g/dL Final    A-G Ratio 12/30/2022 1.1  0.8 - 1.7   Final    Microalbumin,urine random 12/30/2022 4.15 (A)  0 - 3.0 MG/DL Final    Creatinine, urine random 12/30/2022 38.00  30 - 125 mg/dL Final    Microalbumin/Creat ratio (mg/g cre* 2022 109 (A)  0 - 30 mg/g Final    Vitamin D 25-Hydroxy 2022 25.2 (A)  30 - 100 ng/mL Final           Health Maintenance:  Screening:    Mammogram: negative (10/2021) Overdue   PAP smear: s/p CATY/BSO for endometriosis   Colorectal: colonoscopy (2021) poor prep. Dr. Gustavo Rider. Due 2022. Overdue   Depression: no current treatment   DM (HbA1c/FPG): HbA1c 6.8 (2022)   Hepatitis C: negative (2018)   Falls: none   DEXA: osteopenia (2021) [facility]   Glaucoma: regular eye exams with Dr. Yoseph Lindsey. (last 2020) Overdue   Smokin-30 pack years, stopped 20 years ago   Vitamin D: 25.2 (2022)   Medicare Wellness: 9/15/2022 (Initial)        Impression:  Patient Active Problem List   Diagnosis Code    Lumbosacral radiculopathy M54.17    Degeneration of lumbar or lumbosacral intervertebral disc M51.37    Chronic insomnia F51.04    Kidney transplant status, living related donor Z94.0    KIM (generalized anxiety disorder) F41.1    Type 2 diabetes mellitus with microalbuminuria (HCC) E11.29, R80.9    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    Recurrent major depressive disorder, in full remission ([facility] F33.42    Osteopenia of multiple sites M85.89    Essential hypertension I10    Chronic diastolic heart failure (HCC) I50.32    Hyperlipidemia E78.5    GERD (gastroesophageal reflux disease) K21.9    Primary osteoarthritis involving multiple joints M15.9    Immunosuppressed status ([facility]) D84.9    Noncompliance with medications Z91.14    History of SCC (squamous cell carcinoma) of skin Z85.828    Vitamin D deficiency, unspecified  E55.9    History of 2019 novel coronavirus disease (COVID-19) Z86.16       Plan:  1. Diabetes mellitus. On Trulicity 3.0 mg weekly with improvement in HbA1c from 7.8 to 7.0 in 2022. Started on Jardiance 10 mg daily for better blood sugar control and repeat HbA1c improved to 6.8 today.   Patient reports that she stopped Jardiance approximately 1 month ago due to developing a yeast infection, but willing to restart today. Will reassess HbA1c in 3 months. On Ace-I and statin. Urine protein/ creatinine ratio negative on 9/7/2021 in transplant clinic, but urine microalbumin/creatinine ratio elevated at 109 mg/g today. No evidence of retinopathy or neuropathy. Foot exam negative (6/2022). Emphasized importance of lifestyle modifications, including heart healthy low carbohydrate diet, regular exercise, and weight loss. Did not attend visit with dietitian to help improve her diet, and advised to follow-up with dietitian in transplant clinic. Will continue to monitor. 2. S/p renal transplant from living related donor. On tacrolimus 2 mg bid/1 mg nightly and prednisone 2.5 mg every other day. Creatinine stable at 0.97/ eGFR >60 and urine microalbumin/creatinine ratio at 109 mg/g. Last visit with Dr. Meredith Newby at transplant clinic in 10/2022. 3. Hypertension. Blood pressure appears well controlled today on lisinopril 40 mg daily and amlodipine 5 mg daily. Instructed to continue to monitor her blood pressure. Renal function remains normal with creatinine 0.97/ eGFR >60. Will continue to monitor. 4. Chronic diastolic heart failure. Grade 2 with normal systolic function on echocardiogram in 10/2017. Hemodynamically stable on current regimen as above. Does not tolerate beta blockers. Instructed to restrict sodium and follow weights. 5. Hyperlipidemia. On simvastatin 40 mg daily with LDL 94 and HDL 47 in 9/2022, indicative of good control and significant improvement from baseline  with HDL 47 (2/2021) without statin treatment. Prescribed ezetimibe in 2/2021 and 9/2021 to help with better control, but patient not currently taking. Lipid panel significantly worsened today with total cholesterol 230 and triglycerides 409 so that LDL could not be calculated.   Patient states that she has been taking both simvastatin and pravastatin but was advised by Dr. Meredith Newby to discontinue pravastatin. Reports that she feels that has helped to achieve better control and advised to discontinue simvastatin and will prescribe pravastatin 80 mg daily. Will consider addition of ezetimibe if not at goal at next visit. Discussed importance of compliance and lifestyle modifications, including heart healthy diet, regular exercise, and weight loss. Will continue to monitor. 6. History of COVID-19. Patient developed symptoms of nasal congestion and sore throat on 6/17/2022 and tested positive for COVID-19 by home rapid antigen testing. Referred to [facility] and received bebtelovimab infusion given renal transplant status. Gradually improved and denies any sequela today. Completed three doses of the Moderna COVID 19 vaccine series and a fourth Pfizer dose. Also has received the updated bivalent booster dose. Missed appointments for preexposure prophylaxis treatment with [facility] and requesting new order be placed. Will fax to infusion center. 7. History of squamous cell carcinoma. Had been under care of Dr. Hiro Card and treated with Efudex. Related to chronic immunosuppression. However, had not followed up until developed a large rapidly growing lesion on her right forearm in 9/2021 and underwent Mohs surgery by Dr. Lopezasha [facility] with pathology showing squamous carcinoma. Reports continuing close follow-up with [facility] dermatology given increased risk due to immunosuppression from renal transplant. 8. Depression/generalized anxiety disorder. Previously treated with Prozac and Zoloft, and did not recall side effects or reason for discontinuing. Reported increasing difficulty with anxiety at last visit in 9/2021 and PHQ 2 score of 2. She was prescribed Paxil 20 mg daily and reported that she did take it for approximately 2 months and then weaned since she felt she had improved.   Prescribed sertraline and hydroxyzine in 4/2022 by FARA Henning, but no longer taking and no longer seeing FARA Knapp given improvement in family issues. No increase in anxiety reported today. Will continue to monitor. 9. Osteopenia. Last bone density scan in 9/2021. Using femoral neck T-scores, calculated FRAX score estimates her 10 year risk of a major osteoporetic fracture at 12 % and hip fracture at 0.8 % in context of chronic steroid use. Also, noted report of thoracic compression deformities on chest CT scan in 11/2017 at [facility]. Currently taking prednisone 2.5 mg every other day. Continue calcium and Vitamin D. Advised to restart 50,000 U dose weekly of ergocalciferol given low level. Encouraged exercise, particularly weight bearing activities. Will consider repeat bone density study in 9/2023. 10. GERD. Increasing symptoms noted in 5/2019, but did not pursue endoscopy as recommended by Dr. Ivone Bennett. Upper endoscopy in 5/2014 negative. Reported persistent symptoms despite taking omeprazole 40 mg and Carafate, and presented to [facility] ED in 12/2020 with chest pain, and evaluation including stress test negative. Felt to likely represent exacerbation of GERD. Prescribed Carafate tablets and patient stating that taking only as needed. Underwent upper endoscopy by Dr. Fabrice Martines on 12/30/2021 showing possible salmon-colored mucosa at the GE junction, and several nonbleeding benign fundic gastric polyps (pathology: squamous and gastric mucosa with no pathologic abnormality). Continues on omeprazole 40 mg daily and will use Carafate as needed. Reports good control of symptoms today. 11. Chronic constipation. Most likely related to prior use of narcotics. Improved with weaning. No longer taking Linzess and appears to be doing well. 12. Bilateral adnexal cysts. Patient is s/p BSO in 2002 and found to have endometriosis. Now with CT scan (5/2019) showing masses in the area of her prior ovaries. Pelvic ultrasound inadequate, and recommended transvaginal ultrasound.   However, patient not wishing to pursue since does not feel to be a problem. 13. Osteoarthritis. Patient with multiple areas of arthritis contributing to her chronic pain. However, weaned from narcotics, and discontinued Percocet and MS Contin. Not experiencing increased pain currently and no longer requiring any narcotics. 14. Obesity. Weight remaining overall stable since last visit. Emphasized importance of lifestyle modifications, including heart healthy diet, regular exercise, and weight loss. Canceled meeting with dietitian and urged to follow-up with dietitian and renal transplant office. Will continue to address. 15. Health maintenance. Completed three doses of the Moderna COVID-19 vaccine series and a received a fourth Pfizer dose given immunosuppressed status due to renal transplant. Also has received the updated bivalent Pfizer booster dose. Developed COVID-19 on 6/17/2022 and received bebtelovimab. Had been scheduled for preexposure prophylaxis with [facility] but developed COVID so canceled. Wishing again to proceed and will fax order to infusion center. Aware of decreased efficacy to 60-70% against new Omicron variant. Received 1/2 doses of Shingrix vaccine. Requested that she provide documentation so that chart can be updated. Mammogram overdue and will place order. Again urged to schedule eye exam with Dr. Karishma Del Castillo as overdue. Colonoscopy attempted in 12/2021 but due to poor prep recommended to repeat in 1 year (due 12/2022). Referred to Dr. Ruperto Ugalde but did not schedule. Requesting new referral be placed to Dr. Sandy Childress. Vitamin D level low and patient reports not taking ergocalciferol 50,000 U weekly. New prescription sent to pharmacy. Initial Medicare wellness visit completed. Patient understands recommendations and agrees with plan. Follow-up in 3 months. This visit required high complexity medically necessary decision making and management plans.      Time spent in preparing for the visit, including review of history, tests done prior to arrival, additional time reviewing clinical data, imaging, outside records and test results:  5  minutes. Time spent in counseling with patient and/or family members regarding care plan: 30 minutes. Time spent in ordering tests, treatments, and referring patient for further care: 10 minutes. Treated paperwork for [facility] and faxed to infusion center. Time spent on visit does not include time for documentation. Future orders:    ICD-10-CM ICD-9-CM    1. Type 2 diabetes mellitus with microalbuminuria, with long-term current use of insulin (HCC)  E11.29 250.40 CBC WITH AUTOMATED DIFF    R80.9 791.0 HEMOGLOBIN A1C WITH EAG    C14.4 M56.03 METABOLIC PANEL, COMPREHENSIVE      MICROALBUMIN, UR, RAND W/ MICROALB/CREAT RATIO      TSH 3RD GENERATION      2. Kidney transplant status, living related donor  Z94.0 V42.0 CBC WITH AUTOMATED DIFF      MAGNESIUM      METABOLIC PANEL, COMPREHENSIVE      3. Immunosuppressed status ([facility] [address]  D84.9 279.9       4. Essential hypertension  I10 401.9 MAGNESIUM      METABOLIC PANEL, COMPREHENSIVE      TSH 3RD GENERATION      URINALYSIS W/MICROSCOPIC      5. Hyperlipidemia, unspecified hyperlipidemia type  E78.5 272.4 LIPID PANEL      METABOLIC PANEL, COMPREHENSIVE      6. Gastroesophageal reflux disease without esophagitis  K21.9 530.81       7. Vitamin D deficiency, unspecified   E55.9 268.9 VITAMIN D, 25 HYDROXY      8. History of SCC (squamous cell carcinoma) of skin  Z85.828 V10.83       9. History of 2019 novel coronavirus disease (COVID-19)  Z86.16 V12.09       10. Screening mammogram, encounter for  Z12.31 V76.12 [facility] MAMMO BI SCREENING INCL CAD      6.  Colon cancer screening  Z12.11 V76.51 REFERRAL TO GASTROENTEROLOGY

## 2023-02-04 DIAGNOSIS — Z79.4 TYPE 2 DIABETES MELLITUS WITH MICROALBUMINURIA, WITH LONG-TERM CURRENT USE OF INSULIN (HCC): Primary | ICD-10-CM

## 2023-02-04 DIAGNOSIS — R80.9 TYPE 2 DIABETES MELLITUS WITH MICROALBUMINURIA, WITH LONG-TERM CURRENT USE OF INSULIN (HCC): Primary | ICD-10-CM

## 2023-02-04 DIAGNOSIS — I10 ESSENTIAL HYPERTENSION: ICD-10-CM

## 2023-02-04 DIAGNOSIS — E11.29 TYPE 2 DIABETES MELLITUS WITH MICROALBUMINURIA, WITH LONG-TERM CURRENT USE OF INSULIN (HCC): Primary | ICD-10-CM

## 2023-02-04 DIAGNOSIS — Z94.0 KIDNEY TRANSPLANT STATUS, LIVING RELATED DONOR: ICD-10-CM

## 2023-02-05 DIAGNOSIS — E78.5 HYPERLIPIDEMIA, UNSPECIFIED HYPERLIPIDEMIA TYPE: Primary | ICD-10-CM

## 2023-02-05 DIAGNOSIS — R80.9 TYPE 2 DIABETES MELLITUS WITH MICROALBUMINURIA, WITH LONG-TERM CURRENT USE OF INSULIN (HCC): Primary | ICD-10-CM

## 2023-02-05 DIAGNOSIS — E11.29 TYPE 2 DIABETES MELLITUS WITH MICROALBUMINURIA, WITH LONG-TERM CURRENT USE OF INSULIN (HCC): Primary | ICD-10-CM

## 2023-02-05 DIAGNOSIS — Z79.4 TYPE 2 DIABETES MELLITUS WITH MICROALBUMINURIA, WITH LONG-TERM CURRENT USE OF INSULIN (HCC): Primary | ICD-10-CM

## 2023-02-05 DIAGNOSIS — Z94.0 KIDNEY TRANSPLANT STATUS, LIVING RELATED DONOR: ICD-10-CM

## 2023-02-07 DIAGNOSIS — E55.9 VITAMIN D DEFICIENCY, UNSPECIFIED: Primary | ICD-10-CM

## 2023-02-07 DIAGNOSIS — R80.9 TYPE 2 DIABETES MELLITUS WITH MICROALBUMINURIA, WITH LONG-TERM CURRENT USE OF INSULIN (HCC): Primary | ICD-10-CM

## 2023-02-07 DIAGNOSIS — E11.29 TYPE 2 DIABETES MELLITUS WITH MICROALBUMINURIA, WITH LONG-TERM CURRENT USE OF INSULIN (HCC): Primary | ICD-10-CM

## 2023-02-07 DIAGNOSIS — Z94.0 KIDNEY TRANSPLANT STATUS, LIVING RELATED DONOR: Primary | ICD-10-CM

## 2023-02-07 DIAGNOSIS — Z79.4 TYPE 2 DIABETES MELLITUS WITH MICROALBUMINURIA, WITH LONG-TERM CURRENT USE OF INSULIN (HCC): Primary | ICD-10-CM

## 2023-02-07 DIAGNOSIS — I10 ESSENTIAL HYPERTENSION: ICD-10-CM

## 2023-02-14 ENCOUNTER — TELEPHONE (OUTPATIENT)
Age: 68
End: 2023-02-14

## 2023-02-14 NOTE — TELEPHONE ENCOUNTER
Pt calling says pharmacy told her Trulicity is no longer being produced and she needs a replacement medication. Needs a new med called into [facility] on [address]. She is totally out of the medication.

## 2023-02-14 NOTE — TELEPHONE ENCOUNTER
Prescription for Ozempic 1 mg weekly was sent to [place]. This would be an equivalent dose to the Trulicity 3 mg weekly dose. Please advise that each of Ozempic pen contains 4 doses (1 month supply) so needed to prescribe needles to use with it.

## 2023-02-22 ENCOUNTER — TELEPHONE (OUTPATIENT)
Age: 68
End: 2023-02-22

## 2023-02-22 NOTE — TELEPHONE ENCOUNTER
Patient is aware of message below and verbalized understanding. No further questions or concerns from patient at this time.

## 2023-03-30 ENCOUNTER — TELEPHONE (OUTPATIENT)
Age: 68
End: 2023-03-30

## 2023-03-30 NOTE — TELEPHONE ENCOUNTER
Prescription for Ozempic sent to [facility] on [facility].

## 2023-03-30 NOTE — TELEPHONE ENCOUNTER
calling, says [facility] is out of Ozempic. Called [facility] too and they are also out. They didn't know when they will get back in stock. She will need on Wednesday. Wants to know what you want them to do? This med is working well for her.       Call wife at [phone]

## 2023-03-30 NOTE — TELEPHONE ENCOUNTER
Advised patient to keep calling around and if she finds it in stock to let us know and we will send in a new RX.

## 2023-03-31 ENCOUNTER — HOSPITAL ENCOUNTER (OUTPATIENT)
Facility: HOSPITAL | Age: 68
Setting detail: SPECIMEN
End: 2023-03-31
Payer: MEDICARE

## 2023-03-31 DIAGNOSIS — I10 ESSENTIAL HYPERTENSION: ICD-10-CM

## 2023-03-31 DIAGNOSIS — R80.9 TYPE 2 DIABETES MELLITUS WITH MICROALBUMINURIA, WITH LONG-TERM CURRENT USE OF INSULIN (HCC): ICD-10-CM

## 2023-03-31 DIAGNOSIS — Z79.4 TYPE 2 DIABETES MELLITUS WITH MICROALBUMINURIA, WITH LONG-TERM CURRENT USE OF INSULIN (HCC): ICD-10-CM

## 2023-03-31 DIAGNOSIS — E11.29 TYPE 2 DIABETES MELLITUS WITH MICROALBUMINURIA, WITH LONG-TERM CURRENT USE OF INSULIN (HCC): ICD-10-CM

## 2023-03-31 DIAGNOSIS — Z94.0 KIDNEY TRANSPLANT STATUS, LIVING RELATED DONOR: ICD-10-CM

## 2023-03-31 DIAGNOSIS — E78.5 HYPERLIPIDEMIA, UNSPECIFIED HYPERLIPIDEMIA TYPE: ICD-10-CM

## 2023-03-31 DIAGNOSIS — E55.9 VITAMIN D DEFICIENCY, UNSPECIFIED: ICD-10-CM

## 2023-03-31 LAB
25(OH)D3 SERPL-MCNC: 54.2 NG/ML (ref 30–100)
ALBUMIN SERPL-MCNC: 4 G/DL (ref 3.4–5)
ALBUMIN/GLOB SERPL: 1.3 (ref 0.8–1.7)
ALP SERPL-CCNC: 64 U/L (ref 45–117)
ALT SERPL-CCNC: 35 U/L (ref 13–56)
ANION GAP SERPL CALC-SCNC: 5 MMOL/L (ref 3–18)
AST SERPL-CCNC: 20 U/L (ref 10–38)
BASOPHILS # BLD: 0.1 K/UL (ref 0–0.1)
BASOPHILS NFR BLD: 1 % (ref 0–2)
BILIRUB SERPL-MCNC: 0.4 MG/DL (ref 0.2–1)
BUN SERPL-MCNC: 13 MG/DL (ref 7–18)
BUN/CREAT SERPL: 14 (ref 12–20)
CALCIUM SERPL-MCNC: 9.4 MG/DL (ref 8.5–10.1)
CHLORIDE SERPL-SCNC: 108 MMOL/L (ref 100–111)
CHOLEST SERPL-MCNC: 213 MG/DL
CO2 SERPL-SCNC: 25 MMOL/L (ref 21–32)
CREAT SERPL-MCNC: 0.95 MG/DL (ref 0.6–1.3)
CREAT UR-MCNC: 28 MG/DL (ref 30–125)
DIFFERENTIAL METHOD BLD: ABNORMAL
EOSINOPHIL # BLD: 0.6 K/UL (ref 0–0.4)
EOSINOPHIL NFR BLD: 7 % (ref 0–5)
ERYTHROCYTE [DISTWIDTH] IN BLOOD BY AUTOMATED COUNT: 14.2 % (ref 11.6–14.5)
EST. AVERAGE GLUCOSE BLD GHB EST-MCNC: 134 MG/DL
GLOBULIN SER CALC-MCNC: 3.2 G/DL (ref 2–4)
GLUCOSE SERPL-MCNC: 108 MG/DL (ref 74–99)
HBA1C MFR BLD: 6.3 % (ref 4.2–5.6)
HCT VFR BLD AUTO: 41.5 % (ref 35–45)
HDLC SERPL-MCNC: 49 MG/DL (ref 40–60)
HDLC SERPL: 4.3 (ref 0–5)
HGB BLD-MCNC: 13.3 G/DL (ref 12–16)
IMM GRANULOCYTES # BLD AUTO: 0 K/UL (ref 0–0.04)
IMM GRANULOCYTES NFR BLD AUTO: 0 % (ref 0–0.5)
LDLC SERPL CALC-MCNC: 115.2 MG/DL (ref 0–100)
LIPID PANEL: ABNORMAL
LYMPHOCYTES # BLD: 1.9 K/UL (ref 0.9–3.6)
LYMPHOCYTES NFR BLD: 22 % (ref 21–52)
MAGNESIUM SERPL-MCNC: 2 MG/DL (ref 1.6–2.6)
MCH RBC QN AUTO: 30.6 PG (ref 24–34)
MCHC RBC AUTO-ENTMCNC: 32 G/DL (ref 31–37)
MCV RBC AUTO: 95.4 FL (ref 78–100)
MICROALBUMIN UR-MCNC: 2.53 MG/DL (ref 0–3)
MICROALBUMIN/CREAT UR-RTO: 90 MG/G (ref 0–30)
MONOCYTES # BLD: 0.7 K/UL (ref 0.05–1.2)
MONOCYTES NFR BLD: 9 % (ref 3–10)
NEUTS SEG # BLD: 5.3 K/UL (ref 1.8–8)
NEUTS SEG NFR BLD: 62 % (ref 40–73)
NRBC # BLD: 0 K/UL (ref 0–0.01)
NRBC BLD-RTO: 0 PER 100 WBC
PLATELET # BLD AUTO: 320 K/UL (ref 135–420)
PMV BLD AUTO: 9.5 FL (ref 9.2–11.8)
POTASSIUM SERPL-SCNC: 4.3 MMOL/L (ref 3.5–5.5)
PROT SERPL-MCNC: 7.2 G/DL (ref 6.4–8.2)
RBC # BLD AUTO: 4.35 M/UL (ref 4.2–5.3)
SODIUM SERPL-SCNC: 138 MMOL/L (ref 136–145)
TRIGL SERPL-MCNC: 244 MG/DL
TSH SERPL DL<=0.05 MIU/L-ACNC: 1.28 UIU/ML (ref 0.36–3.74)
VLDLC SERPL CALC-MCNC: 48.8 MG/DL
WBC # BLD AUTO: 8.5 K/UL (ref 4.6–13.2)

## 2023-03-31 PROCEDURE — 83036 HEMOGLOBIN GLYCOSYLATED A1C: CPT

## 2023-03-31 PROCEDURE — 80061 LIPID PANEL: CPT

## 2023-03-31 PROCEDURE — 83735 ASSAY OF MAGNESIUM: CPT

## 2023-03-31 PROCEDURE — 84443 ASSAY THYROID STIM HORMONE: CPT

## 2023-03-31 PROCEDURE — 36415 COLL VENOUS BLD VENIPUNCTURE: CPT

## 2023-03-31 PROCEDURE — 80053 COMPREHEN METABOLIC PANEL: CPT

## 2023-03-31 PROCEDURE — 85025 COMPLETE CBC W/AUTO DIFF WBC: CPT

## 2023-03-31 PROCEDURE — 82043 UR ALBUMIN QUANTITATIVE: CPT

## 2023-03-31 PROCEDURE — 82306 VITAMIN D 25 HYDROXY: CPT

## 2023-04-06 ENCOUNTER — OFFICE VISIT (OUTPATIENT)
Age: 68
End: 2023-04-06
Payer: MEDICARE

## 2023-04-06 VITALS
HEART RATE: 82 BPM | SYSTOLIC BLOOD PRESSURE: 134 MMHG | BODY MASS INDEX: 29.23 KG/M2 | TEMPERATURE: 97.1 F | DIASTOLIC BLOOD PRESSURE: 78 MMHG | WEIGHT: 165 LBS | HEIGHT: 63 IN | RESPIRATION RATE: 16 BRPM | OXYGEN SATURATION: 97 %

## 2023-04-06 DIAGNOSIS — I10 ESSENTIAL HYPERTENSION: ICD-10-CM

## 2023-04-06 DIAGNOSIS — E55.9 VITAMIN D DEFICIENCY, UNSPECIFIED: ICD-10-CM

## 2023-04-06 DIAGNOSIS — M15.9 PRIMARY OSTEOARTHRITIS INVOLVING MULTIPLE JOINTS: ICD-10-CM

## 2023-04-06 DIAGNOSIS — F41.1 GAD (GENERALIZED ANXIETY DISORDER): ICD-10-CM

## 2023-04-06 DIAGNOSIS — E11.29 TYPE 2 DIABETES MELLITUS WITH MICROALBUMINURIA, WITHOUT LONG-TERM CURRENT USE OF INSULIN (HCC): Primary | ICD-10-CM

## 2023-04-06 DIAGNOSIS — K21.9 GASTROESOPHAGEAL REFLUX DISEASE, UNSPECIFIED WHETHER ESOPHAGITIS PRESENT: ICD-10-CM

## 2023-04-06 DIAGNOSIS — F51.04 CHRONIC INSOMNIA: ICD-10-CM

## 2023-04-06 DIAGNOSIS — Z94.0 KIDNEY TRANSPLANT STATUS, LIVING RELATED DONOR: ICD-10-CM

## 2023-04-06 DIAGNOSIS — F33.42 MAJOR DEPRESSIVE DISORDER, RECURRENT, IN FULL REMISSION (HCC): ICD-10-CM

## 2023-04-06 DIAGNOSIS — D84.9 IMMUNOSUPPRESSED STATUS (HCC): ICD-10-CM

## 2023-04-06 DIAGNOSIS — E78.2 MIXED HYPERLIPIDEMIA: ICD-10-CM

## 2023-04-06 DIAGNOSIS — R80.9 TYPE 2 DIABETES MELLITUS WITH MICROALBUMINURIA, WITHOUT LONG-TERM CURRENT USE OF INSULIN (HCC): Primary | ICD-10-CM

## 2023-04-06 DIAGNOSIS — I50.32 CHRONIC DIASTOLIC (CONGESTIVE) HEART FAILURE (HCC): ICD-10-CM

## 2023-04-06 PROCEDURE — G8417 CALC BMI ABV UP PARAM F/U: HCPCS | Performed by: INTERNAL MEDICINE

## 2023-04-06 PROCEDURE — 3017F COLORECTAL CA SCREEN DOC REV: CPT | Performed by: INTERNAL MEDICINE

## 2023-04-06 PROCEDURE — 1123F ACP DISCUSS/DSCN MKR DOCD: CPT | Performed by: INTERNAL MEDICINE

## 2023-04-06 PROCEDURE — 2022F DILAT RTA XM EVC RTNOPTHY: CPT | Performed by: INTERNAL MEDICINE

## 2023-04-06 PROCEDURE — 3044F HG A1C LEVEL LT 7.0%: CPT | Performed by: INTERNAL MEDICINE

## 2023-04-06 PROCEDURE — 3078F DIAST BP <80 MM HG: CPT | Performed by: INTERNAL MEDICINE

## 2023-04-06 PROCEDURE — 1036F TOBACCO NON-USER: CPT | Performed by: INTERNAL MEDICINE

## 2023-04-06 PROCEDURE — G8400 PT W/DXA NO RESULTS DOC: HCPCS | Performed by: INTERNAL MEDICINE

## 2023-04-06 PROCEDURE — 1090F PRES/ABSN URINE INCON ASSESS: CPT | Performed by: INTERNAL MEDICINE

## 2023-04-06 PROCEDURE — G8427 DOCREV CUR MEDS BY ELIG CLIN: HCPCS | Performed by: INTERNAL MEDICINE

## 2023-04-06 PROCEDURE — 3074F SYST BP LT 130 MM HG: CPT | Performed by: INTERNAL MEDICINE

## 2023-04-06 PROCEDURE — 99214 OFFICE O/P EST MOD 30 MIN: CPT | Performed by: INTERNAL MEDICINE

## 2023-04-06 RX ORDER — ONDANSETRON 4 MG/1
4 TABLET, ORALLY DISINTEGRATING ORAL EVERY 8 HOURS PRN
Qty: 30 TABLET | Refills: 1 | Status: SHIPPED | OUTPATIENT
Start: 2023-04-06

## 2023-04-06 RX ORDER — EZETIMIBE 10 MG/1
10 TABLET ORAL DAILY
Qty: 90 TABLET | Refills: 3 | Status: SHIPPED | OUTPATIENT
Start: 2023-04-06

## 2023-04-06 RX ORDER — TRAZODONE HYDROCHLORIDE 50 MG/1
50 TABLET ORAL NIGHTLY
Qty: 90 TABLET | Refills: 1 | Status: SHIPPED | OUTPATIENT
Start: 2023-04-06

## 2023-04-06 RX ORDER — PRAVASTATIN SODIUM 80 MG/1
80 TABLET ORAL NIGHTLY
COMMUNITY
Start: 2023-01-03

## 2023-04-06 SDOH — ECONOMIC STABILITY: FOOD INSECURITY: WITHIN THE PAST 12 MONTHS, THE FOOD YOU BOUGHT JUST DIDN'T LAST AND YOU DIDN'T HAVE MONEY TO GET MORE.: NEVER TRUE

## 2023-04-06 SDOH — ECONOMIC STABILITY: FOOD INSECURITY: WITHIN THE PAST 12 MONTHS, YOU WORRIED THAT YOUR FOOD WOULD RUN OUT BEFORE YOU GOT MONEY TO BUY MORE.: NEVER TRUE

## 2023-04-06 SDOH — ECONOMIC STABILITY: HOUSING INSECURITY
IN THE LAST 12 MONTHS, WAS THERE A TIME WHEN YOU DID NOT HAVE A STEADY PLACE TO SLEEP OR SLEPT IN A SHELTER (INCLUDING NOW)?: NO

## 2023-04-06 SDOH — ECONOMIC STABILITY: INCOME INSECURITY: HOW HARD IS IT FOR YOU TO PAY FOR THE VERY BASICS LIKE FOOD, HOUSING, MEDICAL CARE, AND HEATING?: NOT HARD AT ALL

## 2023-04-06 ASSESSMENT — PATIENT HEALTH QUESTIONNAIRE - PHQ9
2. FEELING DOWN, DEPRESSED OR HOPELESS: 1
SUM OF ALL RESPONSES TO PHQ QUESTIONS 1-9: 1
SUM OF ALL RESPONSES TO PHQ9 QUESTIONS 1 & 2: 1
SUM OF ALL RESPONSES TO PHQ QUESTIONS 1-9: 1
1. LITTLE INTEREST OR PLEASURE IN DOING THINGS: 0

## 2023-04-06 NOTE — PATIENT INSTRUCTIONS
beans; or ½ cup cooked corn or green peas. Learn how much carbs to eat each day and at each meal. A dietitian or CDE can teach you how to keep track of the amount of carbs you eat. This is called carbohydrate counting. If you are not sure how to count carbohydrate grams, use the Plate Method to plan meals. It is a good, quick way to make sure that you have a balanced meal. It also helps you spread carbs throughout the day. Divide your plate by types of foods. Put non-starchy vegetables on half the plate, meat or other protein food on one-quarter of the plate, and a grain or starchy vegetable in the final quarter of the plate. To this you can add a small piece of fruit and 1 cup of milk or yogurt, depending on how many carbs you are supposed to eat at a meal.  Try to eat about the same amount of carbs at each meal. Do not \"save up\" your daily allowance of carbs to eat at one meal.  Proteins have very little or no carbs per serving. Examples of proteins are beef, chicken, turkey, fish, eggs, tofu, cheese, cottage cheese, and peanut butter. A serving size of meat is 3 ounces, which is about the size of a deck of cards. Examples of meat substitute serving sizes (equal to 1 ounce of meat) are 1/4 cup of cottage cheese, 1 egg, 1 tablespoon of peanut butter, and ½ cup of tofu. How can you eat out and still eat healthy? Learn to estimate the serving sizes of foods that have carbohydrate. If you measure food at home, it will be easier to estimate the amount in a serving of restaurant food. If the meal you order has too much carbohydrate (such as potatoes, corn, or baked beans), ask to have a low-carbohydrate food instead. Ask for a salad or green vegetables. If you use insulin, check your blood sugar before and after eating out to help you plan how much to eat in the future. If you eat more carbohydrate at a meal than you had planned, take a walk or do other exercise. This will help lower your blood sugar.   What else

## 2023-04-06 NOTE — PROGRESS NOTES
Kit Blank presents today for   Chief Complaint   Patient presents with    3 Month Follow-Up         1. \"Have you been to the ER, urgent care clinic since your last visit? Hospitalized since your last visit? \" yes    2. \"Have you seen or consulted any other health care providers outside of the [address] [place] [address] since your last visit? \" no     3. For patients aged 39-70: Has the patient had a colonoscopy / FIT/ Cologuard? Yes - Care Gap present. Most recent result on file      If the patient is female:    4. For patients aged 41-77: Has the patient had a mammogram within the past 2 years? Yes - no Care Gap present      5. For patients aged 21-65: Has the patient had a pap smear?  NA - based on age or sex
titrated her dose according to her fasting morning blood sugars, increasing to 32 U qHS, and then switched to Levemir on 1/4/2020 due to formulary issues. Trulicity was added, and she was weaned from insulin in 3/2020. She denies any polyuria, polydipsia, nocturia, or blurry vision, and has no history of retinopathy, neuropathy, or nephropathy. She has not been having regular eye exams. She has a history of renal failure and is s/p a living donor cadaveric renal transplant from her brother in 4/1980. She had been noncompliant with her regimen as discussed, but has resumed taking tacrolimus and prednisone one month ago. She is followed in the [facility] by Dr. Sobia Edgar and Dr. Cj Harrington. She has secondary osteopenia due to chronic steroid use, and was being treated with alendronate. However, she states that she discontinued taking it. Her last bone density study was in 9/2021 showing T-scores:  femoral neck left -0.7  and distal radius -1.0. She continues to take calcium and Vitamin D supplements. She has no history of pathologic fractures. She has a history of osteoarthritis and is s/p right hip replacement by Dr. Tim Ruiz in 2015. She also has chronic neck and low back pain secondary to degenerative disease. She had been followed previously in the [facility] pain management center, but has weaned herself off of pain medications. She has stopped taking Percocet and MS Contin. She states that she has not noticed a significant increase in her neck or low back pain. She denies any fevers, chills, weight change, saddle paresthesia, neurogenic bowel or bladder symptoms, or recent trauma. She has a history of GERD, treated with omeprazole. She had an upper endoscopy in 5/2014 by Dr. Edith Weinberg which was normal. She also had difficulty with chronic constipation, secondary to narcotic use. She was being treated with Linzess, although discontinued after she weaned herself from taking narcotics.

## 2023-04-09 PROBLEM — Z91.148 NONCOMPLIANCE WITH MEDICATIONS: Status: RESOLVED | Noted: 2018-11-10 | Resolved: 2023-04-09

## 2023-04-20 RX ORDER — FLASH GLUCOSE SENSOR
KIT MISCELLANEOUS
Qty: 2 EACH | Refills: 5 | Status: SHIPPED | OUTPATIENT
Start: 2023-04-20

## 2023-05-10 ENCOUNTER — TELEPHONE (OUTPATIENT)
Age: 68
End: 2023-05-10

## 2023-05-10 RX ORDER — BLOOD-GLUCOSE SENSOR
EACH MISCELLANEOUS
Qty: 2 EACH | Refills: 5 | Status: SHIPPED | OUTPATIENT
Start: 2023-05-10

## 2023-05-10 NOTE — TELEPHONE ENCOUNTER
Requested refill:   - Semaglutide, 1 MG/DOSE, 4 MG/3ML SOPN    Pharmacy: [facility] on [address]    Patient's  also states they discontinuing the original freestyle diego sensor, they are requesting if a prescription can be sent for the freestyle diego 2 or 3.      Also sending to provider on call, please advise, thank you

## 2023-07-05 ENCOUNTER — TELEPHONE (OUTPATIENT)
Age: 68
End: 2023-07-05

## 2023-07-05 RX ORDER — OMEPRAZOLE 40 MG/1
40 CAPSULE, DELAYED RELEASE ORAL DAILY
Qty: 90 CAPSULE | Refills: 3 | Status: SHIPPED | OUTPATIENT
Start: 2023-07-05

## 2023-07-05 NOTE — TELEPHONE ENCOUNTER
Please call and clarify what he is asking. Please clarify reason for changing dose of pravastatin. She has been taking 80 mg daily. Also please clarify the issue with the [facility] sensor since that does not require compatibility with a smart phone since the reader is freestanding.

## 2023-07-05 NOTE — TELEPHONE ENCOUNTER
Patients spouse called and states that the prescription for pravastatin (PRAVACHOL) 80 MG tablet needs to be changed to 20 mg. Patients spouse also states that the Continuous Blood Gluc Sensor (FREESTYLE BRANDYN 3 SENSOR) MISC and Continuous Blood Gluc Sensor (FREESTYLE BRANDYN 14 DAY SENSOR) MISC are not compatible with their phones. His callback number is [phone]. Please advise.

## 2023-07-05 NOTE — TELEPHONE ENCOUNTER
Patient  call back stating tat her refills need to be sent to [facility] [address] [address] Pankaj Kan [phone] - F [phone]

## 2023-07-06 NOTE — TELEPHONE ENCOUNTER
Please refill if appropriate or refuse medication if not appropriate. PCP: Hemal Alexandre MD     Last appt:4/9/23           No future appointments.       Requested Prescriptions     Pending Prescriptions Disp Refills    omeprazole (PRILOSEC) 40 MG delayed release capsule [Pharmacy Med Name: OMEPRAZOLE 40MG CAPSULES] 90 capsule      Sig: TAKE 1 CAPSULE BY MOUTH DAILY

## 2023-07-07 NOTE — TELEPHONE ENCOUNTER
M for patient to return my call     Per, Dr. Rao Echeverria:       Please call and clarify what he is asking. Please clarify reason for changing dose of pravastatin. She has been taking 80 mg daily. Also please clarify the issue with the [employer] sensor since that does not require compatibility with a smart phone since the reader is freestanding. The patient is Stable - Low risk of patient condition declining or worsening    Shift Goals  Clinical Goals: Discharge to Rehab at 100  Patient Goals: Discharge to Rehab at 1100    Progress made toward(s) clinical / shift goals:  Waiting for transport to be at bedside by 1100      Patient is not progressing towards the following goals:

## 2023-07-21 DIAGNOSIS — R80.9 TYPE 2 DIABETES MELLITUS WITH MICROALBUMINURIA, WITHOUT LONG-TERM CURRENT USE OF INSULIN (HCC): ICD-10-CM

## 2023-07-21 DIAGNOSIS — E11.29 TYPE 2 DIABETES MELLITUS WITH MICROALBUMINURIA, WITHOUT LONG-TERM CURRENT USE OF INSULIN (HCC): ICD-10-CM

## 2023-07-24 DIAGNOSIS — R80.9 TYPE 2 DIABETES MELLITUS WITH MICROALBUMINURIA, WITHOUT LONG-TERM CURRENT USE OF INSULIN (HCC): ICD-10-CM

## 2023-07-24 DIAGNOSIS — E11.29 TYPE 2 DIABETES MELLITUS WITH MICROALBUMINURIA, WITHOUT LONG-TERM CURRENT USE OF INSULIN (HCC): ICD-10-CM

## 2023-07-24 RX ORDER — FLASH GLUCOSE SCANNING READER
EACH MISCELLANEOUS
Qty: 2 EACH | Refills: 0 | Status: SHIPPED | OUTPATIENT
Start: 2023-07-24

## 2023-07-24 RX ORDER — FLASH GLUCOSE SCANNING READER
EACH MISCELLANEOUS
Qty: 2 EACH | OUTPATIENT
Start: 2023-07-24

## 2023-07-24 NOTE — TELEPHONE ENCOUNTER
PCP: Maik Lowe MD    Previous refill per chart   Disp Refills Start End    Continuous Blood Gluc  (FREESTYLE BRANDYN 2 READER) FERNY 1 each 0 7/11/2023     Sig: USE TO CHECK BLOOD SUGAR AT LEAST 4 TIMES DAILY    Sent to pharmacy as: Jonelle Lara 2 [place] Device    E-Prescribing Status: Receipt confirmed by pharmacy (7/11/2023  5:21 PM EDT)    Prior authorization: Closed - Prior Authorization not required for patient/medication      Last appt:   No future appointments.

## 2023-09-30 NOTE — TELEPHONE ENCOUNTER
Called patient for PharmD telephone appointment for diabetes follow up. Patient's  states patient is not home. States he will tell patient to return call to PharmD.     Jodi Hutchison, GregD  Clinical Pharmacist Specialist [facility]

## 2023-10-15 ENCOUNTER — TELEPHONE (OUTPATIENT)
Age: 68
End: 2023-10-15

## 2023-10-15 DIAGNOSIS — E11.29 TYPE 2 DIABETES MELLITUS WITH MICROALBUMINURIA, WITHOUT LONG-TERM CURRENT USE OF INSULIN (HCC): ICD-10-CM

## 2023-10-15 DIAGNOSIS — E55.9 VITAMIN D DEFICIENCY, UNSPECIFIED: ICD-10-CM

## 2023-10-15 DIAGNOSIS — Z94.0 KIDNEY TRANSPLANT STATUS, LIVING RELATED DONOR: ICD-10-CM

## 2023-10-15 DIAGNOSIS — R80.9 TYPE 2 DIABETES MELLITUS WITH MICROALBUMINURIA, WITHOUT LONG-TERM CURRENT USE OF INSULIN (HCC): ICD-10-CM

## 2023-10-15 DIAGNOSIS — E78.2 MIXED HYPERLIPIDEMIA: ICD-10-CM

## 2023-10-15 DIAGNOSIS — I10 ESSENTIAL HYPERTENSION: Primary | ICD-10-CM

## 2023-10-15 DIAGNOSIS — D84.9 IMMUNOSUPPRESSED STATUS (HCC): ICD-10-CM

## 2023-10-17 RX ORDER — LISINOPRIL 40 MG/1
TABLET ORAL
Qty: 90 TABLET | Refills: 0 | Status: SHIPPED | OUTPATIENT
Start: 2023-10-17

## 2023-10-17 NOTE — TELEPHONE ENCOUNTER
PCP: Alesha Mckenna MD    LAST REFILL PER CHART:      Disp Refills Start End   lisinopriL (PRINIVIL, ZESTRIL) 40 mg tablet 90 Tablet 2 12/15/2022    Sig: TAKE 1 TABLET BY MOUTH DAILY   Sent to pharmacy as: lisinopriL 40 mg tablet (Sena Keenan)   E-Prescribing Status: Receipt confirmed by pharmacy (12/15/2022  9:18 PM EST)    No future appointments.

## 2023-10-23 NOTE — TELEPHONE ENCOUNTER
Patient reached , appt scheduled. She states she is a kidney transplant patient and has to have full labs done for that very often, she is asking if Dr Pio Gabriel can use those labs? Please advise, thank you.

## 2023-10-23 NOTE — TELEPHONE ENCOUNTER
Labs were last done for renal transplant in 7/2023. Would recommend updating prior to her visit in December 2023. Lab orders have been placed and please ask that she obtain prior to her visit.

## 2023-10-24 NOTE — TELEPHONE ENCOUNTER
Patient contacted and advised. Patient will get labs done at [facility] and orders have been printed and mailed.

## 2023-11-16 RX ORDER — AMLODIPINE BESYLATE 5 MG/1
TABLET ORAL
Qty: 90 TABLET | Refills: 1 | Status: SHIPPED | OUTPATIENT
Start: 2023-11-16

## 2023-11-16 NOTE — TELEPHONE ENCOUNTER
PCP: Ananya Figueredo MD    LAST REFILL PER CHART:  amLODIPine (NORVASC) 5 mg tablet 90 Tablet 3 7/1/2022    Sig: TAKE 1 TABLET BY MOUTH DAILY   Sent to pharmacy as: amLODIPine 5 mg tablet (NORVASC)   E-Prescribing Status: Receipt confirmed by pharmacy (7/1/2022  8:00 PM EDT)    Future Appointments   Date Time Provider Department Center   12/13/2023  9:00 AM Ananya Figueredo MD [facility] BS AMB

## 2023-11-17 ENCOUNTER — APPOINTMENT (OUTPATIENT)
Facility: HOSPITAL | Age: 68
End: 2023-11-17
Payer: COMMERCIAL

## 2023-11-17 ENCOUNTER — HOSPITAL ENCOUNTER (EMERGENCY)
Facility: HOSPITAL | Age: 68
Discharge: HOME | End: 2023-11-17
Attending: EMERGENCY MEDICINE
Payer: COMMERCIAL

## 2023-11-17 VITALS
WEIGHT: 163 LBS | SYSTOLIC BLOOD PRESSURE: 155 MMHG | DIASTOLIC BLOOD PRESSURE: 81 MMHG | HEART RATE: 79 BPM | RESPIRATION RATE: 19 BRPM | HEIGHT: 63 IN | BODY MASS INDEX: 28.88 KG/M2 | TEMPERATURE: 98.2 F | OXYGEN SATURATION: 98 %

## 2023-11-17 DIAGNOSIS — T78.40XA ALLERGIC REACTION, INITIAL ENCOUNTER: Primary | ICD-10-CM

## 2023-11-17 LAB
ALBUMIN SERPL-MCNC: 4 G/DL (ref 3.4–5)
ALBUMIN/GLOB SERPL: 1.3 (ref 0.8–1.7)
ALP SERPL-CCNC: 60 U/L (ref 45–117)
ALT SERPL-CCNC: 34 U/L (ref 13–56)
ANION GAP SERPL CALC-SCNC: 6 MMOL/L (ref 3–18)
AST SERPL-CCNC: 18 U/L (ref 10–38)
BASOPHILS # BLD: 0 K/UL (ref 0–0.1)
BASOPHILS NFR BLD: 0 % (ref 0–2)
BILIRUB SERPL-MCNC: 0.4 MG/DL (ref 0.2–1)
BUN SERPL-MCNC: 20 MG/DL (ref 7–18)
BUN/CREAT SERPL: 18 (ref 12–20)
CALCIUM SERPL-MCNC: 9.3 MG/DL (ref 8.5–10.1)
CHLORIDE SERPL-SCNC: 109 MMOL/L (ref 100–111)
CO2 SERPL-SCNC: 26 MMOL/L (ref 21–32)
CREAT SERPL-MCNC: 1.09 MG/DL (ref 0.6–1.3)
DIFFERENTIAL METHOD BLD: ABNORMAL
EOSINOPHIL # BLD: 0.4 K/UL (ref 0–0.4)
EOSINOPHIL NFR BLD: 3 % (ref 0–5)
ERYTHROCYTE [DISTWIDTH] IN BLOOD BY AUTOMATED COUNT: 13.5 % (ref 11.6–14.5)
GLOBULIN SER CALC-MCNC: 3.2 G/DL (ref 2–4)
GLUCOSE SERPL-MCNC: 98 MG/DL (ref 74–99)
HCT VFR BLD AUTO: 39.2 % (ref 35–45)
HGB BLD-MCNC: 13.1 G/DL (ref 12–16)
IMM GRANULOCYTES # BLD AUTO: 0.1 K/UL (ref 0–0.04)
IMM GRANULOCYTES NFR BLD AUTO: 0 % (ref 0–0.5)
LYMPHOCYTES # BLD: 3.2 K/UL (ref 0.9–3.6)
LYMPHOCYTES NFR BLD: 28 % (ref 21–52)
MCH RBC QN AUTO: 30.7 PG (ref 24–34)
MCHC RBC AUTO-ENTMCNC: 33.4 G/DL (ref 31–37)
MCV RBC AUTO: 91.8 FL (ref 78–100)
MONOCYTES # BLD: 0.9 K/UL (ref 0.05–1.2)
MONOCYTES NFR BLD: 8 % (ref 3–10)
NEUTS SEG # BLD: 6.6 K/UL (ref 1.8–8)
NEUTS SEG NFR BLD: 60 % (ref 40–73)
NRBC # BLD: 0 K/UL (ref 0–0.01)
NRBC BLD-RTO: 0 PER 100 WBC
PLATELET # BLD AUTO: 326 K/UL (ref 135–420)
PMV BLD AUTO: 8.9 FL (ref 9.2–11.8)
POTASSIUM SERPL-SCNC: 4.3 MMOL/L (ref 3.5–5.5)
PROT SERPL-MCNC: 7.2 G/DL (ref 6.4–8.2)
RBC # BLD AUTO: 4.27 M/UL (ref 4.2–5.3)
SODIUM SERPL-SCNC: 141 MMOL/L (ref 136–145)
TROPONIN I SERPL HS-MCNC: 14 NG/L (ref 0–54)
TROPONIN I SERPL HS-MCNC: 14 NG/L (ref 0–54)
WBC # BLD AUTO: 11.2 K/UL (ref 4.6–13.2)

## 2023-11-17 PROCEDURE — 99284 EMERGENCY DEPT VISIT MOD MDM: CPT

## 2023-11-17 PROCEDURE — 80053 COMPREHEN METABOLIC PANEL: CPT

## 2023-11-17 PROCEDURE — 93005 ELECTROCARDIOGRAM TRACING: CPT | Performed by: EMERGENCY MEDICINE

## 2023-11-17 PROCEDURE — 71045 X-RAY EXAM CHEST 1 VIEW: CPT

## 2023-11-17 PROCEDURE — 84484 ASSAY OF TROPONIN QUANT: CPT

## 2023-11-17 PROCEDURE — 6370000000 HC RX 637 (ALT 250 FOR IP): Performed by: EMERGENCY MEDICINE

## 2023-11-17 PROCEDURE — 85025 COMPLETE CBC W/AUTO DIFF WBC: CPT

## 2023-11-17 RX ORDER — ACETAMINOPHEN 325 MG/1
650 TABLET ORAL
Status: COMPLETED | OUTPATIENT
Start: 2023-11-17 | End: 2023-11-17

## 2023-11-17 RX ADMIN — ACETAMINOPHEN 325MG 650 MG: 325 TABLET ORAL at 19:42

## 2023-11-17 ASSESSMENT — ENCOUNTER SYMPTOMS
ABDOMINAL PAIN: 0
WHEEZING: 0
SHORTNESS OF BREATH: 0
CONSTIPATION: 1

## 2023-11-17 ASSESSMENT — PAIN SCALES - GENERAL: PAINLEVEL_OUTOF10: 6

## 2023-11-17 ASSESSMENT — PAIN - FUNCTIONAL ASSESSMENT: PAIN_FUNCTIONAL_ASSESSMENT: 0-10

## 2023-11-17 ASSESSMENT — PAIN DESCRIPTION - LOCATION: LOCATION: HEAD

## 2023-11-18 LAB
EKG ATRIAL RATE: 82 BPM
EKG DIAGNOSIS: NORMAL
EKG P AXIS: 58 DEGREES
EKG P-R INTERVAL: 160 MS
EKG Q-T INTERVAL: 352 MS
EKG QRS DURATION: 78 MS
EKG QTC CALCULATION (BAZETT): 411 MS
EKG R AXIS: 25 DEGREES
EKG T AXIS: 16 DEGREES
EKG VENTRICULAR RATE: 82 BPM

## 2023-11-18 PROCEDURE — 93010 ELECTROCARDIOGRAM REPORT: CPT | Performed by: INTERNAL MEDICINE

## 2023-11-18 NOTE — ED NOTES
Discharge instructions reviewed with patient. Patient verbalized understanding. Patient advised to follow up as directed on discharge instructions. Patient denies questions, needs or concerns at this time. Patient verbalized understanding. No s/sx of distress noted.        Willis Sosa RN  11/17/23 3507

## 2023-11-18 NOTE — ED TRIAGE NOTES
A&O female with c/o HA and rapid heart rate x 5 days. Patient states she broke our in hives on Monday and has had symptoms ever since. Reports dyspnea on exertion.

## 2023-11-18 NOTE — ED PROVIDER NOTES
`[facility] EMERGENCY DEPT  eMERGENCY dEPARTMENT eNCOUnter      Pt Name: Facundo Watson  MRN: [id number]  [address] 1955 [facility]: 11/17/2023  Provider:Christ Cisneros MD    CHIEF COMPLAINT       HPI    Facundo Watson is a 70 y.o. female  with a history of kidney tansplant for 43 years. She presents to the ER with a 5 days hx  of having a right side headache,  intermittent palpitation and malaise. Her symptoms started after taking  her daily walk today with her . ROS  Review of Systems   Constitutional:  Positive for activity change and fatigue. Negative for appetite change and fever. HENT: Negative. Respiratory:  Negative for shortness of breath and wheezing. Cardiovascular:  Positive for palpitations. Negative for chest pain. Gastrointestinal:  Positive for constipation. Negative for abdominal pain. Genitourinary:  Negative for difficulty urinating and flank pain. Musculoskeletal:  Positive for gait problem. Negative for arthralgias. Neurological:  Negative for dizziness and light-headedness. Psychiatric/Behavioral:  Negative for agitation. Except as noted above the remainder of the review of systems was reviewed and negative. 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   S/P living related donor transplant in 4/1980    Heart failure ([address])     x 3    History of depression 12/12/2017    Hypercholesterolemia     Hyperlipidemia 8/13/2018

## 2023-11-20 ENCOUNTER — TELEPHONE (OUTPATIENT)
Age: 68
End: 2023-11-20

## 2023-11-20 NOTE — TELEPHONE ENCOUNTER
Pt was seen at the ED for sinuses and her BP was all over the place and recommend she sees her PCP.  Please advise an available appt and time

## 2023-11-20 NOTE — TELEPHONE ENCOUNTER
No available appointments this week. Reviewed ED notes. Please make sure she is taking her blood pressure medications and instruct that she begin monitoring and recording readings at home at least 2 hours after her medication doses. She should bring readings and blood pressure monitor to her upcoming visit in 12/2023. If her readings appear elevated after monitoring at home, please advise her to call and adjustments can be made in her antihypertensive therapy.

## 2024-02-21 ENCOUNTER — HOSPITAL ENCOUNTER (OUTPATIENT)
Facility: HOSPITAL | Age: 69
Setting detail: SPECIMEN
Discharge: HOME | End: 2024-02-24
Payer: COMMERCIAL

## 2024-02-21 ENCOUNTER — OFFICE VISIT (OUTPATIENT)
Age: 69
End: 2024-02-21
Payer: COMMERCIAL

## 2024-02-21 VITALS
TEMPERATURE: 97.3 F | OXYGEN SATURATION: 94 % | HEART RATE: 51 BPM | WEIGHT: 165 LBS | DIASTOLIC BLOOD PRESSURE: 82 MMHG | SYSTOLIC BLOOD PRESSURE: 152 MMHG | BODY MASS INDEX: 29.23 KG/M2 | HEIGHT: 63 IN

## 2024-02-21 DIAGNOSIS — E21.3 HYPERPARATHYROIDISM (HCC): ICD-10-CM

## 2024-02-21 DIAGNOSIS — M85.89 OSTEOPENIA OF MULTIPLE SITES: ICD-10-CM

## 2024-02-21 DIAGNOSIS — E11.29 TYPE 2 DIABETES MELLITUS WITH MICROALBUMINURIA, WITHOUT LONG-TERM CURRENT USE OF INSULIN (HCC): ICD-10-CM

## 2024-02-21 DIAGNOSIS — I10 ESSENTIAL HYPERTENSION: ICD-10-CM

## 2024-02-21 DIAGNOSIS — E11.29 TYPE 2 DIABETES MELLITUS WITH MICROALBUMINURIA, WITHOUT LONG-TERM CURRENT USE OF INSULIN (HCC): Primary | ICD-10-CM

## 2024-02-21 DIAGNOSIS — Z94.0 KIDNEY TRANSPLANT STATUS, LIVING RELATED DONOR: ICD-10-CM

## 2024-02-21 DIAGNOSIS — D84.9 IMMUNOSUPPRESSED STATUS (HCC): ICD-10-CM

## 2024-02-21 DIAGNOSIS — E78.2 MIXED HYPERLIPIDEMIA: ICD-10-CM

## 2024-02-21 DIAGNOSIS — K21.9 GASTROESOPHAGEAL REFLUX DISEASE, UNSPECIFIED WHETHER ESOPHAGITIS PRESENT: ICD-10-CM

## 2024-02-21 DIAGNOSIS — F41.1 GAD (GENERALIZED ANXIETY DISORDER): ICD-10-CM

## 2024-02-21 DIAGNOSIS — I50.32 CHRONIC DIASTOLIC HEART FAILURE (HCC): ICD-10-CM

## 2024-02-21 DIAGNOSIS — Z12.31 SCREENING MAMMOGRAM FOR BREAST CANCER: ICD-10-CM

## 2024-02-21 DIAGNOSIS — R80.9 TYPE 2 DIABETES MELLITUS WITH MICROALBUMINURIA, WITHOUT LONG-TERM CURRENT USE OF INSULIN (HCC): Primary | ICD-10-CM

## 2024-02-21 DIAGNOSIS — E55.9 VITAMIN D DEFICIENCY, UNSPECIFIED: ICD-10-CM

## 2024-02-21 DIAGNOSIS — R80.9 TYPE 2 DIABETES MELLITUS WITH MICROALBUMINURIA, WITHOUT LONG-TERM CURRENT USE OF INSULIN (HCC): ICD-10-CM

## 2024-02-21 LAB
CHOLEST SERPL-MCNC: 207 MG/DL
EST. AVERAGE GLUCOSE BLD GHB EST-MCNC: 128 MG/DL
HBA1C MFR BLD: 6.1 % (ref 4.2–5.6)
HDLC SERPL-MCNC: 68 MG/DL (ref 40–60)
HDLC SERPL: 3 (ref 0–5)
LDLC SERPL CALC-MCNC: 116.6 MG/DL (ref 0–100)
LIPID PANEL: ABNORMAL
TRIGL SERPL-MCNC: 112 MG/DL
VLDLC SERPL CALC-MCNC: 22.4 MG/DL

## 2024-02-21 PROCEDURE — G8484 FLU IMMUNIZE NO ADMIN: HCPCS | Performed by: INTERNAL MEDICINE

## 2024-02-21 PROCEDURE — 1036F TOBACCO NON-USER: CPT | Performed by: INTERNAL MEDICINE

## 2024-02-21 PROCEDURE — 1123F ACP DISCUSS/DSCN MKR DOCD: CPT | Performed by: INTERNAL MEDICINE

## 2024-02-21 PROCEDURE — 83036 HEMOGLOBIN GLYCOSYLATED A1C: CPT

## 2024-02-21 PROCEDURE — 3044F HG A1C LEVEL LT 7.0%: CPT | Performed by: INTERNAL MEDICINE

## 2024-02-21 PROCEDURE — 1090F PRES/ABSN URINE INCON ASSESS: CPT | Performed by: INTERNAL MEDICINE

## 2024-02-21 PROCEDURE — G8417 CALC BMI ABV UP PARAM F/U: HCPCS | Performed by: INTERNAL MEDICINE

## 2024-02-21 PROCEDURE — 2022F DILAT RTA XM EVC RTNOPTHY: CPT | Performed by: INTERNAL MEDICINE

## 2024-02-21 PROCEDURE — 36415 COLL VENOUS BLD VENIPUNCTURE: CPT

## 2024-02-21 PROCEDURE — 3079F DIAST BP 80-89 MM HG: CPT | Performed by: INTERNAL MEDICINE

## 2024-02-21 PROCEDURE — 99215 OFFICE O/P EST HI 40 MIN: CPT | Performed by: INTERNAL MEDICINE

## 2024-02-21 PROCEDURE — G8427 DOCREV CUR MEDS BY ELIG CLIN: HCPCS | Performed by: INTERNAL MEDICINE

## 2024-02-21 PROCEDURE — G8399 PT W/DXA RESULTS DOCUMENT: HCPCS | Performed by: INTERNAL MEDICINE

## 2024-02-21 PROCEDURE — 3017F COLORECTAL CA SCREEN DOC REV: CPT | Performed by: INTERNAL MEDICINE

## 2024-02-21 PROCEDURE — 3077F SYST BP >= 140 MM HG: CPT | Performed by: INTERNAL MEDICINE

## 2024-02-21 PROCEDURE — 80061 LIPID PANEL: CPT

## 2024-02-21 RX ORDER — ROSUVASTATIN CALCIUM 5 MG/1
5 TABLET, COATED ORAL DAILY
COMMUNITY

## 2024-02-21 RX ORDER — CYCLOSPORINE 100 MG/1
1 CAPSULE, LIQUID FILLED ORAL 2 TIMES DAILY
COMMUNITY

## 2024-02-21 RX ORDER — LISINOPRIL 40 MG/1
40 TABLET ORAL DAILY
Qty: 1 TABLET | Refills: 0
Start: 2024-02-21 | End: 2024-02-25 | Stop reason: SDUPTHER

## 2024-02-21 RX ORDER — CYCLOSPORINE 100 MG/1
100 CAPSULE, GELATIN COATED ORAL 2 TIMES DAILY
COMMUNITY
End: 2024-02-21 | Stop reason: ALTCHOICE

## 2024-02-21 ASSESSMENT — PATIENT HEALTH QUESTIONNAIRE - PHQ9
SUM OF ALL RESPONSES TO PHQ QUESTIONS 1-9: 1
SUM OF ALL RESPONSES TO PHQ9 QUESTIONS 1 & 2: 0
SUM OF ALL RESPONSES TO PHQ QUESTIONS 1-9: 1
3. TROUBLE FALLING OR STAYING ASLEEP: 1
5. POOR APPETITE OR OVEREATING: 0
2. FEELING DOWN, DEPRESSED OR HOPELESS: 0
1. LITTLE INTEREST OR PLEASURE IN DOING THINGS: 0
4. FEELING TIRED OR HAVING LITTLE ENERGY: 0
SUM OF ALL RESPONSES TO PHQ QUESTIONS 1-9: 1
8. MOVING OR SPEAKING SO SLOWLY THAT OTHER PEOPLE COULD HAVE NOTICED. OR THE OPPOSITE, BEING SO FIGETY OR RESTLESS THAT YOU HAVE BEEN MOVING AROUND A LOT MORE THAN USUAL: 0
9. THOUGHTS THAT YOU WOULD BE BETTER OFF DEAD, OR OF HURTING YOURSELF: 0
7. TROUBLE CONCENTRATING ON THINGS, SUCH AS READING THE NEWSPAPER OR WATCHING TELEVISION: 0
6. FEELING BAD ABOUT YOURSELF - OR THAT YOU ARE A FAILURE OR HAVE LET YOURSELF OR YOUR FAMILY DOWN: 0
SUM OF ALL RESPONSES TO PHQ QUESTIONS 1-9: 1
10. IF YOU CHECKED OFF ANY PROBLEMS, HOW DIFFICULT HAVE THESE PROBLEMS MADE IT FOR YOU TO DO YOUR WORK, TAKE CARE OF THINGS AT HOME, OR GET ALONG WITH OTHER PEOPLE: 0

## 2024-02-21 NOTE — PROGRESS NOTES
Janessa Guerrero presents today for   Chief Complaint   Patient presents with    Follow-up                 1. \"Have you been to the ER, urgent care clinic since your last visit?  Hospitalized since your last visit?\" no    2. \"Have you seen or consulted any other health care providers outside of the [facility] since your last visit?\" yes     3. For patients aged 45-75: Has the patient had a colonoscopy / FIT/ Cologuard? No      If the patient is female:    4. For patients aged 40-74: Has the patient had a mammogram within the past 2 years? No      5. For patients aged 21-65: Has the patient had a pap smear? No   
rosuvastatin in 8/2018, but noticed severe muscle aches and stopped taking it with improvement. She was subsequently restarted on simvastatin at 40 mg dose. In 10/2017, she was admitted to [facility] with dyspnea on exertion, and chest xray revealed mild cardiac enlargement with pulmonary vascular congestion and diffusely increased interstitial markings. Echocardiogram showed normal LV size and function (EF 65%), mild concentric LVH, grade 2 diastolic dysfunction, and a pharmacologic nuclear stress test was a normal low risk study without evidence of ischemia or prior infarction, and calculated EF 70%. She was diagnosed with acute on chronic diastolic heart failure, and treated with lasix and diuresed approximately five liters with improvement. She was intolerant to beta blockers. She currently denies any chest pain, shortness of breath at rest or with exertion, palpitations, lightheadedness, PND, orthopnea, or edema. She is now being followed by Dr. MURALI Figueredo.      She has a history of diabetes mellitus, which had not been treated with medication until 12/2019 when she began experiencing polyuria, polydipsia, and fatigue for several weeks. She stated that on 12/16/2019, she checked her blood sugar at home and it measured 575. She states that she took two 500 mg metformin and presented to [facility] ED for evaluation. She stated that initial blood sugar was elevated at 343, and she was administered 8 units of regular insulin and IV fluids. Evaluation included Na 134, Cl 93, K 4.2, creatinine 1.1/ eGFR 47, Ca 10.1, urinalysis protein 100, glucose >500, chest x-ray negative, EKG sinus tachycardia 100. She was subsequently discharged on metformin 500 mg bid and reported that her blood sugars had been measuring between 400-550 since discharge. She developed GI distress and was instructed to discontinue metformin and begin Lantus 10 U qHS and titrated her dose according to her fasting morning blood sugars,

## 2024-02-21 NOTE — PATIENT INSTRUCTIONS
throughout the day helps keep your blood sugar levels within your target range.  Your daily amount depends on several things, including your weight, how active you are, which diabetes medicines you take, and what your goals are for your blood sugar levels. A registered dietitian or diabetes educator can help you plan how much carbohydrate to include in each meal and snack.  A guideline for your daily amount of carbohydrate is:  45 to 60 grams at each meal. That's about the same as 3 to 4 carbohydrate servings.  15 to 20 grams at each snack. That's about the same as 1 carbohydrate serving.  The Nutrition Facts label on packaged foods tells you how much carbohydrate is in a serving of the food. First, look at the serving size on the food label. Is that the amount you eat in a serving? All of the nutrition information on a food label is based on that serving size. So if you eat more or less than that, you'll need to adjust the other numbers. Total carbohydrate is the next thing you need to look for on the label. If you count carbohydrate servings, one serving of carbohydrate is 15 grams.  For foods that don't come with labels, such as fresh fruits and vegetables, you'll need a guide that lists carbohydrate in these foods. Ask your doctor, dietitian, or diabetes educator about books or other nutrition guides you can use.  If you take insulin, you need to know how many grams of carbohydrate are in a meal. This lets you know how much rapid-acting insulin to take before you eat. If you use an insulin pump, you get a constant rate of insulin during the day. So the pump must be programmed at meals to give you extra insulin to cover the rise in blood sugar after meals.  When you know how much carbohydrate you will eat, you can take the right amount of insulin. Or, if you always use the same amount of insulin, you need to make sure that you eat the same amount of carbohydrate at meals.  If you need more help to understand

## 2024-02-22 ENCOUNTER — TELEPHONE (OUTPATIENT)
Age: 69
End: 2024-02-22

## 2024-02-22 NOTE — TELEPHONE ENCOUNTER
Hospital Outpatient Visit on 02/21/2024   Component Date Value Ref Range Status    Hemoglobin A1C 02/21/2024 6.1 (H)  4.2 - 5.6 % Final    Estimated Avg Glucose 02/21/2024 128  mg/dL Final    LIPID PANEL 02/21/2024      Final    Cholesterol, Total 02/21/2024 207 (H)  <200 MG/DL Final    Triglycerides 02/21/2024 112  <150 MG/DL Final    HDL 02/21/2024 68 (H)  40 - 60 MG/DL Final    LDL Calculated 02/21/2024 116.6 (H)  0 - 100 MG/DL Final    VLDL Cholesterol Calculated 02/21/2024 22.4  MG/DL Final    Chol/HDL Ratio 02/21/2024 3.0  0 - 5.0   Final     Please let patient know that the labs performed at her visit showed her HbA1c was stable at 6.1 which is excellent on Ozempic 1 mg weekly.  Her lipid panel showed total cholesterol 207, HDL 68 and  which is fair control on rosuvastatin 5 mg daily.  Recommend that she restart taking ezetimibe 10 mg daily with rosuvastatin as that will help to control her cholesterol better.

## 2024-02-23 NOTE — TELEPHONE ENCOUNTER
Left message for patient to call the office.     Re:  Please let patient know that the labs performed at her visit showed her HbA1c was stable at 6.1 which is excellent on Ozempic 1 mg weekly. Her lipid panel showed total cholesterol 207, HDL 68 and  which is fair control on rosuvastatin 5 mg daily. Recommend that she restart taking ezetimibe 10 mg daily with rosuvastatin as that will help to control her cholesterol better.

## 2024-02-25 RX ORDER — AMLODIPINE BESYLATE 5 MG/1
5 TABLET ORAL DAILY
Qty: 90 TABLET | Refills: 3 | Status: SHIPPED | OUTPATIENT
Start: 2024-02-25

## 2024-02-25 RX ORDER — LISINOPRIL 40 MG/1
40 TABLET ORAL DAILY
Qty: 90 TABLET | Refills: 3 | Status: SHIPPED | OUTPATIENT
Start: 2024-02-25

## 2024-03-03 ENCOUNTER — HOSPITAL ENCOUNTER (EMERGENCY)
Facility: HOSPITAL | Age: 69
Discharge: HOME | End: 2024-03-03
Attending: STUDENT IN AN ORGANIZED HEALTH CARE EDUCATION/TRAINING PROGRAM
Payer: COMMERCIAL

## 2024-03-03 ENCOUNTER — APPOINTMENT (OUTPATIENT)
Facility: HOSPITAL | Age: 69
End: 2024-03-03
Payer: COMMERCIAL

## 2024-03-03 VITALS
HEIGHT: 62 IN | RESPIRATION RATE: 17 BRPM | DIASTOLIC BLOOD PRESSURE: 83 MMHG | TEMPERATURE: 99.8 F | SYSTOLIC BLOOD PRESSURE: 160 MMHG | BODY MASS INDEX: 30.36 KG/M2 | WEIGHT: 165 LBS | HEART RATE: 85 BPM | OXYGEN SATURATION: 99 %

## 2024-03-03 DIAGNOSIS — J11.1 INFLUENZA WITH RESPIRATORY MANIFESTATION OTHER THAN PNEUMONIA: Primary | ICD-10-CM

## 2024-03-03 LAB
ANION GAP SERPL CALC-SCNC: 9 MMOL/L (ref 3–18)
B PERT DNA SPEC QL NAA+PROBE: NOT DETECTED
BASOPHILS # BLD: 0.1 K/UL (ref 0–0.1)
BASOPHILS NFR BLD: 1 % (ref 0–2)
BORDETELLA PARAPERTUSSIS BY PCR: NOT DETECTED
BUN SERPL-MCNC: 10 MG/DL (ref 7–18)
BUN/CREAT SERPL: 9 (ref 12–20)
C PNEUM DNA SPEC QL NAA+PROBE: NOT DETECTED
CALCIUM SERPL-MCNC: 8.5 MG/DL (ref 8.5–10.1)
CHLORIDE SERPL-SCNC: 110 MMOL/L (ref 100–111)
CO2 SERPL-SCNC: 22 MMOL/L (ref 21–32)
CREAT SERPL-MCNC: 1.16 MG/DL (ref 0.6–1.3)
DEPRECATED S PYO AG THROAT QL EIA: NEGATIVE
DIFFERENTIAL METHOD BLD: ABNORMAL
EOSINOPHIL # BLD: 0.3 K/UL (ref 0–0.4)
EOSINOPHIL NFR BLD: 3 % (ref 0–5)
ERYTHROCYTE [DISTWIDTH] IN BLOOD BY AUTOMATED COUNT: 13.4 % (ref 11.6–14.5)
FLUAV AG NPH QL IA: POSITIVE
FLUAV H3 RNA SPEC QL NAA+PROBE: DETECTED
FLUBV AG NOSE QL IA: NEGATIVE
FLUBV RNA SPEC QL NAA+PROBE: NOT DETECTED
GLUCOSE SERPL-MCNC: 113 MG/DL (ref 74–99)
HADV DNA SPEC QL NAA+PROBE: NOT DETECTED
HCOV 229E RNA SPEC QL NAA+PROBE: NOT DETECTED
HCOV HKU1 RNA SPEC QL NAA+PROBE: NOT DETECTED
HCOV NL63 RNA SPEC QL NAA+PROBE: NOT DETECTED
HCOV OC43 RNA SPEC QL NAA+PROBE: NOT DETECTED
HCT VFR BLD AUTO: 40 % (ref 35–45)
HGB BLD-MCNC: 12.9 G/DL (ref 12–16)
HMPV RNA SPEC QL NAA+PROBE: NOT DETECTED
HPIV1 RNA SPEC QL NAA+PROBE: NOT DETECTED
HPIV2 RNA SPEC QL NAA+PROBE: NOT DETECTED
HPIV3 RNA SPEC QL NAA+PROBE: NOT DETECTED
HPIV4 RNA SPEC QL NAA+PROBE: NOT DETECTED
IMM GRANULOCYTES # BLD AUTO: 0 K/UL (ref 0–0.04)
IMM GRANULOCYTES NFR BLD AUTO: 1 % (ref 0–0.5)
LACTATE BLD-SCNC: 0.8 MMOL/L (ref 0.4–2)
LYMPHOCYTES # BLD: 1.4 K/UL (ref 0.9–3.6)
LYMPHOCYTES NFR BLD: 16 % (ref 21–52)
M PNEUMO DNA SPEC QL NAA+PROBE: NOT DETECTED
MCH RBC QN AUTO: 30.4 PG (ref 24–34)
MCHC RBC AUTO-ENTMCNC: 32.3 G/DL (ref 31–37)
MCV RBC AUTO: 94.3 FL (ref 78–100)
MONOCYTES # BLD: 1.2 K/UL (ref 0.05–1.2)
MONOCYTES NFR BLD: 14 % (ref 3–10)
NEUTS SEG # BLD: 5.7 K/UL (ref 1.8–8)
NEUTS SEG NFR BLD: 66 % (ref 40–73)
NRBC # BLD: 0 K/UL (ref 0–0.01)
NRBC BLD-RTO: 0 PER 100 WBC
PLATELET # BLD AUTO: 229 K/UL (ref 135–420)
PMV BLD AUTO: 9.1 FL (ref 9.2–11.8)
POTASSIUM SERPL-SCNC: 3.6 MMOL/L (ref 3.5–5.5)
RBC # BLD AUTO: 4.24 M/UL (ref 4.2–5.3)
RSV RNA SPEC QL NAA+PROBE: NOT DETECTED
RV+EV RNA SPEC QL NAA+PROBE: NOT DETECTED
SARS-COV-2 RDRP RESP QL NAA+PROBE: NOT DETECTED
SARS-COV-2 RNA RESP QL NAA+PROBE: NOT DETECTED
SODIUM SERPL-SCNC: 141 MMOL/L (ref 136–145)
SOURCE: NORMAL
WBC # BLD AUTO: 8.7 K/UL (ref 4.6–13.2)

## 2024-03-03 PROCEDURE — 83605 ASSAY OF LACTIC ACID: CPT

## 2024-03-03 PROCEDURE — 85025 COMPLETE CBC W/AUTO DIFF WBC: CPT

## 2024-03-03 PROCEDURE — 83735 ASSAY OF MAGNESIUM: CPT

## 2024-03-03 PROCEDURE — 99284 EMERGENCY DEPT VISIT MOD MDM: CPT

## 2024-03-03 PROCEDURE — 87070 CULTURE OTHR SPECIMN AEROBIC: CPT

## 2024-03-03 PROCEDURE — 2580000003 HC RX 258: Performed by: STUDENT IN AN ORGANIZED HEALTH CARE EDUCATION/TRAINING PROGRAM

## 2024-03-03 PROCEDURE — 0202U NFCT DS 22 TRGT SARS-COV-2: CPT

## 2024-03-03 PROCEDURE — 6370000000 HC RX 637 (ALT 250 FOR IP): Performed by: STUDENT IN AN ORGANIZED HEALTH CARE EDUCATION/TRAINING PROGRAM

## 2024-03-03 PROCEDURE — 87880 STREP A ASSAY W/OPTIC: CPT

## 2024-03-03 PROCEDURE — 71046 X-RAY EXAM CHEST 2 VIEWS: CPT

## 2024-03-03 PROCEDURE — 87804 INFLUENZA ASSAY W/OPTIC: CPT

## 2024-03-03 PROCEDURE — 87040 BLOOD CULTURE FOR BACTERIA: CPT

## 2024-03-03 PROCEDURE — 80048 BASIC METABOLIC PNL TOTAL CA: CPT

## 2024-03-03 PROCEDURE — 87635 SARS-COV-2 COVID-19 AMP PRB: CPT

## 2024-03-03 RX ORDER — AMOXICILLIN AND CLAVULANATE POTASSIUM 875; 125 MG/1; MG/1
1 TABLET, FILM COATED ORAL 2 TIMES DAILY
Qty: 9 TABLET | Refills: 0 | Status: SHIPPED | OUTPATIENT
Start: 2024-03-03 | End: 2024-03-08

## 2024-03-03 RX ORDER — AMOXICILLIN AND CLAVULANATE POTASSIUM 875; 125 MG/1; MG/1
1 TABLET, FILM COATED ORAL
Status: COMPLETED | OUTPATIENT
Start: 2024-03-03 | End: 2024-03-03

## 2024-03-03 RX ORDER — OSELTAMIVIR PHOSPHATE 30 MG/1
30 CAPSULE ORAL 2 TIMES DAILY
Qty: 9 CAPSULE | Refills: 0 | Status: SHIPPED | OUTPATIENT
Start: 2024-03-03 | End: 2024-03-08

## 2024-03-03 RX ORDER — 0.9 % SODIUM CHLORIDE 0.9 %
500 INTRAVENOUS SOLUTION INTRAVENOUS ONCE
Status: COMPLETED | OUTPATIENT
Start: 2024-03-03 | End: 2024-03-03

## 2024-03-03 RX ORDER — OSELTAMIVIR PHOSPHATE 75 MG/1
75 CAPSULE ORAL
Status: COMPLETED | OUTPATIENT
Start: 2024-03-03 | End: 2024-03-03

## 2024-03-03 RX ADMIN — SODIUM CHLORIDE 500 ML: 9 INJECTION, SOLUTION INTRAVENOUS at 13:59

## 2024-03-03 RX ADMIN — AMOXICILLIN AND CLAVULANATE POTASSIUM 1 TABLET: 875; 125 TABLET, FILM COATED ORAL at 14:13

## 2024-03-03 RX ADMIN — OSELTAMIVIR PHOSPHATE 75 MG: 75 CAPSULE ORAL at 14:13

## 2024-03-03 ASSESSMENT — LIFESTYLE VARIABLES
HOW MANY STANDARD DRINKS CONTAINING ALCOHOL DO YOU HAVE ON A TYPICAL DAY: PATIENT DOES NOT DRINK
HOW OFTEN DO YOU HAVE A DRINK CONTAINING ALCOHOL: NEVER

## 2024-03-03 ASSESSMENT — PAIN SCALES - GENERAL: PAINLEVEL_OUTOF10: 8

## 2024-03-03 ASSESSMENT — PAIN - FUNCTIONAL ASSESSMENT: PAIN_FUNCTIONAL_ASSESSMENT: 0-10

## 2024-03-03 ASSESSMENT — PAIN DESCRIPTION - LOCATION: LOCATION: FACE

## 2024-03-03 NOTE — ED TRIAGE NOTES
Family was sick, Patient with nasal congestion, chest congestion, cough, fever, diarrhea X 4 days.

## 2024-03-04 ASSESSMENT — ENCOUNTER SYMPTOMS
VOMITING: 0
COUGH: 0
VOICE CHANGE: 0
SHORTNESS OF BREATH: 0
SINUS PAIN: 1
TROUBLE SWALLOWING: 0
DIARRHEA: 0
SORE THROAT: 1
NAUSEA: 0

## 2024-03-04 NOTE — ED PROVIDER NOTES
Time: 03/03/24  1:51 PM    Specimen: Blood   Result Value Ref Range    Special Requests NO SPECIAL REQUESTS      Culture NO GROWTH AFTER 15 HOURS     POCT lactic acid (lactate)    Collection Time: 03/03/24  2:00 PM   Result Value Ref Range    POC Lactic Acid 0.80 0.40 - 2.00 mmol/L   Culture, Blood 2    Collection Time: 03/03/24  2:24 PM    Specimen: Blood   Result Value Ref Range    Special Requests NO SPECIAL REQUESTS      Culture NO GROWTH AFTER 15 HOURS          EKG: See ED Course     RADIOLOGY:     Interpretation per the Radiologist below, if available at the time of this note:     XR CHEST (2 VW)   Final Result      No acute pulmonary disease.                Medical Decision Making  MDM: Based on the patient's current clinical presentation, the differential diagnosis considered in the care of the patient included but was not limited to COVID, flu.  Due to immunosuppressive therapy low threshold for blood cultures despite no sepsis criteria met.  Lactic was normal, patient was placed on Augmentin.  Chest x-ray negative.  Patient positive for influenza A.  Patient given renal dosing for Tamiflu and discharged.  Discussed case with patient's transplant nephrologist prior to discharge     The need for further CT, X-Ray, and lab evaluation was considered but felt not to be indicated at this time given the current clinical presentation.     DISCHARGED: Pt is hemodynamically stable and sitting comfortably in the bed. All relevant labs, imaging, and treatments were printed out and discussed with Patient/legally authorized person.     Non-diagnosed issues on the differential diagnosis were ruled out by clinical exam, laboratory values, or imaging studies. Consideration was given as to whether hospital admission is indicated based on this patient's current presentation and results above, and shared decision making was performed with patient regarding need for admission and further imaging. Pt and any family members

## 2024-03-05 ENCOUNTER — APPOINTMENT (OUTPATIENT)
Facility: HOSPITAL | Age: 69
End: 2024-03-05
Payer: COMMERCIAL

## 2024-03-05 ENCOUNTER — HOSPITAL ENCOUNTER (EMERGENCY)
Facility: HOSPITAL | Age: 69
Discharge: HOME | End: 2024-03-05
Attending: STUDENT IN AN ORGANIZED HEALTH CARE EDUCATION/TRAINING PROGRAM
Payer: COMMERCIAL

## 2024-03-05 ENCOUNTER — TELEPHONE (OUTPATIENT)
Age: 69
End: 2024-03-05

## 2024-03-05 VITALS
TEMPERATURE: 98.9 F | OXYGEN SATURATION: 99 % | DIASTOLIC BLOOD PRESSURE: 73 MMHG | RESPIRATION RATE: 20 BRPM | HEART RATE: 75 BPM | SYSTOLIC BLOOD PRESSURE: 161 MMHG

## 2024-03-05 DIAGNOSIS — J10.1 INFLUENZA A: Primary | ICD-10-CM

## 2024-03-05 DIAGNOSIS — R05.9 COUGH, UNSPECIFIED TYPE: ICD-10-CM

## 2024-03-05 LAB
BACTERIA SPEC CULT: NORMAL
SERVICE CMNT-IMP: NORMAL

## 2024-03-05 PROCEDURE — 6360000002 HC RX W HCPCS: Performed by: STUDENT IN AN ORGANIZED HEALTH CARE EDUCATION/TRAINING PROGRAM

## 2024-03-05 PROCEDURE — 99283 EMERGENCY DEPT VISIT LOW MDM: CPT

## 2024-03-05 PROCEDURE — 6370000000 HC RX 637 (ALT 250 FOR IP): Performed by: STUDENT IN AN ORGANIZED HEALTH CARE EDUCATION/TRAINING PROGRAM

## 2024-03-05 PROCEDURE — 71045 X-RAY EXAM CHEST 1 VIEW: CPT

## 2024-03-05 RX ORDER — ALBUTEROL SULFATE 2.5 MG/3ML
2.5 SOLUTION RESPIRATORY (INHALATION)
Status: COMPLETED | OUTPATIENT
Start: 2024-03-05 | End: 2024-03-05

## 2024-03-05 RX ORDER — BENZONATATE 100 MG/1
100 CAPSULE ORAL
Status: COMPLETED | OUTPATIENT
Start: 2024-03-05 | End: 2024-03-05

## 2024-03-05 RX ORDER — GUAIFENESIN 600 MG/1
600 TABLET, EXTENDED RELEASE ORAL 2 TIMES DAILY
Qty: 30 TABLET | Refills: 0 | Status: SHIPPED | OUTPATIENT
Start: 2024-03-05 | End: 2024-03-20

## 2024-03-05 RX ORDER — LISINOPRIL 40 MG/1
40 TABLET ORAL DAILY
Qty: 90 TABLET | Refills: 3 | Status: SHIPPED | OUTPATIENT
Start: 2024-03-05

## 2024-03-05 RX ORDER — BENZONATATE 100 MG/1
100 CAPSULE ORAL 2 TIMES DAILY PRN
Qty: 20 CAPSULE | Refills: 0 | Status: SHIPPED | OUTPATIENT
Start: 2024-03-05 | End: 2024-03-12

## 2024-03-05 RX ADMIN — BENZONATATE 100 MG: 100 CAPSULE ORAL at 03:30

## 2024-03-05 RX ADMIN — ALBUTEROL SULFATE 2.5 MG: 2.5 SOLUTION RESPIRATORY (INHALATION) at 03:13

## 2024-03-05 ASSESSMENT — ENCOUNTER SYMPTOMS
DIARRHEA: 0
CHEST TIGHTNESS: 0
COUGH: 1
SHORTNESS OF BREATH: 0
WHEEZING: 0
STRIDOR: 0
ABDOMINAL PAIN: 0
NAUSEA: 0
VOMITING: 0

## 2024-03-05 NOTE — ED PROVIDER NOTES
Pending)           Medical Decision Making   I am the first provider for this patient.    I reviewed the vital signs, available nursing notes, past medical history, past surgical history, family history and social history.      Vital Signs-Reviewed the patient's vital signs.    EKG: All EKG's are interpreted by the Emergency Department Physician who either signs or Co-signs this chart in the absence of a cardiologist.               Interpretation per the Radiologist below, if available at the time of this note:    ED Course: Progress Notes, Reevaluation, and Consults:    Provider Notes (Medical Decision Making):       MDM  Number of Diagnoses or Management Options  Cough, unspecified type  Influenza A  Diagnosis management comments: Patient coughing on exam but in no acute distress.  She is hypertensive with a blood pressure 161/73 but is otherwise hemodynamically stable.  Saturating 99% on room air.  Patient does have some expiratory wheezing on lung auscultation.  Chest x-ray showing no evidence of pneumonia.  Patient was given albuterol with improvement of her wheezing.  Will prescribe her guaifenesin for his chest congestion.  PCP follow-up and return precautions advised.  Patient verbalizes understanding and agreement with plan                Procedures          Diagnosis     Clinical Impression:   1. Influenza A    2. Cough, unspecified type        Disposition: discharged    Disclaimer: Sections of this note are dictated using utilizing voice recognition software.  Minor typographical errors may be present. If questions arise, please do not hesitate to contact me or call our department.         Raad Yuen, DO  03/05/24 9043

## 2024-03-05 NOTE — TELEPHONE ENCOUNTER
This patient contacted office for the following prescriptions to be filled:    Medication requested :   lisinopril (PRINIVIL;ZESTRIL) 40 MG tablet    PCP: Bea  Pharmacy or Print: [facility]  Mail order or Local pharmacy   [id number] - [facility] [address] - P [phone] - F [phone] [[id number]]     Scheduled appointment if not seen by current providers in office: LOV: 2/21/24  FU: 8/21/24    #: [phone]

## 2024-03-06 LAB — MAGNESIUM SERPL-MCNC: 2.1 MG/DL (ref 1.6–2.6)

## 2024-03-06 RX ORDER — LISINOPRIL 40 MG/1
40 TABLET ORAL DAILY
Qty: 90 TABLET | Refills: 3 | Status: CANCELLED | OUTPATIENT
Start: 2024-03-06

## 2024-03-09 LAB
BACTERIA SPEC CULT: NORMAL
BACTERIA SPEC CULT: NORMAL
SERVICE CMNT-IMP: NORMAL
SERVICE CMNT-IMP: NORMAL

## 2024-03-11 ENCOUNTER — APPOINTMENT (OUTPATIENT)
Facility: HOSPITAL | Age: 69
End: 2024-03-11
Payer: COMMERCIAL

## 2024-03-11 ENCOUNTER — HOSPITAL ENCOUNTER (EMERGENCY)
Facility: HOSPITAL | Age: 69
Discharge: HOME | End: 2024-03-11
Attending: EMERGENCY MEDICINE
Payer: COMMERCIAL

## 2024-03-11 ENCOUNTER — TELEPHONE (OUTPATIENT)
Age: 69
End: 2024-03-11

## 2024-03-11 VITALS
TEMPERATURE: 97.2 F | WEIGHT: 165 LBS | SYSTOLIC BLOOD PRESSURE: 137 MMHG | RESPIRATION RATE: 17 BRPM | DIASTOLIC BLOOD PRESSURE: 72 MMHG | OXYGEN SATURATION: 96 % | HEIGHT: 62 IN | HEART RATE: 81 BPM | BODY MASS INDEX: 30.36 KG/M2

## 2024-03-11 DIAGNOSIS — S29.019A THORACIC MYOFASCIAL STRAIN, INITIAL ENCOUNTER: ICD-10-CM

## 2024-03-11 DIAGNOSIS — J40 BRONCHITIS: ICD-10-CM

## 2024-03-11 DIAGNOSIS — J98.01 BRONCHOSPASM: Primary | ICD-10-CM

## 2024-03-11 LAB
ANION GAP SERPL CALC-SCNC: 7 MMOL/L (ref 3–18)
BASOPHILS # BLD: 0.1 K/UL (ref 0–0.1)
BASOPHILS NFR BLD: 1 % (ref 0–2)
BUN SERPL-MCNC: 13 MG/DL (ref 7–18)
BUN/CREAT SERPL: 13 (ref 12–20)
CALCIUM SERPL-MCNC: 8.9 MG/DL (ref 8.5–10.1)
CHLORIDE SERPL-SCNC: 112 MMOL/L (ref 100–111)
CO2 SERPL-SCNC: 24 MMOL/L (ref 21–32)
CREAT SERPL-MCNC: 1.02 MG/DL (ref 0.6–1.3)
D DIMER PPP FEU-MCNC: 0.29 UG/ML(FEU)
DIFFERENTIAL METHOD BLD: ABNORMAL
EOSINOPHIL # BLD: 0.3 K/UL (ref 0–0.4)
EOSINOPHIL NFR BLD: 3 % (ref 0–5)
ERYTHROCYTE [DISTWIDTH] IN BLOOD BY AUTOMATED COUNT: 13.1 % (ref 11.6–14.5)
GLUCOSE SERPL-MCNC: 112 MG/DL (ref 74–99)
HCT VFR BLD AUTO: 35.8 % (ref 35–45)
HGB BLD-MCNC: 12.1 G/DL (ref 12–16)
IMM GRANULOCYTES # BLD AUTO: 0.1 K/UL (ref 0–0.04)
IMM GRANULOCYTES NFR BLD AUTO: 1 % (ref 0–0.5)
LACTATE BLD-SCNC: 0.75 MMOL/L (ref 0.4–2)
LIPASE SERPL-CCNC: 48 U/L (ref 13–75)
LYMPHOCYTES # BLD: 4.3 K/UL (ref 0.9–3.6)
LYMPHOCYTES NFR BLD: 40 % (ref 21–52)
MCH RBC QN AUTO: 30.6 PG (ref 24–34)
MCHC RBC AUTO-ENTMCNC: 33.8 G/DL (ref 31–37)
MCV RBC AUTO: 90.6 FL (ref 78–100)
MONOCYTES # BLD: 0.8 K/UL (ref 0.05–1.2)
MONOCYTES NFR BLD: 7 % (ref 3–10)
NEUTS SEG # BLD: 5.3 K/UL (ref 1.8–8)
NEUTS SEG NFR BLD: 49 % (ref 40–73)
NRBC # BLD: 0 K/UL (ref 0–0.01)
NRBC BLD-RTO: 0 PER 100 WBC
NT PRO BNP: 137 PG/ML (ref 0–900)
PLATELET # BLD AUTO: 345 K/UL (ref 135–420)
PMV BLD AUTO: 9 FL (ref 9.2–11.8)
POTASSIUM SERPL-SCNC: 3.6 MMOL/L (ref 3.5–5.5)
RBC # BLD AUTO: 3.95 M/UL (ref 4.2–5.3)
SODIUM SERPL-SCNC: 143 MMOL/L (ref 136–145)
TROPONIN I SERPL HS-MCNC: 18 NG/L (ref 0–54)
WBC # BLD AUTO: 10.9 K/UL (ref 4.6–13.2)

## 2024-03-11 PROCEDURE — 80048 BASIC METABOLIC PNL TOTAL CA: CPT

## 2024-03-11 PROCEDURE — 83690 ASSAY OF LIPASE: CPT

## 2024-03-11 PROCEDURE — 99284 EMERGENCY DEPT VISIT MOD MDM: CPT

## 2024-03-11 PROCEDURE — 84484 ASSAY OF TROPONIN QUANT: CPT

## 2024-03-11 PROCEDURE — 71046 X-RAY EXAM CHEST 2 VIEWS: CPT

## 2024-03-11 PROCEDURE — 85025 COMPLETE CBC W/AUTO DIFF WBC: CPT

## 2024-03-11 PROCEDURE — 83605 ASSAY OF LACTIC ACID: CPT

## 2024-03-11 PROCEDURE — 6370000000 HC RX 637 (ALT 250 FOR IP): Performed by: EMERGENCY MEDICINE

## 2024-03-11 PROCEDURE — 83880 ASSAY OF NATRIURETIC PEPTIDE: CPT

## 2024-03-11 PROCEDURE — 85379 FIBRIN DEGRADATION QUANT: CPT

## 2024-03-11 RX ORDER — IPRATROPIUM BROMIDE AND ALBUTEROL SULFATE 2.5; .5 MG/3ML; MG/3ML
1 SOLUTION RESPIRATORY (INHALATION)
Status: COMPLETED | OUTPATIENT
Start: 2024-03-11 | End: 2024-03-11

## 2024-03-11 RX ORDER — ALBUTEROL SULFATE 90 UG/1
2 AEROSOL, METERED RESPIRATORY (INHALATION) 4 TIMES DAILY PRN
Qty: 54 G | Refills: 0 | Status: SHIPPED | OUTPATIENT
Start: 2024-03-11

## 2024-03-11 RX ORDER — AZITHROMYCIN 250 MG/1
250 TABLET, FILM COATED ORAL DAILY
Qty: 4 TABLET | Refills: 0 | Status: SHIPPED | OUTPATIENT
Start: 2024-03-11 | End: 2024-03-15

## 2024-03-11 RX ORDER — PREDNISONE 20 MG/1
60 TABLET ORAL DAILY
Qty: 12 TABLET | Refills: 0 | Status: SHIPPED | OUTPATIENT
Start: 2024-03-12 | End: 2024-03-16

## 2024-03-11 RX ORDER — PREDNISONE 20 MG/1
60 TABLET ORAL
Status: COMPLETED | OUTPATIENT
Start: 2024-03-11 | End: 2024-03-11

## 2024-03-11 RX ORDER — AZITHROMYCIN 250 MG/1
500 TABLET, FILM COATED ORAL
Status: COMPLETED | OUTPATIENT
Start: 2024-03-11 | End: 2024-03-11

## 2024-03-11 RX ORDER — BENZONATATE 200 MG/1
200 CAPSULE ORAL 3 TIMES DAILY PRN
Qty: 14 CAPSULE | Refills: 0 | Status: SHIPPED | OUTPATIENT
Start: 2024-03-11 | End: 2024-03-18

## 2024-03-11 RX ADMIN — IPRATROPIUM BROMIDE AND ALBUTEROL SULFATE 1 DOSE: .5; 3 SOLUTION RESPIRATORY (INHALATION) at 21:18

## 2024-03-11 RX ADMIN — IPRATROPIUM BROMIDE AND ALBUTEROL SULFATE 1 DOSE: .5; 3 SOLUTION RESPIRATORY (INHALATION) at 19:59

## 2024-03-11 RX ADMIN — AZITHROMYCIN DIHYDRATE 500 MG: 250 TABLET ORAL at 21:17

## 2024-03-11 RX ADMIN — PREDNISONE 60 MG: 20 TABLET ORAL at 21:17

## 2024-03-11 ASSESSMENT — LIFESTYLE VARIABLES
HOW OFTEN DO YOU HAVE A DRINK CONTAINING ALCOHOL: NEVER
HOW MANY STANDARD DRINKS CONTAINING ALCOHOL DO YOU HAVE ON A TYPICAL DAY: PATIENT DOES NOT DRINK

## 2024-03-11 ASSESSMENT — PAIN - FUNCTIONAL ASSESSMENT: PAIN_FUNCTIONAL_ASSESSMENT: 0-10

## 2024-03-11 ASSESSMENT — PAIN SCALES - GENERAL: PAINLEVEL_OUTOF10: 6

## 2024-03-11 NOTE — ED PROVIDER NOTES
Base) MCG/ACT inhaler Inhale 2 puffs into the lungs 4 times daily as needed for Wheezing, Disp-54 g, R-0Normal           Controlled Substances Monitoring:          No data to display                (Please note that portions of this note were completed with a voice recognition program.  Efforts were made to edit the dictations but occasionally words are mis-transcribed.)    Kenroy Angulo MD (electronically signed)  Attending Emergency Physician          Kenroy Angulo MD  03/12/24 7037

## 2024-03-11 NOTE — TELEPHONE ENCOUNTER
----- Message from Zoey Roldan sent at 3/11/2024 12:42 PM EDT -----  Subject: Appointment Request    Reason for Call: Established Patient Appointment needed: Routine ED Follow   Up Visit    QUESTIONS    Reason for appointment request? No appointments available during search     Additional Information for Provider? Patient is inquiring if there are   later appt. 3/12/24 then what she has scheduled. Please contact patient   with another availability.  ---------------------------------------------------------------------------  --------------  CALL BACK INFO  [phone]; OK to leave message on voicemail  ---------------------------------------------------------------------------  --------------  SCRIPT ANSWERS

## 2024-03-11 NOTE — ED TRIAGE NOTES
Ambulatory pt c/o flu like symptoms and upper R side back pain. Pt diagnosed with flu A and was called and told she had possible pneumonia. Pt reports the last time she had pneumonia she had the same type of back pain. Medications from previous visit has not helped completely. Pt reports compromised immune system due to hx of kidney transplant 43 years ago

## 2024-06-13 RX ORDER — TRAZODONE HYDROCHLORIDE 50 MG/1
TABLET ORAL
Qty: 90 TABLET | Refills: 0 | Status: SHIPPED | OUTPATIENT
Start: 2024-06-13

## 2024-06-15 RX ORDER — SEMAGLUTIDE 1.34 MG/ML
INJECTION, SOLUTION SUBCUTANEOUS
Qty: 9 ML | Refills: 1 | Status: SHIPPED | OUTPATIENT
Start: 2024-06-15

## 2024-07-10 ENCOUNTER — OFFICE VISIT (OUTPATIENT)
Facility: CLINIC | Age: 69
End: 2024-07-10

## 2024-07-10 VITALS
DIASTOLIC BLOOD PRESSURE: 80 MMHG | SYSTOLIC BLOOD PRESSURE: 134 MMHG | RESPIRATION RATE: 18 BRPM | WEIGHT: 169 LBS | BODY MASS INDEX: 31.1 KG/M2 | OXYGEN SATURATION: 97 % | HEART RATE: 78 BPM | TEMPERATURE: 95 F | HEIGHT: 62 IN

## 2024-07-10 DIAGNOSIS — I10 ESSENTIAL HYPERTENSION: ICD-10-CM

## 2024-07-10 DIAGNOSIS — E78.2 MIXED HYPERLIPIDEMIA: ICD-10-CM

## 2024-07-10 DIAGNOSIS — Z94.0 KIDNEY TRANSPLANT STATUS, LIVING RELATED DONOR: ICD-10-CM

## 2024-07-10 DIAGNOSIS — D84.9 IMMUNOSUPPRESSED STATUS (HCC): ICD-10-CM

## 2024-07-10 DIAGNOSIS — Z01.818 PREOPERATIVE EVALUATION TO RULE OUT SURGICAL CONTRAINDICATION: Primary | ICD-10-CM

## 2024-07-10 DIAGNOSIS — E11.319 TYPE 2 DIABETES MELLITUS WITH RETINOPATHY OF BOTH EYES, WITHOUT LONG-TERM CURRENT USE OF INSULIN, MACULAR EDEMA PRESENCE UNSPECIFIED, UNSPECIFIED RETINOPATHY SEVERITY (HCC): ICD-10-CM

## 2024-07-10 DIAGNOSIS — E11.29 TYPE 2 DIABETES MELLITUS WITH MICROALBUMINURIA, WITHOUT LONG-TERM CURRENT USE OF INSULIN (HCC): ICD-10-CM

## 2024-07-10 DIAGNOSIS — F33.9 EPISODE OF RECURRENT MAJOR DEPRESSIVE DISORDER, UNSPECIFIED DEPRESSION EPISODE SEVERITY (HCC): ICD-10-CM

## 2024-07-10 DIAGNOSIS — R80.9 TYPE 2 DIABETES MELLITUS WITH MICROALBUMINURIA, WITHOUT LONG-TERM CURRENT USE OF INSULIN (HCC): ICD-10-CM

## 2024-07-10 RX ORDER — PREDNISONE 5 MG/1
5 TABLET ORAL DAILY
COMMUNITY

## 2024-07-10 SDOH — ECONOMIC STABILITY: FOOD INSECURITY: WITHIN THE PAST 12 MONTHS, THE FOOD YOU BOUGHT JUST DIDN'T LAST AND YOU DIDN'T HAVE MONEY TO GET MORE.: NEVER TRUE

## 2024-07-10 SDOH — ECONOMIC STABILITY: FOOD INSECURITY: WITHIN THE PAST 12 MONTHS, YOU WORRIED THAT YOUR FOOD WOULD RUN OUT BEFORE YOU GOT MONEY TO BUY MORE.: NEVER TRUE

## 2024-07-10 SDOH — ECONOMIC STABILITY: INCOME INSECURITY: HOW HARD IS IT FOR YOU TO PAY FOR THE VERY BASICS LIKE FOOD, HOUSING, MEDICAL CARE, AND HEATING?: NOT HARD AT ALL

## 2024-07-10 ASSESSMENT — PATIENT HEALTH QUESTIONNAIRE - PHQ9
SUM OF ALL RESPONSES TO PHQ QUESTIONS 1-9: 0
2. FEELING DOWN, DEPRESSED OR HOPELESS: NOT AT ALL
SUM OF ALL RESPONSES TO PHQ QUESTIONS 1-9: 0
SUM OF ALL RESPONSES TO PHQ9 QUESTIONS 1 & 2: 0
1. LITTLE INTEREST OR PLEASURE IN DOING THINGS: NOT AT ALL

## 2024-07-10 NOTE — PROGRESS NOTES
Janessa Guerrero presents today for pre op.              1. \"Have you been to the ER, urgent care clinic since your last visit?  Hospitalized since your last visit?\" no    2. \"Have you seen or consulted any other health care providers outside of the [facility] since your last visit?\" no     3. For patients aged 45-75: Has the patient had a colonoscopy / FIT/ Cologuard? No      If the patient is female:    4. For patients aged 40-74: Has the patient had a mammogram within the past 2 years? Yes - no Care Gap present      5. For patients aged 21-65: Has the patient had a pap smear? hysterectomy  
pain, and evaluation including stress test negative. Felt to likely represent exacerbation of GERD.  Prescribed Carafate tablets and patient stating that taking only as needed.  Underwent upper endoscopy by Dr. Parson on 12/30/2021 showing possible salmon-colored mucosa at the GE junction, and several nonbleeding benign fundic gastric polyps (pathology: squamous and gastric mucosa with no pathologic abnormality).  Continues on omeprazole 40 mg daily and will use Carafate only as needed.  Reports good control of symptoms today.  11. Chronic constipation. Most likely related to prior use of narcotics. Improved with weaning. No longer taking Linzess and appears to be doing well.   12. Bilateral adnexal cysts. Patient is s/p BSO in 2002 and found to have endometriosis. Now with CT scan (5/2019) showing masses in the area of her prior ovaries. Pelvic ultrasound inadequate, and recommended transvaginal ultrasound.  However, patient not wishing to pursue since does not feel to be a problem.   13. Osteoarthritis. Patient with multiple areas of arthritis contributing to her chronic pain. However, weaned from narcotics, and discontinued Percocet and MS Contin.  Reports increasing neck and right shoulder pain which prompted ED evaluation on 3/5/2023.  She was prescribed cyclobenzaprine, Percocet, and lidocaine patches and completed physical therapy with improvement.  14. Obesity.  Weight remaining overall stable over the last year and attributes to GLP-1 agonist treatment to Ozempic. Emphasized importance of continued attempts at lifestyle modifications, including heart healthy low carbohydrate diet, regular exercise, and weight loss. Will continue to address.  15. Health maintenance.  Completed three doses of the Moderna COVID-19 vaccine series, a fourth Pfizer dose given immunosuppressed status, and the updated bivalent Pfizer booster dose. Received 1/2 doses of Shingrix vaccine.  Requested that she provide documentation so 
BACK PAIN

## 2024-07-14 ENCOUNTER — TELEPHONE (OUTPATIENT)
Facility: CLINIC | Age: 69
End: 2024-07-14

## 2024-07-14 PROBLEM — E11.319 TYPE 2 DIABETES MELLITUS WITH RETINOPATHY, WITHOUT LONG-TERM CURRENT USE OF INSULIN (HCC): Status: ACTIVE | Noted: 2024-07-14

## 2024-07-14 RX ORDER — ESCITALOPRAM OXALATE 10 MG/1
10 TABLET ORAL DAILY
Qty: 90 TABLET | Refills: 1 | Status: SHIPPED | OUTPATIENT
Start: 2024-07-14

## 2024-07-27 RX ORDER — ERGOCALCIFEROL 1.25 MG/1
CAPSULE ORAL
Qty: 12 CAPSULE | Refills: 3 | Status: SHIPPED | OUTPATIENT
Start: 2024-07-27

## 2024-08-01 DIAGNOSIS — E11.29 TYPE 2 DIABETES MELLITUS WITH MICROALBUMINURIA, WITHOUT LONG-TERM CURRENT USE OF INSULIN (HCC): ICD-10-CM

## 2024-08-01 DIAGNOSIS — R80.9 TYPE 2 DIABETES MELLITUS WITH MICROALBUMINURIA, WITHOUT LONG-TERM CURRENT USE OF INSULIN (HCC): ICD-10-CM

## 2024-08-10 RX ORDER — OMEPRAZOLE 40 MG/1
40 CAPSULE, DELAYED RELEASE ORAL DAILY
Qty: 90 CAPSULE | Refills: 3 | Status: SHIPPED | OUTPATIENT
Start: 2024-08-10

## 2024-08-28 RX ORDER — ONDANSETRON 4 MG/1
TABLET, ORALLY DISINTEGRATING ORAL
Qty: 30 TABLET | Refills: 0 | Status: SHIPPED | OUTPATIENT
Start: 2024-08-28

## 2024-10-04 RX ORDER — ALBUTEROL SULFATE 90 UG/1
4 INHALANT RESPIRATORY (INHALATION) PRN
Status: CANCELLED | OUTPATIENT
Start: 2024-10-11

## 2024-10-04 RX ORDER — EPINEPHRINE 1 MG/ML
0.3 INJECTION, SOLUTION, CONCENTRATE INTRAVENOUS PRN
Status: CANCELLED | OUTPATIENT
Start: 2024-10-11

## 2024-10-04 RX ORDER — ACETAMINOPHEN 325 MG/1
650 TABLET ORAL
Status: CANCELLED | OUTPATIENT
Start: 2024-10-11

## 2024-10-04 RX ORDER — ONDANSETRON 2 MG/ML
8 INJECTION INTRAMUSCULAR; INTRAVENOUS
Status: CANCELLED | OUTPATIENT
Start: 2024-10-11

## 2024-10-04 RX ORDER — HEPARIN 100 UNIT/ML
500 SYRINGE INTRAVENOUS PRN
Status: CANCELLED | OUTPATIENT
Start: 2024-10-11

## 2024-10-04 RX ORDER — SODIUM CHLORIDE 9 MG/ML
5-250 INJECTION, SOLUTION INTRAVENOUS PRN
Status: CANCELLED | OUTPATIENT
Start: 2024-10-11

## 2024-10-04 RX ORDER — SODIUM CHLORIDE 9 MG/ML
INJECTION, SOLUTION INTRAVENOUS CONTINUOUS
Status: CANCELLED | OUTPATIENT
Start: 2024-10-11

## 2024-10-04 RX ORDER — DIPHENHYDRAMINE HYDROCHLORIDE 50 MG/ML
50 INJECTION INTRAMUSCULAR; INTRAVENOUS
Status: CANCELLED | OUTPATIENT
Start: 2024-10-11

## 2024-10-11 ENCOUNTER — HOSPITAL ENCOUNTER (OUTPATIENT)
Facility: HOSPITAL | Age: 69
Setting detail: INFUSION SERIES
Discharge: HOME | End: 2024-10-14
Payer: COMMERCIAL

## 2024-10-11 VITALS
DIASTOLIC BLOOD PRESSURE: 97 MMHG | OXYGEN SATURATION: 98 % | WEIGHT: 170 LBS | HEIGHT: 62 IN | BODY MASS INDEX: 31.28 KG/M2 | RESPIRATION RATE: 16 BRPM | TEMPERATURE: 97.9 F | SYSTOLIC BLOOD PRESSURE: 153 MMHG | HEART RATE: 76 BPM

## 2024-10-11 DIAGNOSIS — M85.89 OSTEOPENIA OF MULTIPLE SITES: Primary | ICD-10-CM

## 2024-10-11 PROCEDURE — 2580000003 HC RX 258: Performed by: STUDENT IN AN ORGANIZED HEALTH CARE EDUCATION/TRAINING PROGRAM

## 2024-10-11 PROCEDURE — 6360000002 HC RX W HCPCS: Performed by: STUDENT IN AN ORGANIZED HEALTH CARE EDUCATION/TRAINING PROGRAM

## 2024-10-11 PROCEDURE — 96361 HYDRATE IV INFUSION ADD-ON: CPT

## 2024-10-11 PROCEDURE — 96365 THER/PROPH/DIAG IV INF INIT: CPT

## 2024-10-11 RX ORDER — SODIUM CHLORIDE 9 MG/ML
INJECTION, SOLUTION INTRAVENOUS CONTINUOUS
OUTPATIENT
Start: 2025-10-05

## 2024-10-11 RX ORDER — ACETAMINOPHEN 325 MG/1
650 TABLET ORAL ONCE
Status: DISCONTINUED | OUTPATIENT
Start: 2024-10-11 | End: 2024-10-15 | Stop reason: HOSPADM

## 2024-10-11 RX ORDER — HEPARIN 100 UNIT/ML
500 SYRINGE INTRAVENOUS PRN
OUTPATIENT
Start: 2025-10-05

## 2024-10-11 RX ORDER — SODIUM CHLORIDE 9 MG/ML
INJECTION, SOLUTION INTRAVENOUS ONCE
Status: COMPLETED | OUTPATIENT
Start: 2024-10-11 | End: 2024-10-11

## 2024-10-11 RX ORDER — ALBUTEROL SULFATE 90 UG/1
4 INHALANT RESPIRATORY (INHALATION) PRN
OUTPATIENT
Start: 2025-10-05

## 2024-10-11 RX ORDER — ONDANSETRON 2 MG/ML
8 INJECTION INTRAMUSCULAR; INTRAVENOUS
OUTPATIENT
Start: 2025-10-05

## 2024-10-11 RX ORDER — SODIUM CHLORIDE 0.9 % (FLUSH) 0.9 %
5-40 SYRINGE (ML) INJECTION PRN
OUTPATIENT
Start: 2025-10-05

## 2024-10-11 RX ORDER — ACETAMINOPHEN 325 MG/1
650 TABLET ORAL ONCE
Status: CANCELLED | OUTPATIENT
Start: 2025-10-05 | End: 2025-10-05

## 2024-10-11 RX ORDER — SODIUM CHLORIDE 9 MG/ML
5-250 INJECTION, SOLUTION INTRAVENOUS PRN
OUTPATIENT
Start: 2025-10-05

## 2024-10-11 RX ORDER — EPINEPHRINE 1 MG/ML
0.3 INJECTION, SOLUTION, CONCENTRATE INTRAVENOUS PRN
OUTPATIENT
Start: 2025-10-05

## 2024-10-11 RX ORDER — SODIUM CHLORIDE 0.9 % (FLUSH) 0.9 %
5-40 SYRINGE (ML) INJECTION PRN
Status: ACTIVE | OUTPATIENT
Start: 2024-10-11 | End: 2024-10-12

## 2024-10-11 RX ORDER — ZOLEDRONIC ACID 0.05 MG/ML
5 INJECTION, SOLUTION INTRAVENOUS ONCE
Status: CANCELLED | OUTPATIENT
Start: 2025-10-05 | End: 2025-10-05

## 2024-10-11 RX ORDER — DIPHENHYDRAMINE HYDROCHLORIDE 50 MG/ML
50 INJECTION INTRAMUSCULAR; INTRAVENOUS
OUTPATIENT
Start: 2025-10-05

## 2024-10-11 RX ORDER — SODIUM CHLORIDE 9 MG/ML
5-250 INJECTION, SOLUTION INTRAVENOUS PRN
Status: ACTIVE | OUTPATIENT
Start: 2024-10-11 | End: 2024-10-12

## 2024-10-11 RX ORDER — ZOLEDRONIC ACID 0.05 MG/ML
5 INJECTION, SOLUTION INTRAVENOUS ONCE
Status: COMPLETED | OUTPATIENT
Start: 2024-10-11 | End: 2024-10-11

## 2024-10-11 RX ORDER — ACETAMINOPHEN 325 MG/1
650 TABLET ORAL
OUTPATIENT
Start: 2025-10-05

## 2024-10-11 RX ADMIN — SODIUM CHLORIDE 100 ML: 9 INJECTION, SOLUTION INTRAVENOUS at 12:25

## 2024-10-11 RX ADMIN — ZOLEDRONIC ACID 5 MG: 0.05 INJECTION, SOLUTION INTRAVENOUS at 13:00

## 2024-10-11 RX ADMIN — SODIUM CHLORIDE, PRESERVATIVE FREE 10 ML: 5 INJECTION INTRAVENOUS at 11:20

## 2024-10-11 RX ADMIN — SODIUM CHLORIDE 500 ML: 9 INJECTION, SOLUTION INTRAVENOUS at 11:20

## 2024-10-12 NOTE — PROGRESS NOTES
[facility] Progress Note    Date: 2024    Name: Janessa Guerrero    MRN: [id number]         : 1955      Ms. Guerrero arrived to [facility] at 1015, ambulatory and in stable condition. She is accompanied by her . She is here for her first dose of RECLAST 5 MG, YEARLY. She has Osteopenia with high risk of fracture.    Ms. Guerrero was assessed and education was provided.     Both verbal and written education material provided to patient, regarding Reclast. Discussed actions, side effects and adverse reactions, and patient's questions were answered to her satisfaction. She expressed understanding of all information. Patient Education Signature Sheet was completed and scanned into patient's EMR.    Ms. Guerrero's vitals were reviewed.  Vitals:    10/11/24 1355   BP: (!) 153/97   Pulse: 76   Resp: 16   Temp: 97.9 °F (36.6 °C)   SpO2: 98%     WEIGHT TODAY  LBS ( 77 KG).    Patient declines any C/O today.    Her pre-Reclast labs from 24 were reviewed and revealed a normal calcium level of 9.6, and a normal creatinine level of 1.1.    Calculated Creatinine Clearance was 57.8 mL/min., using a measured weight of 77 kg, which ia WNL and satisfactory for treatment today.    #24 ga PIV was inserted in her right dorsal lateral wrist @ 1120. Venipuncture was difficult due to skin & vein toughness from years of Prednisone. Brisk blood return obtained and PIV flushed easily with NS.    500 mL Normal Saline hung to infuse over 1 hour as ordered to be given prior to Reclast dose @ 1120.    Following the NS hydration, RECLAST 5 MG HUNG @ 1300, OVER 20 MIN, without incident.     After completion of the IV RECLAST infusion, the patient was observed in [facility] for a 30-minute period of observation, just as a precaution. The primary NS bag (100 mL) infused at a KVO rate during this time.Following the observation period, the PIV was removed intact and a gauze & coban dressing was applied to IV site.    TYLENOL 650 MG PO

## 2024-12-02 RX ORDER — SEMAGLUTIDE 1.34 MG/ML
1 INJECTION, SOLUTION SUBCUTANEOUS
Qty: 9 ML | Refills: 1 | Status: SHIPPED | OUTPATIENT
Start: 2024-12-02

## 2024-12-02 NOTE — TELEPHONE ENCOUNTER
Refill request via fax     Medication:   Semaglutide  Quantity: 9ml  Pharmacy:   [facility] [address]      Last Fill: 6/15/24    PCP: Ananya Figueredo MD    LAST OFFICE VISIT: 7/10/2024      Future Appointments   Date Time Provider Department Center   10/10/2025 10:00 AM [facility] INFUSION NURSE 4 [facility]

## 2024-12-02 NOTE — TELEPHONE ENCOUNTER
PCP: Ananya Figueredo MD    LAST OFFICE VISIT: 07/10/2024    LAST REFILL PER CHART:  Medication:Semaglutide, 1 MG/DOSE, (OZEMPIC, 1 MG/DOSE,) 4 MG/3ML SOPN sc injection   Ordered On:06/15/2024  Instructions: INJECT 1 MG SUBCUTANEOUSLY ONCE A WEEK   Dispense:9 mL  Refills:1      Future Appointments   Date Time Provider Department Center   10/10/2025 10:00 AM [facility]

## 2024-12-02 NOTE — TELEPHONE ENCOUNTER
Pt  called asking if prescription for   Ozempic could be resent to   [facility]

## 2024-12-03 ENCOUNTER — TELEPHONE (OUTPATIENT)
Facility: CLINIC | Age: 69
End: 2024-12-03

## 2024-12-03 DIAGNOSIS — E11.29 TYPE 2 DIABETES MELLITUS WITH MICROALBUMINURIA (HCC): Primary | ICD-10-CM

## 2024-12-03 DIAGNOSIS — E11.319 TYPE 2 DIABETES MELLITUS WITH RETINOPATHY OF BOTH EYES, WITHOUT LONG-TERM CURRENT USE OF INSULIN, MACULAR EDEMA PRESENCE UNSPECIFIED, UNSPECIFIED RETINOPATHY SEVERITY (HCC): ICD-10-CM

## 2024-12-03 DIAGNOSIS — R80.9 TYPE 2 DIABETES MELLITUS WITH MICROALBUMINURIA (HCC): Primary | ICD-10-CM

## 2024-12-03 NOTE — TELEPHONE ENCOUNTER
Patients  called the office asking if we can put in an rx for [employer] as her insurance will now cover it. [facility] [address]

## 2024-12-04 RX ORDER — ACYCLOVIR 800 MG/1
TABLET ORAL
Qty: 6 EACH | Refills: 4 | Status: SHIPPED | OUTPATIENT
Start: 2024-12-04

## 2025-02-07 ENCOUNTER — TELEPHONE (OUTPATIENT)
Facility: CLINIC | Age: 70
End: 2025-02-07

## 2025-02-07 NOTE — TELEPHONE ENCOUNTER
The patient is scheduled for right cataract surgery with Dr. Willis on 03/04/25. She needs surgical clearance. Please advise what day we can fit her in.  Thanks

## 2025-02-27 ENCOUNTER — OFFICE VISIT (OUTPATIENT)
Facility: CLINIC | Age: 70
End: 2025-02-27
Payer: COMMERCIAL

## 2025-02-27 ENCOUNTER — TELEPHONE (OUTPATIENT)
Facility: CLINIC | Age: 70
End: 2025-02-27

## 2025-02-27 VITALS
RESPIRATION RATE: 14 BRPM | SYSTOLIC BLOOD PRESSURE: 146 MMHG | WEIGHT: 173 LBS | DIASTOLIC BLOOD PRESSURE: 80 MMHG | HEART RATE: 71 BPM | OXYGEN SATURATION: 94 % | BODY MASS INDEX: 31.83 KG/M2 | TEMPERATURE: 98.4 F | HEIGHT: 62 IN

## 2025-02-27 DIAGNOSIS — E21.3 HYPERPARATHYROIDISM: ICD-10-CM

## 2025-02-27 DIAGNOSIS — E11.29 TYPE 2 DIABETES MELLITUS WITH MICROALBUMINURIA, WITHOUT LONG-TERM CURRENT USE OF INSULIN (HCC): ICD-10-CM

## 2025-02-27 DIAGNOSIS — Z01.818 PREOPERATIVE EVALUATION TO RULE OUT SURGICAL CONTRAINDICATION: Primary | ICD-10-CM

## 2025-02-27 DIAGNOSIS — I10 ESSENTIAL HYPERTENSION: ICD-10-CM

## 2025-02-27 DIAGNOSIS — E78.2 MIXED HYPERLIPIDEMIA: ICD-10-CM

## 2025-02-27 DIAGNOSIS — D84.9 IMMUNOSUPPRESSED STATUS: ICD-10-CM

## 2025-02-27 DIAGNOSIS — R80.9 TYPE 2 DIABETES MELLITUS WITH MICROALBUMINURIA, WITHOUT LONG-TERM CURRENT USE OF INSULIN (HCC): ICD-10-CM

## 2025-02-27 DIAGNOSIS — I50.32 CHRONIC DIASTOLIC HEART FAILURE (HCC): ICD-10-CM

## 2025-02-27 DIAGNOSIS — E11.319 TYPE 2 DIABETES MELLITUS WITH RETINOPATHY OF BOTH EYES, WITHOUT LONG-TERM CURRENT USE OF INSULIN, MACULAR EDEMA PRESENCE UNSPECIFIED, UNSPECIFIED RETINOPATHY SEVERITY (HCC): ICD-10-CM

## 2025-02-27 DIAGNOSIS — Z94.0 KIDNEY TRANSPLANT STATUS, LIVING RELATED DONOR: ICD-10-CM

## 2025-02-27 DIAGNOSIS — M85.89 OSTEOPENIA OF MULTIPLE SITES: ICD-10-CM

## 2025-02-27 PROCEDURE — 1126F AMNT PAIN NOTED NONE PRSNT: CPT | Performed by: INTERNAL MEDICINE

## 2025-02-27 PROCEDURE — 1123F ACP DISCUSS/DSCN MKR DOCD: CPT | Performed by: INTERNAL MEDICINE

## 2025-02-27 PROCEDURE — 99215 OFFICE O/P EST HI 40 MIN: CPT | Performed by: INTERNAL MEDICINE

## 2025-02-27 PROCEDURE — 3077F SYST BP >= 140 MM HG: CPT | Performed by: INTERNAL MEDICINE

## 2025-02-27 PROCEDURE — 3079F DIAST BP 80-89 MM HG: CPT | Performed by: INTERNAL MEDICINE

## 2025-02-27 RX ORDER — ROSUVASTATIN CALCIUM 5 MG/1
5 TABLET, COATED ORAL DAILY
COMMUNITY

## 2025-02-27 RX ORDER — OMEPRAZOLE 40 MG/1
40 CAPSULE, DELAYED RELEASE ORAL DAILY PRN
Qty: 90 CAPSULE | Refills: 3 | Status: SHIPPED | OUTPATIENT
Start: 2025-02-27

## 2025-02-27 SDOH — ECONOMIC STABILITY: FOOD INSECURITY: WITHIN THE PAST 12 MONTHS, THE FOOD YOU BOUGHT JUST DIDN'T LAST AND YOU DIDN'T HAVE MONEY TO GET MORE.: NEVER TRUE

## 2025-02-27 SDOH — ECONOMIC STABILITY: FOOD INSECURITY: WITHIN THE PAST 12 MONTHS, YOU WORRIED THAT YOUR FOOD WOULD RUN OUT BEFORE YOU GOT MONEY TO BUY MORE.: NEVER TRUE

## 2025-02-27 ASSESSMENT — PATIENT HEALTH QUESTIONNAIRE - PHQ9
SUM OF ALL RESPONSES TO PHQ9 QUESTIONS 1 & 2: 0
8. MOVING OR SPEAKING SO SLOWLY THAT OTHER PEOPLE COULD HAVE NOTICED. OR THE OPPOSITE, BEING SO FIGETY OR RESTLESS THAT YOU HAVE BEEN MOVING AROUND A LOT MORE THAN USUAL: NOT AT ALL
7. TROUBLE CONCENTRATING ON THINGS, SUCH AS READING THE NEWSPAPER OR WATCHING TELEVISION: NOT AT ALL
SUM OF ALL RESPONSES TO PHQ QUESTIONS 1-9: 0
6. FEELING BAD ABOUT YOURSELF - OR THAT YOU ARE A FAILURE OR HAVE LET YOURSELF OR YOUR FAMILY DOWN: NOT AT ALL
2. FEELING DOWN, DEPRESSED OR HOPELESS: NOT AT ALL
3. TROUBLE FALLING OR STAYING ASLEEP: NOT AT ALL
SUM OF ALL RESPONSES TO PHQ QUESTIONS 1-9: 0
10. IF YOU CHECKED OFF ANY PROBLEMS, HOW DIFFICULT HAVE THESE PROBLEMS MADE IT FOR YOU TO DO YOUR WORK, TAKE CARE OF THINGS AT HOME, OR GET ALONG WITH OTHER PEOPLE: NOT DIFFICULT AT ALL
SUM OF ALL RESPONSES TO PHQ QUESTIONS 1-9: 0
4. FEELING TIRED OR HAVING LITTLE ENERGY: NOT AT ALL
9. THOUGHTS THAT YOU WOULD BE BETTER OFF DEAD, OR OF HURTING YOURSELF: NOT AT ALL
1. LITTLE INTEREST OR PLEASURE IN DOING THINGS: NOT AT ALL
SUM OF ALL RESPONSES TO PHQ QUESTIONS 1-9: 0
5. POOR APPETITE OR OVEREATING: NOT AT ALL

## 2025-02-27 NOTE — TELEPHONE ENCOUNTER
Amara from [facility] requested medical clearance to be faxed to her at [phone] Attn: Amara. Pt's preop appt is today. If any questions, Amara's number is [phone] ext [phone]. Please advise.

## 2025-02-27 NOTE — PROGRESS NOTES
HPI:   Janessa Guerrero is a 69 y.o. year old female who presents today for preoperative evaluation. She has a history of hypertension, hyperlipidemia, chronic diastolic heart failure, diabetes mellitus, ESRD s/p living donor cadaveric renal transplant (4/1980), osteopenia, osteoarthritis, lumbar degenerative disease, chronic pain syndrome, squamous cell skin cancers, depression, anxiety, GERD, and noncompliance. She reports today that she is doing reasonably well.  She is accompanied by her .        She states that she completed an eye exam with Dr. Willis and was found to have advanced bilateral cataracts as well as diabetic retinopathy and a left detached retina.  She states that it was recommended that she undergo cataract surgery first and she completed left cataract surgery on 7/22/2024.  She states that she did not proceed with right cataract surgery following her left cataract surgery as planned due to complications following her left eye surgery.  She reports today that she underwent evaluation by Dr. Koehler and was told that her left retina was not detached but rather was \"wrinkled\" and no further surgery was needed.  She states that she is now planning to proceed with right cataract surgery on 3/4/2025 after which she will require surgery on her right eyelid.  She denies any change in exercise tolerance today.    She reports that she is continuing to follow regularly at the [facility] renal transplant clinic with Dr. Costello and continues on prednisone 5 mg daily and cyclosporine.  She completed a renal ultrasound on 7/20/2023 and a small non-obstructing calculus was noted in the left lower quadrant transplant kidney.  However, repeat renal ultrasound (4/5/2024) showed no nephrolithiasis or hydronephrosis in the left transplant kidney so she decided not to proceed with urology referral. She states that she also was referred to endocrinology at [facility] for management of her history of osteopenia. She

## 2025-02-28 ENCOUNTER — TELEPHONE (OUTPATIENT)
Facility: CLINIC | Age: 70
End: 2025-02-28

## 2025-02-28 NOTE — TELEPHONE ENCOUNTER
Dr. Willis's office called and asked that we addend the clearance note for the anesthesia to be changed from local to block mac anesthesia.     Telp# [phone]  Fax# [phone]

## 2025-03-12 ENCOUNTER — OFFICE VISIT (OUTPATIENT)
Facility: CLINIC | Age: 70
End: 2025-03-12
Payer: COMMERCIAL

## 2025-03-12 VITALS
BODY MASS INDEX: 31.65 KG/M2 | WEIGHT: 172 LBS | SYSTOLIC BLOOD PRESSURE: 138 MMHG | DIASTOLIC BLOOD PRESSURE: 78 MMHG | HEIGHT: 62 IN | OXYGEN SATURATION: 95 % | TEMPERATURE: 98.2 F | HEART RATE: 80 BPM | RESPIRATION RATE: 16 BRPM

## 2025-03-12 DIAGNOSIS — E78.2 MIXED HYPERLIPIDEMIA: ICD-10-CM

## 2025-03-12 DIAGNOSIS — D84.9 IMMUNOSUPPRESSED STATUS: ICD-10-CM

## 2025-03-12 DIAGNOSIS — I10 ESSENTIAL HYPERTENSION: ICD-10-CM

## 2025-03-12 DIAGNOSIS — Z01.818 PREOPERATIVE EVALUATION TO RULE OUT SURGICAL CONTRAINDICATION: Primary | ICD-10-CM

## 2025-03-12 DIAGNOSIS — Z94.0 KIDNEY TRANSPLANT STATUS, LIVING RELATED DONOR: ICD-10-CM

## 2025-03-12 DIAGNOSIS — E11.319 TYPE 2 DIABETES MELLITUS WITH RETINOPATHY OF BOTH EYES, WITHOUT LONG-TERM CURRENT USE OF INSULIN, MACULAR EDEMA PRESENCE UNSPECIFIED, UNSPECIFIED RETINOPATHY SEVERITY (HCC): ICD-10-CM

## 2025-03-12 DIAGNOSIS — E66.811 CLASS 1 OBESITY DUE TO EXCESS CALORIES WITH SERIOUS COMORBIDITY AND BODY MASS INDEX (BMI) OF 31.0 TO 31.9 IN ADULT: ICD-10-CM

## 2025-03-12 DIAGNOSIS — E66.09 CLASS 1 OBESITY DUE TO EXCESS CALORIES WITH SERIOUS COMORBIDITY AND BODY MASS INDEX (BMI) OF 31.0 TO 31.9 IN ADULT: ICD-10-CM

## 2025-03-12 DIAGNOSIS — I50.32 CHRONIC DIASTOLIC HEART FAILURE (HCC): ICD-10-CM

## 2025-03-12 PROCEDURE — 3078F DIAST BP <80 MM HG: CPT | Performed by: INTERNAL MEDICINE

## 2025-03-12 PROCEDURE — 1123F ACP DISCUSS/DSCN MKR DOCD: CPT | Performed by: INTERNAL MEDICINE

## 2025-03-12 PROCEDURE — 99214 OFFICE O/P EST MOD 30 MIN: CPT | Performed by: INTERNAL MEDICINE

## 2025-03-12 PROCEDURE — 93000 ELECTROCARDIOGRAM COMPLETE: CPT | Performed by: INTERNAL MEDICINE

## 2025-03-12 PROCEDURE — 1126F AMNT PAIN NOTED NONE PRSNT: CPT | Performed by: INTERNAL MEDICINE

## 2025-03-12 PROCEDURE — 3075F SYST BP GE 130 - 139MM HG: CPT | Performed by: INTERNAL MEDICINE

## 2025-03-12 NOTE — PROGRESS NOTES
Janessa Guerrero presents today for   Chief Complaint   Patient presents with    Pre-op Exam       \"Have you been to the ER, urgent care clinic since your last visit?  Hospitalized since your last visit?\"    NO    “Have you seen or consulted any other health care providers outside of [facility] since your last visit?”    NO    “Have you had a colorectal cancer screening such as a colonoscopy/FIT/Cologuard?    NO    Date of last Colonoscopy: 12/30/2021  No cologuard on file  No FIT/FOBT on file   No flexible sigmoidoscopy on file        Have you had a mammogram?”   NO    Date of last Mammogram: 10/26/2021

## 2025-03-12 NOTE — PROGRESS NOTES
HPI:   Janessa Guerrero is a 69 y.o. year old female who presents today for preoperative evaluation. She has a history of hypertension, hyperlipidemia, chronic diastolic heart failure, diabetes mellitus, ESRD s/p living donor cadaveric renal transplant (4/1980), osteopenia, osteoarthritis, lumbar degenerative disease, chronic pain syndrome, squamous cell skin cancers, depression, anxiety, GERD, and noncompliance. She reports today that she is doing reasonably well.  She is accompanied by her .        She reported that she completed an eye exam with Dr. Willis and was found to have advanced bilateral cataracts as well as diabetic retinopathy and a left detached retina. She stated that it was recommended that she undergo cataract surgery first and she completed left cataract surgery on 7/22/2024.  She states that she did not proceed with right cataract surgery as planned due to complications following her left eye surgery.  She underwent evaluation by Dr. Koehler and was told that her left retina was not detached but rather was \"wrinkled\" and no further surgery was needed. She was last seen on 2/27/2025 for preoperative evaluation with plans to proceed with right cataract surgery on 3/4/2025.  However, surgery was aborted since she awoke from MAC anesthesia prior to the procedure being performed.  It was recommended that her right cataract surgery be rescheduled and performed under general anesthesia.  She presents today for preoperative evaluation and denies any change in exercise tolerance.      Other recent issues:  She reports that she is continuing to follow regularly at the [facility] renal transplant clinic with Dr. Costello and continues on prednisone 5 mg daily and cyclosporine.  She completed a renal ultrasound on 7/20/2023 and a small non-obstructing calculus was noted in the left lower quadrant transplant kidney.  However, repeat renal ultrasound (4/5/2024) showed no nephrolithiasis or hydronephrosis in

## 2025-03-14 ENCOUNTER — TELEPHONE (OUTPATIENT)
Facility: CLINIC | Age: 70
End: 2025-03-14

## 2025-03-14 PROBLEM — E66.09 CLASS 1 OBESITY DUE TO EXCESS CALORIES WITH SERIOUS COMORBIDITY IN ADULT: Status: ACTIVE | Noted: 2025-03-14

## 2025-03-14 PROBLEM — E66.811 CLASS 1 OBESITY DUE TO EXCESS CALORIES WITH SERIOUS COMORBIDITY IN ADULT: Status: ACTIVE | Noted: 2025-03-14

## 2025-03-14 NOTE — TELEPHONE ENCOUNTER
Please fax office note for preoperative evaluation to Dr. Willis for upcoming right cataract surgery under general anesthesia.

## 2025-03-21 ENCOUNTER — HOSPITAL ENCOUNTER (OUTPATIENT)
Facility: HOSPITAL | Age: 70
Discharge: HOME | End: 2025-03-24
Payer: COMMERCIAL

## 2025-03-21 LAB
ANION GAP SERPL CALC-SCNC: 8 MMOL/L (ref 3–18)
BUN SERPL-MCNC: 15 MG/DL (ref 7–18)
BUN/CREAT SERPL: 13 (ref 12–20)
CALCIUM SERPL-MCNC: 9.5 MG/DL (ref 8.5–10.1)
CHLORIDE SERPL-SCNC: 106 MMOL/L (ref 100–111)
CO2 SERPL-SCNC: 24 MMOL/L (ref 21–32)
CREAT SERPL-MCNC: 1.15 MG/DL (ref 0.6–1.3)
ERYTHROCYTE [DISTWIDTH] IN BLOOD BY AUTOMATED COUNT: 13.5 % (ref 11.6–14.5)
GLUCOSE SERPL-MCNC: 108 MG/DL (ref 74–99)
HCT VFR BLD AUTO: 41.8 % (ref 35–45)
HGB BLD-MCNC: 13 G/DL (ref 12–16)
MCH RBC QN AUTO: 29.8 PG (ref 24–34)
MCHC RBC AUTO-ENTMCNC: 31.1 G/DL (ref 31–37)
MCV RBC AUTO: 95.9 FL (ref 78–100)
NRBC # BLD: 0 K/UL (ref 0–0.01)
NRBC BLD-RTO: 0 PER 100 WBC
PLATELET # BLD AUTO: 315 K/UL (ref 135–420)
PMV BLD AUTO: 9.5 FL (ref 9.2–11.8)
POTASSIUM SERPL-SCNC: 4.6 MMOL/L (ref 3.5–5.5)
RBC # BLD AUTO: 4.36 M/UL (ref 4.2–5.3)
SODIUM SERPL-SCNC: 138 MMOL/L (ref 136–145)
WBC # BLD AUTO: 9.1 K/UL (ref 4.6–13.2)

## 2025-03-21 PROCEDURE — 80048 BASIC METABOLIC PNL TOTAL CA: CPT

## 2025-03-21 PROCEDURE — 85027 COMPLETE CBC AUTOMATED: CPT

## 2025-03-21 PROCEDURE — 36415 COLL VENOUS BLD VENIPUNCTURE: CPT

## 2025-04-24 ENCOUNTER — OFFICE VISIT (OUTPATIENT)
Facility: CLINIC | Age: 70
End: 2025-04-24

## 2025-04-24 ENCOUNTER — HOSPITAL ENCOUNTER (OUTPATIENT)
Facility: HOSPITAL | Age: 70
Discharge: HOME | End: 2025-04-27
Payer: COMMERCIAL

## 2025-04-24 VITALS
HEART RATE: 88 BPM | RESPIRATION RATE: 16 BRPM | SYSTOLIC BLOOD PRESSURE: 138 MMHG | TEMPERATURE: 98.9 F | DIASTOLIC BLOOD PRESSURE: 78 MMHG | HEIGHT: 62 IN | BODY MASS INDEX: 31.47 KG/M2 | WEIGHT: 171 LBS | OXYGEN SATURATION: 96 %

## 2025-04-24 DIAGNOSIS — I10 ESSENTIAL HYPERTENSION: ICD-10-CM

## 2025-04-24 DIAGNOSIS — E11.29 TYPE 2 DIABETES MELLITUS WITH MICROALBUMINURIA, WITHOUT LONG-TERM CURRENT USE OF INSULIN (HCC): ICD-10-CM

## 2025-04-24 DIAGNOSIS — R80.9 TYPE 2 DIABETES MELLITUS WITH MICROALBUMINURIA, WITHOUT LONG-TERM CURRENT USE OF INSULIN (HCC): ICD-10-CM

## 2025-04-24 DIAGNOSIS — E66.811 CLASS 1 OBESITY DUE TO EXCESS CALORIES WITH SERIOUS COMORBIDITY AND BODY MASS INDEX (BMI) OF 31.0 TO 31.9 IN ADULT: ICD-10-CM

## 2025-04-24 DIAGNOSIS — D84.9 IMMUNOSUPPRESSED STATUS: ICD-10-CM

## 2025-04-24 DIAGNOSIS — Z12.31 SCREENING MAMMOGRAM FOR BREAST CANCER: ICD-10-CM

## 2025-04-24 DIAGNOSIS — Z94.0 KIDNEY TRANSPLANT STATUS, LIVING RELATED DONOR: ICD-10-CM

## 2025-04-24 DIAGNOSIS — Z00.00 ENCOUNTER FOR ROUTINE HISTORY AND PHYSICAL EXAM IN FEMALE: Primary | ICD-10-CM

## 2025-04-24 DIAGNOSIS — E78.2 MIXED HYPERLIPIDEMIA: ICD-10-CM

## 2025-04-24 DIAGNOSIS — M85.89 OSTEOPENIA OF MULTIPLE SITES: ICD-10-CM

## 2025-04-24 DIAGNOSIS — N18.31 STAGE 3A CHRONIC KIDNEY DISEASE (HCC): ICD-10-CM

## 2025-04-24 DIAGNOSIS — E21.3 HYPERPARATHYROIDISM: ICD-10-CM

## 2025-04-24 DIAGNOSIS — R80.9 ALBUMINURIA: ICD-10-CM

## 2025-04-24 DIAGNOSIS — E11.319 TYPE 2 DIABETES MELLITUS WITH RETINOPATHY OF BOTH EYES, WITHOUT LONG-TERM CURRENT USE OF INSULIN, MACULAR EDEMA PRESENCE UNSPECIFIED, UNSPECIFIED RETINOPATHY SEVERITY (HCC): ICD-10-CM

## 2025-04-24 DIAGNOSIS — Z91.199 NONCOMPLIANCE: ICD-10-CM

## 2025-04-24 DIAGNOSIS — E66.09 CLASS 1 OBESITY DUE TO EXCESS CALORIES WITH SERIOUS COMORBIDITY AND BODY MASS INDEX (BMI) OF 31.0 TO 31.9 IN ADULT: ICD-10-CM

## 2025-04-24 DIAGNOSIS — F41.1 GAD (GENERALIZED ANXIETY DISORDER): ICD-10-CM

## 2025-04-24 DIAGNOSIS — Z12.11 COLON CANCER SCREENING: ICD-10-CM

## 2025-04-24 LAB
25(OH)D3 SERPL-MCNC: 54.1 NG/ML (ref 30–100)
ALBUMIN SERPL-MCNC: 4 G/DL (ref 3.4–5)
ALBUMIN/GLOB SERPL: 1.1 (ref 0.8–1.7)
ALP SERPL-CCNC: 56 U/L (ref 45–117)
ALT SERPL-CCNC: 33 U/L (ref 13–56)
ANION GAP SERPL CALC-SCNC: 5 MMOL/L (ref 3–18)
AST SERPL-CCNC: 21 U/L (ref 10–38)
BASOPHILS # BLD: 0.09 K/UL (ref 0–0.1)
BASOPHILS NFR BLD: 0.9 % (ref 0–2)
BILIRUB SERPL-MCNC: 0.4 MG/DL (ref 0.2–1)
BUN SERPL-MCNC: 14 MG/DL (ref 7–18)
BUN/CREAT SERPL: 13 (ref 12–20)
CALCIUM SERPL-MCNC: 9.7 MG/DL (ref 8.5–10.1)
CHLORIDE SERPL-SCNC: 105 MMOL/L (ref 100–111)
CHOLEST SERPL-MCNC: 293 MG/DL
CO2 SERPL-SCNC: 25 MMOL/L (ref 21–32)
CREAT SERPL-MCNC: 1.07 MG/DL (ref 0.6–1.3)
CREAT UR-MCNC: 63 MG/DL (ref 30–125)
DIFFERENTIAL METHOD BLD: ABNORMAL
EOSINOPHIL # BLD: 0.3 K/UL (ref 0–0.4)
EOSINOPHIL NFR BLD: 3.1 % (ref 0–5)
ERYTHROCYTE [DISTWIDTH] IN BLOOD BY AUTOMATED COUNT: 13.2 % (ref 11.6–14.5)
EST. AVERAGE GLUCOSE BLD GHB EST-MCNC: 123 MG/DL
GLOBULIN SER CALC-MCNC: 3.5 G/DL (ref 2–4)
GLUCOSE SERPL-MCNC: 95 MG/DL (ref 74–99)
HBA1C MFR BLD: 5.9 % (ref 4.2–5.6)
HCT VFR BLD AUTO: 40.4 % (ref 35–45)
HDLC SERPL-MCNC: 45 MG/DL (ref 40–60)
HDLC SERPL: 6.5 (ref 0–5)
HGB BLD-MCNC: 13 G/DL (ref 12–16)
IMM GRANULOCYTES # BLD AUTO: 0.05 K/UL (ref 0–0.04)
IMM GRANULOCYTES NFR BLD AUTO: 0.5 % (ref 0–0.5)
LDLC SERPL CALC-MCNC: ABNORMAL MG/DL (ref 0–100)
LIPID PANEL: ABNORMAL
LYMPHOCYTES # BLD: 2.98 K/UL (ref 0.9–3.6)
LYMPHOCYTES NFR BLD: 30.6 % (ref 21–52)
MAGNESIUM SERPL-MCNC: 2 MG/DL (ref 1.6–2.6)
MCH RBC QN AUTO: 30.7 PG (ref 24–34)
MCHC RBC AUTO-ENTMCNC: 32.2 G/DL (ref 31–37)
MCV RBC AUTO: 95.5 FL (ref 78–100)
MICROALBUMIN UR-MCNC: 7.51 MG/DL (ref 0–3)
MICROALBUMIN/CREAT UR-RTO: 119 MG/G (ref 0–30)
MONOCYTES # BLD: 0.97 K/UL (ref 0.05–1.2)
MONOCYTES NFR BLD: 10 % (ref 3–10)
NEUTS SEG # BLD: 5.34 K/UL (ref 1.8–8)
NEUTS SEG NFR BLD: 54.9 % (ref 40–73)
NRBC # BLD: 0 K/UL (ref 0–0.01)
NRBC BLD-RTO: 0 PER 100 WBC
PLATELET # BLD AUTO: 308 K/UL (ref 135–420)
PMV BLD AUTO: 9.3 FL (ref 9.2–11.8)
POTASSIUM SERPL-SCNC: 4.1 MMOL/L (ref 3.5–5.5)
PROT SERPL-MCNC: 7.5 G/DL (ref 6.4–8.2)
RBC # BLD AUTO: 4.23 M/UL (ref 4.2–5.3)
SODIUM SERPL-SCNC: 135 MMOL/L (ref 136–145)
TRIGL SERPL-MCNC: 415 MG/DL
VLDLC SERPL CALC-MCNC: ABNORMAL MG/DL
WBC # BLD AUTO: 9.7 K/UL (ref 4.6–13.2)

## 2025-04-24 PROCEDURE — 85025 COMPLETE CBC W/AUTO DIFF WBC: CPT

## 2025-04-24 PROCEDURE — 36415 COLL VENOUS BLD VENIPUNCTURE: CPT

## 2025-04-24 PROCEDURE — 83735 ASSAY OF MAGNESIUM: CPT

## 2025-04-24 PROCEDURE — 82570 ASSAY OF URINE CREATININE: CPT

## 2025-04-24 PROCEDURE — 82306 VITAMIN D 25 HYDROXY: CPT

## 2025-04-24 PROCEDURE — 83036 HEMOGLOBIN GLYCOSYLATED A1C: CPT

## 2025-04-24 PROCEDURE — 80053 COMPREHEN METABOLIC PANEL: CPT

## 2025-04-24 PROCEDURE — 82043 UR ALBUMIN QUANTITATIVE: CPT

## 2025-04-24 PROCEDURE — 80061 LIPID PANEL: CPT

## 2025-04-24 RX ORDER — HYDROXYZINE HYDROCHLORIDE 25 MG/1
25 TABLET, FILM COATED ORAL NIGHTLY
Qty: 30 TABLET | Refills: 0 | Status: SHIPPED | OUTPATIENT
Start: 2025-04-24 | End: 2025-05-24

## 2025-04-24 RX ORDER — ONDANSETRON 4 MG/1
4 TABLET, ORALLY DISINTEGRATING ORAL EVERY 8 HOURS PRN
Qty: 60 TABLET | Refills: 0 | Status: SHIPPED | OUTPATIENT
Start: 2025-04-24

## 2025-04-24 NOTE — PROGRESS NOTES
Janessa Guerrero presents today for   Chief Complaint   Patient presents with    Follow-up       \"Have you been to the ER, urgent care clinic since your last visit?  Hospitalized since your last visit?\"    NO    “Have you seen or consulted any other health care providers outside of  since your last visit?”    NO    “Have you had a colorectal cancer screening such as a colonoscopy/FIT/Cologuard?    NO    Date of last Colonoscopy: 12/30/2021  No cologuard on file  No FIT/FOBT on file   No flexible sigmoidoscopy on file        Have you had a mammogram?”   NO    Date of last Mammogram: 10/26/2021          
Immature Granulocytes % 04/24/2025 0.5  0.0 - 0.5 % Final    Neutrophils Absolute 04/24/2025 5.34  1.80 - 8.00 K/UL Final    Lymphocytes Absolute 04/24/2025 2.98  0.90 - 3.60 K/UL Final    Monocytes Absolute 04/24/2025 0.97  0.05 - 1.20 K/UL Final    Eosinophils Absolute 04/24/2025 0.30  0.00 - 0.40 K/UL Final    Basophils Absolute 04/24/2025 0.09  0.00 - 0.10 K/UL Final    Immature Granulocytes Absolute 04/24/2025 0.05 (H)  0.00 - 0.04 K/UL Final    Differential Type 04/24/2025 AUTOMATED    Final    Sodium 04/24/2025 135 (L)  136 - 145 mmol/L Final    Potassium 04/24/2025 4.1  3.5 - 5.5 mmol/L Final    Chloride 04/24/2025 105  100 - 111 mmol/L Final    CO2 04/24/2025 25  21 - 32 mmol/L Final    Anion Gap 04/24/2025 5  3.0 - 18 mmol/L Final    Glucose 04/24/2025 95  74 - 99 mg/dL Final    BUN 04/24/2025 14  7.0 - 18 MG/DL Final    Creatinine 04/24/2025 1.07  0.6 - 1.3 MG/DL Final    BUN/Creatinine Ratio 04/24/2025 13  12 - 20   Final    Est, Glom Filt Rate 04/24/2025 56 (L)  >60 ml/min/1.73m2 Final    Calcium 04/24/2025 9.7  8.5 - 10.1 MG/DL Final    Total Bilirubin 04/24/2025 0.4  0.2 - 1.0 MG/DL Final    ALT 04/24/2025 33  13 - 56 U/L Final    AST 04/24/2025 21  10 - 38 U/L Final    Alk Phosphatase 04/24/2025 56  45 - 117 U/L Final    Total Protein 04/24/2025 7.5  6.4 - 8.2 g/dL Final    Albumin 04/24/2025 4.0  3.4 - 5.0 g/dL Final    Globulin 04/24/2025 3.5  2.0 - 4.0 g/dL Final    Albumin/Globulin Ratio 04/24/2025 1.1  0.8 - 1.7   Final    LIPID PANEL 04/24/2025      Final    Cholesterol, Total 04/24/2025 293 (H)  <200 MG/DL Final    Triglycerides 04/24/2025 415 (H)  <150 MG/DL Final    HDL 04/24/2025 45  40 - 60 MG/DL Final    LDL Cholesterol 04/24/2025 LDL AND VLDL CHOLESTEROL NOT CALCULATED WHEN TRIGLYCERIDES >400 MG/DL OR HDL CHOLESTEROL <20 MG/DL  0 - 100 MG/DL Final    VLDL Cholesterol Calculated 04/24/2025 Calculation not valid with this patient's other Lipid values.  MG/DL Final    Chol/HDL Ratio

## 2025-04-24 NOTE — PATIENT INSTRUCTIONS
Heart-Healthy Diet: Care Instructions  Your Care Instructions     A heart-healthy diet has lots of vegetables, fruits, nuts, beans, and whole grains, and is low in salt. It limits foods that are high in saturated fat, such as meats, cheeses, and fried foods. It may be hard to change your diet, but even small changes can lower your risk of heart attack and heart disease.  Follow-up care is a key part of your treatment and safety. Be sure to make and go to all appointments, and call your doctor if you are having problems. It's also a good idea to know your test results and keep a list of the medicines you take.  How can you care for yourself at home?  Watch your portions  Learn what a serving is. A \"serving\" and a \"portion\" are not always the same thing. Make sure that you are not eating larger portions than are recommended. For example, a serving of pasta is ½ cup. A serving size of meat is 2 to 3 ounces. A 3-ounce serving is about the size of a deck of cards. Measure serving sizes until you are good at \"eyeballing\" them. Keep in mind that restaurants often serve portions that are 2 or 3 times the size of one serving.  To keep your energy level up and keep you from feeling hungry, eat often but in smaller portions.  Eat only the number of calories you need to stay at a healthy weight. If you need to lose weight, eat fewer calories than your body burns (through exercise and other physical activity).  Eat more fruits and vegetables  Eat a variety of fruit and vegetables every day. Dark green, deep orange, red, or yellow fruits and vegetables are especially good for you. Examples include spinach, carrots, peaches, and berries.  Keep carrots, celery, and other veggies handy for snacks. Buy fruit that is in season and store it where you can see it so that you will be tempted to eat it.  Cook dishes that have a lot of veggies in them, such as stir-fries and soups.  Limit saturated and trans fat  Read food labels, and try 
Patient seen and evaluated as noted above, agree with patient care plan

## 2025-04-25 RX ORDER — EZETIMIBE 10 MG/1
10 TABLET ORAL DAILY
Qty: 90 TABLET | Refills: 3 | Status: SHIPPED | OUTPATIENT
Start: 2025-04-25

## 2025-04-25 RX ORDER — ROSUVASTATIN CALCIUM 5 MG/1
5 TABLET, COATED ORAL DAILY
Qty: 90 TABLET | Refills: 3 | Status: SHIPPED | OUTPATIENT
Start: 2025-04-25

## 2025-04-27 PROBLEM — E55.9 VITAMIN D DEFICIENCY, UNSPECIFIED: Status: RESOLVED | Noted: 2021-02-28 | Resolved: 2025-04-27

## 2025-04-27 PROBLEM — I50.32 CHRONIC DIASTOLIC HEART FAILURE (HCC): Status: RESOLVED | Noted: 2018-08-13 | Resolved: 2025-04-27

## 2025-04-27 PROBLEM — Z91.199 NONCOMPLIANCE: Status: ACTIVE | Noted: 2018-11-10

## 2025-04-27 PROBLEM — R80.9 ALBUMINURIA: Status: ACTIVE | Noted: 2025-04-27

## 2025-04-27 PROBLEM — N18.30 STAGE 3 CHRONIC KIDNEY DISEASE (HCC): Status: ACTIVE | Noted: 2025-04-27

## 2025-05-13 DIAGNOSIS — E11.29 TYPE 2 DIABETES MELLITUS WITH MICROALBUMINURIA (HCC): Primary | ICD-10-CM

## 2025-05-13 DIAGNOSIS — R80.9 TYPE 2 DIABETES MELLITUS WITH MICROALBUMINURIA (HCC): Primary | ICD-10-CM

## 2025-05-13 DIAGNOSIS — E66.09 CLASS 1 OBESITY DUE TO EXCESS CALORIES WITH SERIOUS COMORBIDITY AND BODY MASS INDEX (BMI) OF 31.0 TO 31.9 IN ADULT: ICD-10-CM

## 2025-05-13 DIAGNOSIS — E66.811 CLASS 1 OBESITY DUE TO EXCESS CALORIES WITH SERIOUS COMORBIDITY AND BODY MASS INDEX (BMI) OF 31.0 TO 31.9 IN ADULT: ICD-10-CM

## 2025-05-13 DIAGNOSIS — E11.319 TYPE 2 DIABETES MELLITUS WITH RETINOPATHY OF BOTH EYES, WITHOUT LONG-TERM CURRENT USE OF INSULIN, MACULAR EDEMA PRESENCE UNSPECIFIED, UNSPECIFIED RETINOPATHY SEVERITY (HCC): ICD-10-CM

## 2025-05-13 RX ORDER — SEMAGLUTIDE 1.34 MG/ML
INJECTION, SOLUTION SUBCUTANEOUS
Qty: 9 ML | Refills: 1 | Status: SHIPPED | OUTPATIENT
Start: 2025-05-13

## 2025-05-13 NOTE — TELEPHONE ENCOUNTER
PCP: Ananya Figueredo MD    LAST OFFICE VISIT: 04/24/2025    LAST REFILL PER CHART:  Medication:Semaglutide, 1 MG/DOSE, (OZEMPIC, 1 MG/DOSE,) 4 MG/3ML SOPN sc injection   Ordered On:12/02/2024  Instructions:Inject 1 mg into the skin every 7 days   Dispense:9mL  Refills:1    Future Appointments   Date Time Provider Department Center   10/10/2025 10:00 AM [facility] INFUSION NURSE 4 [facility]   10/16/2025  9:30 AM [facility] LAB VISIT [facility] DEP   10/23/2025 10:30 AM Ananya Figueredo MD [facility] DEP

## 2025-05-21 ENCOUNTER — APPOINTMENT (OUTPATIENT)
Age: 70
End: 2025-05-21
Attending: EMERGENCY MEDICINE
Payer: COMMERCIAL

## 2025-05-21 ENCOUNTER — HOSPITAL ENCOUNTER (EMERGENCY)
Age: 70
Discharge: HOME | End: 2025-05-21
Attending: EMERGENCY MEDICINE
Payer: COMMERCIAL

## 2025-05-21 VITALS
RESPIRATION RATE: 16 BRPM | TEMPERATURE: 98.4 F | DIASTOLIC BLOOD PRESSURE: 89 MMHG | SYSTOLIC BLOOD PRESSURE: 175 MMHG | OXYGEN SATURATION: 99 % | BODY MASS INDEX: 30.36 KG/M2 | HEIGHT: 62 IN | WEIGHT: 165 LBS | HEART RATE: 61 BPM

## 2025-05-21 DIAGNOSIS — M54.10 RADICULOPATHY AFFECTING UPPER EXTREMITY: Primary | ICD-10-CM

## 2025-05-21 PROCEDURE — 99283 EMERGENCY DEPT VISIT LOW MDM: CPT

## 2025-05-21 PROCEDURE — 73030 X-RAY EXAM OF SHOULDER: CPT

## 2025-05-21 PROCEDURE — 6370000000 HC RX 637 (ALT 250 FOR IP): Performed by: EMERGENCY MEDICINE

## 2025-05-21 RX ORDER — AMLODIPINE BESYLATE 5 MG/1
5 TABLET ORAL
Status: COMPLETED | OUTPATIENT
Start: 2025-05-21 | End: 2025-05-21

## 2025-05-21 RX ORDER — OXYCODONE AND ACETAMINOPHEN 5; 325 MG/1; MG/1
1 TABLET ORAL EVERY 6 HOURS PRN
Qty: 12 TABLET | Refills: 0 | Status: SHIPPED | OUTPATIENT
Start: 2025-05-21 | End: 2025-05-24

## 2025-05-21 RX ORDER — OXYCODONE AND ACETAMINOPHEN 5; 325 MG/1; MG/1
1 TABLET ORAL
Refills: 0 | Status: COMPLETED | OUTPATIENT
Start: 2025-05-21 | End: 2025-05-21

## 2025-05-21 RX ADMIN — OXYCODONE AND ACETAMINOPHEN 1 TABLET: 5; 325 TABLET ORAL at 21:05

## 2025-05-21 RX ADMIN — AMLODIPINE BESYLATE 5 MG: 5 TABLET ORAL at 22:10

## 2025-05-21 ASSESSMENT — PAIN DESCRIPTION - ORIENTATION
ORIENTATION: LEFT
ORIENTATION: LEFT;RIGHT

## 2025-05-21 ASSESSMENT — LIFESTYLE VARIABLES
HOW OFTEN DO YOU HAVE A DRINK CONTAINING ALCOHOL: MONTHLY OR LESS
HOW MANY STANDARD DRINKS CONTAINING ALCOHOL DO YOU HAVE ON A TYPICAL DAY: 1 OR 2

## 2025-05-21 ASSESSMENT — PAIN DESCRIPTION - LOCATION
LOCATION: ARM
LOCATION: ARM

## 2025-05-21 ASSESSMENT — PAIN SCALES - GENERAL: PAINLEVEL_OUTOF10: 10

## 2025-05-21 ASSESSMENT — PAIN - FUNCTIONAL ASSESSMENT: PAIN_FUNCTIONAL_ASSESSMENT: 0-10

## 2025-05-21 ASSESSMENT — PAIN DESCRIPTION - DESCRIPTORS: DESCRIPTORS: TINGLING;SHARP

## 2025-05-21 NOTE — ED TRIAGE NOTES
Patient arrives ambulatory to triage with c/o bilateral arm pain worse on left x a couple weeks. 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 No obvious deformity present.

## 2025-05-22 NOTE — ED PROVIDER NOTES
Ibuprofen, Iodinated contrast media, Metoprolol, Nsaids, Sirolimus, and Doxycycline    FAMILY HISTORY       Family History   Problem Relation Age of Onset    Liver Disease Sister     Osteoarthritis Sister     Osteoarthritis Mother     High Cholesterol Mother     Lung Disease Sister     Alcohol Abuse Father     Osteoarthritis Brother     Osteoarthritis Father     Hypertension Father     Heart Disease Father     Cancer Maternal Grandmother         breast          SOCIAL HISTORY       Social History     Socioeconomic History    Marital status:    Tobacco Use    Smoking status: Former     Current packs/day: 0.00     Types: Cigarettes     Quit date: 1987     Years since quittin.4    Smokeless tobacco: Never   Substance and Sexual Activity    Alcohol use: No    Drug use: Not Currently     Types: Marijuana (Weed)     Social Drivers of Health     Financial Resource Strain: Low Risk  (7/10/2024)    Overall Financial Resource Strain (CARDIA)     Difficulty of Paying Living Expenses: Not hard at all   Food Insecurity: No Food Insecurity (2025)    Hunger Vital Sign     Worried About Running Out of Food in the Last Year: Never true     Ran Out of Food in the Last Year: Never true   Transportation Needs: No Transportation Needs (2025)    PRAPARE - Transportation     Lack of Transportation (Medical): No     Lack of Transportation (Non-Medical): No   Housing Stability: Low Risk  (2025)    Housing Stability Vital Sign     Unable to Pay for Housing in the Last Year: No     Number of Times Moved in the Last Year: 0     Homeless in the Last Year: No         PHYSICAL EXAM       ED Triage Vitals [250]   BP Systolic BP Percentile Diastolic BP Percentile Temp Temp Source Pulse Respirations SpO2   (!) 193/103 -- -- 98.4 °F (36.9 °C) Oral 71 16 99 %      Height Weight - Scale         1.575 m (5' 2\") 74.8 kg (165 lb)             Physical Exam  Constitutional:       Appearance: Normal appearance.

## 2025-05-22 NOTE — ED NOTES
Patient's B/P is elevated, patient states she didn't take her dose of antihypertensive today. Dr. Brewer made aware. Will give patient her dose. Patient denies other complaints at this time. Patient informed will monitor her B/P.

## 2025-06-03 ENCOUNTER — OFFICE VISIT (OUTPATIENT)
Age: 70
End: 2025-06-03
Payer: COMMERCIAL

## 2025-06-03 VITALS — HEIGHT: 62 IN | WEIGHT: 173.8 LBS | BODY MASS INDEX: 31.98 KG/M2

## 2025-06-03 DIAGNOSIS — R20.2 PARESTHESIA AND PAIN OF LEFT EXTREMITY: Primary | ICD-10-CM

## 2025-06-03 DIAGNOSIS — M79.609 PARESTHESIA AND PAIN OF LEFT EXTREMITY: Primary | ICD-10-CM

## 2025-06-03 DIAGNOSIS — M50.30 DDD (DEGENERATIVE DISC DISEASE), CERVICAL: ICD-10-CM

## 2025-06-03 DIAGNOSIS — M54.12 CERVICAL RADICULOPATHY: ICD-10-CM

## 2025-06-03 PROCEDURE — 99203 OFFICE O/P NEW LOW 30 MIN: CPT | Performed by: NURSE PRACTITIONER

## 2025-06-03 PROCEDURE — 1123F ACP DISCUSS/DSCN MKR DOCD: CPT | Performed by: NURSE PRACTITIONER

## 2025-06-03 PROCEDURE — 1159F MED LIST DOCD IN RCRD: CPT | Performed by: NURSE PRACTITIONER

## 2025-06-03 PROCEDURE — 1125F AMNT PAIN NOTED PAIN PRSNT: CPT | Performed by: NURSE PRACTITIONER

## 2025-06-03 PROCEDURE — 72040 X-RAY EXAM NECK SPINE 2-3 VW: CPT | Performed by: NURSE PRACTITIONER

## 2025-06-03 PROCEDURE — 1160F RVW MEDS BY RX/DR IN RCRD: CPT | Performed by: NURSE PRACTITIONER

## 2025-06-03 RX ORDER — METHYLPREDNISOLONE 4 MG/1
TABLET ORAL
Qty: 1 KIT | Refills: 0 | Status: SHIPPED | OUTPATIENT
Start: 2025-06-03

## 2025-06-03 RX ORDER — TOPIRAMATE 25 MG/1
25 TABLET, FILM COATED ORAL NIGHTLY
Qty: 30 TABLET | Refills: 0 | Status: SHIPPED | OUTPATIENT
Start: 2025-06-03

## 2025-06-03 NOTE — PROGRESS NOTES
Janessa Guerrero  1955   Chief Complaint   Patient presents with    Shoulder Pain     Left   Numbness & tingling down the arm        HISTORY OF PRESENT ILLNESS  Janessa Guerrero is a 69 y.o. female who presents today for evaluation of left shoulder pain. Pain started 15 days ago. She did go to the ED for this 5/22/2025 and received a prescription for vicodin which she is almost out of. She reports an injury 2 years ago but nothing recent. She feels like there is a tight blood pressure cuff on the arm and does get numbness and tingling. Pain is a 8/10. She has a history of renal transplant.  Has tried following treatments: Injections:No; Brace:No; Therapy:No; Cane/Crutch:No      Allergies   Allergen Reactions    Aspirin Other (See Comments)    Atorvastatin Myalgia     Had muscle aches and pains    Beta Adrenergic Blockers Other (See Comments)     Sent into [facility].      Ibuprofen Other (See Comments)     Renal transplant patient    Iodinated Contrast Media Other (See Comments)     Unable to take due to Kidney Transplant    Metoprolol Other (See Comments)     Asthma    Nsaids      Other reaction(s): Unknown (comments)    Sirolimus Other (See Comments)     pneumonitis    Doxycycline Rash        Past Medical History:   Diagnosis Date    Adverse effect of anesthesia     pulmonary issues during surgery    Calculus of kidney     Cancer (HCC)     skin-LT leg    Chronic diastolic heart failure (HCC) 8/13/2018    Chronic pain syndrome 7/16/2013    Constipation due to pain medication 4/8/2016    Degeneration of lumbar or lumbosacral intervertebral disc     Depression     Diabetes mellitus (HCC)     Essential hypertension 8/13/2018    TYSON (generalized anxiety disorder) 4/29/2015    H/O renal failure 8/13/2018    Secondary to glomerulonephritis.  S/P living related donor transplant in 4/1980    Heart failure (HCC)     x 3    History of depression 12/12/2017    Hypercholesterolemia     Hyperlipidemia 8/13/2018    Kidney

## 2025-06-20 ENCOUNTER — TELEPHONE (OUTPATIENT)
Facility: HOSPITAL | Age: 70
End: 2025-06-20

## 2025-06-20 RX ORDER — ERGOCALCIFEROL 1.25 MG/1
50000 CAPSULE, LIQUID FILLED ORAL
Qty: 12 CAPSULE | Refills: 0 | Status: SHIPPED | OUTPATIENT
Start: 2025-06-20

## 2025-06-20 NOTE — TELEPHONE ENCOUNTER
PCP: Ananya Figueredo MD    LAST OFFICE VISIT: 04/24/2025    LAST REFILL PER CHART:  Medication:vitamin D (ERGOCALCIFEROL) 1.25 MG (31343 UT) CAPS capsule   Ordered On:07/27/2024  Instructions:TAKE 1 CAPSULE BY MOUTH EVERY 7 DAYS   Dispense:12 capsules  Refills:3    Future Appointments   Date Time Provider Department Center   6/23/2025 11:50 AM Kalyn Argueta, PT [id number]   10/10/2025 10:00 AM [facility]ION NURSE 4 [facility]   10/16/2025  9:30 [facility]   10/23/2025 10:30 AM [facility]

## 2025-07-16 ENCOUNTER — HOSPITAL ENCOUNTER (EMERGENCY)
Age: 70
Discharge: HOME | End: 2025-07-16
Payer: MEDICARE

## 2025-07-16 ENCOUNTER — APPOINTMENT (OUTPATIENT)
Age: 70
End: 2025-07-16
Payer: MEDICARE

## 2025-07-16 VITALS
RESPIRATION RATE: 16 BRPM | OXYGEN SATURATION: 98 % | TEMPERATURE: 97.9 F | WEIGHT: 166 LBS | DIASTOLIC BLOOD PRESSURE: 84 MMHG | HEIGHT: 63 IN | BODY MASS INDEX: 29.41 KG/M2 | HEART RATE: 76 BPM | SYSTOLIC BLOOD PRESSURE: 179 MMHG

## 2025-07-16 DIAGNOSIS — M54.12 CERVICAL RADICULOPATHY: Primary | ICD-10-CM

## 2025-07-16 LAB
ALBUMIN SERPL-MCNC: 4.1 G/DL (ref 3.4–5)
ALBUMIN/GLOB SERPL: 1.3 (ref 1–1.9)
ALP SERPL-CCNC: 48 U/L (ref 45–117)
ALT SERPL-CCNC: 27 U/L (ref 10–35)
ANION GAP SERPL CALC-SCNC: 15 MMOL/L (ref 7–16)
AST SERPL-CCNC: 25 U/L (ref 10–38)
BASOPHILS # BLD: 0.07 K/UL (ref 0–0.1)
BASOPHILS NFR BLD: 0.7 % (ref 0–2)
BILIRUB SERPL-MCNC: <0.2 MG/DL (ref 0.2–1)
BUN SERPL-MCNC: 14 MG/DL (ref 6–23)
BUN/CREAT SERPL: 11
CALCIUM SERPL-MCNC: 9.3 MG/DL (ref 8.5–10.1)
CHLORIDE SERPL-SCNC: 103 MMOL/L (ref 98–107)
CO2 SERPL-SCNC: 22 MMOL/L (ref 21–32)
CREAT SERPL-MCNC: 1.2 MG/DL (ref 0.6–1.3)
DIFFERENTIAL METHOD BLD: ABNORMAL
EKG ATRIAL RATE: 70 BPM
EKG DIAGNOSIS: NORMAL
EKG P AXIS: 61 DEGREES
EKG P-R INTERVAL: 172 MS
EKG Q-T INTERVAL: 382 MS
EKG QRS DURATION: 82 MS
EKG QTC CALCULATION (BAZETT): 412 MS
EKG R AXIS: 34 DEGREES
EKG T AXIS: 43 DEGREES
EKG VENTRICULAR RATE: 70 BPM
EOSINOPHIL # BLD: 0.18 K/UL (ref 0–0.4)
EOSINOPHIL NFR BLD: 1.7 % (ref 0–5)
ERYTHROCYTE [DISTWIDTH] IN BLOOD BY AUTOMATED COUNT: 13.4 % (ref 11.6–14.5)
GLOBULIN SER CALC-MCNC: 3 G/DL (ref 2.3–3.5)
GLUCOSE BLD STRIP.AUTO-MCNC: 101 MG/DL (ref 70–110)
GLUCOSE SERPL-MCNC: 105 MG/DL (ref 74–108)
HCT VFR BLD AUTO: 37.5 % (ref 35–45)
HGB BLD-MCNC: 12.2 G/DL (ref 12–16)
IMM GRANULOCYTES # BLD AUTO: 0.04 K/UL (ref 0–0.04)
IMM GRANULOCYTES NFR BLD AUTO: 0.4 % (ref 0–0.5)
LYMPHOCYTES # BLD: 2.66 K/UL (ref 0.9–3.6)
LYMPHOCYTES NFR BLD: 25.1 % (ref 21–52)
MCH RBC QN AUTO: 30.3 PG (ref 24–34)
MCHC RBC AUTO-ENTMCNC: 32.5 G/DL (ref 31–37)
MCV RBC AUTO: 93.3 FL (ref 78–100)
MONOCYTES # BLD: 0.94 K/UL (ref 0.05–1.2)
MONOCYTES NFR BLD: 8.9 % (ref 3–10)
NEUTS SEG # BLD: 6.69 K/UL (ref 1.8–8)
NEUTS SEG NFR BLD: 63.2 % (ref 40–73)
NRBC # BLD: 0 K/UL (ref 0–0.01)
NRBC BLD-RTO: 0 PER 100 WBC
NT PRO BNP: 228 PG/ML (ref 36–900)
PLATELET # BLD AUTO: 282 K/UL (ref 135–420)
PMV BLD AUTO: 8.4 FL (ref 9.2–11.8)
POTASSIUM SERPL-SCNC: 3.6 MMOL/L (ref 3.5–5.5)
PROT SERPL-MCNC: 7.1 G/DL (ref 6.4–8.2)
RBC # BLD AUTO: 4.02 M/UL (ref 4.2–5.3)
SODIUM SERPL-SCNC: 139 MMOL/L (ref 136–145)
TROPONIN T SERPL HS-MCNC: 18.7 NG/L (ref 0–14)
TROPONIN T SERPL HS-MCNC: 18.9 NG/L (ref 0–14)
WBC # BLD AUTO: 10.6 K/UL (ref 4.6–13.2)

## 2025-07-16 PROCEDURE — 73030 X-RAY EXAM OF SHOULDER: CPT

## 2025-07-16 PROCEDURE — 99285 EMERGENCY DEPT VISIT HI MDM: CPT

## 2025-07-16 PROCEDURE — 93005 ELECTROCARDIOGRAM TRACING: CPT

## 2025-07-16 PROCEDURE — 83880 ASSAY OF NATRIURETIC PEPTIDE: CPT

## 2025-07-16 PROCEDURE — 71046 X-RAY EXAM CHEST 2 VIEWS: CPT

## 2025-07-16 PROCEDURE — 82962 GLUCOSE BLOOD TEST: CPT

## 2025-07-16 PROCEDURE — 80053 COMPREHEN METABOLIC PANEL: CPT

## 2025-07-16 PROCEDURE — 6360000002 HC RX W HCPCS: Performed by: EMERGENCY MEDICINE

## 2025-07-16 PROCEDURE — 85025 COMPLETE CBC W/AUTO DIFF WBC: CPT

## 2025-07-16 PROCEDURE — 96374 THER/PROPH/DIAG INJ IV PUSH: CPT

## 2025-07-16 PROCEDURE — 84484 ASSAY OF TROPONIN QUANT: CPT

## 2025-07-16 RX ORDER — PREDNISONE 10 MG/1
TABLET ORAL
Qty: 21 EACH | Refills: 1 | Status: SHIPPED | OUTPATIENT
Start: 2025-07-16

## 2025-07-16 RX ORDER — ACETAMINOPHEN 325 MG/1
650 TABLET ORAL
Status: DISCONTINUED | OUTPATIENT
Start: 2025-07-16 | End: 2025-07-16 | Stop reason: HOSPADM

## 2025-07-16 RX ORDER — MORPHINE SULFATE 4 MG/ML
4 INJECTION, SOLUTION INTRAMUSCULAR; INTRAVENOUS
Status: COMPLETED | OUTPATIENT
Start: 2025-07-16 | End: 2025-07-16

## 2025-07-16 RX ORDER — ACETAMINOPHEN 500 MG
500 TABLET ORAL EVERY 6 HOURS PRN
Qty: 30 TABLET | Refills: 1 | Status: SHIPPED | OUTPATIENT
Start: 2025-07-16

## 2025-07-16 RX ORDER — TRAMADOL HYDROCHLORIDE 50 MG/1
50 TABLET ORAL EVERY 6 HOURS PRN
Qty: 18 TABLET | Refills: 0 | Status: SHIPPED | OUTPATIENT
Start: 2025-07-16 | End: 2025-07-21

## 2025-07-16 RX ADMIN — MORPHINE SULFATE 4 MG: 4 INJECTION, SOLUTION INTRAMUSCULAR; INTRAVENOUS at 15:45

## 2025-07-16 ASSESSMENT — PAIN - FUNCTIONAL ASSESSMENT: PAIN_FUNCTIONAL_ASSESSMENT: 0-10

## 2025-07-16 ASSESSMENT — PAIN SCALES - GENERAL: PAINLEVEL_OUTOF10: 10

## 2025-07-16 NOTE — ED TRIAGE NOTES
Ambulatory to triage with c/o chronic pain to left side of neck, left shoulder, and left arm x 3 months. \"I have an ortho Dr and they said I need surgery for a bone spur in my neck but I have to wait until my Medicare kicks in\". Denies changes to chronic pain \"I just can't take it anymore\".

## 2025-07-16 NOTE — ED PROVIDER NOTES
EMERGENCY DEPARTMENT HISTORY AND PHYSICAL EXAM      Date: 7/16/2025  Patient Name: Janessa Guerrero    History of Presenting Illness     Chief Complaint   Patient presents with    Neck Pain    Arm Pain       History (Context): Janessa Guerrero is a 69 y.o. female  presents to the ED today with chief complaint of persistent left arm upper arm and left upper back pain.  Was told that she had a pinched nerve by the spine Ortho doctor that she is seeing but her insurance will not pay for it or at least not much of it so she is trying to look for different insurances.  She says her insurance will pay more for it if she goes through several steps including physical therapy but her spine Ortho physician says that those are pointless.  So, she has been in more pain.  Says that the pain seems slightly different from her typical pain in that the pain seems more in the proximal arm and in the upper back.  Typically it is more in the neck radiating down into her hand.  History of heart failure.  Used to be seen by Dr. Figueredo.      PCP: Ananya Figueredo MD    No current facility-administered medications for this encounter.     Current Outpatient Medications   Medication Sig Dispense Refill    traMADol (ULTRAM) 50 MG tablet Take 1 tablet by mouth every 6 hours as needed for Pain for up to 5 days. Intended supply: 3 days. Take lowest dose possible to manage pain Max Daily Amount: 200 mg 18 tablet 0    acetaminophen (TYLENOL) 500 MG tablet Take 1 tablet by mouth every 6 hours as needed for Pain 30 tablet 1    predniSONE 10 MG (21) TBPK Take 6 tablets on day 1; take 5 tablets on day 2; take 4 tablets on day 3; take 3 tablets on day 4; take 2 tablets on day 5; take 1 tablet on day 6. 21 each 1    vitamin D (ERGOCALCIFEROL) 1.25 MG (80637 UT) CAPS capsule TAKE 1 CAPSULE BY MOUTH EVERY 7 DAYS 12 capsule 0    methylPREDNISolone (MEDROL DOSEPACK) 4 MG tablet Take per instructions 1 kit 0    topiramate (TOPAMAX) 25 MG tablet Take 1 tablet  Please call to get in tomorrow morning.  mh

## 2025-07-16 NOTE — ED NOTES
Pt states that she took BC Powder at 1130 AM and declines Tylenol at this time. Pt states that her spouse is coming to the ED.

## (undated) DEVICE — CANNULA ORIG TL CLR W FOAM CUSHIONS AND 14FT SUPL TB 3 CHN

## (undated) DEVICE — FLUFF AND POLYMER UNDERPAD,EXTRA HEAVY: Brand: WINGS

## (undated) DEVICE — GOWN ISOL IMPERV UNIV, DISP, OPEN BACK, BLUE --

## (undated) DEVICE — SYR 10ML LUER LOK 1/5ML GRAD --

## (undated) DEVICE — BITE BLOCK ENDOSCP UNIV AD 6 TO 9.4 MM

## (undated) DEVICE — GAUZE,SPONGE,4"X4",16PLY,STRL,LF,10/TRAY: Brand: MEDLINE

## (undated) DEVICE — MEDI-VAC SUCTION HIGH CAPACITY: Brand: CARDINAL HEALTH

## (undated) DEVICE — FLEX ADVANTAGE 3000CC: Brand: FLEX ADVANTAGE

## (undated) DEVICE — MEDI-VAC NON-CONDUCTIVE SUCTION TUBING: Brand: CARDINAL HEALTH

## (undated) DEVICE — CATHETER SUCT TR FL TIP 14FR W/ O CTRL

## (undated) DEVICE — SYR 50ML SLIP TIP NSAF LF STRL --

## (undated) DEVICE — SOLUTION IRRIG 1000ML H2O STRL BLT

## (undated) DEVICE — CANNULA NSL AD TBNG L14FT STD PVC O2 CRV CONN NONFLARED NSL

## (undated) DEVICE — BASIN EMESIS 500CC ROSE 250/CS 60/PLT: Brand: MEDEGEN MEDICAL PRODUCTS, LLC

## (undated) DEVICE — SYRINGE MED 25GA 3ML L5/8IN SUBQ PLAS W/ DETACH NDL SFTY

## (undated) DEVICE — ENDOSCOPY PUMP TUBING/ CAP SET: Brand: ERBE

## (undated) DEVICE — AIRLIFE™ NASAL OXYGEN CANNULA CURVED, FLARED TIP WITH 14 FOOT (4.3 M) CRUSH-RESISTANT TUBING, OVER-THE-EAR STYLE: Brand: AIRLIFE™

## (undated) DEVICE — SYR 20ML LL STRL LF --

## (undated) DEVICE — STERILE POLYISOPRENE POWDER-FREE SURGICAL GLOVES: Brand: PROTEXIS